# Patient Record
Sex: FEMALE | Race: BLACK OR AFRICAN AMERICAN | NOT HISPANIC OR LATINO | Employment: OTHER | URBAN - METROPOLITAN AREA
[De-identification: names, ages, dates, MRNs, and addresses within clinical notes are randomized per-mention and may not be internally consistent; named-entity substitution may affect disease eponyms.]

---

## 2018-02-10 ENCOUNTER — HOSPITAL ENCOUNTER (OUTPATIENT)
Facility: HOSPITAL | Age: 78
Setting detail: OBSERVATION
Discharge: HOME | End: 2018-02-12
Attending: EMERGENCY MEDICINE | Admitting: FAMILY MEDICINE
Payer: MEDICARE

## 2018-02-10 DIAGNOSIS — R56.9 SEIZURE (HCC): Primary | ICD-10-CM

## 2018-02-10 LAB
ALBUMIN SERPL BCP-MCNC: 3.5 G/DL (ref 3.5–5)
ALP SERPL-CCNC: 180 U/L (ref 46–116)
ALT SERPL W P-5'-P-CCNC: 39 U/L (ref 12–78)
ANION GAP SERPL CALCULATED.3IONS-SCNC: 10 MMOL/L (ref 4–13)
AST SERPL W P-5'-P-CCNC: 30 U/L (ref 5–45)
BASOPHILS # BLD AUTO: 0.01 THOUSANDS/ΜL (ref 0–0.1)
BASOPHILS NFR BLD AUTO: 0 % (ref 0–1)
BILIRUB SERPL-MCNC: 0.2 MG/DL (ref 0.2–1)
BUN SERPL-MCNC: 19 MG/DL (ref 5–25)
CALCIUM SERPL-MCNC: 8.7 MG/DL (ref 8.3–10.1)
CHLORIDE SERPL-SCNC: 103 MMOL/L (ref 100–108)
CO2 SERPL-SCNC: 29 MMOL/L (ref 21–32)
CREAT SERPL-MCNC: 0.86 MG/DL (ref 0.6–1.3)
EOSINOPHIL # BLD AUTO: 0.03 THOUSAND/ΜL (ref 0–0.61)
EOSINOPHIL NFR BLD AUTO: 0 % (ref 0–6)
ERYTHROCYTE [DISTWIDTH] IN BLOOD BY AUTOMATED COUNT: 14.7 % (ref 11.6–15.1)
GFR SERPL CREATININE-BSD FRML MDRD: 75 ML/MIN/1.73SQ M
GLUCOSE SERPL-MCNC: 143 MG/DL (ref 65–140)
HCT VFR BLD AUTO: 37 % (ref 34.8–46.1)
HGB BLD-MCNC: 11.5 G/DL (ref 11.5–15.4)
LYMPHOCYTES # BLD AUTO: 0.93 THOUSANDS/ΜL (ref 0.6–4.47)
LYMPHOCYTES NFR BLD AUTO: 13 % (ref 14–44)
MCH RBC QN AUTO: 28.8 PG (ref 26.8–34.3)
MCHC RBC AUTO-ENTMCNC: 31.1 G/DL (ref 31.4–37.4)
MCV RBC AUTO: 93 FL (ref 82–98)
MONOCYTES # BLD AUTO: 0.64 THOUSAND/ΜL (ref 0.17–1.22)
MONOCYTES NFR BLD AUTO: 9 % (ref 4–12)
NEUTROPHILS # BLD AUTO: 5.59 THOUSANDS/ΜL (ref 1.85–7.62)
NEUTS SEG NFR BLD AUTO: 77 % (ref 43–75)
NRBC BLD AUTO-RTO: 0 /100 WBCS
PLATELET # BLD AUTO: 142 THOUSANDS/UL (ref 149–390)
PMV BLD AUTO: 10.8 FL (ref 8.9–12.7)
POTASSIUM SERPL-SCNC: 3.6 MMOL/L (ref 3.5–5.3)
PROT SERPL-MCNC: 7.3 G/DL (ref 6.4–8.2)
RBC # BLD AUTO: 3.99 MILLION/UL (ref 3.81–5.12)
SODIUM SERPL-SCNC: 142 MMOL/L (ref 136–145)
TROPONIN I SERPL-MCNC: <0.02 NG/ML
WBC # BLD AUTO: 7.22 THOUSAND/UL (ref 4.31–10.16)

## 2018-02-10 PROCEDURE — 36415 COLL VENOUS BLD VENIPUNCTURE: CPT | Performed by: PHYSICIAN ASSISTANT

## 2018-02-10 PROCEDURE — 96374 THER/PROPH/DIAG INJ IV PUSH: CPT

## 2018-02-10 PROCEDURE — 80177 DRUG SCRN QUAN LEVETIRACETAM: CPT | Performed by: PHYSICIAN ASSISTANT

## 2018-02-10 PROCEDURE — 84484 ASSAY OF TROPONIN QUANT: CPT | Performed by: PHYSICIAN ASSISTANT

## 2018-02-10 PROCEDURE — 85025 COMPLETE CBC W/AUTO DIFF WBC: CPT | Performed by: PHYSICIAN ASSISTANT

## 2018-02-10 PROCEDURE — 80053 COMPREHEN METABOLIC PANEL: CPT | Performed by: PHYSICIAN ASSISTANT

## 2018-02-10 PROCEDURE — 93005 ELECTROCARDIOGRAM TRACING: CPT

## 2018-02-10 PROCEDURE — 80184 ASSAY OF PHENOBARBITAL: CPT | Performed by: PHYSICIAN ASSISTANT

## 2018-02-10 RX ORDER — LORAZEPAM 2 MG/ML
2 INJECTION INTRAMUSCULAR ONCE
Status: COMPLETED | OUTPATIENT
Start: 2018-02-10 | End: 2018-02-10

## 2018-02-10 RX ORDER — LEVETIRACETAM 500 MG/1
500 TABLET ORAL EVERY 12 HOURS SCHEDULED
COMMUNITY

## 2018-02-10 RX ORDER — PHENOBARBITAL 64.8 MG/1
64.8 TABLET ORAL 2 TIMES DAILY
COMMUNITY
End: 2022-05-11

## 2018-02-10 RX ADMIN — LORAZEPAM 2 MG: 2 INJECTION INTRAMUSCULAR; INTRAVENOUS at 23:18

## 2018-02-11 ENCOUNTER — APPOINTMENT (OUTPATIENT)
Dept: RADIOLOGY | Facility: HOSPITAL | Age: 78
End: 2018-02-11
Payer: MEDICARE

## 2018-02-11 ENCOUNTER — APPOINTMENT (OUTPATIENT)
Dept: CT IMAGING | Facility: HOSPITAL | Age: 78
End: 2018-02-11
Payer: MEDICARE

## 2018-02-11 PROBLEM — R05.9 COUGH: Status: ACTIVE | Noted: 2018-02-11

## 2018-02-11 PROBLEM — D69.6 THROMBOCYTOPENIA (HCC): Status: ACTIVE | Noted: 2018-02-11

## 2018-02-11 PROBLEM — J06.9 ACUTE UPPER RESPIRATORY INFECTION: Status: ACTIVE | Noted: 2018-02-11

## 2018-02-11 PROBLEM — R06.02 SHORTNESS OF BREATH: Status: ACTIVE | Noted: 2018-02-11

## 2018-02-11 PROBLEM — R56.9 SEIZURE (HCC): Status: ACTIVE | Noted: 2018-02-11

## 2018-02-11 PROBLEM — R23.9 ALTERATION IN SKIN INTEGRITY: Status: ACTIVE | Noted: 2018-02-11

## 2018-02-11 LAB
ANION GAP SERPL CALCULATED.3IONS-SCNC: 8 MMOL/L (ref 4–13)
ATRIAL RATE: 104 BPM
ATRIAL RATE: 88 BPM
BILIRUB UR QL STRIP: NEGATIVE
BUN SERPL-MCNC: 14 MG/DL (ref 5–25)
CALCIUM SERPL-MCNC: 8.7 MG/DL (ref 8.3–10.1)
CHLORIDE SERPL-SCNC: 102 MMOL/L (ref 100–108)
CLARITY UR: CLEAR
CO2 SERPL-SCNC: 24 MMOL/L (ref 21–32)
COLOR UR: YELLOW
CREAT SERPL-MCNC: 0.68 MG/DL (ref 0.6–1.3)
ERYTHROCYTE [DISTWIDTH] IN BLOOD BY AUTOMATED COUNT: 14.7 % (ref 11.6–15.1)
GFR SERPL CREATININE-BSD FRML MDRD: 98 ML/MIN/1.73SQ M
GLUCOSE P FAST SERPL-MCNC: 130 MG/DL (ref 65–99)
GLUCOSE SERPL-MCNC: 130 MG/DL (ref 65–140)
GLUCOSE UR STRIP-MCNC: NEGATIVE MG/DL
HCT VFR BLD AUTO: 39.5 % (ref 34.8–46.1)
HGB BLD-MCNC: 12 G/DL (ref 11.5–15.4)
HGB UR QL STRIP.AUTO: NEGATIVE
KETONES UR STRIP-MCNC: NEGATIVE MG/DL
LEUKOCYTE ESTERASE UR QL STRIP: NEGATIVE
MAGNESIUM SERPL-MCNC: 2.1 MG/DL (ref 1.6–2.6)
MCH RBC QN AUTO: 29.4 PG (ref 26.8–34.3)
MCHC RBC AUTO-ENTMCNC: 30.4 G/DL (ref 31.4–37.4)
MCV RBC AUTO: 97 FL (ref 82–98)
NITRITE UR QL STRIP: NEGATIVE
P AXIS: 27 DEGREES
P AXIS: 48 DEGREES
PH UR STRIP.AUTO: 7.5 [PH] (ref 4.5–8)
PHENOBARB SERPL-MCNC: 14.2 UG/ML (ref 15–40)
PLATELET # BLD AUTO: 135 THOUSANDS/UL (ref 149–390)
PMV BLD AUTO: 10.1 FL (ref 8.9–12.7)
POTASSIUM SERPL-SCNC: 4.5 MMOL/L (ref 3.5–5.3)
PR INTERVAL: 168 MS
PR INTERVAL: 192 MS
PROT UR STRIP-MCNC: NEGATIVE MG/DL
QRS AXIS: -3 DEGREES
QRS AXIS: 21 DEGREES
QRSD INTERVAL: 68 MS
QRSD INTERVAL: 78 MS
QT INTERVAL: 344 MS
QT INTERVAL: 368 MS
QTC INTERVAL: 445 MS
QTC INTERVAL: 452 MS
RBC # BLD AUTO: 4.08 MILLION/UL (ref 3.81–5.12)
SODIUM SERPL-SCNC: 134 MMOL/L (ref 136–145)
SP GR UR STRIP.AUTO: 1.01 (ref 1–1.03)
T WAVE AXIS: 58 DEGREES
T WAVE AXIS: 9 DEGREES
UROBILINOGEN UR QL STRIP.AUTO: 0.2 E.U./DL
VENTRICULAR RATE: 104 BPM
VENTRICULAR RATE: 88 BPM
WBC # BLD AUTO: 5.62 THOUSAND/UL (ref 4.31–10.16)

## 2018-02-11 PROCEDURE — 83735 ASSAY OF MAGNESIUM: CPT | Performed by: NURSE PRACTITIONER

## 2018-02-11 PROCEDURE — 99219 PR INITIAL OBSERVATION CARE/DAY 50 MINUTES: CPT | Performed by: NURSE PRACTITIONER

## 2018-02-11 PROCEDURE — 93005 ELECTROCARDIOGRAM TRACING: CPT

## 2018-02-11 PROCEDURE — 94640 AIRWAY INHALATION TREATMENT: CPT

## 2018-02-11 PROCEDURE — 94760 N-INVAS EAR/PLS OXIMETRY 1: CPT

## 2018-02-11 PROCEDURE — 70450 CT HEAD/BRAIN W/O DYE: CPT

## 2018-02-11 PROCEDURE — 99285 EMERGENCY DEPT VISIT HI MDM: CPT

## 2018-02-11 PROCEDURE — 93010 ELECTROCARDIOGRAM REPORT: CPT | Performed by: INTERNAL MEDICINE

## 2018-02-11 PROCEDURE — 85027 COMPLETE CBC AUTOMATED: CPT | Performed by: NURSE PRACTITIONER

## 2018-02-11 PROCEDURE — 80048 BASIC METABOLIC PNL TOTAL CA: CPT | Performed by: NURSE PRACTITIONER

## 2018-02-11 PROCEDURE — 81003 URINALYSIS AUTO W/O SCOPE: CPT | Performed by: PHYSICIAN ASSISTANT

## 2018-02-11 PROCEDURE — 71045 X-RAY EXAM CHEST 1 VIEW: CPT

## 2018-02-11 PROCEDURE — 99222 1ST HOSP IP/OBS MODERATE 55: CPT | Performed by: PSYCHIATRY & NEUROLOGY

## 2018-02-11 PROCEDURE — 94664 DEMO&/EVAL PT USE INHALER: CPT

## 2018-02-11 RX ORDER — METHYLPREDNISOLONE SODIUM SUCCINATE 40 MG/ML
20 INJECTION, POWDER, LYOPHILIZED, FOR SOLUTION INTRAMUSCULAR; INTRAVENOUS 3 TIMES DAILY
Status: DISCONTINUED | OUTPATIENT
Start: 2018-02-11 | End: 2018-02-12 | Stop reason: HOSPADM

## 2018-02-11 RX ORDER — ACETAMINOPHEN 325 MG/1
650 TABLET ORAL EVERY 6 HOURS PRN
Status: DISCONTINUED | OUTPATIENT
Start: 2018-02-11 | End: 2018-02-12 | Stop reason: HOSPADM

## 2018-02-11 RX ORDER — ATORVASTATIN CALCIUM 80 MG/1
80 TABLET, FILM COATED ORAL DAILY
COMMUNITY

## 2018-02-11 RX ORDER — OMEPRAZOLE 20 MG/1
20 CAPSULE, DELAYED RELEASE ORAL DAILY
COMMUNITY

## 2018-02-11 RX ORDER — HEPARIN SODIUM 5000 [USP'U]/ML
5000 INJECTION, SOLUTION INTRAVENOUS; SUBCUTANEOUS EVERY 8 HOURS SCHEDULED
Status: DISCONTINUED | OUTPATIENT
Start: 2018-02-11 | End: 2018-02-11

## 2018-02-11 RX ORDER — GUAIFENESIN 600 MG
600 TABLET, EXTENDED RELEASE 12 HR ORAL 2 TIMES DAILY
Status: DISCONTINUED | OUTPATIENT
Start: 2018-02-11 | End: 2018-02-12 | Stop reason: HOSPADM

## 2018-02-11 RX ORDER — FOLIC ACID 1 MG/1
TABLET ORAL DAILY
COMMUNITY

## 2018-02-11 RX ORDER — MELATONIN
2000 DAILY
COMMUNITY

## 2018-02-11 RX ORDER — FOLIC ACID 1 MG/1
1 TABLET ORAL DAILY
Status: DISCONTINUED | OUTPATIENT
Start: 2018-02-11 | End: 2018-02-12 | Stop reason: HOSPADM

## 2018-02-11 RX ORDER — PHENOBARBITAL 32.4 MG/1
64.8 TABLET ORAL 2 TIMES DAILY
Status: DISCONTINUED | OUTPATIENT
Start: 2018-02-11 | End: 2018-02-12 | Stop reason: HOSPADM

## 2018-02-11 RX ORDER — ALBUTEROL SULFATE 2.5 MG/3ML
2.5 SOLUTION RESPIRATORY (INHALATION) EVERY 6 HOURS PRN
Status: DISCONTINUED | OUTPATIENT
Start: 2018-02-11 | End: 2018-02-12

## 2018-02-11 RX ORDER — MAGNESIUM HYDROXIDE/ALUMINUM HYDROXICE/SIMETHICONE 120; 1200; 1200 MG/30ML; MG/30ML; MG/30ML
30 SUSPENSION ORAL EVERY 4 HOURS PRN
Status: DISCONTINUED | OUTPATIENT
Start: 2018-02-11 | End: 2018-02-12 | Stop reason: HOSPADM

## 2018-02-11 RX ORDER — PANTOPRAZOLE SODIUM 40 MG/1
40 TABLET, DELAYED RELEASE ORAL
Status: DISCONTINUED | OUTPATIENT
Start: 2018-02-11 | End: 2018-02-12 | Stop reason: HOSPADM

## 2018-02-11 RX ORDER — METHYLPREDNISOLONE SODIUM SUCCINATE 125 MG/2ML
125 INJECTION, POWDER, LYOPHILIZED, FOR SOLUTION INTRAMUSCULAR; INTRAVENOUS ONCE
Status: COMPLETED | OUTPATIENT
Start: 2018-02-11 | End: 2018-02-11

## 2018-02-11 RX ORDER — LEVETIRACETAM 500 MG/1
500 TABLET ORAL EVERY 12 HOURS SCHEDULED
Status: DISCONTINUED | OUTPATIENT
Start: 2018-02-11 | End: 2018-02-12 | Stop reason: HOSPADM

## 2018-02-11 RX ORDER — LEVETIRACETAM 500 MG/1
500 TABLET ORAL EVERY 12 HOURS SCHEDULED
Status: DISCONTINUED | OUTPATIENT
Start: 2018-02-11 | End: 2018-02-11 | Stop reason: SDUPTHER

## 2018-02-11 RX ADMIN — PANTOPRAZOLE SODIUM 40 MG: 40 TABLET, DELAYED RELEASE ORAL at 06:45

## 2018-02-11 RX ADMIN — Medication 1000 MG: at 01:10

## 2018-02-11 RX ADMIN — METOPROLOL TARTRATE 25 MG: 25 TABLET ORAL at 21:22

## 2018-02-11 RX ADMIN — METOPROLOL TARTRATE 25 MG: 25 TABLET ORAL at 02:54

## 2018-02-11 RX ADMIN — LEVETIRACETAM 500 MG: 500 TABLET ORAL at 08:27

## 2018-02-11 RX ADMIN — METHYLPREDNISOLONE SODIUM SUCCINATE 20 MG: 40 INJECTION, POWDER, FOR SOLUTION INTRAMUSCULAR; INTRAVENOUS at 08:27

## 2018-02-11 RX ADMIN — METHYLPREDNISOLONE SODIUM SUCCINATE 20 MG: 40 INJECTION, POWDER, FOR SOLUTION INTRAMUSCULAR; INTRAVENOUS at 17:12

## 2018-02-11 RX ADMIN — GUAIFENESIN 600 MG: 600 TABLET, EXTENDED RELEASE ORAL at 17:12

## 2018-02-11 RX ADMIN — PHENOBARBITAL 64.8 MG: 32.4 TABLET ORAL at 10:12

## 2018-02-11 RX ADMIN — METHYLPREDNISOLONE SODIUM SUCCINATE 125 MG: 125 INJECTION, POWDER, FOR SOLUTION INTRAMUSCULAR; INTRAVENOUS at 02:54

## 2018-02-11 RX ADMIN — METOPROLOL TARTRATE 25 MG: 25 TABLET ORAL at 08:27

## 2018-02-11 RX ADMIN — ALBUTEROL SULFATE 2.5 MG: 2.5 SOLUTION RESPIRATORY (INHALATION) at 02:25

## 2018-02-11 RX ADMIN — GUAIFENESIN 600 MG: 600 TABLET, EXTENDED RELEASE ORAL at 08:27

## 2018-02-11 RX ADMIN — METHYLPREDNISOLONE SODIUM SUCCINATE 20 MG: 40 INJECTION, POWDER, FOR SOLUTION INTRAMUSCULAR; INTRAVENOUS at 21:22

## 2018-02-11 RX ADMIN — FOLIC ACID 1 MG: 1 TABLET ORAL at 08:27

## 2018-02-11 RX ADMIN — LEVETIRACETAM 500 MG: 500 TABLET ORAL at 21:22

## 2018-02-11 RX ADMIN — PHENOBARBITAL 64.8 MG: 32.4 TABLET ORAL at 17:12

## 2018-02-11 NOTE — ASSESSMENT & PLAN NOTE
· History of bilateral lower extremity lymphedema  · Noted thick dry scaly skin  Ichthyoderma  No signs of infectious process  · Will consult wound Care for further management

## 2018-02-11 NOTE — ASSESSMENT & PLAN NOTE
· With Dry Cough, audible wheezing and shortness of breath  Onset 1 week ago per daughter  · Chest Xray, antitussive, respiratory protocol with p r n  neb treatments, IV Solu-Medrol  · Afebrile, no leukocytosis  Will hold off on antibiotics at this time

## 2018-02-11 NOTE — ED PROVIDER NOTES
History  Chief Complaint   Patient presents with    Seizure - Prior Hx Of     Pt brought in by EMS for seizures, family forgot to give seizure medication for the past 2 days      Max Padilla is a 68 y o  female w PMH seizure d/o who presents for evaluation of seizure  Very ltd hx provided by pt and EMS  She is not accompanied to visit by family  Apparently lives w daughter  EMS was called and when arrived she was having a seizure, at time she was rolled into ED she had a seizure  Was post ictal following and not able to recall events before hand  Apparently she has not had meds for 2 days  There is no family contact number provided to us  Prior to Admission Medications   Prescriptions Last Dose Informant Patient Reported? Taking? PHENobarbital 64 8 mg tablet   Yes Yes   Sig: Take 64 8 mg by mouth 2 (two) times a day   folic acid (FOLVITE) 1 mg tablet   Yes Yes   Sig: Take by mouth daily   levETIRAcetam (KEPPRA) 500 mg tablet   Yes Yes   Sig: Take 500 mg by mouth every 12 (twelve) hours   metoprolol tartrate (LOPRESSOR) 25 mg tablet   Yes Yes   Sig: Take 25 mg by mouth every 12 (twelve) hours   omeprazole (PriLOSEC) 20 mg delayed release capsule   Yes Yes   Sig: Take 20 mg by mouth daily      Facility-Administered Medications: None       Past Medical History:   Diagnosis Date    Seizures ([facility] [address] )        History reviewed  No pertinent surgical history  History reviewed  No pertinent family history  I have reviewed and agree with the history as documented  Social History   Substance Use Topics    Smoking status: Never Smoker    Smokeless tobacco: Never Used    Alcohol use No        Review of Systems   Constitutional: Negative for chills, diaphoresis and fever  HENT: Negative for congestion and sore throat  Eyes: Negative for visual disturbance  Respiratory: Negative for cough, chest tightness, shortness of breath and wheezing      Cardiovascular: Negative for chest pain and leg swelling  Gastrointestinal: Negative for abdominal pain, constipation, diarrhea, nausea and vomiting  Genitourinary: Negative for difficulty urinating, dysuria, frequency, hematuria, urgency, vaginal bleeding, vaginal discharge and vaginal pain  Musculoskeletal: Negative for arthralgias and myalgias  Neurological: Positive for seizures  Negative for dizziness, weakness, light-headedness, numbness and headaches  Psychiatric/Behavioral: The patient is not nervous/anxious  Physical Exam  ED Triage Vitals   Temperature Pulse Respirations Blood Pressure SpO2   02/10/18 2259 02/10/18 2259 02/10/18 2259 02/10/18 2300 02/10/18 2259   98 2 °F (36 8 °C) 103 16 (!) 177/82 97 %      Temp Source Heart Rate Source Patient Position - Orthostatic VS BP Location FiO2 (%)   02/10/18 2259 02/10/18 2259 02/10/18 2259 02/10/18 2259 --   Oral Monitor Lying Right arm       Pain Score       02/10/18 2259       No Pain           Orthostatic Vital Signs  Vitals:    02/10/18 2300 02/11/18 0000 02/11/18 0124 02/11/18 0229   BP: (!) 177/82 127/77 144/86    Pulse: 99 84 82 82   Patient Position - Orthostatic VS: Sitting Lying Lying        Physical Exam   Constitutional: She is oriented to person, place, and time  She appears well-developed and well-nourished  No distress  HENT:   Head: Normocephalic and atraumatic  Eyes: Pupils are equal, round, and reactive to light  Neck: Neck supple  No tracheal deviation present  Cardiovascular: Normal rate, regular rhythm and intact distal pulses  Exam reveals no gallop and no friction rub  No murmur heard  Pulmonary/Chest: Effort normal and breath sounds normal  No respiratory distress  She has no wheezes  She has no rales  Abdominal: Soft  Bowel sounds are normal  She exhibits no distension and no mass  There is no tenderness  There is no guarding  Musculoskeletal: She exhibits no edema or deformity  Neurological: She is alert and oriented to person, place, and time  gcs 14 - eyes closed but awaken to voice, she follows commands and appropriately answers questions but is in post ictal state initially, unable to remain awake for thorough neuro exam   Skin: Skin is warm and dry  She is not diaphoretic  Psychiatric: She has a normal mood and affect  Her behavior is normal    Nursing note and vitals reviewed        ED Medications  Medications   albuterol inhalation solution 2 5 mg (2 5 mg Nebulization Given 0/23/05 7926)   folic acid (FOLVITE) tablet 1 mg (1 mg Oral Given 2/11/18 0827)   metoprolol tartrate (LOPRESSOR) tablet 25 mg (25 mg Oral Given 2/11/18 0827)   pantoprazole (PROTONIX) EC tablet 40 mg (40 mg Oral Given 2/11/18 0645)   PHENobarbital tablet 64 8 mg (64 8 mg Oral Given 2/11/18 1012)   acetaminophen (TYLENOL) tablet 650 mg (not administered)   levETIRAcetam (KEPPRA) tablet 500 mg (500 mg Oral Given 2/11/18 0827)   pneumococcal 13-valent conjugate vaccine (PREVNAR-13) IM injection 0 5 mL (not administered)   influenza inactivated quadrivalent vaccine (FLULAVAL) IM injection 0 5 mL (not administered)   guaiFENesin (MUCINEX) 12 hr tablet 600 mg (600 mg Oral Given 2/11/18 0827)   methylPREDNISolone sodium succinate (Solu-MEDROL) injection 20 mg (20 mg Intravenous Given 2/11/18 0827)   LORazepam (ATIVAN) 2 mg/mL injection 2 mg (2 mg Intravenous Given 2/10/18 2318)   levETIRAcetam (KEPPRA) 1,000 mg in sodium chloride 0 9 % 100 mL IVPB (0 mg Intravenous Stopped 2/11/18 0234)   methylPREDNISolone sodium succinate (Solu-MEDROL) injection 125 mg (125 mg Intravenous Given 2/11/18 0254)       Diagnostic Studies  Results Reviewed     Procedure Component Value Units Date/Time    UA w Reflex to Microscopic w Reflex to Culture [[id number]]  (Normal) Collected:  02/11/18 0944    Lab Status:  Final result Specimen:  Urine from Urine, Other Updated:  02/11/18 0954     Color, UA Yellow     Clarity, UA Clear     Specific Gravity, UA 1 015     pH, UA 7 5     Leukocytes, UA Negative Nitrite, UA Negative     Protein, UA Negative mg/dl      Glucose, UA Negative mg/dl      Ketones, UA Negative mg/dl      Urobilinogen, UA 0 2 E U /dl      Bilirubin, UA Negative     Blood, UA Negative    Troponin I [[id number]]  (Normal) Collected:  02/10/18 2328    Lab Status:  Final result Specimen:  Blood from Arm, Right Updated:  02/10/18 2359     Troponin I <0 02 ng/mL     Narrative:         [employer] Chemistry analyzer 99% cutoff is > 0 04 ng/mL in network labs    o cTnI 99% cutoff is useful only when applied to patients in the clinical setting of myocardial ischemia  o cTnI 99% cutoff should be interpreted in the context of clinical history, ECG findings and possibly cardiac imaging to establish correct diagnosis  o cTnI 99% cutoff may be suggestive but clearly not indicative of a coronary event without the clinical setting of myocardial ischemia  Phenobarbital level [[id number]] Collected:  02/10/18 2328    Lab Status:   In process Specimen:  Blood from Hand, Right Updated:  02/10/18 2356    CBC and differential [[id number]]  (Abnormal) Collected:  02/10/18 2328    Lab Status:  Final result Specimen:  Blood from Hand, Right Updated:  02/10/18 2352     WBC 7 22 Thousand/uL      RBC 3 99 Million/uL      Hemoglobin 11 5 g/dL      Hematocrit 37 0 %      MCV 93 fL      MCH 28 8 pg      MCHC 31 1 (L) g/dL      RDW 14 7 %      MPV 10 8 fL      Platelets 592 (L) Thousands/uL      nRBC 0 /100 WBCs      Neutrophils Relative 77 (H) %      Lymphocytes Relative 13 (L) %      Monocytes Relative 9 %      Eosinophils Relative 0 %      Basophils Relative 0 %      Neutrophils Absolute 5 59 Thousands/µL      Lymphocytes Absolute 0 93 Thousands/µL      Monocytes Absolute 0 64 Thousand/µL      Eosinophils Absolute 0 03 Thousand/µL      Basophils Absolute 0 01 Thousands/µL     Comprehensive metabolic panel [[id number]]  (Abnormal) Collected:  02/10/18 2328    Lab Status:  Final result Specimen:  Blood from Hand, Right Updated:  02/10/18 2351     Sodium 142 mmol/L      Potassium 3 6 mmol/L      Chloride 103 mmol/L      CO2 29 mmol/L      Anion Gap 10 mmol/L      BUN 19 mg/dL      Creatinine 0 86 mg/dL      Glucose 143 (H) mg/dL      Calcium 8 7 mg/dL      AST 30 U/L      ALT 39 U/L      Alkaline Phosphatase 180 (H) U/L      Total Protein 7 3 g/dL      Albumin 3 5 g/dL      Total Bilirubin 0 20 mg/dL      eGFR 75 ml/min/1 73sq m     Narrative:         National Kidney Disease Education Program recommendations are as follows:  GFR calculation is accurate only with a steady state creatinine  Chronic Kidney disease less than 60 ml/min/1 73 sq  meters  Kidney failure less than 15 ml/min/1 73 sq  meters  Levetiracetam level [[id number]] Collected:  02/10/18 2328    Lab Status: In process Specimen:  Blood from Arm, Right Updated:  02/10/18 2332                 XR chest portable   Final Result by Tiffanie Mayfield MD (02/11 3423)      1  Mild atelectasis at the left lung base  Otherwise no focal consolidation  2   Catheter overlying the right thorax which may represent a ventriculoperitoneal shunt which appears kinked in several locations  Workstation performed: [id number]                    Procedures  Procedures       Phone Contacts  ED Phone Contact    ED Course  ED Course                                MDM  Number of Diagnoses or Management Options  Seizure [facility]):   Diagnosis management comments: DDX includes but not ltd to:   Breakthrough seizure that seems to have occurred 2/2 missing meds  We will obtain levels, resume home meds and load keppra    Plan is to obtain:  EKG/trop to check for ischemic changes   CBC to check for anemia, leukocytosis, hydration status  Chemistry panel to check for lyte abnormalities, organ function     Based on results:  Admit for neuro eval, monitoring given unable to send pt home to safe environment  No family members to contact and no family members here   I cannot confirm that she is in her usual state of health / returned to baseline  Will need neuro checks and discussion w family in am      Portions of the record may have been created with voice recognition software   Occasional wrong word or "sound a like" substitutions may have occurred due to the inherent limitations of voice recognition software   Read the chart carefully and recognize, using context, where substitutions have occurred  CritCare Time    Disposition  Final diagnoses:   Seizure ([address] )     Time reflects when diagnosis was documented in both MDM as applicable and the Disposition within this note     Time User Action Codes Description Comment    2/11/2018 12:27 AM Marisela Able Add [R56 9] Seizure [facility])       ED Disposition     ED Disposition Condition Comment    Admit  Case was discussed with tierra morris and the patient's admission status was agreed to be Admission Status: observation status to the service of Dr Sana Malagon   Follow-up Information    None       Current Discharge Medication List      CONTINUE these medications which have NOT CHANGED    Details   folic acid (FOLVITE) 1 mg tablet Take by mouth daily      levETIRAcetam (KEPPRA) 500 mg tablet Take 500 mg by mouth every 12 (twelve) hours      metoprolol tartrate (LOPRESSOR) 25 mg tablet Take 25 mg by mouth every 12 (twelve) hours      omeprazole (PriLOSEC) 20 mg delayed release capsule Take 20 mg by mouth daily      PHENobarbital 64 8 mg tablet Take 64 8 mg by mouth 2 (two) times a day           No discharge procedures on file      ED Provider  Electronically Signed by           Julio Pizarro PA-C  02/11/18 8901 JULY Ndiaye PA-C  02/11/18 3241

## 2018-02-11 NOTE — PLAN OF CARE
NEUROSENSORY - ADULT     Absence of seizures Progressing     Remains free of injury related to seizures activity Progressing        Potential for Falls     Patient will remain free of falls Progressing        Prexisting or High Potential for Compromised Skin Integrity     Skin integrity is maintained or improved Progressing        RESPIRATORY - ADULT     Achieves optimal ventilation and oxygenation Progressing

## 2018-02-11 NOTE — H&P
H&P- Rebecca Johnston 1940, 68 y o  female MRN: [id number]    Unit/Bed#: -01 Encounter: [id number]    Primary Care Provider: No primary care provider on file  Date and time admitted to hospital: 2/10/2018 10:52 PM    * Seizure [facility])   Assessment & Plan    · Pre-Hospital   Family report forgetting to administer meds yesterday  · Monitor Keppra Level  · Seizure protocol  · Neurology Consult  · Continue home meds of Keppra and phenop  Alteration in skin integrity   Assessment & Plan    · History of bilateral lower extremity lymphedema  · Noted thick dry scaly skin  Ichthyoderma  No signs of infectious process  · Will consult wound Care for further management  Acute upper respiratory infection   Assessment & Plan    · With persistent, Dry Cough, audible wheezing and shortness of breath  Onset 1 week ago per daughter  · Chest Xray, antitussive, respiratory protocol with p r n  neb treatments, IV Solu-Medrol  · Afebrile, no leukocytosis  Will hold off on antibiotics at this time  Thrombocytopenia (HCC)   Assessment & Plan    · No acute sign of bleed  Will monitor ongoing  VTE Prophylaxis: Pharmacologic VTE Prophylaxis contraindicated due to Thrombocytopenia  / reason for no mechanical VTE prophylaxis Ace wrap   Code Status:  Full code  POLST: There is no POLST form on file for this patient (pre-hospital)  Discussion with family:  Daughters    Anticipated Length of Stay:  Patient will be admitted on an Observation basis with an anticipated length of stay of  less than 2 midnights  Justification for Hospital Stay:  Seizure    Total Time for Visit, including Counseling / Coordination of Care: 45 minutes  Greater than 50% of this total time spent on direct patient counseling and coordination of care  Chief Complaint:   Seizure    History of Present Illness:    Rebecca Johnston is a 68 y o  female history of seizure who presents via EMS status post seizures at home  Daughter at bedside report she forgot to administer seizure medications yesterday  On my exam, Patient also noted with persistent dry cough, shortness of breath and diffuse wheezing  Daughter at bedside report patient has been coughing for the past week  Denies fever chills nausea vomiting or diarrhea  Review of Systems:    Review of Systems   Constitutional: Negative for chills and fever  HENT: Positive for congestion  Respiratory: Positive for cough, shortness of breath and wheezing  Skin:        Bilateral lower extremity lymphedema, tough, dry scaly skin  Neurological: Positive for seizures  Negative for dizziness, syncope, weakness and light-headedness  All other systems reviewed and are negative  Past Medical and Surgical History:     Past Medical History:   Diagnosis Date    Seizures ([facility] [address] )        History reviewed  No pertinent surgical history  Meds/Allergies:    Prior to Admission medications    Medication Sig Start Date End Date Taking? Authorizing Provider   folic acid (FOLVITE) 1 mg tablet Take by mouth daily   Yes Historical Provider, MD   levETIRAcetam (KEPPRA) 500 mg tablet Take 500 mg by mouth every 12 (twelve) hours   Yes Historical Provider, MD   metoprolol tartrate (LOPRESSOR) 25 mg tablet Take 25 mg by mouth every 12 (twelve) hours   Yes Historical Provider, MD   omeprazole (PriLOSEC) 20 mg delayed release capsule Take 20 mg by mouth daily   Yes Historical Provider, MD   PHENobarbital 64 8 mg tablet Take 64 8 mg by mouth 2 (two) times a day   Yes Historical Provider, MD     I have reviewed home medications with patient family member  Allergies:    Allergies   Allergen Reactions    Penicillins        Social History:     Marital Status: Unknown   Occupation:  Retired  Patient Pre-hospital Living Situation:  Home  Patient Pre-hospital Level of Mobility:  Independent  Patient Pre-hospital Diet Restrictions:  None  Substance Use History:   History   Alcohol Use No History   Smoking Status    Never Smoker   Smokeless Tobacco    Never Used     History   Drug Use No       Family History:    non-contributory    Physical Exam:     Vitals:   Blood Pressure: 144/86 (02/11/18 0124)  Pulse: 82 (02/11/18 0229)  Temperature: 98 2 °F (36 8 °C) (02/11/18 0124)  Temp Source: Oral (02/11/18 0124)  Respirations: 20 (02/11/18 0229)  Height: 5' 4" (162 6 cm) (02/10/18 2300)  Weight - Scale: 91 9 kg (202 lb 9 6 oz) (02/10/18 2300)  SpO2: 99 % (02/11/18 0229)    Physical Exam   Constitutional: She is oriented to person, place, and time  She appears well-developed and well-nourished  HENT:   Head: Normocephalic and atraumatic  Anita patient   Neck: Normal range of motion  Neck supple  Cardiovascular: Normal rate, regular rhythm and normal heart sounds  Pulmonary/Chest: Tachypnea noted  She has wheezes (Diffuse)  Abdominal: Soft  Bowel sounds are normal  She exhibits no distension  Genitourinary:   Genitourinary Comments: Incontinent   Musculoskeletal: Normal range of motion  She exhibits no edema or tenderness  Neurological: She is alert and oriented to person, place, and time  Skin: Skin is warm and dry  Tough, Dry scaly bilateral lower extremity   Psychiatric: She has a normal mood and affect  Her behavior is normal        Additional Data:     Lab Results: I have personally reviewed pertinent reports          Results from last 7 days  Lab Units 02/10/18  2328   WBC Thousand/uL 7 22   HEMOGLOBIN g/dL 11 5   HEMATOCRIT % 37 0   PLATELETS Thousands/uL 142*   NEUTROS PCT % 77*   LYMPHS PCT % 13*   MONOS PCT % 9   EOS PCT % 0       Results from last 7 days  Lab Units 02/10/18  2328   SODIUM mmol/L 142   POTASSIUM mmol/L 3 6   CHLORIDE mmol/L 103   CO2 mmol/L 29   BUN mg/dL 19   CREATININE mg/dL 0 86   CALCIUM mg/dL 8 7   TOTAL PROTEIN g/dL 7 3   BILIRUBIN TOTAL mg/dL 0 20   ALK PHOS U/L 180*   ALT U/L 39   AST U/L 30   GLUCOSE RANDOM mg/dL 143*           Imaging: Pending    XR chest portable    (Results Pending)       EKG, Pathology, and Other Studies Reviewed on Admission:   · EKG:  Normal sinus    Allscripts / Epic Records Reviewed: Yes     ** Please Note: This note has been constructed using a voice recognition system   **

## 2018-02-11 NOTE — CONSULTS
Consultation - Neurology   Grady Solis 68 y o  female MRN: [id number]  Unit/Bed#: -01 Encounter: [id number]      Physician Requesting Consult: Kristina Ortiz MD  Reason for Consult:  Seizure  Hx and PE limited by:     HPI: Grady Solis is a (handedness not determinable)  68 y o  female with history of seizure who did not get his seizure medication at home as per the nursing staff and had a seizure and was brought to the hospital, she has also been coughing, no other complaints, patient at baseline is confused according to the nursing staff, family is not available to confirm that  Review of Systems:  See history of present illness for pertinent neurological symptoms  All other systems are negative  Historical Information   Past Medical History:   Diagnosis Date    Seizures ([facility] [address] )      History reviewed  No pertinent surgical history  Social History   History   Smoking Status    Never Smoker   Smokeless Tobacco    Never Used     History   Alcohol Use No     History   Drug Use No       Family History:   History reviewed  No pertinent family history      Allergies   Allergen Reactions    Penicillins        Meds:  All current active meds have been reviewed    Scheduled Meds:  Current Facility-Administered Medications:  acetaminophen 650 mg Oral Q6H PRN AUREA Blackwell   albuterol 2 5 mg Nebulization Q6H PRN AUREA Blackwell   folic acid 1 mg Oral Daily AUREA Blackwell   guaiFENesin 600 mg Oral BID AUREA Blackwell   influenza vaccine 0 5 mL Intramuscular Prior to discharge AUREA Blackwell   levETIRAcetam 500 mg Oral Q12H [facility] AUREA Blackwell   methylPREDNISolone sodium succinate 20 mg Intravenous TID AUREA Blackwell   metoprolol tartrate 25 mg Oral Q12H [facility] AUREA Blackwell   pantoprazole 40 mg Oral Early Morning AUREA Blackwell   PHENobarbital 64 8 mg Oral BID AUREA Blackwell   pneumococcal 13-valent conjugate vaccine 0 5 mL Intramuscular Prior to discharge AUREA Blackwell     PRN Meds:   acetaminophen    albuterol    influenza vaccine    pneumococcal 13-valent conjugate vaccine    Physical Exam:   Objective   Vitals:   Vitals:    02/11/18 0229   BP:    Pulse: 82   Resp: 20   Temp:    SpO2: 99%   ,Body mass index is 34 78 kg/m²  Patient was examined in bed  General appearance: Cooperative in no acute distress  Head & neck head is atraumatic and normocephalic  Neck is supple with full range of motion  Cardiovascular: Carotid arteries-no carotid bruits  Neurologic:   Patient is alert awake oriented, to simple commands, unable to tell me the month year or the president, speech is fluent  No evidence of any aphasia or dysarthria  Cranial nerve examination reveals visual fields are full to threat, pupils equal and reactive, extraocular movements intact, sensation in the V1 V2 V3 distribution is symmetric, no obvious facial asymmetry noted, tongue is midline and gag is adequate    Motor examination reveals normal tone and bulk, no evidence of any drift to the outstretched extremities, strength is 5/5 preserved bilaterally in both upper and lower extremities, deep tendon reflexes are intact, toes are downgoing to withdrawal  Sensory examination to pinprick light touch proprioception and vibration is preserved bilaterally, limited exam   Coordination no evidence of any finger-to-nose dysmetria, no evidence of any dysdiadochokinesia,  Gait could not be tested    Lab Results:Lab Results:   CBC:   Results from last 7 days  Lab Units 02/11/18  0814 02/10/18  2328   WBC Thousand/uL 5 62 7 22   RBC Million/uL 4 08 3 99   HEMOGLOBIN g/dL 12 0 11 5   HEMATOCRIT % 39 5 37 0   MCV fL 97 93   PLATELETS Thousands/uL 135* 142*   , BMP/CMP:   Results from last 7 days  Lab Units 02/11/18  0814 02/10/18  2328   SODIUM mmol/L 134* 142   POTASSIUM mmol/L 4 5 3 6   CHLORIDE mmol/L 102 103   CO2 mmol/L 24 29   ANION GAP mmol/L 8 10   BUN mg/dL 14 19   CREATININE mg/dL 0 68 0 86   GLUCOSE RANDOM mg/dL 130 143*   CALCIUM mg/dL 8 7 8 7   AST U/L  --  30   ALT U/L  --  39   ALK PHOS U/L  --  180*   TOTAL PROTEIN g/dL  --  7 3   BILIRUBIN TOTAL mg/dL  --  0 20   EGFR ml/min/1 73sq m 98 75     I have personally reviewed pertinent reports  EEG, Echo, Pathology, and Other Studies: I have personally reviewed pertinent reports  Family, was not present at the bedside for history and examination  Assessment:  1  Seizure disorder with change in mental status questionable patient's baseline    Plan:  Would recommend a CT scan of the brain an EEG, continue with phenobarb and Keppra, adjust the medication according to the level, will need to check with family as to what patient's baseline is, she could be also having some cognitive impairment at baseline, she needs constant supervision and cannot be left alone, other management as per primary team     Counseling / Coordination of Care  Total time spent today 40 minutes  Greater than 50% of total time was spent with the patient and / or family counseling and / or coordination of care   A description of the counseling / coordination of care:     Aline Solis MD  2/11/2018,12:52 PM    "This note has been constructed using a voice recognition system "

## 2018-02-11 NOTE — ASSESSMENT & PLAN NOTE
· Pre-Hospital   Family report forgetting to administer meds yesterday  · Monitor Keppra Level  · Seizure protocol  · Neurology Consult  · Continue home meds of Keppra and phenop

## 2018-02-11 NOTE — PROGRESS NOTES
Jana Johnson patient's chair ringing while I was in another patient's room  Quickly went to respond to the chair alarm and found the patient sliding off the chair  She slowly came down to the floor  Asked the patient if she has any pain and asked her to do active range of motion in her extremities  The patient stated that she feels fine and that she was just trying to reach for her coffee  Madan Phi helped me get the patient up and back into bed  All fall precautions were and still in place  Chair/bed alarm on, yellow non-skid socks are on, yellow bracelet on patient's wrist, and fall risk sign visible  Notified Dr Melly Hancock  No new orders at this time  Will continue to monitor the patient  Tried educating the patient on fall precaution but the patient is forgetful and confused at times

## 2018-02-12 ENCOUNTER — APPOINTMENT (OUTPATIENT)
Dept: NEUROLOGY | Facility: HOSPITAL | Age: 78
End: 2018-02-12
Attending: PSYCHIATRY & NEUROLOGY
Payer: MEDICARE

## 2018-02-12 VITALS
SYSTOLIC BLOOD PRESSURE: 155 MMHG | HEART RATE: 73 BPM | OXYGEN SATURATION: 99 % | RESPIRATION RATE: 16 BRPM | BODY MASS INDEX: 34.59 KG/M2 | HEIGHT: 64 IN | DIASTOLIC BLOOD PRESSURE: 87 MMHG | WEIGHT: 202.6 LBS | TEMPERATURE: 98.2 F

## 2018-02-12 LAB — TSH SERPL DL<=0.05 MIU/L-ACNC: 0.46 UIU/ML (ref 0.36–3.74)

## 2018-02-12 PROCEDURE — 84443 ASSAY THYROID STIM HORMONE: CPT | Performed by: FAMILY MEDICINE

## 2018-02-12 PROCEDURE — 95816 EEG AWAKE AND DROWSY: CPT

## 2018-02-12 PROCEDURE — G0008 ADMIN INFLUENZA VIRUS VAC: HCPCS | Performed by: NURSE PRACTITIONER

## 2018-02-12 PROCEDURE — 95816 EEG AWAKE AND DROWSY: CPT | Performed by: PSYCHIATRY & NEUROLOGY

## 2018-02-12 PROCEDURE — 90686 IIV4 VACC NO PRSV 0.5 ML IM: CPT | Performed by: NURSE PRACTITIONER

## 2018-02-12 PROCEDURE — 90670 PCV13 VACCINE IM: CPT | Performed by: NURSE PRACTITIONER

## 2018-02-12 PROCEDURE — G0009 ADMIN PNEUMOCOCCAL VACCINE: HCPCS | Performed by: NURSE PRACTITIONER

## 2018-02-12 RX ORDER — ALBUTEROL SULFATE 2.5 MG/3ML
2.5 SOLUTION RESPIRATORY (INHALATION) EVERY 4 HOURS PRN
Status: DISCONTINUED | OUTPATIENT
Start: 2018-02-12 | End: 2018-02-12 | Stop reason: HOSPADM

## 2018-02-12 RX ADMIN — METOPROLOL TARTRATE 25 MG: 25 TABLET ORAL at 08:18

## 2018-02-12 RX ADMIN — METHYLPREDNISOLONE SODIUM SUCCINATE 20 MG: 40 INJECTION, POWDER, FOR SOLUTION INTRAMUSCULAR; INTRAVENOUS at 08:18

## 2018-02-12 RX ADMIN — PNEUMOCOCCAL 13-VALENT CONJUGATE VACCINE 0.5 ML: 2.2; 2.2; 2.2; 2.2; 2.2; 4.4; 2.2; 2.2; 2.2; 2.2; 2.2; 2.2; 2.2 INJECTION, SUSPENSION INTRAMUSCULAR at 18:21

## 2018-02-12 RX ADMIN — INFLUENZA VIRUS VACCINE 0.5 ML: 15; 15; 15; 15 SUSPENSION INTRAMUSCULAR at 18:23

## 2018-02-12 RX ADMIN — PANTOPRAZOLE SODIUM 40 MG: 40 TABLET, DELAYED RELEASE ORAL at 08:18

## 2018-02-12 RX ADMIN — PHENOBARBITAL 64.8 MG: 32.4 TABLET ORAL at 08:18

## 2018-02-12 RX ADMIN — PHENOBARBITAL 64.8 MG: 32.4 TABLET ORAL at 17:39

## 2018-02-12 RX ADMIN — GUAIFENESIN 600 MG: 600 TABLET, EXTENDED RELEASE ORAL at 08:18

## 2018-02-12 RX ADMIN — LEVETIRACETAM 500 MG: 500 TABLET ORAL at 08:18

## 2018-02-12 RX ADMIN — FOLIC ACID 1 MG: 1 TABLET ORAL at 08:18

## 2018-02-12 RX ADMIN — GUAIFENESIN 600 MG: 600 TABLET, EXTENDED RELEASE ORAL at 17:39

## 2018-02-12 RX ADMIN — METHYLPREDNISOLONE SODIUM SUCCINATE 20 MG: 40 INJECTION, POWDER, FOR SOLUTION INTRAMUSCULAR; INTRAVENOUS at 15:14

## 2018-02-12 NOTE — PLAN OF CARE
Problem: DISCHARGE PLANNING - CARE MANAGEMENT  Goal: Discharge to post-acute care or home with appropriate resources  INTERVENTIONS:  - Conduct assessment to determine patient/family and health care team treatment goals, and need for post-acute services based on payer coverage, community resources, and patient preferences, and barriers to discharge  - Address psychosocial, clinical, and financial barriers to discharge as identified in assessment in conjunction with the patient/family and health care team  - Arrange appropriate level of post-acute services according to patients   needs and preference and payer coverage in collaboration with the physician and health care team  - Communicate with and update the patient/family, physician, and health care team regarding progress on the discharge plan  - Arrange appropriate transportation to post-acute venues  Outcome: Progressing  Spoke with patient in her room and she stated that she lives with her family in a house with no steps  She is independent with ADL's and ambulation  She has a wa lker which she uses, but alos has a W/C and a shower chair  She has used [facility] in the past  She purchases her meds at [facility] in [facility] and can afford all of her meds  There is no history of mental illness nor substance abuse  She does not drive but her daughter does and drives her wherever she needs to go and will transfer home at NH

## 2018-02-12 NOTE — PHYSICIAN ADVISOR
Current patient class: Observation  The patient is currently on Hospital Day: 3      The patient was admitted to the hospital  on N/A at N/A for the following diagnosis:  Seizure [facility]) [R56 [id number]]     After review of the relevant documentation, labs, vital signs and test results, the patient is most appropriate for OBSERVATION STATUS  The patient was admitted to the hospital without an expected length of stay of at least 2 midnights  Given that the patient is subject to the 2 midnight benchmark as a CMS patient, they are appropriate for observation status at this time  Should the patient remain hospitalized for a second midnight the status should be reevaluated for medical necessity  Rationale is as follows: The patient is a 68 yrs   Female who presented to the ED at 2/10/2018 10:52 PM with a chief complaint of Seizure - Prior Hx Of (Pt brought in by EMS for seizures, family forgot to give seizure medication for the past 2 days )     Patient admitted to the hospital with a report of having a seizure at home and also in the ED  She does have a known seizure disorder history and it is reported that she may not have had her medication for two days  Labs for her seizure medications were ordered and the phenobarbital level was low and Keppra level is as yet unknown  The patient also  reported a cough for about one week  A CXR showed no acute pulmonary pathology but there was a question as to whether there was a ventriculoperitoneal catheter in the right hemithorax which appeared to be kinked and a head CT was ordered  This is a patient with a known seizure disorder whose one medication level was low but an OBSERVATION status would be considered appropriate for this patient to ensure no other etiology is responsible for her current seizures      The patients vitals on arrival were ED Triage Vitals   Temperature Pulse Respirations Blood Pressure SpO2   02/10/18 2259 02/10/18 2259 02/10/18 2259 02/10/18 2300 02/10/18 2259   98 2 °F (36 8 °C) 103 16 (!) 177/82 97 %      Temp Source Heart Rate Source Patient Position - Orthostatic VS BP Location FiO2 (%)   02/10/18 2259 02/10/18 2259 02/10/18 2259 02/10/18 2259 --   Oral Monitor Lying Right arm       Pain Score       02/10/18 2259       No Pain           Past Medical History:   Diagnosis Date    Seizures ([address] )      History reviewed  No pertinent surgical history          Consults have been placed to:   IP CONSULT TO NEUROLOGY    Vitals:    02/11/18 0229 02/11/18 1452 02/11/18 2300 02/12/18 0700   BP:  156/94 148/87 165/89   BP Location:  Right arm Right arm Right arm   Pulse: 82 77 79 88   Resp: 20 20 16 18   Temp:  98 3 °F (36 8 °C) 98 7 °F (37 1 °C) 98 7 °F (37 1 °C)   TempSrc:  Oral Oral Oral   SpO2: 99% 100% 97% 100%   Weight:       Height:           Most recent labs:    Recent Labs      02/10/18   2328  02/11/18   0814   WBC  7 22  5 62   HGB  11 5  12 0   HCT  37 0  39 5   PLT  142*  135*   K  3 6  4 5   NA  142  134*   CALCIUM  8 7  8 7   BUN  19  14   CREATININE  0 86  0 68   TROPONINI  <0 02   --    AST  30   --    ALT  39   --    ALKPHOS  180*   --    BILITOT  0 20   --        Scheduled Meds:  Current Facility-Administered Medications:  acetaminophen 650 mg Oral Q6H PRN AUREA Blackwell   albuterol 2 5 mg Nebulization Q6H PRN AUREA Blackwell   aluminum-magnesium hydroxide-simethicone 30 mL Oral Q4H PRN AUREA Blackwell   folic acid 1 mg Oral Daily AUREA Blackwell   guaiFENesin 600 mg Oral BID AUREA Blackwell   influenza vaccine 0 5 mL Intramuscular Prior to discharge AUREA Blackwell   levETIRAcetam 500 mg Oral Q12H [address] AUREA Blackwell   methylPREDNISolone sodium succinate 20 mg Intravenous TID AUREA Blackwell   metoprolol tartrate 25 mg Oral Q12H [address] AUREA Blackwell   pantoprazole 40 mg Oral Early Morning AUREA Blackwell   PHENobarbital 64 8 mg Oral BID AUREA Blackwell   pneumococcal 13-valent conjugate vaccine 0 5 mL Intramuscular Prior to discharge AUREA Blackwell     Continuous Infusions:   PRN Meds:   acetaminophen    albuterol    aluminum-magnesium hydroxide-simethicone    influenza vaccine    pneumococcal 13-valent conjugate vaccine    Surgical procedures (if appropriate):

## 2018-02-12 NOTE — CASE MANAGEMENT
Initial Clinical Review     Admission: Date/Time/Statement:    2/11/18 AT [address] [place] [address]    ED work up began 2/10  2328           Orders Placed This Encounter   Procedures    Place in Observation (expected length of stay for this patient is less than two midnights)       Standing Status:   Standing       Number of Occurrences:   1       Order Specific Question:   Admitting Physician       Answer:   Peace Woodall [[id number]]       Order Specific Question:   Level of Care       Answer:   Med Surg [16]      ED: Date/Time/Mode of Arrival:             ED Arrival Information      Expected Arrival Acuity Means of Arrival Escorted By Service Admission Type     - 2/10/2018 22:51 [facility] General Medicine Urgent     Arrival Complaint     -          Chief Complaint:        Chief Complaint   Patient presents with    Seizure - Prior Hx Of       Pt brought in by EMS for seizures, family forgot to give seizure medication for the past 2 days          History of Present Illness:   Olivia Suárez is a 68 y  o  female history of seizure who presents via EMS status post seizures at home  Smith Patron at bedside report she forgot to administer seizure medications yesterday  Kandy Ates my exam, Patient also noted with persistent dry cough, shortness of breath and diffuse wheezing   Daughter at bedside report patient has been coughing for the past week   Denies fever chills nausea vomiting or diarrhea      Review of Systems:   Constitutional: Negative for chills and fever  Respiratory: Positive for cough, shortness of breath and wheezing     Skin:        Bilateral lower extremity lymphedema, tough, dry scaly skin    Neurological: Positive for seizures  Negative for dizziness, syncope, weakness and light-headedness  All other systems reviewed and are negative      Physical Exam   Constitutional: She is oriented to person, place, and time  She appears well-developed and well-nourished     Cardiovascular: Normal rate, regular rhythm and normal heart sounds     Pulmonary/Chest: Tachypnea noted  She has wheezes (Diffuse)  Abdominal: Soft  Bowel sounds are normal  She exhibits no distension  Genitourinary: Incontinent   Musculoskeletal: Normal range of motion  She exhibits no edema or tenderness  Neurological: She is alert and oriented to person, place, and time  Skin: Skin is warm and dry  Tough, Dry scaly bilateral lower extremity         ED Vital Signs:            ED Triage Vitals   Temperature Pulse Respirations Blood Pressure SpO2   02/10/18 2259 02/10/18 2259 02/10/18 2259 02/10/18 2300 02/10/18 2259   98 2 °F (36 8 °C) 103 16 (!) 177/82 97 %       Temp Source Heart Rate Source Patient Position - Orthostatic VS BP Location FiO2 (%)   02/10/18 2259 02/10/18 2259 02/10/18 2259 02/10/18 2259 --   Oral Monitor Lying Right arm         Pain Score           02/10/18 2259           No Pain                Wt Readings from Last 1 Encounters:   02/10/18 91 9 kg (202 lb 9 6 oz)        02/10 0701  02/11 0700 02/11 0701  02/11 1248   Most Recent     Temperature (°F) 98 2-98  5     98 2 (36 8)     Pulse      82     Respirations 16-20     20     Blood Pressure 127//82     144/86     SpO2 (%) 92-99     99           LABS/Diagnostic Test Results:   Results from last 7 days  Lab Units 02/10/18  2328   WBC Thousand/uL 7 22   HEMOGLOBIN g/dL 11 5   HEMATOCRIT % 37 0   PLATELETS Thousands/uL 142*   NEUTROS PCT % 77*   LYMPHS PCT % 13*   MONOS PCT % 9   EOS PCT % 0         Results from last 7 days  Lab Units 02/10/18  2328   SODIUM mmol/L 142   POTASSIUM mmol/L 3 6   CHLORIDE mmol/L 103   CO2 mmol/L 29   BUN mg/dL 19   CREATININE mg/dL 0 86   CALCIUM mg/dL 8 7   TOTAL PROTEIN g/dL 7 3   BILIRUBIN TOTAL mg/dL 0 20   ALK PHOS U/L 180*   ALT U/L 39   AST U/L 30   GLUCOSE RANDOM mg/dL 143*         *PHENOBARB + KEPPRA LEVELS PENDING     EKG (per H+P):  NSR        CHEST X RAY:  1   Mild atelectasis at the left lung base   Otherwise no focal consolidation  2   Catheter overlying the right thorax which may represent a ventriculoperitoneal shunt which appears kinked in several locations         ED Treatment:              Medication Administration from 02/10/2018 2251 to 02/11/2018 0124        Date/Time Order Dose Route Action Action by Comments       02/10/2018 3359 LORazepam (ATIVAN) 2 mg/mL injection 2 mg 2 mg Intravenous Given Yossi Maria RN         02/11/2018 0110 levETIRAcetam (KEPPRA) 1,000 mg in sodium chloride 0 9 % 100 mL IVPB 1,000 mg Intravenous New Bag Yossi Maria RN               Past Medical/Surgical History:        Past Medical History:   Diagnosis Date    Seizures ([facility] [address] )           Admitting Diagnosis: Seizure ([facility] [address] ) [R56 9]     Age/Sex: 68 y o  female     Assessment/Plan:       Seizure ([facility] [address] )   Assessment & Plan     · Pre-Hospital   Family report forgetting to administer meds yesterday  · Monitor Keppra Level  · Seizure protocol  · Neurology Consult  · Continue home meds of Keppra and phenop         Alteration in skin integrity   Assessment & Plan     · History of bilateral lower extremity lymphedema  · Noted thick dry scaly skin  Ichthyoderma   No signs of infectious process  · Will consult wound Care for further management        Acute upper respiratory infection   Assessment & Plan     · With persistent, Dry Cough, audible wheezing and shortness of breath  Onset 1 week ago per daughter  · Chest Xray, antitussive, respiratory protocol with p r n  neb treatments, IV Solu-Medrol    · Afebrile, no leukocytosis   Will hold off on antibiotics at this time        Thrombocytopenia (HCC)   Assessment & Plan     · No acute sign of bleed   Will monitor ongoing           VTE Prophylaxis: Pharmacologic VTE Prophylaxis contraindicated due to Thrombocytopenia  / reason for no mechanical VTE prophylaxis Ace wrap      Code Status:  Full code  POLST: There is no POLST form on file for this patient (pre-hospital)  Discussion with family:  Daughters     Anticipated Length of Stay: Nancy Avalos will be admitted on an Observation basis with an anticipated length of stay of  less than 2 midnights      Justification for Hospital Stay:  Seizure           Admission Orders:  2/11/18 AT 0029 OBSERVATION  VS Q4HRS    SEIZURE PRECAUTIONS     AMBULATE Q SHIFT  SCD     Diet [place]/CHO Controlled; Consistent Carbohydrate Diet Level 2 (5 carb servings/75 grams CHO/meal)       IV ACCESS     Scheduled Meds:   Current Facility-Administered Medications:  acetaminophen 650 mg Oral Q6H PRN AUREA Blackwell   albuterol 2 5 mg Nebulization Q6H PRN AUREA Blackwell   folic acid 1 mg Oral Daily AUREA Blackwell   guaiFENesin 600 mg Oral BID AUREA Blackwell   influenza vaccine 0 5 mL Intramuscular Prior to discharge AUREA Blackwell   levETIRAcetam 500 mg Oral Q12H [address] AUREA Blackwell   methylPREDNISolone sodium succinate 20 mg Intravenous TID AUREA Blackwell   metoprolol tartrate 25 mg Oral Q12H [address] AUREA Blackwell   pantoprazole 40 mg Oral Early Morning AUREA Blackwell   PHENobarbital 64 8 mg Oral BID AUREA Blackwell   pneumococcal 13-valent conjugate vaccine 0 5 mL Intramuscular Prior to discharge AUREA Blackwell      PRN Meds:    acetaminophen    NEB Tx with albuterol 2 5 mg q6hrs prn given x 1    influenza vaccine    pneumococcal 13-valent conjugate vaccine

## 2018-02-12 NOTE — SOCIAL WORK
Spoke with patient in her room and she stated that she lives with her family in a house with no steps  She is independent with ADL's and ambulation  She has a wa lker which she uses, but chato has a W/C and a shower chair  She has used [place] [facility] in the past  She purchases her meds at [facility] in [facility] and can afford all of her meds  There is no history of mental illness nor substance abuse  She does not drive but her daughter does and drives her wherever she needs to go and will transfer home at MD  CM reviewed discharge planning process including the following: identifying help at home, patient preference for discharge planning needs, pharmacy preference, and availability of treatment team to discuss questions or concerns patient and/or family may have regarding understanding medications and recognizing signs and symptoms once discharged  CM also encouraged patient to follow up with all recommended appointments after discharge  Patient advised of importance for patient and family to participate in managing patients medical well being

## 2018-02-12 NOTE — DISCHARGE INSTRUCTIONS
Nonepileptic Seizures   WHAT YOU NEED TO KNOW:   A nonepileptic seizure (MICAELA) is a short period of changes in how you move, think, or feel  It is sometimes called a nonepileptic event or episode  A MICAELA looks like an epileptic seizure, but there are no electrical changes in the brain  Epilepsy medicine will not stop or prevent a MICAELA  A MICAELA is a serious condition  Early diagnosis and treatment are needed to prevent more problems  DISCHARGE INSTRUCTIONS:   Call 911 for any of the following:   · You have chest pain, tightness, or pressure that may spread to your shoulders, arms, jaw, neck, or back  · You are having breathing problems and your lips, fingernails, or face turn blue  · You had a seizure that continued for more than 5 minutes  Seek care immediately if:   · You feel like fainting or are lightheaded or too dizzy to stand up  · You were injured during or after a seizure  · You think about hurting or killing yourself or someone else  Contact your healthcare provider if:   · You are depressed and feel you cannot cope with your illness  · You are confused or cannot think clearly  · You have new symptoms that you did not have at your last healthcare provider visit  · You have questions or concerns about your condition or care  Medicines: You may need any of the following:  · Medicines  may be given to treat a physical cause, such as blood sugar or blood pressure problems  Medicines may instead be given for severe mental stress if that is the cause of your MICAELA  You may need antianxiety medicines to help keep you calm and relaxed  Antidepressants help decrease depression  · Take your medicine as directed  Contact your healthcare provider if you think your medicine is not helping or if you have side effects  Tell him or her if you are allergic to any medicine  Keep a list of the medicines, vitamins, and herbs you take  Include the amounts, and when and why you take them   Bring the list or the pill bottles to follow-up visits  Carry your medicine list with you in case of an emergency  What you can do to manage MICAELA:   · Ask what safety precautions you should take  Talk with your healthcare provider about driving  You may not be able to drive until you are seizure-free for a period of time  You will need to check the law where you live  Also talk to your healthcare provider about swimming and bathing  You may drown or develop life-threatening heart or lung damage if you have a seizure in water  · Tell your friends, family members, and coworkers that you have had a seizure  Give them written instructions to follow if you have another seizure  Your healthcare provider can help you create a list that is specific to your signs and symptoms  · Keep a seizure diary  This can help you find your triggers and avoid them  Write down the dates of your seizures, where you were, and what you were doing  Include how you felt before and after  Possible triggers include illness, lack of sleep, hormonal changes, alcohol, drugs, lights, or stress  What you can do to prevent a MICAELA:  You may not be able to prevent every seizure  The following can help you manage triggers that may make a seizure start:  · Set a regular sleep schedule  Try to go to sleep and wake up at the same times each day  Sleep problems can trigger a MICAELA  Talk to your healthcare provider if you have trouble sleeping  · Exercise as often as possible  Exercise can relieve stress and help you sleep better  You may feel better with 30 minutes of exercise most days of the week  Your healthcare provider can help you create a safe exercise plan  · Limit or do not drink alcohol  Alcohol can trigger a MICAELA  Ask your healthcare provider how much alcohol is safe for you to drink  A drink of alcohol is 12 ounces of beer, 1½ ounces of liquor, or 5 ounces of wine  · Do not use illegal drugs  Drugs can trigger a MICAELA   Talk to your healthcare provider if you use illegal drugs and need help to quit  · Manage stress  Breathe deeply and slowly when you feel stressed or anxious  Relax each part of your body one at a time  Try activities that you find relaxing, such as yoga or a short walk  Music can help you relax  You may also want to join a support group so you can talk with others who have MICAELA  Talk to someone you trust about your feelings  · Do not smoke  Nicotine and other chemicals in cigarettes and cigars can make stress and anxiety worse  Ask your healthcare provider for information if you currently smoke and need help to quit  E-cigarettes or smokeless tobacco still contain nicotine  Talk to your healthcare provider before you use these products  For more information:   · [employer]  [address] [employer] , [address]  Phone: 4- 902 - [phone]  Web Address: Sh[employer]   · [employer]  [address]   Phone: 6- [phone] - [phone]  Phone: 4- 130 - [phone]  Web Address: http://www  aafp org  Follow up with your healthcare provider as directed: Your healthcare provider may refer you to a therapist or psychologist  Write down your questions so you remember to ask them during your visits  © 2017 [facility] Information is for End User's use only and may not be sold, redistributed or otherwise used for commercial purposes  All illustrations and images included in CareNotes® are the copyrighted property of A D A M [employer]  or [employer]  The above information is an  only  It is not intended as medical advice for individual conditions or treatments  Talk to your doctor, nurse or pharmacist before following any medical regimen to see if it is safe and effective for you  Recurrent Seizures in Adults   WHAT YOU NEED TO KNOW:   A seizure is an episode of abnormal brain activity   A seizure can cause jerky muscle movements, loss of consciousness, or confusion  Recurrent means you have a seizure more than once  The cause of your seizures may not be known  Recurrent seizures may occur if you do not take antiseizure medicine as directed  Some common triggers are alcohol, drugs, lack of sleep, fever, or a virus  High or low blood sugar levels can also trigger a seizure  DISCHARGE INSTRUCTIONS:   Call 911 or have someone else call for any of the following:   · Your seizure lasts longer than 5 minutes  · You have a second seizure within 24 hours of your first     · You have trouble breathing after a seizure  · You cannot be woken after your seizure  · You have more than 1 seizure before you are fully awake or aware  · You have diabetes or are pregnant and have a seizure  · You have a seizure in water  Seek care immediately if:   · You are injured during a seizure  Contact your healthcare provider if:   · You have a fever  · You are planning to get pregnant or are currently pregnant  · You have questions or concerns about your condition or care  Medicines:   · Antiepileptic  medicine may be given to control or prevent seizures  Do not  stop taking this medicine without the direction of a healthcare provider  · Take your medicine as directed  Contact your healthcare provider if you think your medicine is not helping or if you have side effects  Tell him of her if you are allergic to any medicine  Keep a list of the medicines, vitamins, and herbs you take  Include the amounts, and when and why you take them  Bring the list or the pill bottles to follow-up visits  Carry your medicine list with you in case of an emergency  Prevent another seizure:   · Take your antiseizure medicine every day at the same time  This will also help reduce side effects  Do not skip any doses  Do not stop taking this medicine unless directed by a healthcare provider  · Manage stress    Stress can trigger a seizure  Exercise can help you reduce stress  Talk to your healthcare provider about exercise that is safe for you  Other ways to manage stress include yoga, meditation, and biofeedback  Illness can be a form of stress  Eat a variety of healthy foods and drink plenty of liquids during an illness  · Set a regular sleep schedule  A lack of sleep can trigger a seizure  Try to go to sleep and wake up at the same times every day  Keep your bedroom quiet and dark  Talk to your healthcare provider if you are having trouble sleeping  · Manage other medical conditions  Manage other health conditions that may increase your risk for a seizure  Keep your blood sugar levels and blood pressure under control  · Limit or do not drink alcohol as directed  Alcohol can trigger a seizure, especially if you drink a large amount at one time  A drink of alcohol is 12 ounces of beer, 1½ ounces of liquor, or 5 ounces of wine  Talk to your healthcare provider about a safe amount of alcohol for you  Your provider may recommend that you do not drink any alcohol  Tell him or her if you need help to quit drinking  Manage recurrent seizures:   · Ask what safety precautions you should take  Talk with your healthcare provider about driving  You may not be able to drive until you are seizure-free for a period of time  You will need to check the law where you live  Also talk to your healthcare provider about swimming and bathing  You may drown or develop life-threatening heart or lung damage if you have a seizure in water  · Tell your friends, family members, and coworkers that you had a seizure  Give them the following instructions to use if you have another seizure:     ¨ Do not panic  ¨ Gently guide me to the floor or a soft surface  ¨ Do not hold me down or put anything in my mouth  ¨ Place me on my side to help prevent me from swallowing saliva or vomit  ¨ Protect me from injury   Remove sharp or hard objects from the area surrounding me, or cushion my head  ¨ Loosen the clothing around my head and neck  ¨ Time how long my seizure lasts  Call 911 if my seizure lasts longer than 5 minutes or if I have a second seizure  ¨ Stay with me until my seizure ends  Let me rest until I am fully awake  ¨ Perform CPR if I stop breathing or you cannot feel my pulse  ¨ Do not give me anything to eat or drink until I am fully awake  Follow up with your healthcare provider or neurologist as directed: You may need more tests to find the cause of your seizure  You may also need tests to check the level of antiseizure medicine in your blood  Your neurologist may need to change or adjust your medicine  Write down your questions so you remember to ask them during your visits  © 2017 [address] [employer] Information is for End User's use only and may not be sold, redistributed or otherwise used for commercial purposes  All illustrations and images included in CareNotes® are the copyrighted property of A D A M [employer]  or [employer]ad Jones  The above information is an  only  It is not intended as medical advice for individual conditions or treatments  Talk to your doctor, nurse or pharmacist before following any medical regimen to see if it is safe and effective for you

## 2018-02-14 LAB — LEVETIRACETAM SERPL-MCNC: 20.8 UG/ML (ref 10–40)

## 2018-12-30 ENCOUNTER — APPOINTMENT (EMERGENCY)
Dept: RADIOLOGY | Facility: HOSPITAL | Age: 78
DRG: 872 | End: 2018-12-30
Payer: MEDICARE

## 2018-12-30 ENCOUNTER — APPOINTMENT (EMERGENCY)
Dept: CT IMAGING | Facility: HOSPITAL | Age: 78
DRG: 872 | End: 2018-12-30
Payer: MEDICARE

## 2018-12-30 ENCOUNTER — HOSPITAL ENCOUNTER (INPATIENT)
Facility: HOSPITAL | Age: 78
LOS: 3 days | Discharge: NURSING, REHAB OR LONG-TERM CARE | DRG: 872 | End: 2019-01-02
Attending: EMERGENCY MEDICINE | Admitting: INTERNAL MEDICINE
Payer: MEDICARE

## 2018-12-30 DIAGNOSIS — M79.604 RIGHT LEG PAIN: ICD-10-CM

## 2018-12-30 DIAGNOSIS — I50.9 CHF (CONGESTIVE HEART FAILURE) (HCC): ICD-10-CM

## 2018-12-30 DIAGNOSIS — R56.9 SEIZURE (HCC): ICD-10-CM

## 2018-12-30 DIAGNOSIS — L03.115 CELLULITIS OF RIGHT LOWER EXTREMITY: Primary | ICD-10-CM

## 2018-12-30 DIAGNOSIS — A41.9 SEPSIS (HCC): ICD-10-CM

## 2018-12-30 PROBLEM — L03.119 LOWER EXTREMITY CELLULITIS: Status: ACTIVE | Noted: 2018-12-30

## 2018-12-30 LAB
ALBUMIN SERPL BCP-MCNC: 3 G/DL (ref 3.5–5)
ALP SERPL-CCNC: 119 U/L (ref 46–116)
ALT SERPL W P-5'-P-CCNC: 29 U/L (ref 12–78)
ANION GAP SERPL CALCULATED.3IONS-SCNC: 7 MMOL/L (ref 4–13)
APTT PPP: 29 SECONDS (ref 26–38)
AST SERPL W P-5'-P-CCNC: 23 U/L (ref 5–45)
ATRIAL RATE: 106 BPM
BASOPHILS # BLD AUTO: 0.02 THOUSANDS/ΜL (ref 0–0.1)
BASOPHILS NFR BLD AUTO: 0 % (ref 0–1)
BILIRUB DIRECT SERPL-MCNC: 0.18 MG/DL (ref 0–0.2)
BILIRUB SERPL-MCNC: 0.5 MG/DL (ref 0.2–1)
BILIRUB UR QL STRIP: NEGATIVE
BUN SERPL-MCNC: 12 MG/DL (ref 5–25)
CALCIUM SERPL-MCNC: 8.7 MG/DL (ref 8.3–10.1)
CHLORIDE SERPL-SCNC: 103 MMOL/L (ref 100–108)
CLARITY UR: CLEAR
CO2 SERPL-SCNC: 27 MMOL/L (ref 21–32)
COLOR UR: YELLOW
CREAT SERPL-MCNC: 0.92 MG/DL (ref 0.6–1.3)
EOSINOPHIL # BLD AUTO: 0.01 THOUSAND/ΜL (ref 0–0.61)
EOSINOPHIL NFR BLD AUTO: 0 % (ref 0–6)
ERYTHROCYTE [DISTWIDTH] IN BLOOD BY AUTOMATED COUNT: 14.6 % (ref 11.6–15.1)
GFR SERPL CREATININE-BSD FRML MDRD: 69 ML/MIN/1.73SQ M
GLUCOSE SERPL-MCNC: 103 MG/DL (ref 65–140)
GLUCOSE UR STRIP-MCNC: NEGATIVE MG/DL
HCT VFR BLD AUTO: 36.2 % (ref 34.8–46.1)
HGB BLD-MCNC: 11.4 G/DL (ref 11.5–15.4)
HGB UR QL STRIP.AUTO: NEGATIVE
IMM GRANULOCYTES # BLD AUTO: 0.08 THOUSAND/UL (ref 0–0.2)
IMM GRANULOCYTES NFR BLD AUTO: 1 % (ref 0–2)
INR PPP: 1.19 (ref 0.86–1.17)
KETONES UR STRIP-MCNC: NEGATIVE MG/DL
LACTATE SERPL-SCNC: 0.7 MMOL/L (ref 0.5–2)
LEUKOCYTE ESTERASE UR QL STRIP: NEGATIVE
LYMPHOCYTES # BLD AUTO: 1.1 THOUSANDS/ΜL (ref 0.6–4.47)
LYMPHOCYTES NFR BLD AUTO: 8 % (ref 14–44)
MAGNESIUM SERPL-MCNC: 1.7 MG/DL (ref 1.6–2.6)
MCH RBC QN AUTO: 30.2 PG (ref 26.8–34.3)
MCHC RBC AUTO-ENTMCNC: 31.5 G/DL (ref 31.4–37.4)
MCV RBC AUTO: 96 FL (ref 82–98)
MONOCYTES # BLD AUTO: 0.53 THOUSAND/ΜL (ref 0.17–1.22)
MONOCYTES NFR BLD AUTO: 4 % (ref 4–12)
NEUTROPHILS # BLD AUTO: 11.88 THOUSANDS/ΜL (ref 1.85–7.62)
NEUTS SEG NFR BLD AUTO: 87 % (ref 43–75)
NITRITE UR QL STRIP: NEGATIVE
NRBC BLD AUTO-RTO: 0 /100 WBCS
NT-PROBNP SERPL-MCNC: 2352 PG/ML
P AXIS: 52 DEGREES
PH UR STRIP.AUTO: 7.5 [PH] (ref 4.5–8)
PLATELET # BLD AUTO: 103 THOUSANDS/UL (ref 149–390)
PLATELET # BLD AUTO: 108 THOUSANDS/UL (ref 149–390)
PMV BLD AUTO: 11.1 FL (ref 8.9–12.7)
PMV BLD AUTO: 11.5 FL (ref 8.9–12.7)
POTASSIUM SERPL-SCNC: 4.1 MMOL/L (ref 3.5–5.3)
PR INTERVAL: 144 MS
PROT SERPL-MCNC: 6.9 G/DL (ref 6.4–8.2)
PROT UR STRIP-MCNC: NEGATIVE MG/DL
PROTHROMBIN TIME: 15 SECONDS (ref 11.8–14.2)
QRS AXIS: 8 DEGREES
QRSD INTERVAL: 72 MS
QT INTERVAL: 326 MS
QTC INTERVAL: 433 MS
RBC # BLD AUTO: 3.77 MILLION/UL (ref 3.81–5.12)
SODIUM SERPL-SCNC: 137 MMOL/L (ref 136–145)
SP GR UR STRIP.AUTO: 1.01 (ref 1–1.03)
T WAVE AXIS: 28 DEGREES
TROPONIN I SERPL-MCNC: 0.04 NG/ML
UROBILINOGEN UR QL STRIP.AUTO: 0.2 E.U./DL
VENTRICULAR RATE: 106 BPM
WBC # BLD AUTO: 13.62 THOUSAND/UL (ref 4.31–10.16)

## 2018-12-30 PROCEDURE — 93005 ELECTROCARDIOGRAM TRACING: CPT

## 2018-12-30 PROCEDURE — 85049 AUTOMATED PLATELET COUNT: CPT | Performed by: INTERNAL MEDICINE

## 2018-12-30 PROCEDURE — 99285 EMERGENCY DEPT VISIT HI MDM: CPT

## 2018-12-30 PROCEDURE — 87631 RESP VIRUS 3-5 TARGETS: CPT | Performed by: EMERGENCY MEDICINE

## 2018-12-30 PROCEDURE — 99223 1ST HOSP IP/OBS HIGH 75: CPT | Performed by: INTERNAL MEDICINE

## 2018-12-30 PROCEDURE — 84484 ASSAY OF TROPONIN QUANT: CPT | Performed by: EMERGENCY MEDICINE

## 2018-12-30 PROCEDURE — 1124F ACP DISCUSS-NO DSCNMKR DOCD: CPT | Performed by: INTERNAL MEDICINE

## 2018-12-30 PROCEDURE — 96375 TX/PRO/DX INJ NEW DRUG ADDON: CPT

## 2018-12-30 PROCEDURE — 85025 COMPLETE CBC W/AUTO DIFF WBC: CPT | Performed by: EMERGENCY MEDICINE

## 2018-12-30 PROCEDURE — 96367 TX/PROPH/DG ADDL SEQ IV INF: CPT

## 2018-12-30 PROCEDURE — 87040 BLOOD CULTURE FOR BACTERIA: CPT | Performed by: EMERGENCY MEDICINE

## 2018-12-30 PROCEDURE — 80048 BASIC METABOLIC PNL TOTAL CA: CPT | Performed by: EMERGENCY MEDICINE

## 2018-12-30 PROCEDURE — 83735 ASSAY OF MAGNESIUM: CPT | Performed by: EMERGENCY MEDICINE

## 2018-12-30 PROCEDURE — 71046 X-RAY EXAM CHEST 2 VIEWS: CPT

## 2018-12-30 PROCEDURE — 85610 PROTHROMBIN TIME: CPT | Performed by: EMERGENCY MEDICINE

## 2018-12-30 PROCEDURE — 87040 BLOOD CULTURE FOR BACTERIA: CPT | Performed by: INTERNAL MEDICINE

## 2018-12-30 PROCEDURE — 73701 CT LOWER EXTREMITY W/DYE: CPT

## 2018-12-30 PROCEDURE — 36415 COLL VENOUS BLD VENIPUNCTURE: CPT | Performed by: EMERGENCY MEDICINE

## 2018-12-30 PROCEDURE — 85730 THROMBOPLASTIN TIME PARTIAL: CPT | Performed by: EMERGENCY MEDICINE

## 2018-12-30 PROCEDURE — 96365 THER/PROPH/DIAG IV INF INIT: CPT

## 2018-12-30 PROCEDURE — 83880 ASSAY OF NATRIURETIC PEPTIDE: CPT | Performed by: EMERGENCY MEDICINE

## 2018-12-30 PROCEDURE — 83605 ASSAY OF LACTIC ACID: CPT | Performed by: EMERGENCY MEDICINE

## 2018-12-30 PROCEDURE — 80076 HEPATIC FUNCTION PANEL: CPT | Performed by: EMERGENCY MEDICINE

## 2018-12-30 PROCEDURE — 81003 URINALYSIS AUTO W/O SCOPE: CPT | Performed by: EMERGENCY MEDICINE

## 2018-12-30 PROCEDURE — 93010 ELECTROCARDIOGRAM REPORT: CPT | Performed by: INTERNAL MEDICINE

## 2018-12-30 PROCEDURE — 87086 URINE CULTURE/COLONY COUNT: CPT | Performed by: EMERGENCY MEDICINE

## 2018-12-30 RX ORDER — PANTOPRAZOLE SODIUM 40 MG/1
40 TABLET, DELAYED RELEASE ORAL
Status: DISCONTINUED | OUTPATIENT
Start: 2018-12-31 | End: 2019-01-02 | Stop reason: HOSPADM

## 2018-12-30 RX ORDER — LANOLIN ALCOHOL/MO/W.PET/CERES
400 CREAM (GRAM) TOPICAL DAILY
Status: DISCONTINUED | OUTPATIENT
Start: 2018-12-31 | End: 2019-01-02 | Stop reason: HOSPADM

## 2018-12-30 RX ORDER — MELATONIN
2000 DAILY
Status: DISCONTINUED | OUTPATIENT
Start: 2018-12-31 | End: 2019-01-02 | Stop reason: HOSPADM

## 2018-12-30 RX ORDER — LEVETIRACETAM 500 MG/1
500 TABLET ORAL EVERY 12 HOURS SCHEDULED
Status: DISCONTINUED | OUTPATIENT
Start: 2018-12-30 | End: 2019-01-02 | Stop reason: HOSPADM

## 2018-12-30 RX ORDER — PHENOBARBITAL 32.4 MG/1
64.8 TABLET ORAL 2 TIMES DAILY
Status: DISCONTINUED | OUTPATIENT
Start: 2018-12-30 | End: 2019-01-02 | Stop reason: HOSPADM

## 2018-12-30 RX ORDER — ACETAMINOPHEN 325 MG/1
650 TABLET ORAL ONCE
Status: COMPLETED | OUTPATIENT
Start: 2018-12-30 | End: 2018-12-30

## 2018-12-30 RX ORDER — ATORVASTATIN CALCIUM 40 MG/1
80 TABLET, FILM COATED ORAL DAILY
Status: DISCONTINUED | OUTPATIENT
Start: 2018-12-31 | End: 2019-01-02 | Stop reason: HOSPADM

## 2018-12-30 RX ORDER — HEPARIN SODIUM 5000 [USP'U]/ML
5000 INJECTION, SOLUTION INTRAVENOUS; SUBCUTANEOUS EVERY 8 HOURS SCHEDULED
Status: DISCONTINUED | OUTPATIENT
Start: 2018-12-30 | End: 2019-01-02 | Stop reason: HOSPADM

## 2018-12-30 RX ADMIN — IOHEXOL 100 ML: 350 INJECTION, SOLUTION INTRAVENOUS at 15:42

## 2018-12-30 RX ADMIN — SODIUM CHLORIDE 500 ML: 0.9 INJECTION, SOLUTION INTRAVENOUS at 14:29

## 2018-12-30 RX ADMIN — ACETAMINOPHEN 650 MG: 325 TABLET, FILM COATED ORAL at 14:28

## 2018-12-30 RX ADMIN — PHENOBARBITAL 64.8 MG: 32.4 TABLET ORAL at 22:10

## 2018-12-30 RX ADMIN — VANCOMYCIN HYDROCHLORIDE 1750 MG: 1 INJECTION, POWDER, LYOPHILIZED, FOR SOLUTION INTRAVENOUS at 16:12

## 2018-12-30 RX ADMIN — CEFEPIME HYDROCHLORIDE 2000 MG: 2 INJECTION, POWDER, FOR SOLUTION INTRAVENOUS at 14:30

## 2018-12-30 RX ADMIN — MORPHINE SULFATE 2 MG: 2 INJECTION, SOLUTION INTRAMUSCULAR; INTRAVENOUS at 14:29

## 2018-12-30 RX ADMIN — LEVETIRACETAM 500 MG: 500 TABLET, FILM COATED ORAL at 22:10

## 2018-12-30 RX ADMIN — HEPARIN SODIUM 5000 UNITS: 5000 INJECTION, SOLUTION INTRAVENOUS; SUBCUTANEOUS at 22:10

## 2018-12-30 NOTE — ED PROVIDER NOTES
History  Chief Complaint   Patient presents with    Leg Pain     Pt c/o R leg pain that radiates to the left leg  Pt has hx of seizures and claims to have had a seizure yesterday which exacerbated the pain  Pt was able to ambulate with walker lastnight but was unable to get out of bed this AM      Patient is a 42-year-old female with past medical history of epilepsy, GERD, hypertension, hyperlipidemia, presents to the emergency department for right lower extremity pain, swelling and redness  Patient is overall a poor historian likely due to age in mild dementia  She reports that she has chronic pain in her legs however over the past several days it has worsened and according to her daughter, she has been unable to walk or get out of bed due to the pain as well as weakness in her leg since yesterday  She reports having a fever at home this morning but no associated shaking chills  She reports she has had cough as well but denies any dyspnea or chest pain  She denies any headache, dizziness or near syncope, visual disturbance, URI symptoms, neck pain or stiffness, back pain, chest pain, palpitations, dyspnea, wheezing, abdominal pain, nausea, vomiting, diarrhea, constipation, blood per rectum or melena, dysuria, frequency, hematuria, extremity paresthesia or other focal neurologic deficits  No prior history of congestive heart failure or cellulitis  She does report having had a seizure yesterday but is unsure how long it lasted and is unsure if she injured her leg in the process  History provided by:  Patient   used: No    Leg Pain   Associated symptoms: fever    Associated symptoms: no back pain and no neck pain        Prior to Admission Medications   Prescriptions Last Dose Informant Patient Reported? Taking?    PHENobarbital 64 8 mg tablet   Yes No   Sig: Take 64 8 mg by mouth 2 (two) times a day   atorvastatin (LIPITOR) 80 mg tablet   Yes No   Sig: Take 80 mg by mouth daily cholecalciferol (VITAMIN D3) 1,000 units tablet   Yes No   Sig: Take 2,000 Units by mouth daily   folic acid (FOLVITE) 1 mg tablet   Yes No   Sig: Take by mouth daily   levETIRAcetam (KEPPRA) 500 mg tablet   Yes No   Sig: Take 500 mg by mouth every 12 (twelve) hours   metoprolol tartrate (LOPRESSOR) 25 mg tablet   Yes No   Sig: Take 25 mg by mouth every 12 (twelve) hours   omeprazole (PriLOSEC) 20 mg delayed release capsule   Yes No   Sig: Take 20 mg by mouth daily      Facility-Administered Medications: None       Past Medical History:   Diagnosis Date    Seizures ([facility] [address] )        Past Surgical History:   Procedure Laterality Date    LEG SURGERY  05/2016    Pt family cannot recall the specific surgery or which leg it was preformed on  History reviewed  No pertinent family history  I have reviewed and agree with the history as documented  Social History   Substance Use Topics    Smoking status: Never Smoker    Smokeless tobacco: Never Used    Alcohol use No        Review of Systems   Constitutional: Positive for fever  Negative for chills  HENT: Negative for congestion, ear pain, rhinorrhea and sore throat  Eyes: Negative for pain and visual disturbance  Respiratory: Positive for cough  Negative for chest tightness, shortness of breath and wheezing  Cardiovascular: Positive for leg swelling  Negative for chest pain and palpitations  Gastrointestinal: Negative for abdominal distention, abdominal pain, blood in stool, constipation, diarrhea, nausea and vomiting  Genitourinary: Negative for dysuria, flank pain, frequency and hematuria  Musculoskeletal: Negative for back pain, neck pain and neck stiffness  + Right leg pain/swelling/redness   Skin: Negative for color change, pallor and rash         + Right leg redness   Allergic/Immunologic: Negative for immunocompromised state  Neurological: Positive for weakness   Negative for dizziness, syncope, facial asymmetry, speech difficulty, light-headedness, numbness and headaches         + Right leg weakness/heaviness  Hematological: Negative for adenopathy  Does not bruise/bleed easily  Psychiatric/Behavioral: Negative for confusion and decreased concentration  All other systems reviewed and are negative  Physical Exam  Physical Exam   Constitutional: She appears well-developed and well-nourished  No distress  HENT:   Head: Normocephalic and atraumatic  Mouth/Throat: Oropharynx is clear and moist  No oropharyngeal exudate  Eyes: Pupils are equal, round, and reactive to light  Conjunctivae and EOM are normal    Neck: Normal range of motion  Neck supple  No JVD present  Cardiovascular: Regular rhythm and intact distal pulses  Exam reveals no gallop and no friction rub  Murmur heard  Tachycardic  Pulmonary/Chest: Effort normal and breath sounds normal  No respiratory distress  She has no wheezes  She has no rales  She exhibits no tenderness  Abdominal: Soft  Bowel sounds are normal  She exhibits no distension  There is no tenderness  There is no rebound and no guarding  Musculoskeletal: She exhibits edema and tenderness  She exhibits no deformity  Pitting edema in bilateral lower extremities, worse on the right side  There is significant erythema, skin induration, soft tissue swelling and tenderness over the right thigh and lower leg  There are chronic venous stasis skin changes on bilateral lower legs  Right lower leg weeping  Decreased range of motion of right leg secondary to pain  Lymphadenopathy:     She has no cervical adenopathy  Neurological: She is alert  No cranial nerve deficit  Oriented to person and place but not to time  3/5 strength right lower extremity compared to 4/5 strength left lower extremity  5/5 strength bilateral upper extremities  No gross sensory deficits  Skin: Skin is warm and dry  No rash noted  She is not diaphoretic  There is erythema  No pallor     Psychiatric: She has a normal mood and affect  Her behavior is normal    Nursing note and vitals reviewed        Vital Signs  ED Triage Vitals [12/30/18 1212]   Temperature Pulse Respirations Blood Pressure SpO2   (!) 102 5 °F (39 2 °C) (!) 108 16 135/74 95 %      Temp Source Heart Rate Source Patient Position - Orthostatic VS BP Location FiO2 (%)   Oral Monitor Sitting Right arm --      Pain Score       Worst Possible Pain         Vitals:    12/30/18 1212 12/30/18 1430 12/30/18 1433 12/30/18 1615   BP: 135/74  116/56 100/50   BP Location: Right arm  Right arm Right arm   Pulse: (!) 108 (!) 106 104 95   Resp: 16  20 17   Temp: (!) 102 5 °F (39 2 °C)      TempSrc: Oral      SpO2: 95% 97% 97% 97%   Weight: 93 4 kg (205 lb 14 6 oz)      Height: 4' 11" (1 499 m)        Visual Acuity      ED Medications  Medications   vancomycin (VANCOCIN) 1,750 mg in sodium chloride 0 9 % 500 mL IVPB (1,750 mg Intravenous New Bag 12/30/18 1612)   sodium chloride 0 9 % bolus 500 mL (0 mL Intravenous Stopped 12/30/18 1544)   morphine injection 2 mg (2 mg Intravenous Given 12/30/18 1429)   cefepime (MAXIPIME) 2,000 mg in dextrose 5 % 50 mL IVPB (0 mg Intravenous Stopped 12/30/18 1525)   acetaminophen (TYLENOL) tablet 650 mg (650 mg Oral Given 12/30/18 1428)   iohexol (OMNIPAQUE) 350 MG/ML injection (MULTI-DOSE) 100 mL (100 mL Intravenous Given 12/30/18 1542)       Diagnostic Studies  Results Reviewed     Procedure Component Value Units Date/Time    UA w Reflex to Microscopic [[id number]] Collected:  12/30/18 1611    Lab Status:  Final result Specimen:  Urine from Urine, Clean Catch Updated:  12/30/18 1618     Color, UA Yellow     Clarity, UA Clear     Specific Gravity, UA 1 015     pH, UA 7 5     Leukocytes, UA Negative     Nitrite, UA Negative     Protein, UA Negative mg/dl      Glucose, UA Negative mg/dl      Ketones, UA Negative mg/dl      Urobilinogen, UA 0 2 E U /dl      Bilirubin, UA Negative     Blood, UA Negative    Urine culture [[id number]] Collected: 12/30/18 1611    Lab Status: In process Specimen:  Urine from Urine, Clean Catch Updated:  12/30/18 1614    Protime-INR [[id number]]  (Abnormal) Collected:  12/30/18 1411    Lab Status:  Final result Specimen:  Blood from Arm, Right Updated:  12/30/18 1456     Protime 15 0 (H) seconds      INR 1 19 (H)    APTT [[id number]]  (Normal) Collected:  12/30/18 1411    Lab Status:  Final result Specimen:  Blood from Arm, Right Updated:  12/30/18 1456     PTT 29 seconds     Hepatic function panel [[id number]]  (Abnormal) Collected:  12/30/18 1411    Lab Status:  Final result Specimen:  Blood from Arm, Right Updated:  12/30/18 1448     Total Bilirubin 0 50 mg/dL      Bilirubin, Direct 0 18 mg/dL      Alkaline Phosphatase 119 (H) U/L      AST 23 U/L      ALT 29 U/L      Total Protein 6 9 g/dL      Albumin 3 0 (L) g/dL     Magnesium [[id number]]  (Normal) Collected:  12/30/18 1411    Lab Status:  Final result Specimen:  Blood from Arm, Right Updated:  12/30/18 1448     Magnesium 1 7 mg/dL     B-type natriuretic peptide [[id number]]  (Abnormal) Collected:  12/30/18 1411    Lab Status:  Final result Specimen:  Blood from Arm, Right Updated:  12/30/18 1448     NT-proBNP 2,352 (H) pg/mL     Lactic acid, plasma [[id number]]  (Normal) Collected:  12/30/18 1414    Lab Status:  Final result Specimen:  Blood from Arm, Left Updated:  12/30/18 1445     LACTIC ACID 0 7 mmol/L     Narrative:         Result may be elevated if tourniquet was used during collection      Troponin I [[id number]]  (Normal) Collected:  12/30/18 1411    Lab Status:  Final result Specimen:  Blood from Arm, Right Updated:  12/30/18 1445     Troponin I 0 04 ng/mL     Basic metabolic panel [[id number]] Collected:  12/30/18 1411    Lab Status:  Final result Specimen:  Blood from Arm, Right Updated:  12/30/18 1442     Sodium 137 mmol/L      Potassium 4 1 mmol/L      Chloride 103 mmol/L      CO2 27 mmol/L      ANION GAP 7 mmol/L      BUN 12 mg/dL      Creatinine 0 92 mg/dL      Glucose 103 mg/dL      Calcium 8 7 mg/dL      eGFR 69 ml/min/1 73sq m     Narrative:         National Kidney Disease Education Program recommendations are as follows:  GFR calculation is accurate only with a steady state creatinine  Chronic Kidney disease less than 60 ml/min/1 73 sq  meters  Kidney failure less than 15 ml/min/1 73 sq  meters  Influenza A/B and RSV by PCR [[id number]] Collected:  12/30/18 1422    Lab Status: In process Specimen:  Nasopharyngeal from Nasopharyngeal Swab Updated:  12/30/18 1425    CBC and differential [[id number]]  (Abnormal) Collected:  12/30/18 1411    Lab Status:  Final result Specimen:  Blood from Arm, Right Updated:  12/30/18 1422     WBC 13 62 (H) Thousand/uL      RBC 3 77 (L) Million/uL      Hemoglobin 11 4 (L) g/dL      Hematocrit 36 2 %      MCV 96 fL      MCH 30 2 pg      MCHC 31 5 g/dL      RDW 14 6 %      MPV 11 1 fL      Platelets 081 (L) Thousands/uL      nRBC 0 /100 WBCs      Neutrophils Relative 87 (H) %      Immat GRANS % 1 %      Lymphocytes Relative 8 (L) %      Monocytes Relative 4 %      Eosinophils Relative 0 %      Basophils Relative 0 %      Neutrophils Absolute 11 88 (H) Thousands/µL      Immature Grans Absolute 0 08 Thousand/uL      Lymphocytes Absolute 1 10 Thousands/µL      Monocytes Absolute 0 53 Thousand/µL      Eosinophils Absolute 0 01 Thousand/µL      Basophils Absolute 0 02 Thousands/µL     Blood culture #2 [[id number]] Collected:  12/30/18 1411    Lab Status: In process Specimen:  Blood from Arm, Right Updated:  12/30/18 1419    Blood culture #1 [[id number]] Collected:  12/30/18 1414    Lab Status: In process Specimen:  Blood from Arm, Left Updated:  12/30/18 1419                 CT tibia fibula right w contrast   Final Result by Marcello Pereira MD (12/30 1631)      1  No CT findings to suggest necrotizing fasciitis, noting that early findings are nonspecific     2   Subcutaneous edema throughout the lower extremity, nonspecific      Workstation performed: [id number]         CT femur right w contrast   Final Result by Janet Ocampo MD (12/30 1631)      1  No CT findings to suggest necrotizing fasciitis, noting that early findings are nonspecific  2   Subcutaneous edema throughout the lower extremity, nonspecific      Workstation performed: [id number]         XR chest 2 views   ED Interpretation by Christiano Lantigua DO (12/30 1601)   Interstitial vascular congestion but otherwise no other acute abnormality  Procedures  ECG 12 Lead Documentation  Date/Time: 12/30/2018 2:00 PM  Performed by: Laura Wakefield by: Halina Nurse     ECG reviewed by me, the ED Provider: yes    Patient location:  ED  Previous ECG:     Previous ECG:  Compared to current    Comparison ECG info:  2-    Similarity:  No change  Rate:     ECG rate:  106    ECG rate assessment: tachycardic    Rhythm:     Rhythm: sinus tachycardia    Ectopy:     Ectopy: none    QRS:     QRS axis:  Normal    QRS intervals:  Normal  Conduction:     Conduction: normal    ST segments:     ST segments:  Normal  T waves:     T waves: normal    Other findings:     Other findings: LVH             Phone Contacts  ED Phone Contact    ED Course  ED Course as of Dec 30 1722   Sun Dec 30, 2018   1448 LACTIC ACID: 0 7   1448 NT-proBNP: (!) 2,352   1448 WBC: (!) 13 62   1617 Patient reassessed and updated patient and family about workup thus far  Her pain overall is better after the 2 mg of morphine  1618 Leukocytes, UA: Negative   1618 Nitrite, UA: Negative                         Initial Sepsis Screening     Row Name 12/30/18 1449                Is the patient's history suggestive of a new or worsening infection? (!)  Yes (Proceed)  -MA        Suspected source of infection soft tissue  -MA        Are two or more of the following signs & symptoms of infection both present and new to the patient?  (!)  Yes (Proceed)  -MA        Indicate SIRS criteria Leukocytosis (WBC > 99792 IJL);Tachycardia > 90 bpm;Hyperthemia > 38 3C (100 9F)  -MA        If the answer is yes to both questions, suspicion of sepsis is present          If severe sepsis is present AND tissue hypoperfusion perists in the hour after fluid resuscitation or lactate > 4, the patient meets criteria for SEPTIC SHOCK          Are any of the following organ dysfunction criteria present within 6 hours of suspected infection and SIRS criteria that are NOT considered to be chronic conditions? No  -MA        Organ dysfunction          Date of presentation of severe sepsis          Time of presentation of severe sepsis          Tissue hypoperfusion persists in the hour after crystalloid fluid administration, evidenced, by either:          Was hypotension present within one hour of the conclusion of crystalloid fluid administration?         Date of presentation of septic shock          Time of presentation of septic shock            User Key  (r) = Recorded By, (t) = Taken By, (c) = Cosigned By    Initials Name Provider Type    ROLAND Ordoñez DO Physician                  MDM  Number of Diagnoses or Management Options  Diagnosis management comments: 80-year-old female presents with right leg pain/swelling/redness  She has edema, weeping and clinical signs of cellulitis  DDX also includes DVT but given associated fever, less likely  Will do full cardiac/septic work up, obtain CT scan of right leg to r/o necrotizing fasciitis  Will start broad-spectrum antibiotics and 500 cc of IV fluids  Will admit         Amount and/or Complexity of Data Reviewed  Clinical lab tests: ordered and reviewed  Tests in the radiology section of CPT®: ordered and reviewed  Tests in the medicine section of CPT®: ordered and reviewed  Obtain history from someone other than the patient: yes  Independent visualization of images, tracings, or specimens: yes      CritCare Time    Disposition  Final diagnoses:   Cellulitis of right lower extremity Right leg pain   CHF (congestive heart failure) ([facility] [address] )   [facility]eps[facility]     Time reflects when diagnosis was documented in both MDM as applicable and the Disposition within this note     Time User Action Codes Description Comment    12/30/2018  5:19 PM Everlean Bumps E Add [L03 115] Cellulitis of right lower extremity     12/30/2018  5:19 PM Everlean Bumps E Add [G32 201] Right leg pain     12/30/2018  5:20 PM Everlean Bumps E Add [I50 9] CHF (congestive heart failure) ([facility] [address] )     12/30/2018  5:20 PM Everlean Bumps E Add [A41 9] Sepsis [facility])       ED Disposition     ED Disposition Condition Comment    Admit  Case was discussed with WOOD and the patient's admission status was agreed to be Admission Status: inpatient status to the service of Dr Chung Rosenthal   Follow-up Information    None         Patient's Medications   Discharge Prescriptions    No medications on file     No discharge procedures on file      ED Provider  Electronically Signed by           Joi Britton DO  12/30/18 5550

## 2018-12-30 NOTE — SEPSIS NOTE
Sepsis Note   Eileen Ramires 66 y o  female MRN: [id number]  Unit/Bed#: ED 24 Encounter: [id number]            Initial Sepsis Screening     [address] Name 12/30/18 9946                Is the patient's history suggestive of a new or worsening infection? (!)  Yes (Proceed)  -MA        Suspected source of infection soft tissue  -MA        Are two or more of the following signs & symptoms of infection both present and new to the patient? (!)  Yes (Proceed)  -MA        Indicate SIRS criteria Leukocytosis (WBC > 71773 IJL); Tachycardia > 90 bpm;Hyperthemia > 38 3C (100 9F)  -MA        If the answer is yes to both questions, suspicion of sepsis is present          If severe sepsis is present AND tissue hypoperfusion perists in the hour after fluid resuscitation or lactate > 4, the patient meets criteria for SEPTIC SHOCK          Are any of the following organ dysfunction criteria present within 6 hours of suspected infection and SIRS criteria that are NOT considered to be chronic conditions? No  -MA        Organ dysfunction          Date of presentation of severe sepsis          Time of presentation of severe sepsis          Tissue hypoperfusion persists in the hour after crystalloid fluid administration, evidenced, by either:          Was hypotension present within one hour of the conclusion of crystalloid fluid administration?           Date of presentation of septic shock          Time of presentation of septic shock            User Key  (r) = Recorded By, (t) = Taken By, (c) = Cosigned By    Initials Name Provider Type    [address], DO Physician

## 2018-12-31 ENCOUNTER — APPOINTMENT (INPATIENT)
Dept: ULTRASOUND IMAGING | Facility: HOSPITAL | Age: 78
DRG: 872 | End: 2018-12-31
Payer: MEDICARE

## 2018-12-31 LAB
ALBUMIN SERPL BCP-MCNC: 2.4 G/DL (ref 3.5–5)
ALP SERPL-CCNC: 95 U/L (ref 46–116)
ALT SERPL W P-5'-P-CCNC: 25 U/L (ref 12–78)
ANION GAP SERPL CALCULATED.3IONS-SCNC: 9 MMOL/L (ref 4–13)
AST SERPL W P-5'-P-CCNC: 27 U/L (ref 5–45)
BILIRUB SERPL-MCNC: 0.4 MG/DL (ref 0.2–1)
BUN SERPL-MCNC: 12 MG/DL (ref 5–25)
CALCIUM SERPL-MCNC: 8.1 MG/DL (ref 8.3–10.1)
CHLORIDE SERPL-SCNC: 105 MMOL/L (ref 100–108)
CO2 SERPL-SCNC: 24 MMOL/L (ref 21–32)
CREAT SERPL-MCNC: 0.85 MG/DL (ref 0.6–1.3)
ERYTHROCYTE [DISTWIDTH] IN BLOOD BY AUTOMATED COUNT: 14.8 % (ref 11.6–15.1)
FLUAV AG SPEC QL: NORMAL
FLUBV AG SPEC QL: NORMAL
GFR SERPL CREATININE-BSD FRML MDRD: 76 ML/MIN/1.73SQ M
GLUCOSE SERPL-MCNC: 113 MG/DL (ref 65–140)
HCT VFR BLD AUTO: 31.7 % (ref 34.8–46.1)
HGB BLD-MCNC: 9.9 G/DL (ref 11.5–15.4)
MAGNESIUM SERPL-MCNC: 1.7 MG/DL (ref 1.6–2.6)
MCH RBC QN AUTO: 29.7 PG (ref 26.8–34.3)
MCHC RBC AUTO-ENTMCNC: 31.2 G/DL (ref 31.4–37.4)
MCV RBC AUTO: 95 FL (ref 82–98)
PLATELET # BLD AUTO: 108 THOUSANDS/UL (ref 149–390)
PMV BLD AUTO: 10.8 FL (ref 8.9–12.7)
POTASSIUM SERPL-SCNC: 3.8 MMOL/L (ref 3.5–5.3)
PROT SERPL-MCNC: 6 G/DL (ref 6.4–8.2)
RBC # BLD AUTO: 3.33 MILLION/UL (ref 3.81–5.12)
RSV B RNA SPEC QL NAA+PROBE: NORMAL
SODIUM SERPL-SCNC: 138 MMOL/L (ref 136–145)
WBC # BLD AUTO: 11.36 THOUSAND/UL (ref 4.31–10.16)

## 2018-12-31 PROCEDURE — 85027 COMPLETE CBC AUTOMATED: CPT | Performed by: INTERNAL MEDICINE

## 2018-12-31 PROCEDURE — 99223 1ST HOSP IP/OBS HIGH 75: CPT | Performed by: INTERNAL MEDICINE

## 2018-12-31 PROCEDURE — 94760 N-INVAS EAR/PLS OXIMETRY 1: CPT

## 2018-12-31 PROCEDURE — 93971 EXTREMITY STUDY: CPT

## 2018-12-31 PROCEDURE — 93971 EXTREMITY STUDY: CPT | Performed by: SURGERY

## 2018-12-31 PROCEDURE — 99232 SBSQ HOSP IP/OBS MODERATE 35: CPT | Performed by: FAMILY MEDICINE

## 2018-12-31 PROCEDURE — 80053 COMPREHEN METABOLIC PANEL: CPT | Performed by: INTERNAL MEDICINE

## 2018-12-31 PROCEDURE — 94640 AIRWAY INHALATION TREATMENT: CPT

## 2018-12-31 PROCEDURE — 94664 DEMO&/EVAL PT USE INHALER: CPT

## 2018-12-31 PROCEDURE — 83735 ASSAY OF MAGNESIUM: CPT | Performed by: INTERNAL MEDICINE

## 2018-12-31 RX ORDER — ACETAMINOPHEN 325 MG/1
650 TABLET ORAL EVERY 6 HOURS PRN
Status: DISCONTINUED | OUTPATIENT
Start: 2018-12-31 | End: 2019-01-02 | Stop reason: HOSPADM

## 2018-12-31 RX ORDER — IPRATROPIUM BROMIDE AND ALBUTEROL SULFATE 2.5; .5 MG/3ML; MG/3ML
3 SOLUTION RESPIRATORY (INHALATION)
Status: DISCONTINUED | OUTPATIENT
Start: 2018-12-31 | End: 2019-01-01

## 2018-12-31 RX ORDER — CEFAZOLIN SODIUM 1 G/50ML
1000 SOLUTION INTRAVENOUS EVERY 8 HOURS
Status: DISCONTINUED | OUTPATIENT
Start: 2018-12-31 | End: 2019-01-02

## 2018-12-31 RX ADMIN — ACETAMINOPHEN 650 MG: 325 TABLET, FILM COATED ORAL at 03:37

## 2018-12-31 RX ADMIN — CEFAZOLIN SODIUM 1000 MG: 1 SOLUTION INTRAVENOUS at 20:42

## 2018-12-31 RX ADMIN — METOPROLOL TARTRATE 25 MG: 25 TABLET, FILM COATED ORAL at 08:45

## 2018-12-31 RX ADMIN — LEVETIRACETAM 500 MG: 500 TABLET, FILM COATED ORAL at 20:40

## 2018-12-31 RX ADMIN — IPRATROPIUM BROMIDE AND ALBUTEROL SULFATE 3 ML: 2.5; .5 SOLUTION RESPIRATORY (INHALATION) at 13:10

## 2018-12-31 RX ADMIN — HEPARIN SODIUM 5000 UNITS: 5000 INJECTION, SOLUTION INTRAVENOUS; SUBCUTANEOUS at 05:56

## 2018-12-31 RX ADMIN — PHENOBARBITAL 64.8 MG: 32.4 TABLET ORAL at 08:50

## 2018-12-31 RX ADMIN — Medication 400 MCG: at 08:45

## 2018-12-31 RX ADMIN — PANTOPRAZOLE SODIUM 40 MG: 40 TABLET, DELAYED RELEASE ORAL at 05:56

## 2018-12-31 RX ADMIN — IPRATROPIUM BROMIDE AND ALBUTEROL SULFATE 3 ML: 2.5; .5 SOLUTION RESPIRATORY (INHALATION) at 20:12

## 2018-12-31 RX ADMIN — ACETAMINOPHEN 650 MG: 325 TABLET, FILM COATED ORAL at 20:40

## 2018-12-31 RX ADMIN — CEFAZOLIN SODIUM 1000 MG: 1 SOLUTION INTRAVENOUS at 12:16

## 2018-12-31 RX ADMIN — VITAMIN D, TAB 1000IU (100/BT) 2000 UNITS: 25 TAB at 08:45

## 2018-12-31 RX ADMIN — HEPARIN SODIUM 5000 UNITS: 5000 INJECTION, SOLUTION INTRAVENOUS; SUBCUTANEOUS at 14:53

## 2018-12-31 RX ADMIN — VANCOMYCIN HYDROCHLORIDE 1500 MG: 1 INJECTION, POWDER, LYOPHILIZED, FOR SOLUTION INTRAVENOUS at 02:02

## 2018-12-31 RX ADMIN — PHENOBARBITAL 64.8 MG: 32.4 TABLET ORAL at 19:09

## 2018-12-31 RX ADMIN — ATORVASTATIN CALCIUM 80 MG: 40 TABLET, FILM COATED ORAL at 08:45

## 2018-12-31 RX ADMIN — METOPROLOL TARTRATE 25 MG: 25 TABLET, FILM COATED ORAL at 20:40

## 2018-12-31 RX ADMIN — LEVETIRACETAM 500 MG: 500 TABLET, FILM COATED ORAL at 08:45

## 2018-12-31 RX ADMIN — HEPARIN SODIUM 5000 UNITS: 5000 INJECTION, SOLUTION INTRAVENOUS; SUBCUTANEOUS at 21:40

## 2018-12-31 NOTE — ASSESSMENT & PLAN NOTE
2/2 below  Will treat with ABx and IVF  Continue cefazolin as per ID recommendation  Lactic acid is within normal limits

## 2018-12-31 NOTE — ASSESSMENT & PLAN NOTE
Likely acute bronchitis  Continue current antibiotic  Continue respiratory protocol  Scheduled nebulizer  Incentive spirometry  Chest x-ray negative for any acute pathology, a patient deteriorate will going to consider CT chest

## 2018-12-31 NOTE — H&P
H&P- Adeline Elliott 1940, 66 y o  female MRN: [id number]    Unit/Bed#: -01 Encounter: [id number]    Primary Care Provider: Lilibeth Francis DO   Date and time admitted to hospital: 12/30/2018 11:56 AM        Lower extremity cellulitis   Assessment & Plan    Will treat with vanomycin for now   Will get venous dopplers  Follow up on blood cultures  Will consult ID  * Sepsis ([address] )   Assessment & Plan    2/2 below  Will treat with ABx and IVF  Lactic acid is within normal limits  VTE Prophylaxis: Heparin  / sequential compression device   Code Status: Full Code  POLST: There is no POLST form on file for this patient (pre-hospital)  Discussion with family: Discussed with patient and with daughter at bedside  Anticipated Length of Stay:  Patient will be admitted on an Inpatient basis with an anticipated length of stay of  More than 2 midnights  Justification for Hospital Stay: cellulitis and sepsis  Total Time for Visit, including Counseling / Coordination of Care: 45 minutes  Greater than 50% of this total time spent on direct patient counseling and coordination of care  Chief Complaint:     Leg swelling  History of Present Illness:    Adeline Elliott is a 66 y o  female who presents with worsening bilateral edema as well as the erythema  The patient also has findings consistent with sepsis including temperatures and tachycardia  The patient's daughter gives most of the history as the patient is a poor historian  As per the patient's daughter she has "always had swollen legs", but her daughter only yesterday noticed that they were turning red and starting to weep  In the emergency department the patient is found to have a temperature of 102 5 tachycardia of 108 as well as white cell count 13  Her lactic acid is normal and her creatinine is 0 9  Apart from lower extremity edema and pain the patient has no other complaints at this time      She has been given 1 dose of cefepime and vancomycin in the emergency department and also had CT scan of both lower extremities  Review of Systems:    Review of Systems   Unable to perform ROS: Dementia       Past Medical and Surgical History:     Past Medical History:   Diagnosis Date    Seizures [facility])        Past Surgical History:   Procedure Laterality Date    LEG SURGERY  05/2016    Pt family cannot recall the specific surgery or which leg it was preformed on  Meds/Allergies:    Prior to Admission medications    Medication Sig Start Date End Date Taking? Authorizing Provider   atorvastatin (LIPITOR) 80 mg tablet Take 80 mg by mouth daily   Yes Historical Provider, MD   levETIRAcetam (KEPPRA) 500 mg tablet Take 500 mg by mouth every 12 (twelve) hours   Yes Historical Provider, MD   metoprolol tartrate (LOPRESSOR) 25 mg tablet Take 25 mg by mouth every 12 (twelve) hours   Yes Historical Provider, MD   omeprazole (PriLOSEC) 20 mg delayed release capsule Take 20 mg by mouth daily   Yes Historical Provider, MD   PHENobarbital 64 8 mg tablet Take 64 8 mg by mouth 2 (two) times a day   Yes Historical Provider, MD   cholecalciferol (VITAMIN D3) 1,000 units tablet Take 2,000 Units by mouth daily    Historical Provider, MD   folic acid (FOLVITE) 1 mg tablet Take by mouth daily    Historical Provider, MD     I have reviewed home medications with patient personally  Allergies: Allergies   Allergen Reactions    Penicillins        Social History:     Marital Status: Single   Occupation: retired  Patient Pre-hospital Living Situation: lives with daughter  Patient Pre-hospital Level of Mobility: walker  Patient Pre-hospital Diet Restrictions: none  Substance Use History:   History   Alcohol Use No     History   Smoking Status    Never Smoker   Smokeless Tobacco    Never Used     History   Drug Use No       Family History:    History reviewed  No pertinent family history      Physical Exam:     Vitals: Blood Pressure: 133/61 (12/30/18 1818)  Pulse: 90 (12/30/18 1818)  Temperature: 98 8 °F (37 1 °C) (12/30/18 1818)  Temp Source: Oral (12/30/18 1818)  Respirations: 16 (12/30/18 1818)  Height: 4' 11" (149 9 cm) (12/30/18 1212)  Weight - Scale: 93 4 kg (205 lb 14 6 oz) (12/30/18 1212)  SpO2: 97 % (12/30/18 1818)    Physical Exam   Constitutional: No distress  Obese  HENT:   Head: Normocephalic and atraumatic  Mouth/Throat: No oropharyngeal exudate  Eyes: Right eye exhibits no discharge  Left eye exhibits no discharge  No scleral icterus  Neck: No JVD present  No tracheal deviation present  No thyromegaly present  Cardiovascular: Exam reveals no gallop  Murmur heard  Pulmonary/Chest: Effort normal  No respiratory distress  She has no wheezes  She has no rales  She exhibits no tenderness  Abdominal: She exhibits no distension and no mass  There is no tenderness  There is no rebound and no guarding  Musculoskeletal: She exhibits edema and tenderness  She exhibits no deformity  Lymphadenopathy:     She has no cervical adenopathy  Neurological: She is alert  No cranial nerve deficit  Coordination normal    Skin: Rash noted  She is not diaphoretic  There is erythema  No pallor  Psychiatric: She has a normal mood and affect  Additional Data:     Lab Results: I have personally reviewed pertinent reports          Results from last 7 days  Lab Units 12/30/18  1411   WBC Thousand/uL 13 62*   HEMOGLOBIN g/dL 11 4*   HEMATOCRIT % 36 2   PLATELETS Thousands/uL 108*   NEUTROS PCT % 87*   LYMPHS PCT % 8*   MONOS PCT % 4   EOS PCT % 0       Results from last 7 days  Lab Units 12/30/18  1411   SODIUM mmol/L 137   POTASSIUM mmol/L 4 1   CHLORIDE mmol/L 103   CO2 mmol/L 27   BUN mg/dL 12   CREATININE mg/dL 0 92   ANION GAP mmol/L 7   CALCIUM mg/dL 8 7   ALBUMIN g/dL 3 0*   TOTAL BILIRUBIN mg/dL 0 50   ALK PHOS U/L 119*   ALT U/L 29   AST U/L 23   GLUCOSE RANDOM mg/dL 103       Results from last 7 days  Lab Units 12/30/18  1411   INR  1 19*               Results from last 7 days  Lab Units 12/30/18  1414   LACTIC ACID mmol/L 0 7       Imaging: I have personally reviewed pertinent reports  CT tibia fibula right w contrast   Final Result by Arminda Damon MD (12/30 1631)      1  No CT findings to suggest necrotizing fasciitis, noting that early findings are nonspecific  2   Subcutaneous edema throughout the lower extremity, nonspecific      Workstation performed: [id number]         CT femur right w contrast   Final Result by Arminda Damon MD (12/30 1631)      1  No CT findings to suggest necrotizing fasciitis, noting that early findings are nonspecific  2   Subcutaneous edema throughout the lower extremity, nonspecific      Workstation performed: [id number]         XR chest 2 views   ED Interpretation by Ani Gu DO (12/30 1601)   Interstitial vascular congestion but otherwise no other acute abnormality  EKG, Pathology, and Other Studies Reviewed on Admission:   · EKG: LVH, no acute ishcemia  Allscripts / Epic Records Reviewed: Yes     ** Please Note: This note has been constructed using a voice recognition system   **

## 2018-12-31 NOTE — MALNUTRITION/BMI
This medical record reflects one or more clinical indicators suggestive of  morbid obesity  BMI Findings:  BMI Classifications: Morbid Obesity 40-44 9     Body mass index is 41 59 kg/m²  See Nutrition note dated 12/31/2018 for additional details  Completed nutrition assessment is viewable in the nutrition documentation

## 2018-12-31 NOTE — PLAN OF CARE
PAIN - ADULT     Verbalizes/displays adequate comfort level or baseline comfort level Progressing        Potential for Falls     Patient will remain free of falls Progressing        Prexisting or High Potential for Compromised Skin Integrity     Skin integrity is maintained or improved Progressing        SAFETY ADULT     Maintain or return to baseline ADL function Progressing     Maintain or return mobility status to optimal level Progressing     Patient will remain free of falls Progressing        SKIN/TISSUE INTEGRITY - ADULT     Skin integrity remains intact Progressing     Incision(s), wounds(s) or drain site(s) healing without S/S of infection Progressing

## 2018-12-31 NOTE — ASSESSMENT & PLAN NOTE
Continue vanomycin for now   Follow-up venous dopplers  Follow up on blood cultures  ID continue IV antibiotic

## 2018-12-31 NOTE — ASSESSMENT & PLAN NOTE
Will treat with vanomycin for now   Will get venous dopplers  Follow up on blood cultures  Will consult ID

## 2018-12-31 NOTE — UTILIZATION REVIEW
Initial Clinical Review    Admission: Date/Time/Statement: 12/30/18 @ 1721     Orders Placed This Encounter   Procedures    Inpatient Admission (expected length of stay for this patient is greater than two midnights)     Standing Status:   Standing     Number of Occurrences:   1     Order Specific Question:   Admitting Physician     Answer:   Massiel Mejia     Order Specific Question:   Level of Care     Answer:   Med Surg [16]     Order Specific Question:   Bed request comments     Answer:   tele     Order Specific Question:   Estimated length of stay     Answer:   More than 2 Midnights     Order Specific Question:   Certification     Answer:   I certify that inpatient services are medically necessary for this patient for a duration of greater than two midnights  See H&P and MD Progress Notes for additional information about the patient's course of treatment  ED: Date/Time/Mode of Arrival:   ED Arrival Information     Expected Arrival Acuity Means of Arrival Escorted By Service Admission Type    - 12/30/2018 11:55 Urgent Ambulance [facility]) General Medicine Urgent    Arrival Complaint    leg pain          Chief Complaint:   Chief Complaint   Patient presents with    Leg Pain     Pt c/o R leg pain that radiates to the left leg  Pt has hx of seizures and claims to have had a seizure yesterday which exacerbated the pain   Pt was able to ambulate with walker lastnight but was unable to get out of bed this AM        History of Illness: Esther Ireland is a 66 y o  female who presents with worsening bilateral edema as well as the erythema      The patient also has findings consistent with sepsis including temperatures and tachycardia      The patient's daughter gives most of the history as the patient is a poor historian      As per the patient's daughter she has "always had swollen legs", but her daughter only yesterday noticed that they were turning red and starting to weep      In the emergency department the patient is found to have a temperature of 102 5 tachycardia of 108 as well as white cell count 13      She has been given 1 dose of cefepime and vancomycin in the emergency department and also had CT scan of both lower extremities        ED Vital Signs:   ED Triage Vitals [12/30/18 1212]   Temperature Pulse Respirations Blood Pressure SpO2   (!) 102 5 °F (39 2 °C) (!) 108 16 135/74 95 %      Temp Source Heart Rate Source Patient Position - Orthostatic VS BP Location FiO2 (%)   Oral Monitor Sitting Right arm --      Pain Score       Worst Possible Pain        Wt Readings from Last 1 Encounters:   12/31/18 93 4 kg (205 lb 14 6 oz)       Vital Signs (abnormal):   Date/Time  Temp  Pulse  Resp  BP  SpO2  O2 Device  Patient Position - Orthostatic VS   12/31/18 1100   100 7 °F (38 2 °C)  93  18  148/82  95 %  None (Room air)  Lying   12/31/18 0300   102 8 °F (39 3 °C)   116  22  123/65  95 %  None (Room air)  Lying   Temp: SLIM notified at 12/31/18 0300   12/31/18 0017   101 9 °F (38 8 °C)   112  22  133/70  98 %  None (Room air)  Lying   12/30/18 2002  99 2 °F (37 3 °C)  102  18  97/56  97 %  None (Room air)  Lying   12/30/18 1433  --  104  20  116/56  97 %  None (Room air)  Lying         Abnormal Labs/Diagnostic Test Results:   WBC 13 62 - H/H 11 4/36 2 -  Platelets 855 - BNP 6,366   CT right tib/fib and femur  1  No CT findings to suggest necrotizing fasciitis, noting that early findings are nonspecific     2   Subcutaneous edema throughout the lower extremity, nonspecific     CXR  Interstitial vascular congestion but otherwise no other acute abnormality    · EKG: LVH, no acute ishcemia       ED Treatment:   Medication Administration from 12/30/2018 1154 to 12/30/2018 1918       Date/Time Order Dose Route Action Comments     12/30/2018 1544 sodium chloride 0 9 % bolus 500 mL 0 mL Intravenous Stopped      12/30/2018 1429 sodium chloride 0 9 % bolus 500 mL 500 mL Intravenous New Bag      12/30/2018 1429 morphine injection 2 mg 2 mg Intravenous Given      12/30/2018 1525 cefepime (MAXIPIME) 2,000 mg in dextrose 5 % 50 mL IVPB 0 mg Intravenous Stopped      12/30/2018 1430 cefepime (MAXIPIME) 2,000 mg in dextrose 5 % 50 mL IVPB 2,000 mg Intravenous New Bag      12/30/2018 1812 vancomycin (VANCOCIN) 1,750 mg in sodium chloride 0 9 % 500 mL IVPB 0 mg/kg Intravenous Stopped      12/30/2018 1612 vancomycin (VANCOCIN) 1,750 mg in sodium chloride 0 9 % 500 mL IVPB 1,750 mg Intravenous New Bag      12/30/2018 1428 acetaminophen (TYLENOL) tablet 650 mg 650 mg Oral Given      12/30/2018 1542 iohexol (OMNIPAQUE) 350 MG/ML injection (MULTI-DOSE) 100 mL 100 mL Intravenous Given           Past Medical/Surgical History: Active Ambulatory Problems     Diagnosis Date Noted    Cough 02/11/2018    Shortness of breath 02/11/2018    [facility] ([facility] [address] ) 02/11/2018    Thrombocytopenia ([facility] [address] ) 02/11/2018    Acute upper respiratory infection 02/11/2018    Alteration in skin integrity 02/11/2018     Resolved Ambulatory Problems     Diagnosis Date Noted    No Resolved Ambulatory Problems     Past Medical History:   Diagnosis Date    Seizures ([facility] )        Admitting Diagnosis: CHF (congestive heart failure) ([facility]) [I50 9]  Leg pain [M79 606]  Right leg pain [M79 604]  Cellulitis of right lower extremity [L03 115]  Sepsis ([facility] [address] ) [A41 9]    Age/Sex: 66 y o  female    Assessment/Plan:          Lower extremity cellulitis   Assessment & Plan     Will treat with vanomycin for now   Will get venous dopplers  Follow up on blood cultures  Will consult ID           * Sepsis ([facility] [address] )   Assessment & Plan     2/2 below  Will treat with ABx and IVF  Lactic acid is within normal limits              VTE Prophylaxis: Heparin  / sequential compression device   Code Status: Full Code     POLST: There is no POLST form on file for this patient (pre-hospital)  Discussion with family: Discussed with patient and with daughter at bedside       Anticipated Length of Stay:  Patient will be admitted on an Inpatient basis with an anticipated length of stay of  More than 2 midnights  Justification for Hospital Stay: cellulitis and sepsis           Admission Orders:  Scheduled Meds:   Current Facility-Administered Medications:  acetaminophen 650 mg Oral Q6H PRN Miguel Angel Montiel MD    atorvastatin 80 mg Oral Daily Jayden Jason MD    cefazolin 1,000 mg Intravenous Q8H Emanuel Roberts MD Last Rate: 1,000 mg (12/31/18 1216)   cholecalciferol 2,000 Units Oral Daily Jayden Jason MD    folic acid 491 mcg Oral Daily Jayden Jason MD    heparin (porcine) 5,000 Units Subcutaneous [facility] Jayden Jason MD    ipratropium-albuterol 3 mL Nebulization Q6H Kathy Brambila MD    levETIRAcetam 500 mg Oral Q12H [facility] Jayden Jason MD    metoprolol tartrate 25 mg Oral Q12H [facility] Jayden Jason MD    pantoprazole 40 mg Oral Early Morning Jayden Jason MD    PHENobarbital 64 8 mg Oral BID Jayden Jason MD      Continuous Infusions:    PRN Meds:   acetaminophen          Consult infectious disease  Telemetry  Regular diet  SCD's  Repeat CBC 12/31 WBC 11 36, H/H 9 9/31 7 Platelet 354  Repeat BMP 12/31 Ca 8 1  - albumin 2 4  - T protein 6 0      ====================================================================  Infectious disease note  1  Sepsis, POA, presented with fever and leukocytosis  Source of sepsis is most likely right leg cellulitis  Despite sepsis, patient remains systemically and hemodynamically stable, without hypotension  She had no lactic acidosis on admission  Patient is clinically improved on IV antibiotics  Temperature is down  WBC decreasing  Admission blood cultures are negative so far  Antibiotic plan as in below  Monitor temperature/WBC  Monitor hemodynamics  Follow-up on pending blood cultures      2  Right leg cellulitis  Etiology is most likely underlying venous stasis and poorly controlled leg edema    Cellulitis has the appearance of beta-hemolytic streptococcal infection, without purulence  CT without deep abscess or involvement of deeper structures  Patient is clinically improved overnight  Deescalate antibiotic regimen to IV cefazolin  Serial leg exams  Monitor temperature/WBC      3  Venous stasis/lymphedema with poorly controlled leg edema  For now, would keep legs elevated to control edema  At home, patient will also need to keep her legs elevated  She may benefit from compression stocking  Keep legs elevated to control edema      4  Dementia  Mental status appears to base baseline

## 2018-12-31 NOTE — CONSULTS
Consultation - Infectious Disease   Olivia Woods 66 y o  female MRN: [id number]  Unit/Bed#: -01 Encounter: [id number]      IMPRESSION & RECOMMENDATIONS:   Impression/Recommendations: This is a 66 y o  female, with underlying venous stasis/lymphedema, admitted yesterday with sepsis and right leg cellulitis  Patient is clinically improved on IV antibiotics  1  Sepsis, POA, presented with fever and leukocytosis  Source of sepsis is most likely right leg cellulitis  Despite sepsis, patient remains systemically and hemodynamically stable, without hypotension  She had no lactic acidosis on admission  Patient is clinically improved on IV antibiotics  Temperature is down  WBC decreasing  Admission blood cultures are negative so far  Antibiotic plan as in below  Monitor temperature/WBC  Monitor hemodynamics  Follow-up on pending blood cultures  2  Right leg cellulitis  Etiology is most likely underlying venous stasis and poorly controlled leg edema  Cellulitis has the appearance of beta-hemolytic streptococcal infection, without purulence  CT without deep abscess or involvement of deeper structures  Patient is clinically improved overnight  Deescalate antibiotic regimen to IV cefazolin  Serial leg exams  Monitor temperature/WBC  3  Venous stasis/lymphedema with poorly controlled leg edema  For now, would keep legs elevated to control edema  At home, patient will also need to keep her legs elevated  She may benefit from compression stocking  Keep legs elevated to control edema  4  Dementia  Mental status appears to base baseline  Hospitalization records reviewed in detail  Discussed with patient in detail regarding the above plan  Thank you for this consultation  We will follow along with you  HISTORY OF PRESENT ILLNESS:  Reason for Consult:  Right leg cellulitis      HPI: Tamar Childs is a 66 y o  female, history of venous stasis/lymphedema, brought to the ER yesterday by her daughter with 1 day history of right leg swelling, redness and pain  On presentation, patient had fever and leukocytosis  She had obvious right leg cellulitis  She was admitted  She was started on vancomycin/cefepime  We are asked to evaluate the patient  Patient has underlying dementia  History of typical and unreliable  However, patient is able to answer question regarding his symptoms quite appropriately  She complains of pain both in right thigh and lower leg, worse in the lower leg posteriorly  She states that the pain is a little better today  No fever or chills  Patient states that she had infection of her leg previously but does not recall when  Per old record review, patient has not had previous admissions in the [facility] system  REVIEW OF SYSTEMS:  A complete 12 point system-based review was done  Except for what is noted in HPI above, ROS of systems is otherwise negative  PAST MEDICAL HISTORY:  Past Medical History:   Diagnosis Date    Seizures [facility])      Past Surgical History:   Procedure Laterality Date    LEG SURGERY  2016    Pt family cannot recall the specific surgery or which leg it was preformed on  Problem list reviewed  FAMILY HISTORY:  Non-contributory    SOCIAL HISTORY:  History   Alcohol Use No     History   Drug Use No     History   Smoking Status    Never Smoker   Smokeless Tobacco    Never Used       ALLERGIES:  Allergies   Allergen Reactions    Penicillins        MEDICATIONS:  All current active medications have been reviewed  Patient is currently on vancomycin/cefepime      PHYSICAL EXAM:  Vitals:  Temp:  [98 8 °F (37 1 °C)-102 8 °F (39 3 °C)] 99 5 °F (37 5 °C)  HR:  [] 105  Resp:  [16-22] 18  BP: ()/(50-74) 118/63  SpO2:  [95 %-98 %] 96 %  Temp (24hrs), Av 8 °F (38 2 °C), Min:98 8 °F (37 1 °C), Max:102 8 °F (39 3 °C)  Current: Temperature: 99 5 °F (37 5 °C)     Physical Exam:  General:  Obese, comfortable, nontoxic, in no acute distress  Awake, alert and oriented x 3  Eyes:  Conjunctive clear with no hemorrhages or effusions  Oropharynx:  No ulcers, no lesions, pharynx benign, no tonsillitis  Neck:  Supple, no lymphadenopathy, no mass, nontender  Lungs:  Expansion symmetric, no rales, no wheezing, no accessory muscle use  Cardiac:  Tachycardic with regular rhythm, normal S1, normal S2, no murmurs  Abdomen:  Soft, nondistended, non-tender, no HSM  Extremities:  2 to 3+ leg edema, worse on right  Right leg with erythema/warmth involving the whole lower leg and half upper leg  No purulence  Moderate tenderness, especially in calf  Knee without effusion and with full ROM  Skin:  No rashes, no ulcers  Neurological:  Moves all four extremities spontaneously, sensation grossly intact    LABS, IMAGING, & OTHER STUDIES:  Lab Results:  I have personally reviewed pertinent labs  Results from last 7 days  Lab Units 12/31/18  0553 12/30/18  1411   POTASSIUM mmol/L 3 8 4 1   CHLORIDE mmol/L 105 103   CO2 mmol/L 24 27   BUN mg/dL 12 12   CREATININE mg/dL 0 85 0 92   EGFR ml/min/1 73sq m 76 69   CALCIUM mg/dL 8 1* 8 7   AST U/L 27 23   ALT U/L 25 29   ALK PHOS U/L 95 119*       Results from last 7 days  Lab Units 12/31/18  0553 12/30/18  2310 12/30/18  1411   WBC Thousand/uL 11 36*  --  13 62*   HEMOGLOBIN g/dL 9 9*  --  11 4*   PLATELETS Thousands/uL 108* 103* 108*           Imaging Studies:   I have personally reviewed pertinent imaging study reports and images in PACS  Right lower leg CT reviewed personally  There is soft tissue edema consistent with cellulitis  No fasciitis  No abscess  Right thigh CT reviewed personally  Soft tissue edema consistent with cellulitis  No fasciitis  No abscess  CXR reviewed personally  No infiltrates or consolidations  EKG, Pathology, and Other Studies:   I have personally reviewed pertinent reports

## 2018-12-31 NOTE — PROGRESS NOTES
Progress Note - Grady Shells 1940, 66 y o  female MRN: [id number]    Unit/Bed#: -01 Encounter: [id number]    Primary Care Provider: Paolo Alexis DO   Date and time admitted to hospital: 2018 11:56 AM        * Sepsis [facility])   Assessment & Plan    2/ below  Will treat with ABx and IVF  Continue cefazolin as per ID recommendation  Lactic acid is within normal limits  Lower extremity cellulitis   Assessment & Plan    Continue vanomycin for now   Follow-up venous dopplers  Follow up on blood cultures  ID continue IV antibiotic  Acute upper respiratory infection   Assessment & Plan    Likely acute bronchitis  Continue current antibiotic  Continue respiratory protocol  Scheduled nebulizer  Incentive spirometry  Chest x-ray negative for any acute pathology, a patient deteriorate will going to consider CT chest       VTE Pharmacologic Prophylaxis:   Pharmacologic: Heparin  Mechanical VTE Prophylaxis in Place: Yes    Patient Centered Rounds: I have performed bedside rounds with nursing staff today  Discussions with Specialists or Other Care Team Provider:     Education and Discussions with Family / Patient:  Patient    Time Spent for Care: 20 minutes  More than 50% of total time spent on counseling and coordination of care as described above      Current Length of Stay: 1 day(s)    Current Patient Status: Inpatient   Certification Statement: The patient will continue to require additional inpatient hospital stay due to Acute above condition    Discharge Plan:  Depends on clinical course    Code Status: Level 1 - Full Code      Subjective:   Complaint short of breath, denies chest pain, fever, nausea    Objective:     Vitals:   Temp (24hrs), Av 5 °F (38 1 °C), Min:98 8 °F (37 1 °C), Max:102 8 °F (39 3 °C)    Temp:  [98 8 °F (37 1 °C)-102 8 °F (39 3 °C)] 100 7 °F (38 2 °C)  HR:  [] 93  Resp:  [16-22] 18  BP: ()/(50-82) 148/82  SpO2:  [95 %-98 %] 95 %  Body mass index is 41 59 kg/m²  Input and Output Summary (last 24 hours): Intake/Output Summary (Last 24 hours) at 12/31/18 1305  Last data filed at 12/31/18 0102   Gross per 24 hour   Intake              120 ml   Output                0 ml   Net              120 ml       Physical Exam:     Physical Exam   Constitutional: She is oriented to person, place, and time  No distress  Cardiovascular: Regular rhythm, normal heart sounds and intact distal pulses  Exam reveals no gallop  No murmur heard  Pulmonary/Chest: She has wheezes  Abdominal: She exhibits no distension and no mass  There is no tenderness  There is no guarding  obese   Musculoskeletal: She exhibits no edema or tenderness  Neurological: She is alert and oriented to person, place, and time  She has normal reflexes  No cranial nerve deficit  Coordination abnormal    Skin: Skin is warm  She is not diaphoretic  There is erythema  Right lower extremity         Additional Data:     Labs:      Results from last 7 days  Lab Units 12/31/18  0553  12/30/18  1411   WBC Thousand/uL 11 36*  --  13 62*   HEMOGLOBIN g/dL 9 9*  --  11 4*   HEMATOCRIT % 31 7*  --  36 2   PLATELETS Thousands/uL 108*  < > 108*   NEUTROS PCT %  --   --  87*   LYMPHS PCT %  --   --  8*   MONOS PCT %  --   --  4   EOS PCT %  --   --  0   < > = values in this interval not displayed  Results from last 7 days  Lab Units 12/31/18  0553   SODIUM mmol/L 138   POTASSIUM mmol/L 3 8   CHLORIDE mmol/L 105   CO2 mmol/L 24   BUN mg/dL 12   CREATININE mg/dL 0 85   ANION GAP mmol/L 9   CALCIUM mg/dL 8 1*   ALBUMIN g/dL 2 4*   TOTAL BILIRUBIN mg/dL 0 40   ALK PHOS U/L 95   ALT U/L 25   AST U/L 27   GLUCOSE RANDOM mg/dL 113       Results from last 7 days  Lab Units 12/30/18  1411   INR  1 19*               Results from last 7 days  Lab Units 12/30/18  1414   LACTIC ACID mmol/L 0 7           * I Have Reviewed All Lab Data Listed Above  * Additional Pertinent Lab Tests Reviewed:  All Labs Within Last 24 Hours Reviewed    Imaging:    Imaging Reports Reviewed Today Include:   Imaging Personally Reviewed by Myself Includes:      Recent Cultures (last 7 days):           Last 24 Hours Medication List:     Current Facility-Administered Medications:  acetaminophen 650 mg Oral Q6H PRN Miguel Angel Montiel MD    atorvastatin 80 mg Oral Daily Shantell Al MD    cefazolin 1,000 mg Intravenous Q8H Simon Brian MD Last Rate: 1,000 mg (12/31/18 1216)   cholecalciferol 2,000 Units Oral Daily Shantell Al MD    folic acid 971 mcg Oral Daily Shantell Al MD    heparin (porcine) 5,000 Units Subcutaneous [facility] Shantell Al MD    ipratropium-albuterol 3 mL Nebulization Q6H Kanika Alexandra MD    levETIRAcetam 500 mg Oral Q12H [facility] Shantell Al MD    metoprolol tartrate 25 mg Oral Q12H [facility] Shantell Al MD    pantoprazole 40 mg Oral Early Morning Shantell Al MD    PHENobarbital 64 8 mg Oral BID Shantell Al MD         Today, Patient Was Seen By: Kanika Alexandra MD    ** Please Note: Dictation voice to text software may have been used in the creation of this document   **

## 2019-01-01 LAB — BACTERIA UR CULT: NORMAL

## 2019-01-01 PROCEDURE — 94760 N-INVAS EAR/PLS OXIMETRY 1: CPT

## 2019-01-01 PROCEDURE — 99232 SBSQ HOSP IP/OBS MODERATE 35: CPT | Performed by: INTERNAL MEDICINE

## 2019-01-01 PROCEDURE — 94640 AIRWAY INHALATION TREATMENT: CPT

## 2019-01-01 PROCEDURE — 99232 SBSQ HOSP IP/OBS MODERATE 35: CPT | Performed by: FAMILY MEDICINE

## 2019-01-01 RX ORDER — IPRATROPIUM BROMIDE AND ALBUTEROL SULFATE 2.5; .5 MG/3ML; MG/3ML
3 SOLUTION RESPIRATORY (INHALATION)
Status: DISCONTINUED | OUTPATIENT
Start: 2019-01-01 | End: 2019-01-02 | Stop reason: HOSPADM

## 2019-01-01 RX ADMIN — HEPARIN SODIUM 5000 UNITS: 5000 INJECTION, SOLUTION INTRAVENOUS; SUBCUTANEOUS at 22:36

## 2019-01-01 RX ADMIN — CEFAZOLIN SODIUM 1000 MG: 1 SOLUTION INTRAVENOUS at 12:18

## 2019-01-01 RX ADMIN — ATORVASTATIN CALCIUM 80 MG: 40 TABLET, FILM COATED ORAL at 09:01

## 2019-01-01 RX ADMIN — METOPROLOL TARTRATE 25 MG: 25 TABLET, FILM COATED ORAL at 22:36

## 2019-01-01 RX ADMIN — Medication 400 MCG: at 08:59

## 2019-01-01 RX ADMIN — PHENOBARBITAL 64.8 MG: 32.4 TABLET ORAL at 08:59

## 2019-01-01 RX ADMIN — HEPARIN SODIUM 5000 UNITS: 5000 INJECTION, SOLUTION INTRAVENOUS; SUBCUTANEOUS at 05:33

## 2019-01-01 RX ADMIN — CEFAZOLIN SODIUM 1000 MG: 1 SOLUTION INTRAVENOUS at 01:57

## 2019-01-01 RX ADMIN — PANTOPRAZOLE SODIUM 40 MG: 40 TABLET, DELAYED RELEASE ORAL at 05:33

## 2019-01-01 RX ADMIN — VITAMIN D, TAB 1000IU (100/BT) 2000 UNITS: 25 TAB at 09:00

## 2019-01-01 RX ADMIN — IPRATROPIUM BROMIDE AND ALBUTEROL SULFATE 3 ML: 2.5; .5 SOLUTION RESPIRATORY (INHALATION) at 07:36

## 2019-01-01 RX ADMIN — CEFAZOLIN SODIUM 1000 MG: 1 SOLUTION INTRAVENOUS at 18:58

## 2019-01-01 RX ADMIN — LEVETIRACETAM 500 MG: 500 TABLET, FILM COATED ORAL at 22:36

## 2019-01-01 RX ADMIN — PHENOBARBITAL 64.8 MG: 32.4 TABLET ORAL at 18:58

## 2019-01-01 RX ADMIN — LEVETIRACETAM 500 MG: 500 TABLET, FILM COATED ORAL at 09:01

## 2019-01-01 RX ADMIN — METOPROLOL TARTRATE 25 MG: 25 TABLET, FILM COATED ORAL at 09:00

## 2019-01-01 RX ADMIN — IPRATROPIUM BROMIDE AND ALBUTEROL SULFATE 3 ML: 2.5; .5 SOLUTION RESPIRATORY (INHALATION) at 20:14

## 2019-01-01 RX ADMIN — HEPARIN SODIUM 5000 UNITS: 5000 INJECTION, SOLUTION INTRAVENOUS; SUBCUTANEOUS at 14:17

## 2019-01-01 RX ADMIN — IPRATROPIUM BROMIDE AND ALBUTEROL SULFATE 3 ML: 2.5; .5 SOLUTION RESPIRATORY (INHALATION) at 12:56

## 2019-01-01 NOTE — PROGRESS NOTES
Progress Note - Rachel Prieto 1940, 66 y o  female MRN: [id number]    Unit/Bed#: -01 Encounter: [id number]    Primary Care Provider: Eber Ma DO   Date and time admitted to hospital: 2018 11:56 AM        * Sepsis [facility])   Assessment & Plan    Improved  Continue cefazolin as per ID recommendation  Lactic acid is within normal limits  Lower extremity cellulitis   Assessment & Plan    Continue vanomycin for now   Follow-up venous dopplers  Negative  blood cultures  ID continue IV antibiotic  Acute upper respiratory infection   Assessment & Plan    Likely acute bronchitis  Continue current antibiotic  Continue respiratory protocol  Scheduled nebulizer  Incentive spirometry  Chest x-ray negative for any acute pathology, a patient deteriorate will going to consider CT chest       VTE Pharmacologic Prophylaxis:   Pharmacologic: Heparin  Mechanical VTE Prophylaxis in Place: Yes    Patient Centered Rounds: I have performed bedside rounds with nursing staff today  Discussions with Specialists or Other Care Team Provider:     Education and Discussions with Family / Patient: patient     Time Spent for Care: 20 minutes  More than 50% of total time spent on counseling and coordination of care as described above  Current Length of Stay: 2 day(s)    Current Patient Status: Inpatient   Certification Statement: The patient will continue to require additional inpatient hospital stay due to Acute above conditions    Discharge Plan:  Depends on clinical course    Code Status: Level 1 - Full Code      Subjective:   Patient states he is doing better today  Denied worsening condition  Objective:     Vitals:   Temp (24hrs), Av 5 °F (37 5 °C), Min:99 °F (37 2 °C), Max:100 3 °F (37 9 °C)    Temp:  [99 °F (37 2 °C)-100 3 °F (37 9 °C)] 99 °F (37 2 °C)  HR:  [] 78  Resp:  [16-18] 16  BP: (110-164)/(64-72) 132/68  SpO2:  [94 %-98 %] 94 %  Body mass index is 41 59 kg/m²       Input and Output Summary (last 24 hours): Intake/Output Summary (Last 24 hours) at 01/01/19 1424  Last data filed at 01/01/19 0244   Gross per 24 hour   Intake              260 ml   Output             1500 ml   Net            -1240 ml       Physical Exam:     Physical Exam   Constitutional: She is oriented to person, place, and time  No distress  Cardiovascular: Regular rhythm, normal heart sounds and intact distal pulses  Exam reveals no gallop  No murmur heard  Pulmonary/Chest: She has no wheezes  She has no rales  She exhibits no tenderness  Abdominal: She exhibits no distension and no mass  There is no tenderness  There is no guarding  obese   Musculoskeletal: She exhibits no edema or tenderness  Neurological: She is alert and oriented to person, place, and time  She has normal reflexes  No cranial nerve deficit  Coordination abnormal    Skin: Skin is warm  She is not diaphoretic  There is erythema  Right lower extremity       Additional Data:     Labs:      Results from last 7 days  Lab Units 12/31/18  0553  12/30/18  1411   WBC Thousand/uL 11 36*  --  13 62*   HEMOGLOBIN g/dL 9 9*  --  11 4*   HEMATOCRIT % 31 7*  --  36 2   PLATELETS Thousands/uL 108*  < > 108*   NEUTROS PCT %  --   --  87*   LYMPHS PCT %  --   --  8*   MONOS PCT %  --   --  4   EOS PCT %  --   --  0   < > = values in this interval not displayed  Results from last 7 days  Lab Units 12/31/18  0553   SODIUM mmol/L 138   POTASSIUM mmol/L 3 8   CHLORIDE mmol/L 105   CO2 mmol/L 24   BUN mg/dL 12   CREATININE mg/dL 0 85   ANION GAP mmol/L 9   CALCIUM mg/dL 8 1*   ALBUMIN g/dL 2 4*   TOTAL BILIRUBIN mg/dL 0 40   ALK PHOS U/L 95   ALT U/L 25   AST U/L 27   GLUCOSE RANDOM mg/dL 113       Results from last 7 days  Lab Units 12/30/18  1411   INR  1 19*               Results from last 7 days  Lab Units 12/30/18  1414   LACTIC ACID mmol/L 0 7           * I Have Reviewed All Lab Data Listed Above    * Additional Pertinent Lab Tests Reviewed: All Labs Within Last 24 Hours Reviewed    Imaging:    Imaging Reports Reviewed Today Include:   Imaging Personally Reviewed by Myself Includes:      Recent Cultures (last 7 days):       Results from last 7 days  Lab Units 12/30/18  2310 12/30/18  1611 12/30/18  1422 12/30/18  1414 12/30/18  1411   BLOOD CULTURE  No Growth at 24 hrs  No Growth at 24 hrs   --   --  No Growth at 24 hrs  No Growth at 24 hrs  URINE CULTURE   --  <10,000 cfu/ml   --   --   --    INFLUENZA B PCR   --   --  None Detected  --   --    RSV PCR   --   --  None Detected  --   --        Last 24 Hours Medication List:     Current Facility-Administered Medications:  acetaminophen 650 mg Oral Q6H PRN Miguel Angel Montiel MD    atorvastatin 80 mg Oral Daily Andria Nunez MD    cefazolin 1,000 mg Intravenous Q8H Don Howard MD Last Rate: 1,000 mg (01/01/19 1218)   cholecalciferol 2,000 Units Oral Daily Andria Nunez MD    folic acid 227 mcg Oral Daily Andria Nunez MD    heparin (porcine) 5,000 Units Subcutaneous [facility] Andria Nunez MD    ipratropium-albuterol 3 mL Nebulization TID Andria Nunez MD    levETIRAcetam 500 mg Oral Q12H [facility] Andria Nunez MD    metoprolol tartrate 25 mg Oral Q12H [facility] Andria Nunez MD    pantoprazole 40 mg Oral Early Morning Andria Nunez MD    PHENobarbital 64 8 mg Oral BID Andria Nunez MD         Today, Patient Was Seen By: Yvonne Lynn MD    ** Please Note: Dictation voice to text software may have been used in the creation of this document   **

## 2019-01-01 NOTE — PROGRESS NOTES
Progress Note - Infectious Disease   Olivia Botello 66 y o  female MRN: [id number]  Unit/Bed#: -01 Encounter: [id number]      Impression/Recommendations:  1  Sepsis, POA, presented with fever and leukocytosis  Source of sepsis is most likely right leg cellulitis  Despite sepsis, patient remains systemically and hemodynamically stable, without hypotension  She had no lactic acidosis on admission  Patient is clinically improved on IV antibiotics  Temperature is down  WBC decreasing  Admission blood cultures remain negative  Antibiotic plan as in below  Monitor temperature/WBC  Monitor hemodynamics  Follow-up on pending blood cultures      2  Right leg cellulitis  Etiology is most likely underlying venous stasis and poorly controlled leg edema  Cellulitis has the appearance of beta-hemolytic streptococcal infection, without purulence  CT without deep abscess or involvement of deeper structures  Patient is clinically improving and continues to improve with antibiotic deescalation  Continue IV cefazolin  Serial leg exams  Monitor temperature/WBC  Anticipate transition to p o  Keflex in 24-48 hours, if patient continues to improve clinically      3  Venous stasis/lymphedema with poorly controlled leg edema  Per discussion with daughter, patient frequently sits with legs dangling down at home  For now, would keep legs elevated to control edema  At home, patient will also need to keep her legs elevated  She may benefit from compression stocking  Keep legs elevated to control edema      4  Dementia  Mental status appears to base baseline      Discussed with patient and daughter in detail regarding the above plan  Antibiotics:  Cefazolin  Antibiotic # 3    Subjective:  Patient's right leg pain is improved  Temperature trending down  No further chills  She is tolerating antibiotic well  No nausea, vomiting or diarrhea      Objective:  Vitals:  Temp:  [99 2 °F (37 3 °C)-100 7 °F (38 2 °C)] 99 2 °F (37 3 °C)  HR:  [] 127  Resp:  [16-18] 16  BP: (110-164)/(64-82) 164/70  SpO2:  [95 %-99 %] 98 %  Temp (24hrs), Av 9 °F (37 7 °C), Min:99 2 °F (37 3 °C), Max:100 7 °F (38 2 °C)  Current: Temperature: 99 2 °F (37 3 °C)    Physical Exam:     General: Awake, alert, cooperative, no distress  Neck:  Supple  No mass  No lymphadenopathy  Lungs: Expansion symmetric, no rales, no wheezing, respirations unlabored  Heart:  Regular rate and rhythm, S1 and S2 normal, no murmur  Abdomen: Soft, nondistended, non-tender, bowel sounds active all four quadrants,        no masses, no organomegaly  Extremities: Decreased leg edema  Decrease erythema/warmth  No ulcer  Much improved tenderness  Skin:  No rash  Neuro: Moves all extremities  Invasive Devices     Peripheral Intravenous Line            Peripheral IV 18 Left Wrist 1 day    Peripheral IV 18 Right Wrist 1 day                Labs studies:   I have personally reviewed pertinent labs  Results from last 7 days  Lab Units 18  0553 18  1411   POTASSIUM mmol/L 3 8 4 1   CHLORIDE mmol/L 105 103   CO2 mmol/L 24 27   BUN mg/dL 12 12   CREATININE mg/dL 0 85 0 92   EGFR ml/min/1 73sq m 76 69   CALCIUM mg/dL 8 1* 8 7   AST U/L 27 23   ALT U/L 25 29   ALK PHOS U/L 95 119*       Results from last 7 days  Lab Units 18  0553 18  2310 18  1411   WBC Thousand/uL 11 36*  --  13 62*   HEMOGLOBIN g/dL 9 9*  --  11 4*   PLATELETS Thousands/uL 108* 103* 108*       Results from last 7 days  Lab Units 18  2310 18  1611 18  1422 18  1414 18  1411   BLOOD CULTURE  No Growth at 24 hrs  No Growth at 24 hrs   --   --  No Growth at 24 hrs  No Growth at 24 hrs     URINE CULTURE   --  <10,000 cfu/ml   --   --   --    INFLUENZA B PCR   --   --  None Detected  --   --    RSV PCR   --   --  None Detected  --   --        Imaging Studies:   I have personally reviewed pertinent imaging study reports and images in PACS  EKG, Pathology, and Other Studies:   I have personally reviewed pertinent reports

## 2019-01-01 NOTE — ASSESSMENT & PLAN NOTE
Continue vanomycin for now   Follow-up venous dopplers  Negative  blood cultures  ID continue IV antibiotic

## 2019-01-01 NOTE — PLAN OF CARE
Nutrition/Hydration-ADULT     Nutrient/Hydration intake appropriate for improving, restoring or maintaining nutritional needs Progressing        PAIN - ADULT     Verbalizes/displays adequate comfort level or baseline comfort level Progressing        Potential for Falls     Patient will remain free of falls Progressing        Prexisting or High Potential for Compromised Skin Integrity     Skin integrity is maintained or improved Progressing        SAFETY ADULT     Maintain or return to baseline ADL function Progressing     Maintain or return mobility status to optimal level Progressing     Patient will remain free of falls Progressing        SKIN/TISSUE INTEGRITY - ADULT     Skin integrity remains intact Progressing     Incision(s), wounds(s) or drain site(s) healing without S/S of infection Progressing

## 2019-01-02 VITALS
HEART RATE: 96 BPM | BODY MASS INDEX: 41.51 KG/M2 | SYSTOLIC BLOOD PRESSURE: 127 MMHG | OXYGEN SATURATION: 96 % | RESPIRATION RATE: 20 BRPM | TEMPERATURE: 99.4 F | DIASTOLIC BLOOD PRESSURE: 78 MMHG | WEIGHT: 205.91 LBS | HEIGHT: 59 IN

## 2019-01-02 PROCEDURE — 97163 PT EVAL HIGH COMPLEX 45 MIN: CPT

## 2019-01-02 PROCEDURE — G8979 MOBILITY GOAL STATUS: HCPCS

## 2019-01-02 PROCEDURE — 94640 AIRWAY INHALATION TREATMENT: CPT

## 2019-01-02 PROCEDURE — 94760 N-INVAS EAR/PLS OXIMETRY 1: CPT

## 2019-01-02 PROCEDURE — G8978 MOBILITY CURRENT STATUS: HCPCS

## 2019-01-02 PROCEDURE — 99232 SBSQ HOSP IP/OBS MODERATE 35: CPT | Performed by: FAMILY MEDICINE

## 2019-01-02 PROCEDURE — 99238 HOSP IP/OBS DSCHRG MGMT 30/<: CPT | Performed by: FAMILY MEDICINE

## 2019-01-02 PROCEDURE — 99232 SBSQ HOSP IP/OBS MODERATE 35: CPT | Performed by: INTERNAL MEDICINE

## 2019-01-02 RX ORDER — PHENOBARBITAL 64.8 MG/1
64.8 TABLET ORAL 2 TIMES DAILY
Qty: 5 TABLET | Refills: 0 | Status: SHIPPED | OUTPATIENT
Start: 2019-01-03 | End: 2019-01-13

## 2019-01-02 RX ORDER — CEPHALEXIN 500 MG/1
500 CAPSULE ORAL EVERY 6 HOURS SCHEDULED
Status: DISCONTINUED | OUTPATIENT
Start: 2019-01-02 | End: 2019-01-02 | Stop reason: HOSPADM

## 2019-01-02 RX ORDER — PHENOBARBITAL 64.8 MG/1
64.8 TABLET ORAL 2 TIMES DAILY
Qty: 5 TABLET | Refills: 0 | Status: SHIPPED | OUTPATIENT
Start: 2019-01-03 | End: 2019-01-02

## 2019-01-02 RX ORDER — CEPHALEXIN 500 MG/1
500 CAPSULE ORAL EVERY 6 HOURS SCHEDULED
Qty: 24 CAPSULE | Refills: 0 | Status: SHIPPED | OUTPATIENT
Start: 2019-01-02 | End: 2019-01-08

## 2019-01-02 RX ADMIN — CEFAZOLIN SODIUM 1000 MG: 1 SOLUTION INTRAVENOUS at 02:31

## 2019-01-02 RX ADMIN — CEPHALEXIN 500 MG: 500 CAPSULE ORAL at 13:21

## 2019-01-02 RX ADMIN — Medication 400 MCG: at 09:06

## 2019-01-02 RX ADMIN — ATORVASTATIN CALCIUM 80 MG: 40 TABLET, FILM COATED ORAL at 09:05

## 2019-01-02 RX ADMIN — LEVETIRACETAM 500 MG: 500 TABLET, FILM COATED ORAL at 09:05

## 2019-01-02 RX ADMIN — METOPROLOL TARTRATE 25 MG: 25 TABLET, FILM COATED ORAL at 09:05

## 2019-01-02 RX ADMIN — IPRATROPIUM BROMIDE AND ALBUTEROL SULFATE 3 ML: 2.5; .5 SOLUTION RESPIRATORY (INHALATION) at 14:16

## 2019-01-02 RX ADMIN — CEPHALEXIN 500 MG: 500 CAPSULE ORAL at 18:32

## 2019-01-02 RX ADMIN — PANTOPRAZOLE SODIUM 40 MG: 40 TABLET, DELAYED RELEASE ORAL at 06:29

## 2019-01-02 RX ADMIN — VITAMIN D, TAB 1000IU (100/BT) 2000 UNITS: 25 TAB at 09:05

## 2019-01-02 RX ADMIN — PHENOBARBITAL 64.8 MG: 32.4 TABLET ORAL at 18:32

## 2019-01-02 RX ADMIN — IPRATROPIUM BROMIDE AND ALBUTEROL SULFATE 3 ML: 2.5; .5 SOLUTION RESPIRATORY (INHALATION) at 08:33

## 2019-01-02 RX ADMIN — HEPARIN SODIUM 5000 UNITS: 5000 INJECTION, SOLUTION INTRAVENOUS; SUBCUTANEOUS at 06:28

## 2019-01-02 RX ADMIN — PHENOBARBITAL 64.8 MG: 32.4 TABLET ORAL at 09:08

## 2019-01-02 NOTE — PROGRESS NOTES
Progress Note - Esther Ireland 1940, 66 y o  female MRN: [id number]    Unit/Bed#: -01 Encounter: [id number]    Primary Care Provider: Karissa Candelaria DO   Date and time admitted to hospital: 2018 11:56 AM        * Sepsis [facility])   Assessment & Plan    Improved  Discontinue cefazolin as per ID recommendation  Lactic acid is within normal limits  Start on Keflex  PT OT recommend short-term rehab pending placement  Lower extremity cellulitis   Assessment & Plan    Continue vanomycin for now   Follow-up venous dopplers  Negative  blood cultures  ID discontinue IV antibiotic  Start on Keflex       Acute upper respiratory infection   Assessment & Plan    Likely acute bronchitis  Stay complaining of cough  Continue current antibiotic  Continue respiratory protocol  Scheduled nebulizer  Incentive spirometry  Chest x-ray negative for any acute pathology, a patient deteriorate will going to consider CT chest         VTE Pharmacologic Prophylaxis:   Pharmacologic: Heparin  Mechanical VTE Prophylaxis in Place: No    Patient Centered Rounds: I have performed bedside rounds with nursing staff today  Discussions with Specialists or Other Care Team Provider:      Education and Discussions with Family / Patient:  Patient    Time Spent for Care: 20 minutes  More than 50% of total time spent on counseling and coordination of care as described above  Current Length of Stay: 3 day(s)    Current Patient Status: Inpatient   Certification Statement: The patient will continue to require additional inpatient hospital stay due to Acute above conditions    Discharge Plan:  Pending placement    Code Status: Level 1 - Full Code      Subjective:   Patient states he is doing better  Still complaining of some cough  She denied worsening of lower extremity edema      Objective:     Vitals:   Temp (24hrs), Av 3 °F (37 4 °C), Min:99 °F (37 2 °C), Max:100 °F (37 8 °C)    Temp:  [99 °F (37 2 °C)-100 °F (37 8 °C)] 99 °F (37 2 °C)  HR:  [] 98  Resp:  [18] 18  BP: (106-132)/(55-66) 130/62  SpO2:  [93 %-96 %] 93 %  Body mass index is 41 59 kg/m²  Input and Output Summary (last 24 hours):     No intake or output data in the 24 hours ending 01/02/19 1109    Physical Exam:     Physical Exam   Constitutional: She is oriented to person, place, and time  No distress  Cardiovascular: Regular rhythm, normal heart sounds and intact distal pulses  Exam reveals no gallop  No murmur heard  Pulmonary/Chest: She has no wheezes  She has no rales  She exhibits no tenderness  Abdominal: She exhibits no distension and no mass  There is no tenderness  There is no guarding  obese   Musculoskeletal: She exhibits no edema or tenderness  Neurological: She is alert and oriented to person, place, and time  She has normal reflexes  No cranial nerve deficit  Coordination abnormal    Skin: Skin is warm  She is not diaphoretic  There is erythema  Right lower extremity         Additional Data:     Labs:      Results from last 7 days  Lab Units 12/31/18  0553  12/30/18  1411   WBC Thousand/uL 11 36*  --  13 62*   HEMOGLOBIN g/dL 9 9*  --  11 4*   HEMATOCRIT % 31 7*  --  36 2   PLATELETS Thousands/uL 108*  < > 108*   NEUTROS PCT %  --   --  87*   LYMPHS PCT %  --   --  8*   MONOS PCT %  --   --  4   EOS PCT %  --   --  0   < > = values in this interval not displayed      Results from last 7 days  Lab Units 12/31/18  0553   SODIUM mmol/L 138   POTASSIUM mmol/L 3 8   CHLORIDE mmol/L 105   CO2 mmol/L 24   BUN mg/dL 12   CREATININE mg/dL 0 85   ANION GAP mmol/L 9   CALCIUM mg/dL 8 1*   ALBUMIN g/dL 2 4*   TOTAL BILIRUBIN mg/dL 0 40   ALK PHOS U/L 95   ALT U/L 25   AST U/L 27   GLUCOSE RANDOM mg/dL 113       Results from last 7 days  Lab Units 12/30/18  1411   INR  1 19*               Results from last 7 days  Lab Units 12/30/18  1414   LACTIC ACID mmol/L 0 7           * I Have Reviewed All Lab Data Listed Above   * Additional Pertinent Lab Tests Reviewed: All Labs Within Last 24 Hours Reviewed    Imaging:    Imaging Reports Reviewed Today Include:   Imaging Personally Reviewed by Myself Includes:      Recent Cultures (last 7 days):       Results from last 7 days  Lab Units 12/30/18  2310 12/30/18  1611 12/30/18  1422 12/30/18  1414 12/30/18  1411   BLOOD CULTURE  No Growth at 48 hrs  No Growth at 48 hrs   --   --  No Growth at 48 hrs  No Growth at 48 hrs  URINE CULTURE   --  <10,000 cfu/ml   --   --   --    INFLUENZA B PCR   --   --  None Detected  --   --    RSV PCR   --   --  None Detected  --   --        Last 24 Hours Medication List:     Current Facility-Administered Medications:  acetaminophen 650 mg Oral Q6H PRN Miguel Angel Montiel MD   atorvastatin 80 mg Oral Daily Will MD Luis   cephalexin 500 mg Oral Q6H [facility] Jayjay Panda MD   cholecalciferol 2,000 Units Oral Daily Will MD Luis   folic acid 440 mcg Oral Daily Will MD Luis   heparin (porcine) 5,000 Units Subcutaneous [facility] Will MD Luis   ipratropium-albuterol 3 mL Nebulization TID Will MD Luis   levETIRAcetam 500 mg Oral Q12H [facility] Will MD Luis   metoprolol tartrate 25 mg Oral Q12H Efren Victoria MD   pantoprazole 40 mg Oral Early Morning Will MD Luis   PHENobarbital 64 8 mg Oral BID Will MD Luis        Today, Patient Was Seen By: Leeann Helms MD    ** Please Note: Dictation voice to text software may have been used in the creation of this document   **

## 2019-01-02 NOTE — PLAN OF CARE
Problem: Potential for Falls  Goal: Patient will remain free of falls  INTERVENTIONS:  - Assess patient frequently for physical needs  -  Identify cognitive and physical deficits and behaviors that affect risk of falls    -  [place] fall precautions as indicated by assessment   - Educate patient/family on patient safety including physical limitations  - Instruct patient to call for assistance with activity based on assessment  - Modify environment to reduce risk of injury  - Consider OT/PT consult to assist with strengthening/mobility    Outcome: Progressing      Problem: Prexisting or High Potential for Compromised Skin Integrity  Goal: Skin integrity is maintained or improved  INTERVENTIONS:  - Identify patients at risk for skin breakdown  - Assess and monitor skin integrity  - Assess and monitor nutrition and hydration status  - Monitor labs (i e  albumin)  - Assess for incontinence   - Turn and reposition patient  - Assist with mobility/ambulation  - Relieve pressure over bony prominences  - Avoid friction and shearing  - Provide appropriate hygiene as needed including keeping skin clean and dry  - Evaluate need for skin moisturizer/barrier cream  - Collaborate with interdisciplinary team (i e  Nutrition, Rehabilitation, etc )   - Patient/family teaching   Outcome: Progressing      Problem: PAIN - ADULT  Goal: Verbalizes/displays adequate comfort level or baseline comfort level  Interventions:  - Encourage patient to monitor pain and request assistance  - Assess pain using appropriate pain scale  - Administer analgesics based on type and severity of pain and evaluate response  - Implement non-pharmacological measures as appropriate and evaluate response  - Consider cultural and social influences on pain and pain management  - Notify physician/advanced practitioner if interventions unsuccessful or patient reports new pain   Outcome: Progressing      Problem: SAFETY ADULT  Goal: Maintain or return to baseline ADL function  INTERVENTIONS:  -  Assess patient's ability to carry out ADLs; assess patient's baseline for ADL function and identify physical deficits which impact ability to perform ADLs (bathing, care of mouth/teeth, toileting, grooming, dressing, etc )  - Assess/evaluate cause of self-care deficits   - Assess range of motion  - Assess patient's mobility; develop plan if impaired  - Assess patient's need for assistive devices and provide as appropriate  - Encourage maximum independence but intervene and supervise when necessary  ¯ Involve family in performance of ADLs  ¯ Assess for home care needs following discharge   ¯ Request OT consult to assist with ADL evaluation and planning for discharge  ¯ Provide patient education as appropriate   Outcome: Progressing    Goal: Maintain or return mobility status to optimal level  INTERVENTIONS:  - Assess patient's baseline mobility status (ambulation, transfers, stairs, etc )    - Identify cognitive and physical deficits and behaviors that affect mobility  - Identify mobility aids required to assist with transfers and/or ambulation (gait belt, sit-to-stand, lift, walker, cane, etc )  - [place] fall precautions as indicated by assessment  - Record patient progress and toleration of activity level on Mobility SBAR; progress patient to next Phase/Stage  - Instruct patient to call for assistance with activity based on assessment  - Request Rehabilitation consult to assist with strengthening/weightbearing, etc    Outcome: Progressing    Goal: Patient will remain free of falls  INTERVENTIONS:  - Assess patient frequently for physical needs  -  Identify cognitive and physical deficits and behaviors that affect risk of falls    -  [place] fall precautions as indicated by assessment   - Educate patient/family on patient safety including physical limitations  - Instruct patient to call for assistance with activity based on assessment  - Modify environment to reduce risk of injury  - Consider OT/PT consult to assist with strengthening/mobility    Outcome: Progressing      Problem: SKIN/TISSUE INTEGRITY - ADULT  Goal: Skin integrity remains intact  INTERVENTIONS  - Identify patients at risk for skin breakdown  - Assess and monitor skin integrity  - Assess and monitor nutrition and hydration status  - Monitor labs (i e  albumin)  - Assess for incontinence   - Turn and reposition patient  - Assist with mobility/ambulation  - Relieve pressure over bony prominences  - Avoid friction and shearing  - Provide appropriate hygiene as needed including keeping skin clean and dry  - Evaluate need for skin moisturizer/barrier cream  - Collaborate with interdisciplinary team (i e  Nutrition, Rehabilitation, etc )   - Patient/family teaching   Outcome: Progressing    Goal: Incision(s), wounds(s) or drain site(s) healing without S/S of infection  INTERVENTIONS  - Assess and document risk factors for skin impairment   - Assess and document dressing, incision, wound bed, drain sites and surrounding tissue  - Initiate Nutrition services consult and/or wound management as needed   Outcome: Progressing      Problem: Nutrition/Hydration-ADULT  Goal: Nutrient/Hydration intake appropriate for improving, restoring or maintaining nutritional needs  Monitor and assess patient's nutrition/hydration status for malnutrition (ex- brittle hair, bruises, dry skin, pale skin and conjunctiva, muscle wasting, smooth red tongue, and disorientation)  Collaborate with interdisciplinary team and initiate plan and interventions as ordered  Monitor patient's weight and dietary intake as ordered or per policy  Utilize nutrition screening tool and intervene per policy  Determine patient's food preferences and provide high-protein, high-caloric foods as appropriate       INTERVENTIONS:  - Monitor oral intake, urinary output, labs, and treatment plans  - Assess nutrition and hydration status and recommend course of action  - Evaluate amount of meals eaten  - Assist patient with eating if necessary   - Allow adequate time for meals  - Recommend/ encourage appropriate diets, oral nutritional supplements, and vitamin/mineral supplements  - Order, calculate, and assess calorie counts as needed  - Recommend, monitor, and adjust tube feedings and TPN/PPN based on assessed needs  - Assess need for intravenous fluids  - Provide specific nutrition/hydration education as appropriate  - Include patient/family/caregiver in decisions related to nutrition   Outcome: Progressing

## 2019-01-02 NOTE — ASSESSMENT & PLAN NOTE
Continue vanomycin for now   Follow-up venous dopplers  Negative  blood cultures  ID discontinue IV antibiotic     Start on Keflex

## 2019-01-02 NOTE — PLAN OF CARE
Problem: DISCHARGE PLANNING - CARE MANAGEMENT  Goal: Discharge to post-acute care or home with appropriate resources  INTERVENTIONS:  - Conduct assessment to determine patient/family and health care team treatment goals, and need for post-acute services based on payer coverage, community resources, and patient preferences, and barriers to discharge  - Address psychosocial, clinical, and financial barriers to discharge as identified in assessment in conjunction with the patient/family and health care team  - Arrange appropriate level of post-acute services according to patient's   needs and preference and payer coverage in collaboration with the physician and health care team  - Communicate with and update the patient/family, physician, and health care team regarding progress on the discharge plan  - Arrange appropriate transportation to post-acute venues  Outcome: Progressing  LOS 3  LACE 72  No 30 day readmit  Patient's daughter-Jerri phoned back  Citlali PATIÑO informed patient lives with her in a one story house  There is a ramp to enter the house from outside  Patient ambulates with a walker  Citlali PATIÑO helps patient with showers  Patient can dress herself  Patient has been in [facility] in the past and Citlali PATIÑO would like patient to go back after discharge  Referral sent to [facility]  Patient has hhc hx with [place] [facility]  Patient fills her prescriptions with [employer], [place]  Citlali PATIÑO denies mental health dx hx and substance abuse hx  Patient does  Have a plate in head over 40 years now  Patient's daughter-Jerri is patient's health representative  Patient is retired  Citlali PATIÑO drives patient to doctor's appointments  Patient is cleared for discharge per WOOD PATIÑO would like patient to be transported to [facility]  CM will follow patient's needs  nurse and SLIM informed

## 2019-01-02 NOTE — ASSESSMENT & PLAN NOTE
Likely acute bronchitis    Stay complaining of cough  Continue current antibiotic  Continue respiratory protocol  Scheduled nebulizer  Incentive spirometry  Chest x-ray negative for any acute pathology, a patient deteriorate will going to consider CT chest

## 2019-01-02 NOTE — PROGRESS NOTES
Progress Note - Infectious Disease   Olivia Fernandez 66 y o  female MRN: [id number]  Unit/Bed#: -01 Encounter: [id number]      Impression/Plan:  1  Sepsis, POA, presented with fever and leukocytosis   Source of sepsis is most likely right leg cellulitis   Despite sepsis, patient remains systemically and hemodynamically stable, without hypotension   She had no lactic acidosis on admission   Patient is clinically improved on IV antibiotics   Temperature is down   WBC decreasing   Admission blood cultures remain negative  Antibiotic plan as in below  Monitor temperature/WBC  Monitor hemodynamics  Follow-up on pending blood cultures      2  Right leg cellulitis   Etiology is most likely underlying venous stasis and poorly controlled leg edema   Cellulitis has the appearance of beta-hemolytic streptococcal infection, without purulence   CT without deep abscess or involvement of deeper structures   Patient is clinically improving and continues to improve with antibiotic deescalation  Will transition to oral Keflex 500 mg q 6 hours  Plan for antibiotics through 1/8/19  Serial leg exams  Monitor temperature/WBC      3  Venous stasis/lymphedema with poorly controlled leg edema  Per prior discussion with daughter, patient frequently sits with legs dangling down at home    For now, would keep legs elevated to control edema   At home, patient will also need to keep her legs elevated   She may benefit from compression stocking  Keep legs elevated to control edema      4  Dementia   Mental status appears to base baseline      ID consult service will sign off at this time  Please call if any additional questions or concerns  Antibiotics:  Ancef 3  Systemic antibiotics day 4    24 hour events:  No acute events noted overnight on chart review  Patient remains afebrile  No CBC today  Blood cultures remain without growth  No new images  Patient's other vitals stable    Subjective:  Patient appears pleasant today  She denies any nausea, vomiting, chest pain or shortness of breath  She denies any rash or intolerance of antibiotics  She reports that her legs continue to have pain as they always do  She can't tell if the redness is any better  She offers no other complaints on exam     Objective:  Vitals:  Temp:  [99 °F (37 2 °C)-100 °F (37 8 °C)] 99 °F (37 2 °C)  HR:  [] 98  Resp:  [18] 18  BP: (106-132)/(55-68) 130/62  SpO2:  [93 %-97 %] 96 %  Temp (24hrs), Av 3 °F (37 4 °C), Min:99 °F (37 2 °C), Max:100 °F (37 8 °C)  Current: Temperature: 99 °F (37 2 °C)    Physical Exam:   General Appearance:  Alert, interactive, nontoxic, no acute distress  Throat: Oropharynx moist without lesions  Lungs:   Clear to auscultation anteriorly bilaterally; no wheezes, rhonchi or rales; respirations unlabored   Heart:  RRR; no murmur, rub or gallop   Abdomen:   Soft, non-tender, non-distended, positive bowel sounds  Extremities: No clubbing, cyanosis; patient noted to have nonpitting edema in the lower extremities bilaterally and the skin appears to have changes consistent with chronic swelling and venous stasis  No pain or tenderness or decreased mobility in the knees or ankles  Skin: No new rashes or lesions  No new draining wounds noted  Patient's right leg has faint erythema present and remains tender to palpation along the shin         Labs, Imaging, & Other studies:   All pertinent labs and imaging studies were personally reviewed    Results from last 7 days  Lab Units 18  0553 18  2310 18  1411   WBC Thousand/uL 11 36*  --  13 62*   HEMOGLOBIN g/dL 9 9*  --  11 4*   PLATELETS Thousands/uL 108* 103* 108*       Results from last 7 days  Lab Units 18  0553 18  1411   POTASSIUM mmol/L 3 8 4 1   CHLORIDE mmol/L 105 103   CO2 mmol/L 24 27   BUN mg/dL 12 12   CREATININE mg/dL 0 85 0 92   EGFR ml/min/1 73sq m 76 69   CALCIUM mg/dL 8 1* 8 7   AST U/L 27 23   ALT U/L 25 29   ALK PHOS U/L 95 119*       Results from last 7 days  Lab Units 12/30/18  2310 12/30/18  1611 12/30/18  1422 12/30/18  1414 12/30/18  1411   BLOOD CULTURE  No Growth at 48 hrs  No Growth at 48 hrs   --   --  No Growth at 48 hrs  No Growth at 48 hrs     URINE CULTURE   --  <10,000 cfu/ml   --   --   --    INFLUENZA B PCR   --   --  None Detected  --   --    RSV PCR   --   --  None Detected  --   --

## 2019-01-02 NOTE — ASSESSMENT & PLAN NOTE
Improved  Discontinue cefazolin as per ID recommendation  Lactic acid is within normal limits  Start on Keflex  PT OT recommend short-term rehab pending placement

## 2019-01-02 NOTE — SOCIAL WORK
LOS 3  LACE 72  No 30 day readmit  Patient's daughterTamika phoned back  [facility] informed patient lives with her in a one story house  There is a ramp to enter the house from outside  Patient ambulates with a walker  [facility] helps patient with showers  Patient can dress herself  Patient has been in [facility] in the past and [facility] would like patient to go back after discharge  Referral sent to [facility]  Patient has hhc hx with [place]  Patient fills her prescriptions with [place], [place]  [facility] denies mental health dx hx and substance abuse hx  Patient does  Have a plate in head over 40 years now  Patient's daughter-Jerri is patient's health representative  Patient is retired  [facility] drives patient to doctor's appointments  Patient is cleared for discharge per SLIM  [facility] would like patient to be transported to [facility]  CM will follow patient's needs  nurse and SLIM informed  CM reviewed discharge planning process including the following: identifying help at home, patient preference for discharge planning needs, pharmacy preference, and availability of treatment team to discuss questions or concerns patient and/or family may have regarding understanding medications and recognizing signs and symptoms once discharged  CM also encouraged patient to follow up with all recommended appointments after discharge  Patient advised of importance for patient and family to participate in managing patients medical well being  CM name and role reviewed  Discharge Checklist reviewed and CM will continue to monitor for progress toward discharge goals in nursing and provider rounds  Khurram Pretty

## 2019-01-02 NOTE — PLAN OF CARE
Problem: PHYSICAL THERAPY ADULT  Goal: Performs mobility at highest level of function for planned discharge setting  See evaluation for individualized goals  Treatment/Interventions: Functional transfer training, LE strengthening/ROM, Therapeutic exercise, Endurance training, Patient/family training, Bed mobility, Gait training, Spoke to nursing, Spoke to MD, Spoke to case management          See flowsheet documentation for full assessment, interventions and recommendations  Prognosis: Good  Problem List: Decreased strength, Decreased range of motion, Decreased endurance, Impaired balance, Decreased mobility, Decreased cognition, Decreased skin integrity, Pain  Assessment: pt is a 79y/o f who presents to [facility] c sepsis 2* R LE cellulitis  PMH significant for dementia, seizure, thrombocytopenia, + LE cellulitis  at baseline, pt fairly mod (I) c functional mobility c RW  resides c dtr in 2nd floor apt c elevator access  dtr (A) pt c ADLs prn  currently pt requires min (A)x2 to complete OOB mobility tasks 2* deficits in strength, ROM, balance, gait quality, pain, cognition, skin integrity, + activity tolerance noted in PT exam above  Barthel Index 30/100  increased time required to complete all transitions c min verbal cues for safe technique  ambulated 3' to chair c RW limited by pain/fatigue  min verbal cues required for sequencing + RW management  would benefit from skilled PT to maximize functional mobility + improve quality of life  upon d/c, recommend STR  PT eval of high complexity 2* unstable med status c pt requiring ongoing medical management 2* sepsis  pt requires min (A)x2 to complete OOB mobility tasks 2* significant deficits noted above in PT eval  pt also cont to report pain R LE c movement + touch  pt c multiple co-morbidities impacting PT including dementia, seizures, + thrombocytopenia     Barriers to Discharge: None     Recommendation: Short-term skilled PT     PT - OK to Discharge: Yes    See flowsheet documentation for full assessment

## 2019-01-02 NOTE — SOCIAL WORK
CM left message for daughter to call back to discuss discharge plan  Patient is unable to provided adequate information  Nurse and SLIM notified

## 2019-01-02 NOTE — PHYSICAL THERAPY NOTE
PT Evaluation (24min)  (10:10-10:34)    Past Medical History:   Diagnosis Date    Seizures ([facility] [address] )       01/02/19 1011   Note Type   Note type Eval only   Pain Assessment   Pain Assessment FLACC   Pain Rating: FLACC (Rest) - Face 0   Pain Rating: FLACC (Rest) - Legs 0   Pain Rating: FLACC (Rest) - Activity 0   Pain Rating: FLACC (Rest) - Cry 0   Pain Rating: FLACC (Rest) - Consolability 0   Score: FLACC (Rest) 0   Pain Rating: FLACC (Activity) - Face 1   Pain Rating: FLACC (Activity) - Legs 1   Pain Rating: FLACC (Activity) - Activity 1   Pain Rating: FLACC (Activity) - Cry 1   Pain Rating: FLACC (Activity) - Consolability 1   Score: FLACC (Activity) 5   Home Living   Type of Home Apartment  (2nd floor)   Home Layout Elevator; One level   Bathroom Toilet Raised   Bathroom Equipment Shower chair;Grab bars in shower;Grab bars around toilet; Toilet raiser   [address] [facility]; Wheelchair-manual;Cane;Grab bars   Prior Function   Level of [place] Needs assistance with ADLs and functional mobility  (ambulates c RW)   Lives With Daughter   Receives Help From Family   ADL Assistance Needs assistance  (dtr (A) in/out of shower)   IADLs Needs assistance   Falls in the last 6 months 1 to 4   Comments (-) drives   Restrictions/Precautions   Other Precautions Cognitive; Bed Alarm; Fall Risk   General   Additional Pertinent History pt presents to [facility] c sepsis 2* R LE cellulitis  PT consulted for mobility + d/c planning  cleared for OOB mobility by MD Brenda Herrera)  Family/Caregiver Present No   Cognition   Orientation Level Oriented to person;Oriented to place   RUE Assessment   RUE Assessment X  (shldr limited)   LUE Assessment   LUE Assessment X  (shldr limited)   RLE Assessment   RLE Assessment WFL  (4-/5)   LLE Assessment   LLE Assessment WFL  (4-/5)   Coordination   Sensation WFL   Bed Mobility   Supine to Sit 3  Moderate assistance   Additional items Assist x 1;HOB elevated; Increased time required;Verbal cues;LE management   Transfers   Sit to Stand 4  Minimal assistance   Additional items Assist x 2;Verbal cues; Increased time required   Stand to Sit 4  Minimal assistance   Additional items Assist x 2;Verbal cues; Increased time required;Armrests   Ambulation/Elevation   Gait pattern Antalgic;Decreased foot clearance; Wide VITO; Short stride   Gait Assistance 4  Minimal assist   Additional items Verbal cues; Assist x 2   Assistive Device Rolling walker   Distance 3' to chair; limited by pain/fatigue   Balance   Static Sitting Fair +   Dynamic Sitting Fair   Static Standing Poor +   Dynamic Standing Poor   Ambulatory Poor   Activity Tolerance   Activity Tolerance Patient limited by fatigue;Patient limited by pain   Nurse [address]   Assessment   Prognosis Good   Problem List Decreased strength;Decreased range of motion;Decreased endurance; Impaired balance;Decreased mobility; Decreased cognition;Decreased skin integrity;Pain   Assessment pt is a 79y/o f who presents to [facility] c sepsis 2* R LE cellulitis  PMH significant for dementia, seizure, thrombocytopenia, + LE cellulitis  at baseline, pt fairly mod (I) c functional mobility c RW  resides c dtr in 2nd floor apt c elevator access  dtr (A) pt c ADLs prn  currently pt requires min (A)x2 to complete OOB mobility tasks 2* deficits in strength, ROM, balance, gait quality, pain, cognition, skin integrity, + activity tolerance noted in PT exam above  Barthel Index 30/100  increased time required to complete all transitions c min verbal cues for safe technique  ambulated 3' to chair c RW limited by pain/fatigue  min verbal cues required for sequencing + RW management  would benefit from skilled PT to maximize functional mobility + improve quality of life  upon d/c, recommend STR  PT eval of high complexity 2* unstable med status c pt requiring ongoing medical management 2* sepsis   pt requires min (A)x2 to complete OOB mobility tasks 2* significant deficits noted above in PT eval  pt also cont to report pain R LE c movement + touch  pt c multiple co-morbidities impacting PT including dementia, seizures, + thrombocytopenia  Barriers to Discharge None   Goals   Patient Goals "I want to get up and walk more"  [facility] Expiration Date 01/12/19   Short Term Goal #1 1  increase strength 1/2 grade to improve overall functional mobility, 2  perform bed mobility c (S) to decrease caregiver burden, 3  perform transfers c (S) to safely perform ADLs, 4  ambulate 150' c (S) + RW to safely navigate home environment   Plan   Treatment/Interventions Functional transfer training;LE strengthening/ROM; Therapeutic exercise; Endurance training;Patient/family training;Bed mobility;Gait training;Spoke to nursing;Spoke to MD;Spoke to case management   PT Frequency Other (Comment)  (3-5x/wk)   Recommendation   Recommendation Short-term skilled PT   PT - OK to Discharge Yes  (to STR when stable )   Barthel Index   Feeding 5   Bathing 0   Grooming Score 0   Dressing Score 5   Bladder Score 0   Bowels Score 10   Toilet Use Score 5   Transfers (Bed/Chair) Score 5   Mobility (Level Surface) Score 0   Stairs Score 0   Barthel Index Score 30     Donald Lucero, PT, DPT

## 2019-01-03 NOTE — DISCHARGE SUMMARY
Discharge Summary - [place] [address] [facility]    Patient Information: Jarrett Grande 66 y o  female MRN: [id number]  Unit/Bed#: -01 Encounter: [id number]    Discharging Physician / Practitioner: Urmila Farooq MD  PCP: Davy Oakley DO  Admission Date: 12/30/2018  Discharge Date: 01/03/19    Disposition:     Other: Short-term rehab         Discharge Diagnoses:     Principal Problem:    Sepsis ([address] )  Active Problems:    Acute upper respiratory infection    Lower extremity cellulitis  Resolved Problems:    * No resolved hospital problems  *      Consultations During Hospital Stay:  · Infectious Disease    Procedures Performed:     ·     Significant Findings / Test Results:     ·     Incidental Findings:   ·     Test Results Pending at Discharge (will require follow up):   ·      Outpatient Tests Requested:  ·     Complications:     Hospital Course:     Jarrett Grande is a 66 y o  female patient who originally presented to the hospital on 12/30/2018 due to sepsis secondary to right lower extremity cellulitis, patient was placed on IV antibiotic, closely evaluated by Infectious Disease  De-escalated to by mouth antibiotic with will improvement of symptoms  Patient was having some upper respiratory symptoms reason why she was on respiratory protocol  Evaluated by Physical therapy which recommend patient should go to inpatient rehab  Condition at Discharge: stable     Discharge Day Visit / Exam:     * Please refer to separate progress note for these details *    Discussion with Family:       Discharge instructions/Information to patient and family:   See after visit summary for information provided to patient and family  Provisions for Follow-Up Care:  See after visit summary for information related to follow-up care and any pertinent home health orders  Planned Readmission:     Discharge Statement:  I spent 30 minutes discharging the patient   This time was spent on the day of discharge  I had direct contact with the patient on the day of discharge  Greater than 50% of the total time was spent examining patient, answering all patient questions, arranging and discussing plan of care with patient as well as directly providing post-discharge instructions  Additional time then spent on discharge activities  Discharge Medications:  See after visit summary for reconciled discharge medications provided to patient and family  ** Please Note: This note has been constructed using a voice recognition system   **

## 2019-01-05 LAB
BACTERIA BLD CULT: NORMAL

## 2019-12-18 ENCOUNTER — APPOINTMENT (EMERGENCY)
Dept: CT IMAGING | Facility: HOSPITAL | Age: 79
End: 2019-12-18
Payer: MEDICARE

## 2019-12-18 ENCOUNTER — HOSPITAL ENCOUNTER (EMERGENCY)
Facility: HOSPITAL | Age: 79
Discharge: HOME | End: 2019-12-18
Admitting: EMERGENCY MEDICINE
Payer: MEDICARE

## 2019-12-18 VITALS
SYSTOLIC BLOOD PRESSURE: 166 MMHG | TEMPERATURE: 98.5 F | DIASTOLIC BLOOD PRESSURE: 83 MMHG | HEIGHT: 59 IN | RESPIRATION RATE: 19 BRPM | OXYGEN SATURATION: 100 % | WEIGHT: 196.21 LBS | BODY MASS INDEX: 39.56 KG/M2 | HEART RATE: 65 BPM

## 2019-12-18 DIAGNOSIS — G40.909 SEIZURE DISORDER (HCC): Primary | ICD-10-CM

## 2019-12-18 LAB
ANION GAP SERPL CALCULATED.3IONS-SCNC: 8 MMOL/L (ref 4–13)
ATRIAL RATE: 70 BPM
BASOPHILS # BLD AUTO: 0.03 THOUSANDS/ΜL (ref 0–0.1)
BASOPHILS NFR BLD AUTO: 1 % (ref 0–1)
BUN SERPL-MCNC: 16 MG/DL (ref 5–25)
CALCIUM SERPL-MCNC: 8.8 MG/DL (ref 8.3–10.1)
CHLORIDE SERPL-SCNC: 103 MMOL/L (ref 100–108)
CO2 SERPL-SCNC: 29 MMOL/L (ref 21–32)
CREAT SERPL-MCNC: 0.83 MG/DL (ref 0.6–1.3)
EOSINOPHIL # BLD AUTO: 0.08 THOUSAND/ΜL (ref 0–0.61)
EOSINOPHIL NFR BLD AUTO: 2 % (ref 0–6)
ERYTHROCYTE [DISTWIDTH] IN BLOOD BY AUTOMATED COUNT: 14.4 % (ref 11.6–15.1)
GFR SERPL CREATININE-BSD FRML MDRD: 78 ML/MIN/1.73SQ M
GLUCOSE SERPL-MCNC: 80 MG/DL (ref 65–140)
HCT VFR BLD AUTO: 39.8 % (ref 34.8–46.1)
HGB BLD-MCNC: 12.1 G/DL (ref 11.5–15.4)
IMM GRANULOCYTES # BLD AUTO: 0.02 THOUSAND/UL (ref 0–0.2)
IMM GRANULOCYTES NFR BLD AUTO: 0 % (ref 0–2)
LYMPHOCYTES # BLD AUTO: 0.88 THOUSANDS/ΜL (ref 0.6–4.47)
LYMPHOCYTES NFR BLD AUTO: 19 % (ref 14–44)
MCH RBC QN AUTO: 29.6 PG (ref 26.8–34.3)
MCHC RBC AUTO-ENTMCNC: 30.4 G/DL (ref 31.4–37.4)
MCV RBC AUTO: 97 FL (ref 82–98)
MONOCYTES # BLD AUTO: 0.56 THOUSAND/ΜL (ref 0.17–1.22)
MONOCYTES NFR BLD AUTO: 12 % (ref 4–12)
NEUTROPHILS # BLD AUTO: 3.1 THOUSANDS/ΜL (ref 1.85–7.62)
NEUTS SEG NFR BLD AUTO: 66 % (ref 43–75)
NRBC BLD AUTO-RTO: 0 /100 WBCS
P AXIS: 52 DEGREES
PLATELET # BLD AUTO: 125 THOUSANDS/UL (ref 149–390)
PMV BLD AUTO: 11.5 FL (ref 8.9–12.7)
POTASSIUM SERPL-SCNC: 4.2 MMOL/L (ref 3.5–5.3)
PR INTERVAL: 182 MS
QRS AXIS: 1 DEGREES
QRSD INTERVAL: 84 MS
QT INTERVAL: 396 MS
QTC INTERVAL: 427 MS
RBC # BLD AUTO: 4.09 MILLION/UL (ref 3.81–5.12)
SODIUM SERPL-SCNC: 140 MMOL/L (ref 136–145)
T WAVE AXIS: 17 DEGREES
VENTRICULAR RATE: 70 BPM
WBC # BLD AUTO: 4.67 THOUSAND/UL (ref 4.31–10.16)

## 2019-12-18 PROCEDURE — 96365 THER/PROPH/DIAG IV INF INIT: CPT

## 2019-12-18 PROCEDURE — 93010 ELECTROCARDIOGRAM REPORT: CPT | Performed by: INTERNAL MEDICINE

## 2019-12-18 PROCEDURE — 72125 CT NECK SPINE W/O DYE: CPT

## 2019-12-18 PROCEDURE — 96361 HYDRATE IV INFUSION ADD-ON: CPT

## 2019-12-18 PROCEDURE — 93005 ELECTROCARDIOGRAM TRACING: CPT

## 2019-12-18 PROCEDURE — 36415 COLL VENOUS BLD VENIPUNCTURE: CPT | Performed by: PHYSICIAN ASSISTANT

## 2019-12-18 PROCEDURE — 70450 CT HEAD/BRAIN W/O DYE: CPT

## 2019-12-18 PROCEDURE — 99284 EMERGENCY DEPT VISIT MOD MDM: CPT | Performed by: PHYSICIAN ASSISTANT

## 2019-12-18 PROCEDURE — 80048 BASIC METABOLIC PNL TOTAL CA: CPT | Performed by: PHYSICIAN ASSISTANT

## 2019-12-18 PROCEDURE — 99285 EMERGENCY DEPT VISIT HI MDM: CPT

## 2019-12-18 PROCEDURE — 85025 COMPLETE CBC W/AUTO DIFF WBC: CPT | Performed by: PHYSICIAN ASSISTANT

## 2019-12-18 RX ORDER — MIDAZOLAM HYDROCHLORIDE 1 MG/ML
2 INJECTION INTRAMUSCULAR; INTRAVENOUS ONCE
Status: COMPLETED | OUTPATIENT
Start: 2019-12-18 | End: 2019-12-18

## 2019-12-18 RX ORDER — LEVETIRACETAM 500 MG/1
500 TABLET ORAL EVERY 12 HOURS SCHEDULED
Qty: 60 TABLET | Refills: 0 | Status: SHIPPED | OUTPATIENT
Start: 2019-12-18 | End: 2022-05-11

## 2019-12-18 RX ORDER — LEVETIRACETAM 500 MG/1
500 TABLET ORAL ONCE
Status: COMPLETED | OUTPATIENT
Start: 2019-12-18 | End: 2019-12-18

## 2019-12-18 RX ADMIN — LEVETIRACETAM 500 MG: 500 TABLET, FILM COATED ORAL at 11:09

## 2019-12-18 RX ADMIN — SODIUM CHLORIDE 1000 ML: 0.9 INJECTION, SOLUTION INTRAVENOUS at 08:51

## 2019-12-18 RX ADMIN — LEVETIRACETAM 1000 MG: 100 INJECTION, SOLUTION INTRAVENOUS at 09:48

## 2019-12-18 NOTE — ED NOTES
Discharged via provider  Pt ambulatory out of department, using steady gait, with walker, with daughter       Jayy Thomas, TERESA  12/18/19 8266

## 2019-12-18 NOTE — ED PROVIDER NOTES
History  Chief Complaint   Patient presents with    Seizure - Prior Hx Of     pt brought via EMS for evaluation of seizures, intermittently for 1 hour prior to arrival  EMS reports witnessed seizure, given 4mg Versed IN  Per family, pt hasn't taken Keppra x1 week     Yashleonor Chapin is a 78 y o  female w PMH CHF, cirrhosis, seizures who presents for evaluation of seizure  Patient with seizure for about an hour prior to arrival   She comes in with the daughter who takes care of her  The daughter lives with her  She reports she takes Keppra and phenobarbital   She reports she accidentally got her prescriptions next up and picked up vitamin-D instead of Keppra and has been giving her vitamin D thinking it was Keppra for the past few weeks  She finally figured this out this morning when she started to have seizures  She reports she had 3 grand mal tonic-clonic seizures  She reports with the 1st seizure she was standing and then did fall to the floor, unsure if she had her head it was it was not completely witnessed  Mother was in the same room but when she turned around her mother was on the ground  She had 10 seizures total, 3 grand-mal seizures and 7 smaller seizures where she just jerked her head and her neck and she had her eyes open and was able to talk and respond during these episodes  EMS gave 4mg versed and since she has had no seizures  Prior to Admission Medications   Prescriptions Last Dose Informant Patient Reported? Taking?    PHENobarbital 64 8 mg tablet   Yes No   Sig: Take 64 8 mg by mouth 2 (two) times a day   PHENobarbital 64 8 mg tablet   No No   Sig: Take 1 tablet (64 8 mg total) by mouth 2 (two) times a day for 10 days   atorvastatin (LIPITOR) 80 mg tablet   Yes No   Sig: Take 80 mg by mouth daily   cholecalciferol (VITAMIN D3) 1,000 units tablet   Yes No   Sig: Take 2,000 Units by mouth daily   folic acid (FOLVITE) 1 mg tablet   Yes No   Sig: Take by mouth daily   levETIRAcetam (KEPPRA) 500 mg tablet   Yes No   Sig: Take 500 mg by mouth every 12 (twelve) hours   metoprolol tartrate (LOPRESSOR) 25 mg tablet   Yes No   Sig: Take 25 mg by mouth every 12 (twelve) hours   omeprazole (PriLOSEC) 20 mg delayed release capsule   Yes No   Sig: Take 20 mg by mouth daily      Facility-Administered Medications: None       Past Medical History:   Diagnosis Date    CHF (congestive heart failure) (HCC)     Liver cirrhosis (HCC)     Seizures ([address] )        Past Surgical History:   Procedure Laterality Date    HYSTERECTOMY      LEG SURGERY  05/2016    Pt family cannot recall the specific surgery or which leg it was preformed on  History reviewed  No pertinent family history  I have reviewed and agree with the history as documented  Social History     Tobacco Use    Smoking status: Never Smoker    Smokeless tobacco: Never Used   Substance Use Topics    Alcohol use: No    Drug use: No        Review of Systems   Constitutional: Negative for chills, diaphoresis and fever  HENT: Negative for congestion and sore throat  Eyes: Negative for visual disturbance  Respiratory: Negative for cough, chest tightness, shortness of breath and wheezing  Cardiovascular: Negative for chest pain and leg swelling  Gastrointestinal: Negative for abdominal pain, constipation, diarrhea, nausea and vomiting  Genitourinary: Negative for difficulty urinating, dysuria, frequency, hematuria, urgency, vaginal bleeding, vaginal discharge and vaginal pain  Musculoskeletal: Negative for arthralgias and myalgias  Neurological: Positive for seizures  Negative for dizziness, weakness, light-headedness, numbness and headaches  Psychiatric/Behavioral: The patient is not nervous/anxious  Physical Exam  Physical Exam   Constitutional: She is oriented to person, place, and time  She appears well-developed and well-nourished  No distress  HENT:   Head: Normocephalic and atraumatic     Eyes: Pupils are equal, round, and reactive to light  Neck: Normal range of motion  Neck supple  No tracheal deviation present  Cardiovascular: Normal rate, regular rhythm, normal heart sounds and intact distal pulses  Exam reveals no gallop and no friction rub  No murmur heard  Pulmonary/Chest: Effort normal and breath sounds normal  No respiratory distress  She has no wheezes  She has no rales  Abdominal: Soft  Bowel sounds are normal  She exhibits no distension and no mass  There is no tenderness  There is no guarding  Musculoskeletal: She exhibits no edema or deformity  Neurological: She is alert and oriented to person, place, and time  GCS 15 non focal  She does have some baseline weakness but strength is symmetric  Follows commands, alert and responsive    Skin: Skin is warm and dry  She is not diaphoretic  Psychiatric: She has a normal mood and affect  Her behavior is normal    Nursing note and vitals reviewed        Vital Signs  ED Triage Vitals [12/18/19 0742]   Temperature Pulse Respirations Blood Pressure SpO2   98 5 °F (36 9 °C) 82 20 (!) 194/86 94 %      Temp Source Heart Rate Source Patient Position - Orthostatic VS BP Location FiO2 (%)   Oral Monitor Lying Left arm --      Pain Score       No Pain           Vitals:    12/18/19 0830 12/18/19 0930 12/18/19 1030 12/18/19 1130   BP: 156/72 160/77 138/71 166/83   Pulse: 74 77 65 65   Patient Position - Orthostatic VS: Lying Lying Lying Lying         Visual Acuity  Visual Acuity      Most Recent Value   L Pupil Size (mm)  3   R Pupil Size (mm)  3          ED Medications  Medications   midazolam (FOR EMS ONLY) (VERSED) 2 mg/2 mL injection 4 mg (0 mg Does not apply Given to EMS 12/18/19 0816)   sodium chloride 0 9 % bolus 1,000 mL (0 mL Intravenous Stopped 12/18/19 1213)   levETIRAcetam (KEPPRA) 1,000 mg in sodium chloride 0 9 % 100 mL IVPB (0 mg Intravenous Stopped 12/18/19 1017)   levETIRAcetam (KEPPRA) tablet 500 mg (500 mg Oral Given 12/18/19 1109) Diagnostic Studies  Results Reviewed     Procedure Component Value Units Date/Time    Basic metabolic panel [[id number]] Collected:  12/18/19 0849    Lab Status:  Final result Specimen:  Blood from Hand, Right Updated:  12/18/19 0911     Sodium 140 mmol/L      Potassium 4 2 mmol/L      Chloride 103 mmol/L      CO2 29 mmol/L      ANION GAP 8 mmol/L      BUN 16 mg/dL      Creatinine 0 83 mg/dL      Glucose 80 mg/dL      Calcium 8 8 mg/dL      eGFR 78 ml/min/1 73sq m     Narrative:       [place] guidelines for Chronic Kidney Disease (CKD):     Stage 1 with normal or high GFR (GFR > 90 mL/min/1 73 square meters)    Stage 2 Mild CKD (GFR = 60-89 mL/min/1 73 square meters)    Stage 3A Moderate CKD (GFR = 45-59 mL/min/1 73 square meters)    Stage 3B Moderate CKD (GFR = 30-44 mL/min/1 73 square meters)    Stage 4 Severe CKD (GFR = 15-29 mL/min/1 73 square meters)    Stage 5 End Stage CKD (GFR <15 mL/min/1 73 square meters)  Note: GFR calculation is accurate only with a steady state creatinine    CBC and differential [[id number]]  (Abnormal) Collected:  12/18/19 0849    Lab Status:  Final result Specimen:  Blood from Hand, Right Updated:  12/18/19 0907     WBC 4 67 Thousand/uL      RBC 4 09 Million/uL      Hemoglobin 12 1 g/dL      Hematocrit 39 8 %      MCV 97 fL      MCH 29 6 pg      MCHC 30 4 g/dL      RDW 14 4 %      MPV 11 5 fL      Platelets 862 Thousands/uL      nRBC 0 /100 WBCs      Neutrophils Relative 66 %      Immat GRANS % 0 %      Lymphocytes Relative 19 %      Monocytes Relative 12 %      Eosinophils Relative 2 %      Basophils Relative 1 %      Neutrophils Absolute 3 10 Thousands/µL      Immature Grans Absolute 0 02 Thousand/uL      Lymphocytes Absolute 0 88 Thousands/µL      Monocytes Absolute 0 56 Thousand/µL      Eosinophils Absolute 0 08 Thousand/µL      Basophils Absolute 0 03 Thousands/µL                  CT head without contrast   Final Result by Luan Su MD (12/18 6544)      No new intracranial hemorrhage      No extra-axial collection      Ventricular shunt catheter through the right frontal approach which crosses the midline and then projects into the basal ganglia and extending up to the level of the suprasellar cistern, unchanged in appearance      Chronic hypodensities left frontal region, right occipital region remains unchanged                  Workstation performed: [id number]         CT spine cervical without contrast   Final Result by Raul Moran MD (12/18 1106)      No acute fracture or evidence for traumatic malalignment  Workstation performed: [id number]                    Procedures  Procedures         ED Course  ED Course as of Dec 21 2138   Wed Dec 18, 2019   0900 Discussed case w neurologist Dr Robin Frey  Plan to observe patient until noon following load of 1g IV keppra and 500mg PO  If not additional seizures, remains at baseline, ok to go home  If recurrent seizure or change in mental status can rediscuss plan w neuro  MDM  Number of Diagnoses or Management Options  Seizure disorder [facility]):   Diagnosis management comments: DDX includes but not ltd to:   Patient with hx of seizures w mediation mix up likely causing breakthrough seizures now  Will CT head to ensure no trauma based on her hx of fall  Called neurology and will load w keppra and resume home dose  Daughter called pharmacy while here to ensure meds are available  Will check her electrolytes to ensure no metabolic derangement to explain her seizures  Return parameters discussed  Pt requires f/u as an outpt  Pt expresses understanding w above treatment plan  All questions answered prior to d/c      Portions of the record may have been created with voice recognition software   Occasional wrong word or "sound a like" substitutions may have occurred due to the inherent limitations of voice recognition software   Read the chart carefully and recognize, using context, where substitutions have occurred  Disposition  Final diagnoses:   Seizure disorder [facility])     Time reflects when diagnosis was documented in both MDM as applicable and the Disposition within this note     Time User Action Codes Description Comment    12/18/2019 12:11 PM Kira Reach Add [G40 919] Breakthrough seizure ([facility] [address] )     12/18/2019 12:11 PM Kira Reach Remove [G40 919] Breakthrough seizure ([facility] [address] )     12/18/2019 12:11 PM Kira Reach Add [G40 909] Seizure disorder [facility])       ED Disposition     ED Disposition Condition Date/Time Comment    Discharge Stable Wed Dec 18, 2019 12:11 PM [address] discharge to home/self care  Follow-up Information     Follow up With Specialties Details Why Contact Info Additional Information    [facility] Emergency Department Emergency Medicine  If symptoms worsen [address] [facility] [address] [place], [place], [address]        follow up w neurology as an outpatient           Discharge Medication List as of 12/18/2019 12:12 PM      START taking these medications    Details   !! levETIRAcetam (KEPPRA) 500 mg tablet Take 1 tablet (500 mg total) by mouth every 12 (twelve) hours, Starting Wed 12/18/2019, Print       !! - Potential duplicate medications found  Please discuss with provider        CONTINUE these medications which have NOT CHANGED    Details   atorvastatin (LIPITOR) 80 mg tablet Take 80 mg by mouth daily, Historical Med      cholecalciferol (VITAMIN D3) 1,000 units tablet Take 2,000 Units by mouth daily, Historical Med      folic acid (FOLVITE) 1 mg tablet Take by mouth daily, Historical Med      !! levETIRAcetam (KEPPRA) 500 mg tablet Take 500 mg by mouth every 12 (twelve) hours, Historical Med      metoprolol tartrate (LOPRESSOR) 25 mg tablet Take 25 mg by mouth every 12 (twelve) hours, Historical Med      omeprazole (PriLOSEC) 20 mg delayed release capsule Take 20 mg by mouth daily, Historical Med      PHENobarbital 64 8 mg tablet Take 64 8 mg by mouth 2 (two) times a day, Historical Med       !! - Potential duplicate medications found  Please discuss with provider  No discharge procedures on file      ED Provider  Electronically Signed by           Milady Wadsworth PA-C  12/21/19 6107

## 2020-12-13 ENCOUNTER — APPOINTMENT (EMERGENCY)
Dept: CT IMAGING | Facility: HOSPITAL | Age: 80
DRG: 871 | End: 2020-12-13
Payer: MEDICARE

## 2020-12-13 ENCOUNTER — APPOINTMENT (EMERGENCY)
Dept: RADIOLOGY | Facility: HOSPITAL | Age: 80
DRG: 871 | End: 2020-12-13
Payer: MEDICARE

## 2020-12-13 ENCOUNTER — HOSPITAL ENCOUNTER (INPATIENT)
Facility: HOSPITAL | Age: 80
LOS: 11 days | Discharge: HOME HEALTH | DRG: 871 | End: 2020-12-24
Attending: EMERGENCY MEDICINE | Admitting: INTERNAL MEDICINE
Payer: MEDICARE

## 2020-12-13 DIAGNOSIS — U07.1 PNEUMONIA DUE TO COVID-19 VIRUS: Primary | ICD-10-CM

## 2020-12-13 DIAGNOSIS — J12.82 PNEUMONIA DUE TO COVID-19 VIRUS: Primary | ICD-10-CM

## 2020-12-13 PROBLEM — I50.32 CHRONIC DIASTOLIC (CONGESTIVE) HEART FAILURE (HCC): Status: ACTIVE | Noted: 2020-12-13

## 2020-12-13 PROBLEM — I10 HTN, GOAL BELOW 140/90: Status: ACTIVE | Noted: 2020-12-13

## 2020-12-13 PROBLEM — G40.019 LOCALIZATION-RELATED EPILEPSY, INTRACTABLE (HCC): Status: ACTIVE | Noted: 2019-02-15

## 2020-12-13 PROBLEM — I35.0 NONRHEUMATIC AORTIC VALVE STENOSIS: Status: ACTIVE | Noted: 2020-12-13

## 2020-12-13 PROBLEM — E78.5 HYPERLIPIDEMIA: Status: ACTIVE | Noted: 2020-12-13

## 2020-12-13 PROBLEM — G40.909 SEIZURE DISORDER (HCC): Status: ACTIVE | Noted: 2018-02-11

## 2020-12-13 PROBLEM — K21.9 GERD (GASTROESOPHAGEAL REFLUX DISEASE): Status: ACTIVE | Noted: 2020-12-13

## 2020-12-13 LAB
ABO GROUP BLD: NORMAL
ALBUMIN SERPL BCP-MCNC: 3.3 G/DL (ref 3.5–5)
ALP SERPL-CCNC: 102 U/L (ref 46–116)
ALT SERPL W P-5'-P-CCNC: 63 U/L (ref 12–78)
ANION GAP SERPL CALCULATED.3IONS-SCNC: 7 MMOL/L (ref 4–13)
APTT PPP: 37 SECONDS (ref 23–37)
AST SERPL W P-5'-P-CCNC: 84 U/L (ref 5–45)
BACTERIA UR QL AUTO: ABNORMAL /HPF
BASE EX.OXY STD BLDV CALC-SCNC: 67.4 % (ref 60–80)
BASE EXCESS BLDV CALC-SCNC: 2 MMOL/L
BASOPHILS # BLD AUTO: 0.01 THOUSANDS/ΜL (ref 0–0.1)
BASOPHILS NFR BLD AUTO: 0 % (ref 0–1)
BILIRUB SERPL-MCNC: 0.5 MG/DL (ref 0.2–1)
BILIRUB UR QL STRIP: ABNORMAL
BUN SERPL-MCNC: 8 MG/DL (ref 5–25)
CALCIUM ALBUM COR SERPL-MCNC: 9.3 MG/DL (ref 8.3–10.1)
CALCIUM SERPL-MCNC: 8.7 MG/DL (ref 8.3–10.1)
CHLORIDE SERPL-SCNC: 96 MMOL/L (ref 100–108)
CLARITY UR: ABNORMAL
CO2 SERPL-SCNC: 30 MMOL/L (ref 21–32)
COLOR UR: ABNORMAL
CREAT SERPL-MCNC: 1.2 MG/DL (ref 0.6–1.3)
CRP SERPL QL: 108.9 MG/L
D DIMER PPP FEU-MCNC: 2.05 UG/ML FEU
EOSINOPHIL # BLD AUTO: 0.01 THOUSAND/ΜL (ref 0–0.61)
EOSINOPHIL NFR BLD AUTO: 0 % (ref 0–6)
ERYTHROCYTE [DISTWIDTH] IN BLOOD BY AUTOMATED COUNT: 14.4 % (ref 11.6–15.1)
FLUAV RNA RESP QL NAA+PROBE: NEGATIVE
FLUBV RNA RESP QL NAA+PROBE: NEGATIVE
GFR SERPL CREATININE-BSD FRML MDRD: 49 ML/MIN/1.73SQ M
GLUCOSE SERPL-MCNC: 110 MG/DL (ref 65–140)
GLUCOSE UR STRIP-MCNC: NEGATIVE MG/DL
HCO3 BLDV-SCNC: 28.1 MMOL/L (ref 24–30)
HCT VFR BLD AUTO: 41.1 % (ref 34.8–46.1)
HGB BLD-MCNC: 13 G/DL (ref 11.5–15.4)
HGB UR QL STRIP.AUTO: ABNORMAL
HYALINE CASTS #/AREA URNS LPF: ABNORMAL /LPF
IMM GRANULOCYTES # BLD AUTO: 0.04 THOUSAND/UL (ref 0–0.2)
IMM GRANULOCYTES NFR BLD AUTO: 1 % (ref 0–2)
INR PPP: 1.07 (ref 0.84–1.19)
KETONES UR STRIP-MCNC: ABNORMAL MG/DL
LACTATE SERPL-SCNC: 1.4 MMOL/L (ref 0.5–2)
LEUKOCYTE ESTERASE UR QL STRIP: NEGATIVE
LYMPHOCYTES # BLD AUTO: 0.85 THOUSANDS/ΜL (ref 0.6–4.47)
LYMPHOCYTES NFR BLD AUTO: 13 % (ref 14–44)
MCH RBC QN AUTO: 29.1 PG (ref 26.8–34.3)
MCHC RBC AUTO-ENTMCNC: 31.6 G/DL (ref 31.4–37.4)
MCV RBC AUTO: 92 FL (ref 82–98)
MONOCYTES # BLD AUTO: 0.74 THOUSAND/ΜL (ref 0.17–1.22)
MONOCYTES NFR BLD AUTO: 12 % (ref 4–12)
MUCOUS THREADS UR QL AUTO: ABNORMAL
NEUTROPHILS # BLD AUTO: 4.7 THOUSANDS/ΜL (ref 1.85–7.62)
NEUTS SEG NFR BLD AUTO: 74 % (ref 43–75)
NITRITE UR QL STRIP: NEGATIVE
NON-SQ EPI CELLS URNS QL MICRO: ABNORMAL /HPF
NRBC BLD AUTO-RTO: 0 /100 WBCS
NT-PROBNP SERPL-MCNC: 493 PG/ML
O2 CT BLDV-SCNC: 13.2 ML/DL
PCO2 BLDV: 49.3 MM HG (ref 42–50)
PH BLDV: 7.37 [PH] (ref 7.3–7.4)
PH UR STRIP.AUTO: 6.5 [PH]
PLATELET # BLD AUTO: 111 THOUSANDS/UL (ref 149–390)
PLATELET # BLD AUTO: 125 THOUSANDS/UL (ref 149–390)
PMV BLD AUTO: 10.9 FL (ref 8.9–12.7)
PMV BLD AUTO: 11.5 FL (ref 8.9–12.7)
PO2 BLDV: 36.1 MM HG (ref 35–45)
POTASSIUM SERPL-SCNC: 4.1 MMOL/L (ref 3.5–5.3)
PROT SERPL-MCNC: 7.6 G/DL (ref 6.4–8.2)
PROT UR STRIP-MCNC: ABNORMAL MG/DL
PROTHROMBIN TIME: 14.2 SECONDS (ref 11.6–14.5)
RBC # BLD AUTO: 4.46 MILLION/UL (ref 3.81–5.12)
RBC #/AREA URNS AUTO: ABNORMAL /HPF
RH BLD: NEGATIVE
RSV RNA RESP QL NAA+PROBE: NEGATIVE
SARS-COV-2 RNA RESP QL NAA+PROBE: POSITIVE
SODIUM SERPL-SCNC: 133 MMOL/L (ref 136–145)
SP GR UR STRIP.AUTO: 1.02 (ref 1–1.03)
TROPONIN I SERPL-MCNC: <0.02 NG/ML
UROBILINOGEN UR QL STRIP.AUTO: 1 E.U./DL
WBC # BLD AUTO: 6.35 THOUSAND/UL (ref 4.31–10.16)
WBC #/AREA URNS AUTO: ABNORMAL /HPF

## 2020-12-13 PROCEDURE — 86140 C-REACTIVE PROTEIN: CPT | Performed by: PHYSICIAN ASSISTANT

## 2020-12-13 PROCEDURE — 85049 AUTOMATED PLATELET COUNT: CPT | Performed by: PHYSICIAN ASSISTANT

## 2020-12-13 PROCEDURE — 85610 PROTHROMBIN TIME: CPT | Performed by: NURSE PRACTITIONER

## 2020-12-13 PROCEDURE — 84484 ASSAY OF TROPONIN QUANT: CPT | Performed by: NURSE PRACTITIONER

## 2020-12-13 PROCEDURE — 85025 COMPLETE CBC W/AUTO DIFF WBC: CPT | Performed by: NURSE PRACTITIONER

## 2020-12-13 PROCEDURE — 0241U HB NFCT DS VIR RESP RNA 4 TRGT: CPT | Performed by: NURSE PRACTITIONER

## 2020-12-13 PROCEDURE — G1004 CDSM NDSC: HCPCS

## 2020-12-13 PROCEDURE — 94640 AIRWAY INHALATION TREATMENT: CPT

## 2020-12-13 PROCEDURE — 85730 THROMBOPLASTIN TIME PARTIAL: CPT | Performed by: NURSE PRACTITIONER

## 2020-12-13 PROCEDURE — 99285 EMERGENCY DEPT VISIT HI MDM: CPT | Performed by: NURSE PRACTITIONER

## 2020-12-13 PROCEDURE — 86901 BLOOD TYPING SEROLOGIC RH(D): CPT | Performed by: PHYSICIAN ASSISTANT

## 2020-12-13 PROCEDURE — 80053 COMPREHEN METABOLIC PANEL: CPT | Performed by: NURSE PRACTITIONER

## 2020-12-13 PROCEDURE — 99285 EMERGENCY DEPT VISIT HI MDM: CPT

## 2020-12-13 PROCEDURE — 81001 URINALYSIS AUTO W/SCOPE: CPT | Performed by: NURSE PRACTITIONER

## 2020-12-13 PROCEDURE — 96365 THER/PROPH/DIAG IV INF INIT: CPT

## 2020-12-13 PROCEDURE — 83605 ASSAY OF LACTIC ACID: CPT | Performed by: NURSE PRACTITIONER

## 2020-12-13 PROCEDURE — 1124F ACP DISCUSS-NO DSCNMKR DOCD: CPT | Performed by: NURSE PRACTITIONER

## 2020-12-13 PROCEDURE — 86900 BLOOD TYPING SEROLOGIC ABO: CPT | Performed by: PHYSICIAN ASSISTANT

## 2020-12-13 PROCEDURE — 36415 COLL VENOUS BLD VENIPUNCTURE: CPT | Performed by: NURSE PRACTITIONER

## 2020-12-13 PROCEDURE — 71045 X-RAY EXAM CHEST 1 VIEW: CPT

## 2020-12-13 PROCEDURE — 82728 ASSAY OF FERRITIN: CPT | Performed by: PHYSICIAN ASSISTANT

## 2020-12-13 PROCEDURE — 84145 PROCALCITONIN (PCT): CPT | Performed by: NURSE PRACTITIONER

## 2020-12-13 PROCEDURE — 99223 1ST HOSP IP/OBS HIGH 75: CPT | Performed by: INTERNAL MEDICINE

## 2020-12-13 PROCEDURE — 87040 BLOOD CULTURE FOR BACTERIA: CPT | Performed by: NURSE PRACTITIONER

## 2020-12-13 PROCEDURE — 82805 BLOOD GASES W/O2 SATURATION: CPT | Performed by: NURSE PRACTITIONER

## 2020-12-13 PROCEDURE — 94664 DEMO&/EVAL PT USE INHALER: CPT

## 2020-12-13 PROCEDURE — 96366 THER/PROPH/DIAG IV INF ADDON: CPT

## 2020-12-13 PROCEDURE — 96361 HYDRATE IV INFUSION ADD-ON: CPT

## 2020-12-13 PROCEDURE — 83880 ASSAY OF NATRIURETIC PEPTIDE: CPT | Performed by: NURSE PRACTITIONER

## 2020-12-13 PROCEDURE — 94760 N-INVAS EAR/PLS OXIMETRY 1: CPT

## 2020-12-13 PROCEDURE — 85379 FIBRIN DEGRADATION QUANT: CPT | Performed by: PHYSICIAN ASSISTANT

## 2020-12-13 PROCEDURE — 71275 CT ANGIOGRAPHY CHEST: CPT

## 2020-12-13 RX ORDER — FAMOTIDINE 20 MG/1
20 TABLET, FILM COATED ORAL DAILY
Status: DISCONTINUED | OUTPATIENT
Start: 2020-12-14 | End: 2020-12-24 | Stop reason: HOSPADM

## 2020-12-13 RX ORDER — DOXYCYCLINE HYCLATE 100 MG/1
100 CAPSULE ORAL EVERY 12 HOURS
Status: DISCONTINUED | OUTPATIENT
Start: 2020-12-13 | End: 2020-12-21

## 2020-12-13 RX ORDER — FOLIC ACID 1 MG/1
1 TABLET ORAL DAILY
Status: DISCONTINUED | OUTPATIENT
Start: 2020-12-14 | End: 2020-12-24 | Stop reason: HOSPADM

## 2020-12-13 RX ORDER — PHENOBARBITAL 32.4 MG/1
64.8 TABLET ORAL 2 TIMES DAILY
Status: DISCONTINUED | OUTPATIENT
Start: 2020-12-13 | End: 2020-12-24 | Stop reason: HOSPADM

## 2020-12-13 RX ORDER — LIDOCAINE 50 MG/G
1 PATCH TOPICAL DAILY
Status: DISCONTINUED | OUTPATIENT
Start: 2020-12-13 | End: 2020-12-24 | Stop reason: HOSPADM

## 2020-12-13 RX ORDER — ACETAMINOPHEN 325 MG/1
650 TABLET ORAL EVERY 6 HOURS PRN
Status: DISCONTINUED | OUTPATIENT
Start: 2020-12-13 | End: 2020-12-24 | Stop reason: HOSPADM

## 2020-12-13 RX ORDER — DEXAMETHASONE SODIUM PHOSPHATE 4 MG/ML
6 INJECTION, SOLUTION INTRA-ARTICULAR; INTRALESIONAL; INTRAMUSCULAR; INTRAVENOUS; SOFT TISSUE EVERY 24 HOURS
Status: DISCONTINUED | OUTPATIENT
Start: 2020-12-13 | End: 2020-12-23

## 2020-12-13 RX ORDER — ALBUTEROL SULFATE 90 UG/1
2 AEROSOL, METERED RESPIRATORY (INHALATION) EVERY 4 HOURS PRN
Status: DISCONTINUED | OUTPATIENT
Start: 2020-12-13 | End: 2020-12-24 | Stop reason: HOSPADM

## 2020-12-13 RX ORDER — ASCORBIC ACID 500 MG
1000 TABLET ORAL EVERY 12 HOURS SCHEDULED
Status: COMPLETED | OUTPATIENT
Start: 2020-12-13 | End: 2020-12-20

## 2020-12-13 RX ORDER — ONDANSETRON 2 MG/ML
4 INJECTION INTRAMUSCULAR; INTRAVENOUS EVERY 6 HOURS PRN
Status: DISCONTINUED | OUTPATIENT
Start: 2020-12-13 | End: 2020-12-24 | Stop reason: HOSPADM

## 2020-12-13 RX ORDER — GUAIFENESIN 100 MG/5ML
200 SOLUTION ORAL EVERY 4 HOURS PRN
Status: DISCONTINUED | OUTPATIENT
Start: 2020-12-13 | End: 2020-12-24 | Stop reason: HOSPADM

## 2020-12-13 RX ORDER — MULTIVITAMIN/IRON/FOLIC ACID 18MG-0.4MG
1 TABLET ORAL DAILY
Status: DISCONTINUED | OUTPATIENT
Start: 2020-12-21 | End: 2020-12-24 | Stop reason: HOSPADM

## 2020-12-13 RX ORDER — SODIUM CHLORIDE FOR INHALATION 0.9 %
3 VIAL, NEBULIZER (ML) INHALATION ONCE
Status: COMPLETED | OUTPATIENT
Start: 2020-12-13 | End: 2020-12-13

## 2020-12-13 RX ORDER — IPRATROPIUM BROMIDE AND ALBUTEROL SULFATE 2.5; .5 MG/3ML; MG/3ML
3 SOLUTION RESPIRATORY (INHALATION) ONCE
Status: COMPLETED | OUTPATIENT
Start: 2020-12-13 | End: 2020-12-13

## 2020-12-13 RX ORDER — ZINC SULFATE 50(220)MG
220 CAPSULE ORAL DAILY
Status: COMPLETED | OUTPATIENT
Start: 2020-12-14 | End: 2020-12-20

## 2020-12-13 RX ORDER — LEVETIRACETAM 500 MG/1
500 TABLET ORAL EVERY 12 HOURS SCHEDULED
Status: DISCONTINUED | OUTPATIENT
Start: 2020-12-13 | End: 2020-12-24 | Stop reason: HOSPADM

## 2020-12-13 RX ORDER — ATORVASTATIN CALCIUM 40 MG/1
80 TABLET, FILM COATED ORAL DAILY
Status: DISCONTINUED | OUTPATIENT
Start: 2020-12-14 | End: 2020-12-24 | Stop reason: HOSPADM

## 2020-12-13 RX ORDER — MELATONIN
2000 DAILY
Status: DISCONTINUED | OUTPATIENT
Start: 2020-12-14 | End: 2020-12-24 | Stop reason: HOSPADM

## 2020-12-13 RX ORDER — DOCUSATE SODIUM 100 MG/1
100 CAPSULE, LIQUID FILLED ORAL 2 TIMES DAILY
Status: DISCONTINUED | OUTPATIENT
Start: 2020-12-13 | End: 2020-12-24 | Stop reason: HOSPADM

## 2020-12-13 RX ADMIN — LEVETIRACETAM 500 MG: 500 TABLET, FILM COATED ORAL at 21:50

## 2020-12-13 RX ADMIN — OXYCODONE HYDROCHLORIDE AND ACETAMINOPHEN 1000 MG: 500 TABLET ORAL at 21:48

## 2020-12-13 RX ADMIN — DEXAMETHASONE SODIUM PHOSPHATE 6 MG: 4 INJECTION, SOLUTION INTRAMUSCULAR; INTRAVENOUS at 21:48

## 2020-12-13 RX ADMIN — GUAIFENESIN 200 MG: 100 SOLUTION ORAL at 21:48

## 2020-12-13 RX ADMIN — DOCUSATE SODIUM 100 MG: 100 CAPSULE ORAL at 21:48

## 2020-12-13 RX ADMIN — IPRATROPIUM BROMIDE AND ALBUTEROL SULFATE 3 ML: 2.5; .5 SOLUTION RESPIRATORY (INHALATION) at 18:15

## 2020-12-13 RX ADMIN — METOPROLOL TARTRATE 25 MG: 25 TABLET, FILM COATED ORAL at 21:48

## 2020-12-13 RX ADMIN — CEFTRIAXONE SODIUM 1000 MG: 10 INJECTION, POWDER, FOR SOLUTION INTRAVENOUS at 18:13

## 2020-12-13 RX ADMIN — DOXYCYCLINE 100 MG: 100 CAPSULE ORAL at 21:48

## 2020-12-13 RX ADMIN — ISODIUM CHLORIDE 3 ML: 0.03 SOLUTION RESPIRATORY (INHALATION) at 18:15

## 2020-12-13 RX ADMIN — IOHEXOL 100 ML: 350 INJECTION, SOLUTION INTRAVENOUS at 18:44

## 2020-12-13 RX ADMIN — SODIUM CHLORIDE 500 ML: 0.9 INJECTION, SOLUTION INTRAVENOUS at 17:40

## 2020-12-13 RX ADMIN — REMDESIVIR 200 MG: 100 INJECTION, POWDER, LYOPHILIZED, FOR SOLUTION INTRAVENOUS at 22:13

## 2020-12-13 RX ADMIN — ACETAMINOPHEN 650 MG: 325 TABLET, FILM COATED ORAL at 21:48

## 2020-12-13 RX ADMIN — LIDOCAINE 5% 1 PATCH: 700 PATCH TOPICAL at 21:50

## 2020-12-13 RX ADMIN — PHENOBARBITAL 64.8 MG: 32.4 TABLET ORAL at 22:05

## 2020-12-14 LAB
ALBUMIN SERPL BCP-MCNC: 2.7 G/DL (ref 3.5–5)
ALP SERPL-CCNC: 86 U/L (ref 46–116)
ALT SERPL W P-5'-P-CCNC: 56 U/L (ref 12–78)
ANION GAP SERPL CALCULATED.3IONS-SCNC: 7 MMOL/L (ref 4–13)
AST SERPL W P-5'-P-CCNC: 78 U/L (ref 5–45)
BASOPHILS # BLD AUTO: 0 THOUSANDS/ΜL (ref 0–0.1)
BASOPHILS NFR BLD AUTO: 0 % (ref 0–1)
BILIRUB SERPL-MCNC: 0.5 MG/DL (ref 0.2–1)
BUN SERPL-MCNC: 8 MG/DL (ref 5–25)
CALCIUM ALBUM COR SERPL-MCNC: 9.4 MG/DL (ref 8.3–10.1)
CALCIUM SERPL-MCNC: 8.4 MG/DL (ref 8.3–10.1)
CHLORIDE SERPL-SCNC: 101 MMOL/L (ref 100–108)
CO2 SERPL-SCNC: 29 MMOL/L (ref 21–32)
CREAT SERPL-MCNC: 0.7 MG/DL (ref 0.6–1.3)
EOSINOPHIL # BLD AUTO: 0 THOUSAND/ΜL (ref 0–0.61)
EOSINOPHIL NFR BLD AUTO: 0 % (ref 0–6)
ERYTHROCYTE [DISTWIDTH] IN BLOOD BY AUTOMATED COUNT: 14.4 % (ref 11.6–15.1)
FERRITIN SERPL-MCNC: 340 NG/ML (ref 8–388)
GFR SERPL CREATININE-BSD FRML MDRD: 95 ML/MIN/1.73SQ M
GLUCOSE SERPL-MCNC: 117 MG/DL (ref 65–140)
HCT VFR BLD AUTO: 39.9 % (ref 34.8–46.1)
HGB BLD-MCNC: 12.6 G/DL (ref 11.5–15.4)
IMM GRANULOCYTES # BLD AUTO: 0.03 THOUSAND/UL (ref 0–0.2)
IMM GRANULOCYTES NFR BLD AUTO: 1 % (ref 0–2)
LYMPHOCYTES # BLD AUTO: 0.75 THOUSANDS/ΜL (ref 0.6–4.47)
LYMPHOCYTES NFR BLD AUTO: 17 % (ref 14–44)
MCH RBC QN AUTO: 29.1 PG (ref 26.8–34.3)
MCHC RBC AUTO-ENTMCNC: 31.6 G/DL (ref 31.4–37.4)
MCV RBC AUTO: 92 FL (ref 82–98)
MONOCYTES # BLD AUTO: 0.5 THOUSAND/ΜL (ref 0.17–1.22)
MONOCYTES NFR BLD AUTO: 11 % (ref 4–12)
NEUTROPHILS # BLD AUTO: 3.19 THOUSANDS/ΜL (ref 1.85–7.62)
NEUTS SEG NFR BLD AUTO: 71 % (ref 43–75)
NRBC BLD AUTO-RTO: 0 /100 WBCS
PLATELET # BLD AUTO: 116 THOUSANDS/UL (ref 149–390)
PMV BLD AUTO: 11.7 FL (ref 8.9–12.7)
POTASSIUM SERPL-SCNC: 4.7 MMOL/L (ref 3.5–5.3)
PROCALCITONIN SERPL-MCNC: <0.05 NG/ML
PROT SERPL-MCNC: 7 G/DL (ref 6.4–8.2)
RBC # BLD AUTO: 4.33 MILLION/UL (ref 3.81–5.12)
SODIUM SERPL-SCNC: 137 MMOL/L (ref 136–145)
WBC # BLD AUTO: 4.47 THOUSAND/UL (ref 4.31–10.16)

## 2020-12-14 PROCEDURE — 85025 COMPLETE CBC W/AUTO DIFF WBC: CPT | Performed by: PHYSICIAN ASSISTANT

## 2020-12-14 PROCEDURE — XW033E5 INTRODUCTION OF REMDESIVIR ANTI-INFECTIVE INTO PERIPHERAL VEIN, PERCUTANEOUS APPROACH, NEW TECHNOLOGY GROUP 5: ICD-10-PCS | Performed by: INTERNAL MEDICINE

## 2020-12-14 PROCEDURE — 80053 COMPREHEN METABOLIC PANEL: CPT | Performed by: PHYSICIAN ASSISTANT

## 2020-12-14 PROCEDURE — 99232 SBSQ HOSP IP/OBS MODERATE 35: CPT | Performed by: INTERNAL MEDICINE

## 2020-12-14 RX ADMIN — METOPROLOL TARTRATE 25 MG: 25 TABLET, FILM COATED ORAL at 09:36

## 2020-12-14 RX ADMIN — LIDOCAINE 5% 1 PATCH: 700 PATCH TOPICAL at 10:24

## 2020-12-14 RX ADMIN — PHENOBARBITAL 64.8 MG: 32.4 TABLET ORAL at 10:22

## 2020-12-14 RX ADMIN — ATORVASTATIN CALCIUM 80 MG: 40 TABLET, FILM COATED ORAL at 09:35

## 2020-12-14 RX ADMIN — ZINC SULFATE 220 MG (50 MG) CAPSULE 220 MG: CAPSULE at 09:36

## 2020-12-14 RX ADMIN — DOCUSATE SODIUM 100 MG: 100 CAPSULE ORAL at 17:53

## 2020-12-14 RX ADMIN — METOPROLOL TARTRATE 25 MG: 25 TABLET, FILM COATED ORAL at 21:17

## 2020-12-14 RX ADMIN — LEVETIRACETAM 500 MG: 500 TABLET, FILM COATED ORAL at 09:36

## 2020-12-14 RX ADMIN — DEXAMETHASONE SODIUM PHOSPHATE 6 MG: 4 INJECTION, SOLUTION INTRAMUSCULAR; INTRAVENOUS at 21:17

## 2020-12-14 RX ADMIN — ACETAMINOPHEN 650 MG: 325 TABLET, FILM COATED ORAL at 21:17

## 2020-12-14 RX ADMIN — LEVETIRACETAM 500 MG: 500 TABLET, FILM COATED ORAL at 21:17

## 2020-12-14 RX ADMIN — Medication 2000 UNITS: at 09:36

## 2020-12-14 RX ADMIN — OXYCODONE HYDROCHLORIDE AND ACETAMINOPHEN 1000 MG: 500 TABLET ORAL at 09:36

## 2020-12-14 RX ADMIN — CEFTRIAXONE SODIUM 1000 MG: 10 INJECTION, POWDER, FOR SOLUTION INTRAVENOUS at 17:56

## 2020-12-14 RX ADMIN — DOCUSATE SODIUM 100 MG: 100 CAPSULE ORAL at 09:36

## 2020-12-14 RX ADMIN — OXYCODONE HYDROCHLORIDE AND ACETAMINOPHEN 1000 MG: 500 TABLET ORAL at 21:17

## 2020-12-14 RX ADMIN — FAMOTIDINE 20 MG: 20 TABLET, FILM COATED ORAL at 10:22

## 2020-12-14 RX ADMIN — DOXYCYCLINE 100 MG: 100 CAPSULE ORAL at 21:17

## 2020-12-14 RX ADMIN — DOXYCYCLINE 100 MG: 100 CAPSULE ORAL at 09:35

## 2020-12-14 RX ADMIN — REMDESIVIR 100 MG: 100 INJECTION, POWDER, LYOPHILIZED, FOR SOLUTION INTRAVENOUS at 21:30

## 2020-12-14 RX ADMIN — FOLIC ACID 1 MG: 1 TABLET ORAL at 09:36

## 2020-12-14 RX ADMIN — ENOXAPARIN SODIUM 40 MG: 40 INJECTION SUBCUTANEOUS at 10:24

## 2020-12-14 RX ADMIN — PHENOBARBITAL 64.8 MG: 32.4 TABLET ORAL at 17:53

## 2020-12-15 PROBLEM — A41.9 SEPSIS (HCC): Status: RESOLVED | Noted: 2018-12-30 | Resolved: 2020-12-15

## 2020-12-15 LAB
ANION GAP SERPL CALCULATED.3IONS-SCNC: 7 MMOL/L (ref 4–13)
BASOPHILS # BLD AUTO: 0 THOUSANDS/ΜL (ref 0–0.1)
BASOPHILS NFR BLD AUTO: 0 % (ref 0–1)
BUN SERPL-MCNC: 12 MG/DL (ref 5–25)
CALCIUM SERPL-MCNC: 8.5 MG/DL (ref 8.3–10.1)
CHLORIDE SERPL-SCNC: 102 MMOL/L (ref 100–108)
CO2 SERPL-SCNC: 27 MMOL/L (ref 21–32)
CREAT SERPL-MCNC: 0.91 MG/DL (ref 0.6–1.3)
EOSINOPHIL # BLD AUTO: 0 THOUSAND/ΜL (ref 0–0.61)
EOSINOPHIL NFR BLD AUTO: 0 % (ref 0–6)
ERYTHROCYTE [DISTWIDTH] IN BLOOD BY AUTOMATED COUNT: 14.4 % (ref 11.6–15.1)
GFR SERPL CREATININE-BSD FRML MDRD: 69 ML/MIN/1.73SQ M
GLUCOSE SERPL-MCNC: 121 MG/DL (ref 65–140)
HCT VFR BLD AUTO: 40.7 % (ref 34.8–46.1)
HGB BLD-MCNC: 12.4 G/DL (ref 11.5–15.4)
IMM GRANULOCYTES # BLD AUTO: 0.04 THOUSAND/UL (ref 0–0.2)
IMM GRANULOCYTES NFR BLD AUTO: 1 % (ref 0–2)
LYMPHOCYTES # BLD AUTO: 0.96 THOUSANDS/ΜL (ref 0.6–4.47)
LYMPHOCYTES NFR BLD AUTO: 15 % (ref 14–44)
MCH RBC QN AUTO: 28.6 PG (ref 26.8–34.3)
MCHC RBC AUTO-ENTMCNC: 30.5 G/DL (ref 31.4–37.4)
MCV RBC AUTO: 94 FL (ref 82–98)
MONOCYTES # BLD AUTO: 0.57 THOUSAND/ΜL (ref 0.17–1.22)
MONOCYTES NFR BLD AUTO: 9 % (ref 4–12)
NEUTROPHILS # BLD AUTO: 4.73 THOUSANDS/ΜL (ref 1.85–7.62)
NEUTS SEG NFR BLD AUTO: 75 % (ref 43–75)
NRBC BLD AUTO-RTO: 0 /100 WBCS
PLATELET # BLD AUTO: 136 THOUSANDS/UL (ref 149–390)
PMV BLD AUTO: 11.1 FL (ref 8.9–12.7)
POTASSIUM SERPL-SCNC: 4.1 MMOL/L (ref 3.5–5.3)
PROCALCITONIN SERPL-MCNC: <0.05 NG/ML
RBC # BLD AUTO: 4.34 MILLION/UL (ref 3.81–5.12)
SODIUM SERPL-SCNC: 136 MMOL/L (ref 136–145)
WBC # BLD AUTO: 6.3 THOUSAND/UL (ref 4.31–10.16)

## 2020-12-15 PROCEDURE — 80048 BASIC METABOLIC PNL TOTAL CA: CPT | Performed by: INTERNAL MEDICINE

## 2020-12-15 PROCEDURE — 94664 DEMO&/EVAL PT USE INHALER: CPT

## 2020-12-15 PROCEDURE — 99233 SBSQ HOSP IP/OBS HIGH 50: CPT | Performed by: INTERNAL MEDICINE

## 2020-12-15 PROCEDURE — 94760 N-INVAS EAR/PLS OXIMETRY 1: CPT

## 2020-12-15 PROCEDURE — 84145 PROCALCITONIN (PCT): CPT | Performed by: INTERNAL MEDICINE

## 2020-12-15 PROCEDURE — 85025 COMPLETE CBC W/AUTO DIFF WBC: CPT | Performed by: INTERNAL MEDICINE

## 2020-12-15 PROCEDURE — 97163 PT EVAL HIGH COMPLEX 45 MIN: CPT

## 2020-12-15 RX ADMIN — ENOXAPARIN SODIUM 40 MG: 40 INJECTION SUBCUTANEOUS at 10:04

## 2020-12-15 RX ADMIN — LEVETIRACETAM 500 MG: 500 TABLET, FILM COATED ORAL at 09:59

## 2020-12-15 RX ADMIN — REMDESIVIR 100 MG: 100 INJECTION, POWDER, LYOPHILIZED, FOR SOLUTION INTRAVENOUS at 22:25

## 2020-12-15 RX ADMIN — LEVETIRACETAM 500 MG: 500 TABLET, FILM COATED ORAL at 22:20

## 2020-12-15 RX ADMIN — DEXAMETHASONE SODIUM PHOSPHATE 6 MG: 4 INJECTION, SOLUTION INTRAMUSCULAR; INTRAVENOUS at 22:25

## 2020-12-15 RX ADMIN — DOXYCYCLINE 100 MG: 100 CAPSULE ORAL at 10:04

## 2020-12-15 RX ADMIN — PHENOBARBITAL 64.8 MG: 32.4 TABLET ORAL at 17:42

## 2020-12-15 RX ADMIN — Medication 2000 UNITS: at 09:59

## 2020-12-15 RX ADMIN — ZINC SULFATE 220 MG (50 MG) CAPSULE 220 MG: CAPSULE at 09:59

## 2020-12-15 RX ADMIN — DOXYCYCLINE 100 MG: 100 CAPSULE ORAL at 22:20

## 2020-12-15 RX ADMIN — FOLIC ACID 1 MG: 1 TABLET ORAL at 09:59

## 2020-12-15 RX ADMIN — OXYCODONE HYDROCHLORIDE AND ACETAMINOPHEN 1000 MG: 500 TABLET ORAL at 22:20

## 2020-12-15 RX ADMIN — ATORVASTATIN CALCIUM 80 MG: 40 TABLET, FILM COATED ORAL at 09:59

## 2020-12-15 RX ADMIN — ACETAMINOPHEN 650 MG: 325 TABLET, FILM COATED ORAL at 22:26

## 2020-12-15 RX ADMIN — METOPROLOL TARTRATE 25 MG: 25 TABLET, FILM COATED ORAL at 22:23

## 2020-12-15 RX ADMIN — FAMOTIDINE 20 MG: 20 TABLET, FILM COATED ORAL at 09:59

## 2020-12-15 RX ADMIN — CEFTRIAXONE SODIUM 1000 MG: 10 INJECTION, POWDER, FOR SOLUTION INTRAVENOUS at 17:44

## 2020-12-15 RX ADMIN — OXYCODONE HYDROCHLORIDE AND ACETAMINOPHEN 1000 MG: 500 TABLET ORAL at 09:59

## 2020-12-15 RX ADMIN — PHENOBARBITAL 64.8 MG: 32.4 TABLET ORAL at 09:59

## 2020-12-15 RX ADMIN — METOPROLOL TARTRATE 25 MG: 25 TABLET, FILM COATED ORAL at 09:59

## 2020-12-16 LAB — PROCALCITONIN SERPL-MCNC: <0.05 NG/ML

## 2020-12-16 PROCEDURE — 84145 PROCALCITONIN (PCT): CPT | Performed by: INTERNAL MEDICINE

## 2020-12-16 PROCEDURE — 99232 SBSQ HOSP IP/OBS MODERATE 35: CPT | Performed by: INTERNAL MEDICINE

## 2020-12-16 RX ADMIN — CEFTRIAXONE SODIUM 1000 MG: 10 INJECTION, POWDER, FOR SOLUTION INTRAVENOUS at 18:04

## 2020-12-16 RX ADMIN — LEVETIRACETAM 500 MG: 500 TABLET, FILM COATED ORAL at 22:33

## 2020-12-16 RX ADMIN — DOXYCYCLINE 100 MG: 100 CAPSULE ORAL at 22:34

## 2020-12-16 RX ADMIN — REMDESIVIR 100 MG: 100 INJECTION, POWDER, LYOPHILIZED, FOR SOLUTION INTRAVENOUS at 22:43

## 2020-12-16 RX ADMIN — Medication 2000 UNITS: at 10:11

## 2020-12-16 RX ADMIN — OXYCODONE HYDROCHLORIDE AND ACETAMINOPHEN 1000 MG: 500 TABLET ORAL at 10:11

## 2020-12-16 RX ADMIN — PHENOBARBITAL 64.8 MG: 32.4 TABLET ORAL at 18:05

## 2020-12-16 RX ADMIN — METOPROLOL TARTRATE 25 MG: 25 TABLET, FILM COATED ORAL at 22:32

## 2020-12-16 RX ADMIN — FAMOTIDINE 20 MG: 20 TABLET, FILM COATED ORAL at 10:10

## 2020-12-16 RX ADMIN — ENOXAPARIN SODIUM 40 MG: 40 INJECTION SUBCUTANEOUS at 10:20

## 2020-12-16 RX ADMIN — ATORVASTATIN CALCIUM 80 MG: 40 TABLET, FILM COATED ORAL at 10:10

## 2020-12-16 RX ADMIN — OXYCODONE HYDROCHLORIDE AND ACETAMINOPHEN 1000 MG: 500 TABLET ORAL at 22:30

## 2020-12-16 RX ADMIN — DOXYCYCLINE 100 MG: 100 CAPSULE ORAL at 10:10

## 2020-12-16 RX ADMIN — LEVETIRACETAM 500 MG: 500 TABLET, FILM COATED ORAL at 10:10

## 2020-12-16 RX ADMIN — LIDOCAINE 5% 1 PATCH: 700 PATCH TOPICAL at 10:14

## 2020-12-16 RX ADMIN — ZINC SULFATE 220 MG (50 MG) CAPSULE 220 MG: CAPSULE at 10:10

## 2020-12-16 RX ADMIN — PHENOBARBITAL 64.8 MG: 32.4 TABLET ORAL at 10:09

## 2020-12-16 RX ADMIN — DEXAMETHASONE SODIUM PHOSPHATE 6 MG: 4 INJECTION, SOLUTION INTRAMUSCULAR; INTRAVENOUS at 22:35

## 2020-12-16 RX ADMIN — METOPROLOL TARTRATE 25 MG: 25 TABLET, FILM COATED ORAL at 10:11

## 2020-12-16 RX ADMIN — FOLIC ACID 1 MG: 1 TABLET ORAL at 10:09

## 2020-12-17 LAB
ALBUMIN SERPL BCP-MCNC: 2.4 G/DL (ref 3.5–5)
ALP SERPL-CCNC: 81 U/L (ref 46–116)
ALT SERPL W P-5'-P-CCNC: 49 U/L (ref 12–78)
ANION GAP SERPL CALCULATED.3IONS-SCNC: 9 MMOL/L (ref 4–13)
AST SERPL W P-5'-P-CCNC: 58 U/L (ref 5–45)
BILIRUB SERPL-MCNC: 0.3 MG/DL (ref 0.2–1)
BUN SERPL-MCNC: 11 MG/DL (ref 5–25)
CALCIUM ALBUM COR SERPL-MCNC: 9.5 MG/DL (ref 8.3–10.1)
CALCIUM SERPL-MCNC: 8.2 MG/DL (ref 8.3–10.1)
CHLORIDE SERPL-SCNC: 99 MMOL/L (ref 100–108)
CO2 SERPL-SCNC: 26 MMOL/L (ref 21–32)
CREAT SERPL-MCNC: 0.68 MG/DL (ref 0.6–1.3)
ERYTHROCYTE [DISTWIDTH] IN BLOOD BY AUTOMATED COUNT: 14.4 % (ref 11.6–15.1)
GFR SERPL CREATININE-BSD FRML MDRD: 96 ML/MIN/1.73SQ M
GLUCOSE SERPL-MCNC: 151 MG/DL (ref 65–140)
HCT VFR BLD AUTO: 38.3 % (ref 34.8–46.1)
HGB BLD-MCNC: 12 G/DL (ref 11.5–15.4)
MCH RBC QN AUTO: 29.3 PG (ref 26.8–34.3)
MCHC RBC AUTO-ENTMCNC: 31.3 G/DL (ref 31.4–37.4)
MCV RBC AUTO: 93 FL (ref 82–98)
PLATELET # BLD AUTO: 180 THOUSANDS/UL (ref 149–390)
PMV BLD AUTO: 10.4 FL (ref 8.9–12.7)
POTASSIUM SERPL-SCNC: 4.7 MMOL/L (ref 3.5–5.3)
PROT SERPL-MCNC: 6.7 G/DL (ref 6.4–8.2)
RBC # BLD AUTO: 4.1 MILLION/UL (ref 3.81–5.12)
SODIUM SERPL-SCNC: 134 MMOL/L (ref 136–145)
WBC # BLD AUTO: 7.81 THOUSAND/UL (ref 4.31–10.16)

## 2020-12-17 PROCEDURE — 99232 SBSQ HOSP IP/OBS MODERATE 35: CPT | Performed by: INTERNAL MEDICINE

## 2020-12-17 PROCEDURE — 85027 COMPLETE CBC AUTOMATED: CPT | Performed by: INTERNAL MEDICINE

## 2020-12-17 PROCEDURE — 80053 COMPREHEN METABOLIC PANEL: CPT | Performed by: INTERNAL MEDICINE

## 2020-12-17 RX ADMIN — DEXAMETHASONE SODIUM PHOSPHATE 6 MG: 4 INJECTION, SOLUTION INTRAMUSCULAR; INTRAVENOUS at 20:50

## 2020-12-17 RX ADMIN — CEFTRIAXONE SODIUM 1000 MG: 10 INJECTION, POWDER, FOR SOLUTION INTRAVENOUS at 17:34

## 2020-12-17 RX ADMIN — DOXYCYCLINE 100 MG: 100 CAPSULE ORAL at 10:11

## 2020-12-17 RX ADMIN — PHENOBARBITAL 64.8 MG: 32.4 TABLET ORAL at 10:11

## 2020-12-17 RX ADMIN — PHENOBARBITAL 64.8 MG: 32.4 TABLET ORAL at 17:34

## 2020-12-17 RX ADMIN — DOXYCYCLINE 100 MG: 100 CAPSULE ORAL at 20:50

## 2020-12-17 RX ADMIN — OXYCODONE HYDROCHLORIDE AND ACETAMINOPHEN 1000 MG: 500 TABLET ORAL at 10:17

## 2020-12-17 RX ADMIN — LEVETIRACETAM 500 MG: 500 TABLET, FILM COATED ORAL at 10:11

## 2020-12-17 RX ADMIN — ENOXAPARIN SODIUM 40 MG: 40 INJECTION SUBCUTANEOUS at 10:23

## 2020-12-17 RX ADMIN — Medication 2000 UNITS: at 10:12

## 2020-12-17 RX ADMIN — FOLIC ACID 1 MG: 1 TABLET ORAL at 10:20

## 2020-12-17 RX ADMIN — OXYCODONE HYDROCHLORIDE AND ACETAMINOPHEN 1000 MG: 500 TABLET ORAL at 20:50

## 2020-12-17 RX ADMIN — METOPROLOL TARTRATE 25 MG: 25 TABLET, FILM COATED ORAL at 20:50

## 2020-12-17 RX ADMIN — FAMOTIDINE 20 MG: 20 TABLET, FILM COATED ORAL at 10:12

## 2020-12-17 RX ADMIN — ZINC SULFATE 220 MG (50 MG) CAPSULE 220 MG: CAPSULE at 10:11

## 2020-12-17 RX ADMIN — LEVETIRACETAM 500 MG: 500 TABLET, FILM COATED ORAL at 20:50

## 2020-12-17 RX ADMIN — ACETAMINOPHEN 650 MG: 325 TABLET, FILM COATED ORAL at 20:50

## 2020-12-17 RX ADMIN — METOPROLOL TARTRATE 25 MG: 25 TABLET, FILM COATED ORAL at 10:12

## 2020-12-17 RX ADMIN — ATORVASTATIN CALCIUM 80 MG: 40 TABLET, FILM COATED ORAL at 10:11

## 2020-12-18 LAB
ALBUMIN SERPL BCP-MCNC: 2.4 G/DL (ref 3.5–5)
ALP SERPL-CCNC: 80 U/L (ref 46–116)
ALT SERPL W P-5'-P-CCNC: 44 U/L (ref 12–78)
ANION GAP SERPL CALCULATED.3IONS-SCNC: 5 MMOL/L (ref 4–13)
AST SERPL W P-5'-P-CCNC: 35 U/L (ref 5–45)
BACTERIA BLD CULT: NORMAL
BILIRUB SERPL-MCNC: 0.3 MG/DL (ref 0.2–1)
BUN SERPL-MCNC: 10 MG/DL (ref 5–25)
CALCIUM ALBUM COR SERPL-MCNC: 9.9 MG/DL (ref 8.3–10.1)
CALCIUM SERPL-MCNC: 8.6 MG/DL (ref 8.3–10.1)
CHLORIDE SERPL-SCNC: 104 MMOL/L (ref 100–108)
CO2 SERPL-SCNC: 30 MMOL/L (ref 21–32)
CREAT SERPL-MCNC: 0.77 MG/DL (ref 0.6–1.3)
ERYTHROCYTE [DISTWIDTH] IN BLOOD BY AUTOMATED COUNT: 14.3 % (ref 11.6–15.1)
GFR SERPL CREATININE-BSD FRML MDRD: 84 ML/MIN/1.73SQ M
GLUCOSE SERPL-MCNC: 143 MG/DL (ref 65–140)
HCT VFR BLD AUTO: 38.3 % (ref 34.8–46.1)
HGB BLD-MCNC: 11.9 G/DL (ref 11.5–15.4)
MCH RBC QN AUTO: 28.7 PG (ref 26.8–34.3)
MCHC RBC AUTO-ENTMCNC: 31.1 G/DL (ref 31.4–37.4)
MCV RBC AUTO: 92 FL (ref 82–98)
PLATELET # BLD AUTO: 209 THOUSANDS/UL (ref 149–390)
PMV BLD AUTO: 10.3 FL (ref 8.9–12.7)
POTASSIUM SERPL-SCNC: 4.1 MMOL/L (ref 3.5–5.3)
PROT SERPL-MCNC: 6.3 G/DL (ref 6.4–8.2)
RBC # BLD AUTO: 4.15 MILLION/UL (ref 3.81–5.12)
SODIUM SERPL-SCNC: 139 MMOL/L (ref 136–145)
WBC # BLD AUTO: 8.48 THOUSAND/UL (ref 4.31–10.16)

## 2020-12-18 PROCEDURE — 85027 COMPLETE CBC AUTOMATED: CPT | Performed by: INTERNAL MEDICINE

## 2020-12-18 PROCEDURE — 99232 SBSQ HOSP IP/OBS MODERATE 35: CPT | Performed by: INTERNAL MEDICINE

## 2020-12-18 PROCEDURE — 80053 COMPREHEN METABOLIC PANEL: CPT | Performed by: INTERNAL MEDICINE

## 2020-12-18 RX ADMIN — PHENOBARBITAL 64.8 MG: 32.4 TABLET ORAL at 17:24

## 2020-12-18 RX ADMIN — OXYCODONE HYDROCHLORIDE AND ACETAMINOPHEN 1000 MG: 500 TABLET ORAL at 10:12

## 2020-12-18 RX ADMIN — ENOXAPARIN SODIUM 40 MG: 40 INJECTION SUBCUTANEOUS at 10:10

## 2020-12-18 RX ADMIN — ZINC SULFATE 220 MG (50 MG) CAPSULE 220 MG: CAPSULE at 10:10

## 2020-12-18 RX ADMIN — FAMOTIDINE 20 MG: 20 TABLET, FILM COATED ORAL at 10:11

## 2020-12-18 RX ADMIN — Medication 2000 UNITS: at 10:11

## 2020-12-18 RX ADMIN — OXYCODONE HYDROCHLORIDE AND ACETAMINOPHEN 1000 MG: 500 TABLET ORAL at 21:24

## 2020-12-18 RX ADMIN — LEVETIRACETAM 500 MG: 500 TABLET, FILM COATED ORAL at 21:25

## 2020-12-18 RX ADMIN — METOPROLOL TARTRATE 25 MG: 25 TABLET, FILM COATED ORAL at 21:25

## 2020-12-18 RX ADMIN — DOXYCYCLINE 100 MG: 100 CAPSULE ORAL at 10:10

## 2020-12-18 RX ADMIN — DEXAMETHASONE SODIUM PHOSPHATE 6 MG: 4 INJECTION, SOLUTION INTRAMUSCULAR; INTRAVENOUS at 23:07

## 2020-12-18 RX ADMIN — METOPROLOL TARTRATE 25 MG: 25 TABLET, FILM COATED ORAL at 10:11

## 2020-12-18 RX ADMIN — PHENOBARBITAL 64.8 MG: 32.4 TABLET ORAL at 10:11

## 2020-12-18 RX ADMIN — DOXYCYCLINE 100 MG: 100 CAPSULE ORAL at 21:24

## 2020-12-18 RX ADMIN — ALBUTEROL SULFATE 2 PUFF: 90 AEROSOL, METERED RESPIRATORY (INHALATION) at 21:29

## 2020-12-18 RX ADMIN — CEFTRIAXONE SODIUM 1000 MG: 10 INJECTION, POWDER, FOR SOLUTION INTRAVENOUS at 23:07

## 2020-12-18 RX ADMIN — FOLIC ACID 1 MG: 1 TABLET ORAL at 10:11

## 2020-12-18 RX ADMIN — ATORVASTATIN CALCIUM 80 MG: 40 TABLET, FILM COATED ORAL at 10:11

## 2020-12-18 RX ADMIN — LEVETIRACETAM 500 MG: 500 TABLET, FILM COATED ORAL at 10:12

## 2020-12-18 RX ADMIN — ACETAMINOPHEN 650 MG: 325 TABLET, FILM COATED ORAL at 21:25

## 2020-12-19 LAB
ALBUMIN SERPL BCP-MCNC: 2.5 G/DL (ref 3.5–5)
ALP SERPL-CCNC: 83 U/L (ref 46–116)
ALT SERPL W P-5'-P-CCNC: 37 U/L (ref 12–78)
ANION GAP SERPL CALCULATED.3IONS-SCNC: 7 MMOL/L (ref 4–13)
AST SERPL W P-5'-P-CCNC: 30 U/L (ref 5–45)
BILIRUB SERPL-MCNC: 0.3 MG/DL (ref 0.2–1)
BUN SERPL-MCNC: 10 MG/DL (ref 5–25)
CALCIUM ALBUM COR SERPL-MCNC: 9.8 MG/DL (ref 8.3–10.1)
CALCIUM SERPL-MCNC: 8.6 MG/DL (ref 8.3–10.1)
CHLORIDE SERPL-SCNC: 100 MMOL/L (ref 100–108)
CO2 SERPL-SCNC: 29 MMOL/L (ref 21–32)
CREAT SERPL-MCNC: 0.77 MG/DL (ref 0.6–1.3)
ERYTHROCYTE [DISTWIDTH] IN BLOOD BY AUTOMATED COUNT: 14.5 % (ref 11.6–15.1)
GFR SERPL CREATININE-BSD FRML MDRD: 84 ML/MIN/1.73SQ M
GLUCOSE SERPL-MCNC: 137 MG/DL (ref 65–140)
HCT VFR BLD AUTO: 39.6 % (ref 34.8–46.1)
HGB BLD-MCNC: 12.5 G/DL (ref 11.5–15.4)
MCH RBC QN AUTO: 29.1 PG (ref 26.8–34.3)
MCHC RBC AUTO-ENTMCNC: 31.6 G/DL (ref 31.4–37.4)
MCV RBC AUTO: 92 FL (ref 82–98)
PLATELET # BLD AUTO: 231 THOUSANDS/UL (ref 149–390)
PMV BLD AUTO: 10.2 FL (ref 8.9–12.7)
POTASSIUM SERPL-SCNC: 4.1 MMOL/L (ref 3.5–5.3)
PROT SERPL-MCNC: 6.8 G/DL (ref 6.4–8.2)
RBC # BLD AUTO: 4.3 MILLION/UL (ref 3.81–5.12)
SODIUM SERPL-SCNC: 136 MMOL/L (ref 136–145)
WBC # BLD AUTO: 8.73 THOUSAND/UL (ref 4.31–10.16)

## 2020-12-19 PROCEDURE — 99232 SBSQ HOSP IP/OBS MODERATE 35: CPT | Performed by: INTERNAL MEDICINE

## 2020-12-19 PROCEDURE — 85027 COMPLETE CBC AUTOMATED: CPT | Performed by: INTERNAL MEDICINE

## 2020-12-19 PROCEDURE — 80053 COMPREHEN METABOLIC PANEL: CPT | Performed by: INTERNAL MEDICINE

## 2020-12-19 RX ADMIN — Medication 2000 UNITS: at 08:07

## 2020-12-19 RX ADMIN — FOLIC ACID 1 MG: 1 TABLET ORAL at 08:08

## 2020-12-19 RX ADMIN — ENOXAPARIN SODIUM 40 MG: 40 INJECTION SUBCUTANEOUS at 08:08

## 2020-12-19 RX ADMIN — FAMOTIDINE 20 MG: 20 TABLET, FILM COATED ORAL at 08:07

## 2020-12-19 RX ADMIN — METOPROLOL TARTRATE 25 MG: 25 TABLET, FILM COATED ORAL at 20:29

## 2020-12-19 RX ADMIN — OXYCODONE HYDROCHLORIDE AND ACETAMINOPHEN 1000 MG: 500 TABLET ORAL at 08:08

## 2020-12-19 RX ADMIN — PHENOBARBITAL 64.8 MG: 32.4 TABLET ORAL at 17:01

## 2020-12-19 RX ADMIN — GUAIFENESIN 200 MG: 100 SOLUTION ORAL at 20:29

## 2020-12-19 RX ADMIN — METOPROLOL TARTRATE 25 MG: 25 TABLET, FILM COATED ORAL at 08:08

## 2020-12-19 RX ADMIN — CEFTRIAXONE SODIUM 1000 MG: 10 INJECTION, POWDER, FOR SOLUTION INTRAVENOUS at 20:28

## 2020-12-19 RX ADMIN — LEVETIRACETAM 500 MG: 500 TABLET, FILM COATED ORAL at 20:30

## 2020-12-19 RX ADMIN — OXYCODONE HYDROCHLORIDE AND ACETAMINOPHEN 1000 MG: 500 TABLET ORAL at 20:29

## 2020-12-19 RX ADMIN — LEVETIRACETAM 500 MG: 500 TABLET, FILM COATED ORAL at 08:08

## 2020-12-19 RX ADMIN — DOCUSATE SODIUM 100 MG: 100 CAPSULE ORAL at 17:01

## 2020-12-19 RX ADMIN — DOXYCYCLINE 100 MG: 100 CAPSULE ORAL at 20:35

## 2020-12-19 RX ADMIN — ATORVASTATIN CALCIUM 80 MG: 40 TABLET, FILM COATED ORAL at 08:07

## 2020-12-19 RX ADMIN — DEXAMETHASONE SODIUM PHOSPHATE 6 MG: 4 INJECTION, SOLUTION INTRAMUSCULAR; INTRAVENOUS at 20:30

## 2020-12-19 RX ADMIN — ZINC SULFATE 220 MG (50 MG) CAPSULE 220 MG: CAPSULE at 08:09

## 2020-12-19 RX ADMIN — PHENOBARBITAL 64.8 MG: 32.4 TABLET ORAL at 08:08

## 2020-12-19 RX ADMIN — LIDOCAINE 5% 1 PATCH: 700 PATCH TOPICAL at 08:04

## 2020-12-19 RX ADMIN — DOXYCYCLINE 100 MG: 100 CAPSULE ORAL at 08:55

## 2020-12-19 RX ADMIN — DOCUSATE SODIUM 100 MG: 100 CAPSULE ORAL at 08:08

## 2020-12-20 LAB — AMMONIA PLAS-SCNC: 19 UMOL/L (ref 11–35)

## 2020-12-20 PROCEDURE — 80177 DRUG SCRN QUAN LEVETIRACETAM: CPT | Performed by: INTERNAL MEDICINE

## 2020-12-20 PROCEDURE — 82140 ASSAY OF AMMONIA: CPT | Performed by: INTERNAL MEDICINE

## 2020-12-20 PROCEDURE — 99233 SBSQ HOSP IP/OBS HIGH 50: CPT | Performed by: INTERNAL MEDICINE

## 2020-12-20 RX ADMIN — DOCUSATE SODIUM 100 MG: 100 CAPSULE ORAL at 17:16

## 2020-12-20 RX ADMIN — LIDOCAINE 5% 1 PATCH: 700 PATCH TOPICAL at 08:34

## 2020-12-20 RX ADMIN — DEXAMETHASONE SODIUM PHOSPHATE 6 MG: 4 INJECTION, SOLUTION INTRAMUSCULAR; INTRAVENOUS at 21:29

## 2020-12-20 RX ADMIN — ENOXAPARIN SODIUM 40 MG: 40 INJECTION SUBCUTANEOUS at 08:34

## 2020-12-20 RX ADMIN — LEVETIRACETAM 500 MG: 500 TABLET, FILM COATED ORAL at 08:37

## 2020-12-20 RX ADMIN — METOPROLOL TARTRATE 25 MG: 25 TABLET, FILM COATED ORAL at 21:28

## 2020-12-20 RX ADMIN — DOXYCYCLINE 100 MG: 100 CAPSULE ORAL at 21:29

## 2020-12-20 RX ADMIN — ATORVASTATIN CALCIUM 80 MG: 40 TABLET, FILM COATED ORAL at 08:37

## 2020-12-20 RX ADMIN — DOCUSATE SODIUM 100 MG: 100 CAPSULE ORAL at 08:37

## 2020-12-20 RX ADMIN — METOPROLOL TARTRATE 25 MG: 25 TABLET, FILM COATED ORAL at 08:37

## 2020-12-20 RX ADMIN — FOLIC ACID 1 MG: 1 TABLET ORAL at 08:38

## 2020-12-20 RX ADMIN — Medication 2000 UNITS: at 08:37

## 2020-12-20 RX ADMIN — FAMOTIDINE 20 MG: 20 TABLET, FILM COATED ORAL at 08:36

## 2020-12-20 RX ADMIN — PHENOBARBITAL 64.8 MG: 32.4 TABLET ORAL at 08:36

## 2020-12-20 RX ADMIN — DOXYCYCLINE 100 MG: 100 CAPSULE ORAL at 08:37

## 2020-12-20 RX ADMIN — PHENOBARBITAL 64.8 MG: 32.4 TABLET ORAL at 17:16

## 2020-12-20 RX ADMIN — LEVETIRACETAM 500 MG: 500 TABLET, FILM COATED ORAL at 21:28

## 2020-12-20 RX ADMIN — ZINC SULFATE 220 MG (50 MG) CAPSULE 220 MG: CAPSULE at 08:37

## 2020-12-20 RX ADMIN — OXYCODONE HYDROCHLORIDE AND ACETAMINOPHEN 1000 MG: 500 TABLET ORAL at 08:37

## 2020-12-21 LAB
ALBUMIN SERPL BCP-MCNC: 2.5 G/DL (ref 3.5–5)
ALP SERPL-CCNC: 84 U/L (ref 46–116)
ALT SERPL W P-5'-P-CCNC: 37 U/L (ref 12–78)
ANION GAP SERPL CALCULATED.3IONS-SCNC: 7 MMOL/L (ref 4–13)
AST SERPL W P-5'-P-CCNC: 31 U/L (ref 5–45)
BILIRUB SERPL-MCNC: 0.4 MG/DL (ref 0.2–1)
BUN SERPL-MCNC: 13 MG/DL (ref 5–25)
CALCIUM ALBUM COR SERPL-MCNC: 10.2 MG/DL (ref 8.3–10.1)
CALCIUM SERPL-MCNC: 9 MG/DL (ref 8.3–10.1)
CHLORIDE SERPL-SCNC: 101 MMOL/L (ref 100–108)
CO2 SERPL-SCNC: 30 MMOL/L (ref 21–32)
CREAT SERPL-MCNC: 0.67 MG/DL (ref 0.6–1.3)
ERYTHROCYTE [DISTWIDTH] IN BLOOD BY AUTOMATED COUNT: 14.4 % (ref 11.6–15.1)
GFR SERPL CREATININE-BSD FRML MDRD: 96 ML/MIN/1.73SQ M
GLUCOSE SERPL-MCNC: 119 MG/DL (ref 65–140)
HCT VFR BLD AUTO: 41.1 % (ref 34.8–46.1)
HGB BLD-MCNC: 12.7 G/DL (ref 11.5–15.4)
MCH RBC QN AUTO: 28.7 PG (ref 26.8–34.3)
MCHC RBC AUTO-ENTMCNC: 30.9 G/DL (ref 31.4–37.4)
MCV RBC AUTO: 93 FL (ref 82–98)
PLATELET # BLD AUTO: 266 THOUSANDS/UL (ref 149–390)
PMV BLD AUTO: 10.2 FL (ref 8.9–12.7)
POTASSIUM SERPL-SCNC: 4.6 MMOL/L (ref 3.5–5.3)
PROT SERPL-MCNC: 7.1 G/DL (ref 6.4–8.2)
RBC # BLD AUTO: 4.43 MILLION/UL (ref 3.81–5.12)
SODIUM SERPL-SCNC: 138 MMOL/L (ref 136–145)
WBC # BLD AUTO: 8.12 THOUSAND/UL (ref 4.31–10.16)

## 2020-12-21 PROCEDURE — 80053 COMPREHEN METABOLIC PANEL: CPT | Performed by: INTERNAL MEDICINE

## 2020-12-21 PROCEDURE — 85027 COMPLETE CBC AUTOMATED: CPT | Performed by: INTERNAL MEDICINE

## 2020-12-21 PROCEDURE — 99232 SBSQ HOSP IP/OBS MODERATE 35: CPT | Performed by: INTERNAL MEDICINE

## 2020-12-21 RX ADMIN — Medication 2000 UNITS: at 10:03

## 2020-12-21 RX ADMIN — ENOXAPARIN SODIUM 40 MG: 40 INJECTION SUBCUTANEOUS at 10:02

## 2020-12-21 RX ADMIN — FAMOTIDINE 20 MG: 20 TABLET, FILM COATED ORAL at 10:03

## 2020-12-21 RX ADMIN — LEVETIRACETAM 500 MG: 500 TABLET, FILM COATED ORAL at 10:02

## 2020-12-21 RX ADMIN — DOCUSATE SODIUM 100 MG: 100 CAPSULE ORAL at 17:47

## 2020-12-21 RX ADMIN — METOPROLOL TARTRATE 25 MG: 25 TABLET, FILM COATED ORAL at 10:02

## 2020-12-21 RX ADMIN — ATORVASTATIN CALCIUM 80 MG: 40 TABLET, FILM COATED ORAL at 10:02

## 2020-12-21 RX ADMIN — FOLIC ACID 1 MG: 1 TABLET ORAL at 10:02

## 2020-12-21 RX ADMIN — DOCUSATE SODIUM 100 MG: 100 CAPSULE ORAL at 10:03

## 2020-12-21 RX ADMIN — LEVETIRACETAM 500 MG: 500 TABLET, FILM COATED ORAL at 20:39

## 2020-12-21 RX ADMIN — Medication 1 TABLET: at 10:03

## 2020-12-21 RX ADMIN — PHENOBARBITAL 64.8 MG: 32.4 TABLET ORAL at 17:47

## 2020-12-21 RX ADMIN — METOPROLOL TARTRATE 25 MG: 25 TABLET, FILM COATED ORAL at 20:39

## 2020-12-21 RX ADMIN — PHENOBARBITAL 64.8 MG: 32.4 TABLET ORAL at 10:02

## 2020-12-22 PROCEDURE — 99232 SBSQ HOSP IP/OBS MODERATE 35: CPT | Performed by: NURSE PRACTITIONER

## 2020-12-22 RX ADMIN — ATORVASTATIN CALCIUM 80 MG: 40 TABLET, FILM COATED ORAL at 09:18

## 2020-12-22 RX ADMIN — FAMOTIDINE 20 MG: 20 TABLET, FILM COATED ORAL at 09:18

## 2020-12-22 RX ADMIN — DOCUSATE SODIUM 100 MG: 100 CAPSULE ORAL at 17:47

## 2020-12-22 RX ADMIN — ENOXAPARIN SODIUM 40 MG: 40 INJECTION SUBCUTANEOUS at 09:18

## 2020-12-22 RX ADMIN — PHENOBARBITAL 64.8 MG: 32.4 TABLET ORAL at 09:18

## 2020-12-22 RX ADMIN — LEVETIRACETAM 500 MG: 500 TABLET, FILM COATED ORAL at 09:18

## 2020-12-22 RX ADMIN — METOPROLOL TARTRATE 25 MG: 25 TABLET, FILM COATED ORAL at 21:49

## 2020-12-22 RX ADMIN — Medication 2000 UNITS: at 09:18

## 2020-12-22 RX ADMIN — FOLIC ACID 1 MG: 1 TABLET ORAL at 09:18

## 2020-12-22 RX ADMIN — METOPROLOL TARTRATE 25 MG: 25 TABLET, FILM COATED ORAL at 09:18

## 2020-12-22 RX ADMIN — Medication 1 TABLET: at 09:18

## 2020-12-22 RX ADMIN — LEVETIRACETAM 500 MG: 500 TABLET, FILM COATED ORAL at 21:49

## 2020-12-22 RX ADMIN — PHENOBARBITAL 64.8 MG: 32.4 TABLET ORAL at 17:47

## 2020-12-23 LAB
ANION GAP SERPL CALCULATED.3IONS-SCNC: 5 MMOL/L (ref 4–13)
BUN SERPL-MCNC: 11 MG/DL (ref 5–25)
CALCIUM SERPL-MCNC: 9.1 MG/DL (ref 8.3–10.1)
CHLORIDE SERPL-SCNC: 101 MMOL/L (ref 100–108)
CO2 SERPL-SCNC: 30 MMOL/L (ref 21–32)
CREAT SERPL-MCNC: 0.67 MG/DL (ref 0.6–1.3)
D DIMER PPP FEU-MCNC: 0.69 UG/ML FEU
ERYTHROCYTE [DISTWIDTH] IN BLOOD BY AUTOMATED COUNT: 14.6 % (ref 11.6–15.1)
GFR SERPL CREATININE-BSD FRML MDRD: 96 ML/MIN/1.73SQ M
GLUCOSE SERPL-MCNC: 88 MG/DL (ref 65–140)
HCT VFR BLD AUTO: 40.1 % (ref 34.8–46.1)
HGB BLD-MCNC: 12.4 G/DL (ref 11.5–15.4)
LEVETIRACETAM SERPL-MCNC: 20.4 UG/ML (ref 10–40)
MCH RBC QN AUTO: 29.1 PG (ref 26.8–34.3)
MCHC RBC AUTO-ENTMCNC: 30.9 G/DL (ref 31.4–37.4)
MCV RBC AUTO: 94 FL (ref 82–98)
PLATELET # BLD AUTO: 217 THOUSANDS/UL (ref 149–390)
PMV BLD AUTO: 9.6 FL (ref 8.9–12.7)
POTASSIUM SERPL-SCNC: 4.3 MMOL/L (ref 3.5–5.3)
PROCALCITONIN SERPL-MCNC: <0.05 NG/ML
RBC # BLD AUTO: 4.26 MILLION/UL (ref 3.81–5.12)
SODIUM SERPL-SCNC: 136 MMOL/L (ref 136–145)
WBC # BLD AUTO: 7.51 THOUSAND/UL (ref 4.31–10.16)

## 2020-12-23 PROCEDURE — 84145 PROCALCITONIN (PCT): CPT | Performed by: NURSE PRACTITIONER

## 2020-12-23 PROCEDURE — 85027 COMPLETE CBC AUTOMATED: CPT | Performed by: NURSE PRACTITIONER

## 2020-12-23 PROCEDURE — 94761 N-INVAS EAR/PLS OXIMETRY MLT: CPT

## 2020-12-23 PROCEDURE — 85379 FIBRIN DEGRADATION QUANT: CPT | Performed by: PHYSICIAN ASSISTANT

## 2020-12-23 PROCEDURE — 80048 BASIC METABOLIC PNL TOTAL CA: CPT | Performed by: NURSE PRACTITIONER

## 2020-12-23 PROCEDURE — 99232 SBSQ HOSP IP/OBS MODERATE 35: CPT | Performed by: PHYSICIAN ASSISTANT

## 2020-12-23 RX ORDER — PREDNISONE 20 MG/1
40 TABLET ORAL DAILY
Status: DISCONTINUED | OUTPATIENT
Start: 2020-12-23 | End: 2020-12-24 | Stop reason: HOSPADM

## 2020-12-23 RX ADMIN — DOCUSATE SODIUM 100 MG: 100 CAPSULE ORAL at 17:41

## 2020-12-23 RX ADMIN — FAMOTIDINE 20 MG: 20 TABLET, FILM COATED ORAL at 09:57

## 2020-12-23 RX ADMIN — LEVETIRACETAM 500 MG: 500 TABLET, FILM COATED ORAL at 09:57

## 2020-12-23 RX ADMIN — FOLIC ACID 1 MG: 1 TABLET ORAL at 09:57

## 2020-12-23 RX ADMIN — Medication 1 TABLET: at 09:57

## 2020-12-23 RX ADMIN — Medication 2000 UNITS: at 09:57

## 2020-12-23 RX ADMIN — DOCUSATE SODIUM 100 MG: 100 CAPSULE ORAL at 09:57

## 2020-12-23 RX ADMIN — ENOXAPARIN SODIUM 40 MG: 40 INJECTION SUBCUTANEOUS at 09:58

## 2020-12-23 RX ADMIN — PHENOBARBITAL 64.8 MG: 32.4 TABLET ORAL at 17:41

## 2020-12-23 RX ADMIN — PHENOBARBITAL 64.8 MG: 32.4 TABLET ORAL at 09:57

## 2020-12-23 RX ADMIN — ATORVASTATIN CALCIUM 80 MG: 40 TABLET, FILM COATED ORAL at 09:57

## 2020-12-23 RX ADMIN — LEVETIRACETAM 500 MG: 500 TABLET, FILM COATED ORAL at 22:31

## 2020-12-23 RX ADMIN — PREDNISONE 40 MG: 20 TABLET ORAL at 22:31

## 2020-12-23 RX ADMIN — METOPROLOL TARTRATE 25 MG: 25 TABLET, FILM COATED ORAL at 22:31

## 2020-12-23 RX ADMIN — METOPROLOL TARTRATE 25 MG: 25 TABLET, FILM COATED ORAL at 09:57

## 2020-12-24 VITALS
BODY MASS INDEX: 40.72 KG/M2 | HEIGHT: 59 IN | HEART RATE: 85 BPM | OXYGEN SATURATION: 95 % | DIASTOLIC BLOOD PRESSURE: 83 MMHG | WEIGHT: 202 LBS | SYSTOLIC BLOOD PRESSURE: 125 MMHG | RESPIRATION RATE: 18 BRPM | TEMPERATURE: 97.9 F

## 2020-12-24 PROCEDURE — 99239 HOSP IP/OBS DSCHRG MGMT >30: CPT | Performed by: INTERNAL MEDICINE

## 2020-12-24 RX ORDER — GUAIFENESIN 100 MG/5ML
200 SOLUTION ORAL EVERY 4 HOURS PRN
Qty: 236 ML | Refills: 0 | Status: SHIPPED | OUTPATIENT
Start: 2020-12-24 | End: 2022-05-11

## 2020-12-24 RX ORDER — PREDNISONE 20 MG/1
TABLET ORAL
Qty: 19 TABLET | Refills: 0 | Status: SHIPPED | OUTPATIENT
Start: 2020-12-25 | End: 2020-12-24

## 2020-12-24 RX ORDER — MULTIVITAMIN/IRON/FOLIC ACID 18MG-0.4MG
1 TABLET ORAL DAILY
Qty: 30 TABLET | Refills: 0 | Status: SHIPPED | OUTPATIENT
Start: 2020-12-25 | End: 2022-05-11

## 2020-12-24 RX ORDER — ALBUTEROL SULFATE 90 UG/1
2 AEROSOL, METERED RESPIRATORY (INHALATION) EVERY 4 HOURS PRN
Qty: 8 G | Refills: 0 | Status: SHIPPED | OUTPATIENT
Start: 2020-12-24

## 2020-12-24 RX ORDER — LIDOCAINE 50 MG/G
1 PATCH TOPICAL DAILY
Qty: 10 PATCH | Refills: 0 | Status: SHIPPED | OUTPATIENT
Start: 2020-12-25 | End: 2022-05-11

## 2020-12-24 RX ORDER — PREDNISONE 20 MG/1
TABLET ORAL
Qty: 7 TABLET | Refills: 0 | Status: SHIPPED | OUTPATIENT
Start: 2020-12-25 | End: 2020-12-31

## 2020-12-24 RX ADMIN — DOCUSATE SODIUM 100 MG: 100 CAPSULE ORAL at 09:14

## 2020-12-24 RX ADMIN — FAMOTIDINE 20 MG: 20 TABLET, FILM COATED ORAL at 09:14

## 2020-12-24 RX ADMIN — ATORVASTATIN CALCIUM 80 MG: 40 TABLET, FILM COATED ORAL at 09:14

## 2020-12-24 RX ADMIN — ENOXAPARIN SODIUM 40 MG: 40 INJECTION SUBCUTANEOUS at 09:13

## 2020-12-24 RX ADMIN — FOLIC ACID 1 MG: 1 TABLET ORAL at 09:14

## 2020-12-24 RX ADMIN — PREDNISONE 40 MG: 20 TABLET ORAL at 09:14

## 2020-12-24 RX ADMIN — LIDOCAINE 5% 1 PATCH: 700 PATCH TOPICAL at 09:13

## 2020-12-24 RX ADMIN — Medication 2000 UNITS: at 09:14

## 2020-12-24 RX ADMIN — Medication 1 TABLET: at 09:14

## 2020-12-24 RX ADMIN — METOPROLOL TARTRATE 25 MG: 25 TABLET, FILM COATED ORAL at 09:14

## 2020-12-24 RX ADMIN — PHENOBARBITAL 64.8 MG: 32.4 TABLET ORAL at 09:13

## 2020-12-24 RX ADMIN — LEVETIRACETAM 500 MG: 500 TABLET, FILM COATED ORAL at 09:14

## 2020-12-29 ENCOUNTER — TELEPHONE (OUTPATIENT)
Dept: PULMONOLOGY | Facility: CLINIC | Age: 80
End: 2020-12-29

## 2021-01-11 ENCOUNTER — TELEMEDICINE (OUTPATIENT)
Dept: PULMONOLOGY | Facility: CLINIC | Age: 81
End: 2021-01-11
Payer: MEDICARE

## 2021-01-11 DIAGNOSIS — J06.9 ACUTE UPPER RESPIRATORY INFECTION: ICD-10-CM

## 2021-01-11 DIAGNOSIS — J12.82 PNEUMONIA DUE TO COVID-19 VIRUS: Primary | ICD-10-CM

## 2021-01-11 DIAGNOSIS — U07.1 PNEUMONIA DUE TO COVID-19 VIRUS: Primary | ICD-10-CM

## 2021-01-11 DIAGNOSIS — J96.01 ACUTE RESPIRATORY FAILURE WITH HYPOXIA (HCC): ICD-10-CM

## 2021-01-11 DIAGNOSIS — I50.32 CHRONIC DIASTOLIC (CONGESTIVE) HEART FAILURE (HCC): ICD-10-CM

## 2021-01-11 DIAGNOSIS — G40.909 SEIZURE DISORDER (HCC): ICD-10-CM

## 2021-01-11 PROCEDURE — 99204 OFFICE O/P NEW MOD 45 MIN: CPT | Performed by: INTERNAL MEDICINE

## 2021-01-11 NOTE — PROGRESS NOTES
Virtual Regular Visit      Assessment/Plan:    Problem List Items Addressed This Visit        Respiratory    Acute upper respiratory infection    Pneumonia due to COVID-19 virus - Primary     Improved clinically, currently continues to have some minimal cough and still requiring oxygen probably with exertion  No further intervention at this time  Acute respiratory failure with hypoxia (HCC)     Based on assessment today with pulse oximetry I believe patient does not need oxygen at rest, I explained that to the daughter and to monitor the pulse ox, also to start oxygen with pulse ox less than 88%  She verbalized understanding  I told her to continue oxygen with exertion  I told her also to speak to the physical therapist tomorrow to check her pulse ox on room air with exertion  I encouraged patient to walk every day at least 3 times daily with the help of her daughter, I told the daughter to push her mother to walk few steps each time to increase her stamina and exercise tolerance  Cardiovascular and Mediastinum    Chronic diastolic (congestive) heart failure (HCC)     Wt Readings from Last 3 Encounters:   12/23/20 91 6 kg (202 lb)   12/18/19 89 kg (196 lb 3 4 oz)   12/31/18 93 4 kg (205 lb 14 6 oz)       Currently compensated, follow with PCP/ Cardiology as needed              Nervous and Auditory    Seizure disorder ([facility] [address] )     Continue Keppra                    Reason for visit is   Chief Complaint   Patient presents with    Virtual Regular Visit        Encounter provider Nesha Gonzalez MD    Provider located at [address] [place] [address]      Recent Visits  No visits were found meeting these conditions     Showing recent visits within past 7 days and meeting all other requirements     Today's Visits  Date Type Provider Dept   01/11/21 Telemedicine Nesha Gonzalez MD [facility]   Showing today's visits and meeting all other requirements     Future Appointments  No visits were found meeting these conditions  Showing future appointments within next 150 days and meeting all other requirements        The patient was identified by name and date of birth  Tiara Johnson was informed that this is a telemedicine visit and that the visit is being conducted through [facility] and patient was informed that this is not a secure, HIPAA-compliant platform  She agrees to proceed     My office door was closed  No one else was in the room  She acknowledged consent and understanding of privacy and security of the video platform  The patient has agreed to participate and understands they can discontinue the visit at any time  Patient is aware this is a billable service  Terrance Botello is a [de-identified] y o  female who has no past medical history of pulmonary disease although she was a former smoker but quit smoking more than 40 years ago, was admitted in mid December 2021 with respiratory symptoms and hypoxia and was found to have COVID-19 pneumonia, was treated and consider to have mild disease  She finished a course of corticosteroids for 10 days and also 5 days of Remdesivir  She had 11 days hospitalization and was discharged on 2 L of oxygen via nasal cannula on 12/24/2020  Since then patient has been doing better, improved shortness of breath, denies chest pain, denies fever or chills but she has some night sweats, denies body aches but continues to have significant fatigue/weakness and not doing much activity  She has mild intermittent cough at night without significant sputum production  Denies dysphagia or nausea or vomiting, denies abdominal pain, denies diarrhea but she has some soft stool  Patient mainly stays in bed, she walks with the help of her daughter and a walker for short distance in the house in the morning to go the bathroom but otherwise she stays in bed  She has a physical therapist who comes every week to help for with walking  Also she has a visiting nurse 3 times weekly  Her pulse ox is about 97% on oxygen as per last visit on Saturday  Prior to COVID-19 pneumonia patient was able to walk to the bathroom with a walker  Patient appeared comfortable in bed, she lives with her daughter who helped during this encounter  Patient has chronic diastolic CHF but currently not on diuretics  Denies legs edema and the daughter confirms  Also she has seizure disorder on Keppra  Past Medical History:   Diagnosis Date    CHF (congestive heart failure) (HCC)     Liver cirrhosis (HCC)     Seizures (HCC)        Past Surgical History:   Procedure Laterality Date    HYSTERECTOMY      LEG SURGERY  05/2016    Pt family cannot recall the specific surgery or which leg it was preformed on         Current Outpatient Medications   Medication Sig Dispense Refill    albuterol (PROVENTIL HFA,VENTOLIN HFA) 90 mcg/act inhaler Inhale 2 puffs every 4 (four) hours as needed for wheezing 8 g 0    atorvastatin (LIPITOR) 80 mg tablet Take 80 mg by mouth daily      cholecalciferol (VITAMIN D3) 1,000 units tablet Take 2,000 Units by mouth daily      folic acid (FOLVITE) 1 mg tablet Take by mouth daily      guaiFENesin (ROBITUSSIN) 100 mg/5 mL oral solution Take 10 mL (200 mg total) by mouth every 4 (four) hours as needed (cough) 236 mL 0    levETIRAcetam (KEPPRA) 500 mg tablet Take 500 mg by mouth every 12 (twelve) hours      levETIRAcetam (KEPPRA) 500 mg tablet Take 1 tablet (500 mg total) by mouth every 12 (twelve) hours 60 tablet 0    lidocaine (LIDODERM) 5 % Apply 1 patch topically daily for 10 days Remove & Discard patch within 12 hours or as directed by MD 10 patch 0    metoprolol tartrate (LOPRESSOR) 25 mg tablet Take 25 mg by mouth every 12 (twelve) hours      multivitamin-minerals (CENTRUM ADULTS) tablet Take 1 tablet by mouth daily 30 tablet 0    omeprazole (PriLOSEC) 20 mg delayed release capsule Take 20 mg by mouth daily      PHENobarbital 64 8 mg tablet Take 64 8 mg by mouth 2 (two) times a day       No current facility-administered medications for this visit  Allergies   Allergen Reactions    Penicillins Other (See Comments)     Pt unaware of reaction       Review of Systems   Constitutional: Positive for fatigue  HENT: Negative  Eyes: Negative  Respiratory: Positive for cough  Cardiovascular: Negative  Gastrointestinal: Negative  Endocrine: Negative  Genitourinary: Negative  Musculoskeletal: Negative  Skin: Negative  Allergic/Immunologic: Negative  Neurological: Positive for headaches  Hematological: Negative  Psychiatric/Behavioral: Negative  Video Exam    There were no vitals filed for this visit  I asked patient's daughter to use the pulse oximetry while we were talking and she showed me the results  On 2 L/min NC: pulse ox 95-97%, /min    2 minutes Off O2 / on RA:  Pulse ox 91%, /min    Physical Exam  Vitals signs reviewed  Constitutional:       General: She is not in acute distress  Appearance: Normal appearance  She is not ill-appearing, toxic-appearing or diaphoretic  HENT:      Head: Normocephalic  Mouth/Throat:      Mouth: Mucous membranes are moist    Eyes:      Extraocular Movements: Extraocular movements intact  Neck:      Musculoskeletal: Normal range of motion  Pulmonary:      Effort: Pulmonary effort is normal    Musculoskeletal:      Left lower leg: No edema  Skin:     General: Skin is dry  Coloration: Skin is not jaundiced or pale  Findings: No rash  Neurological:      Mental Status: She is alert and oriented to person, place, and time  Mental status is at baseline  Psychiatric:         Mood and Affect: Mood normal          Behavior: Behavior normal          Thought Content:  Thought content normal          Judgment: Judgment normal         Chest CT scan reviewed on PACs:  Decreased right lung volume, patchy bilateral ground-glass infiltrates specially in the right upper lobe  No PE  Labs reviewed:  Creatinine 0 67, bicarb 30, hemoglobin 12 4  On 12/13/2020:   9, D-dimer 2 05 --> 0 69 on 12/23/2020      I spent 30 minutes with patient today in which greater than 50% of the time was spent in counseling/coordination of care regarding Checking pulse ox on and off oxygen and also counseling the patient/daughter about further management including out of bed and ambulation  VIRTUAL VISIT DISCLAIMER    Olivia Campo acknowledges that she has consented to an online visit or consultation  She understands that the online visit is based solely on information provided by her, and that, in the absence of a face-to-face physical evaluation by the physician, the diagnosis she receives is both limited and provisional in terms of accuracy and completeness  This is not intended to replace a full medical face-to-face evaluation by the physician  Teresa Child understands and accepts these terms

## 2021-01-11 NOTE — ASSESSMENT & PLAN NOTE
Wt Readings from Last 3 Encounters:   12/23/20 91 6 kg (202 lb)   12/18/19 89 kg (196 lb 3 4 oz)   12/31/18 93 4 kg (205 lb 14 6 oz)       Currently compensated, follow with PCP/ Cardiology as needed

## 2021-01-11 NOTE — ASSESSMENT & PLAN NOTE
Based on assessment today with pulse oximetry I believe patient does not need oxygen at rest, I explained that to the daughter and to monitor the pulse ox, also to start oxygen with pulse ox less than 88%  She verbalized understanding  I told her to continue oxygen with exertion  I told her also to speak to the physical therapist tomorrow to check her pulse ox on room air with exertion  I encouraged patient to walk every day at least 3 times daily with the help of her daughter, I told the daughter to push her mother to walk few steps each time to increase her stamina and exercise tolerance

## 2021-01-11 NOTE — ASSESSMENT & PLAN NOTE
Improved clinically, currently continues to have some minimal cough and still requiring oxygen probably with exertion  No further intervention at this time

## 2021-03-14 ENCOUNTER — APPOINTMENT (EMERGENCY)
Dept: CT IMAGING | Facility: HOSPITAL | Age: 81
DRG: 603 | End: 2021-03-14
Payer: MEDICARE

## 2021-03-14 ENCOUNTER — HOSPITAL ENCOUNTER (INPATIENT)
Facility: HOSPITAL | Age: 81
LOS: 6 days | Discharge: HOME HEALTH | DRG: 603 | End: 2021-03-20
Attending: EMERGENCY MEDICINE | Admitting: INTERNAL MEDICINE
Payer: MEDICARE

## 2021-03-14 ENCOUNTER — APPOINTMENT (EMERGENCY)
Dept: RADIOLOGY | Facility: HOSPITAL | Age: 81
DRG: 603 | End: 2021-03-14
Payer: MEDICARE

## 2021-03-14 DIAGNOSIS — L03.90 CELLULITIS: Primary | ICD-10-CM

## 2021-03-14 DIAGNOSIS — R60.1 ANASARCA: ICD-10-CM

## 2021-03-14 PROBLEM — R60.0 LOWER EXTREMITY EDEMA: Status: ACTIVE | Noted: 2021-03-14

## 2021-03-14 PROBLEM — R26.2 AMBULATORY DYSFUNCTION: Status: ACTIVE | Noted: 2021-03-14

## 2021-03-14 LAB
ALBUMIN SERPL BCP-MCNC: 3 G/DL (ref 3.5–5)
ALP SERPL-CCNC: 156 U/L (ref 46–116)
ALT SERPL W P-5'-P-CCNC: 31 U/L (ref 12–78)
ANION GAP SERPL CALCULATED.3IONS-SCNC: 4 MMOL/L (ref 4–13)
APTT PPP: 31 SECONDS (ref 23–37)
AST SERPL W P-5'-P-CCNC: 24 U/L (ref 5–45)
ATRIAL RATE: 89 BPM
BASE EX.OXY STD BLDV CALC-SCNC: 47.8 % (ref 60–80)
BASE EXCESS BLDV CALC-SCNC: 4.9 MMOL/L
BASOPHILS # BLD AUTO: 0.04 THOUSANDS/ΜL (ref 0–0.1)
BASOPHILS NFR BLD AUTO: 1 % (ref 0–1)
BILIRUB SERPL-MCNC: 0.33 MG/DL (ref 0.2–1)
BUN SERPL-MCNC: 6 MG/DL (ref 5–25)
CALCIUM ALBUM COR SERPL-MCNC: 9.7 MG/DL (ref 8.3–10.1)
CALCIUM SERPL-MCNC: 8.9 MG/DL (ref 8.3–10.1)
CHLORIDE SERPL-SCNC: 104 MMOL/L (ref 100–108)
CO2 SERPL-SCNC: 35 MMOL/L (ref 21–32)
CREAT SERPL-MCNC: 0.71 MG/DL (ref 0.6–1.3)
EOSINOPHIL # BLD AUTO: 0.35 THOUSAND/ΜL (ref 0–0.61)
EOSINOPHIL NFR BLD AUTO: 5 % (ref 0–6)
ERYTHROCYTE [DISTWIDTH] IN BLOOD BY AUTOMATED COUNT: 15.2 % (ref 11.6–15.1)
GFR SERPL CREATININE-BSD FRML MDRD: 93 ML/MIN/1.73SQ M
GLUCOSE SERPL-MCNC: 91 MG/DL (ref 65–140)
HCO3 BLDV-SCNC: 31.9 MMOL/L (ref 24–30)
HCT VFR BLD AUTO: 34.5 % (ref 34.8–46.1)
HGB BLD-MCNC: 10.7 G/DL (ref 11.5–15.4)
IMM GRANULOCYTES # BLD AUTO: 0.03 THOUSAND/UL (ref 0–0.2)
IMM GRANULOCYTES NFR BLD AUTO: 0 % (ref 0–2)
INR PPP: 1.1 (ref 0.84–1.19)
LACTATE SERPL-SCNC: 1.2 MMOL/L (ref 0.5–2)
LIPASE SERPL-CCNC: 72 U/L (ref 73–393)
LYMPHOCYTES # BLD AUTO: 1.41 THOUSANDS/ΜL (ref 0.6–4.47)
LYMPHOCYTES NFR BLD AUTO: 21 % (ref 14–44)
MAGNESIUM SERPL-MCNC: 1.6 MG/DL (ref 1.6–2.6)
MCH RBC QN AUTO: 29.9 PG (ref 26.8–34.3)
MCHC RBC AUTO-ENTMCNC: 31 G/DL (ref 31.4–37.4)
MCV RBC AUTO: 96 FL (ref 82–98)
MONOCYTES # BLD AUTO: 0.89 THOUSAND/ΜL (ref 0.17–1.22)
MONOCYTES NFR BLD AUTO: 13 % (ref 4–12)
NEUTROPHILS # BLD AUTO: 4.17 THOUSANDS/ΜL (ref 1.85–7.62)
NEUTS SEG NFR BLD AUTO: 60 % (ref 43–75)
NRBC BLD AUTO-RTO: 0 /100 WBCS
NT-PROBNP SERPL-MCNC: 538 PG/ML
O2 CT BLDV-SCNC: 8.1 ML/DL
P AXIS: 45 DEGREES
PCO2 BLDV: 58.5 MM HG (ref 42–50)
PH BLDV: 7.35 [PH] (ref 7.3–7.4)
PLATELET # BLD AUTO: 177 THOUSANDS/UL (ref 149–390)
PLATELET # BLD AUTO: 197 THOUSANDS/UL (ref 149–390)
PMV BLD AUTO: 10 FL (ref 8.9–12.7)
PMV BLD AUTO: 10.2 FL (ref 8.9–12.7)
PO2 BLDV: 27.7 MM HG (ref 35–45)
POTASSIUM SERPL-SCNC: 3.7 MMOL/L (ref 3.5–5.3)
PR INTERVAL: 142 MS
PROT SERPL-MCNC: 7.1 G/DL (ref 6.4–8.2)
PROTHROMBIN TIME: 13.7 SECONDS (ref 11.6–14.5)
QRS AXIS: 29 DEGREES
QRSD INTERVAL: 78 MS
QT INTERVAL: 370 MS
QTC INTERVAL: 450 MS
RBC # BLD AUTO: 3.58 MILLION/UL (ref 3.81–5.12)
SODIUM SERPL-SCNC: 143 MMOL/L (ref 136–145)
T WAVE AXIS: 65 DEGREES
TROPONIN I SERPL-MCNC: <0.02 NG/ML
VENTRICULAR RATE: 89 BPM
WBC # BLD AUTO: 6.89 THOUSAND/UL (ref 4.31–10.16)

## 2021-03-14 PROCEDURE — 87040 BLOOD CULTURE FOR BACTERIA: CPT | Performed by: EMERGENCY MEDICINE

## 2021-03-14 PROCEDURE — 83605 ASSAY OF LACTIC ACID: CPT | Performed by: EMERGENCY MEDICINE

## 2021-03-14 PROCEDURE — 82805 BLOOD GASES W/O2 SATURATION: CPT | Performed by: EMERGENCY MEDICINE

## 2021-03-14 PROCEDURE — 99285 EMERGENCY DEPT VISIT HI MDM: CPT

## 2021-03-14 PROCEDURE — 85730 THROMBOPLASTIN TIME PARTIAL: CPT | Performed by: EMERGENCY MEDICINE

## 2021-03-14 PROCEDURE — 99285 EMERGENCY DEPT VISIT HI MDM: CPT | Performed by: EMERGENCY MEDICINE

## 2021-03-14 PROCEDURE — 93005 ELECTROCARDIOGRAM TRACING: CPT

## 2021-03-14 PROCEDURE — 85049 AUTOMATED PLATELET COUNT: CPT | Performed by: INTERNAL MEDICINE

## 2021-03-14 PROCEDURE — 80053 COMPREHEN METABOLIC PANEL: CPT | Performed by: EMERGENCY MEDICINE

## 2021-03-14 PROCEDURE — 84145 PROCALCITONIN (PCT): CPT | Performed by: EMERGENCY MEDICINE

## 2021-03-14 PROCEDURE — 83880 ASSAY OF NATRIURETIC PEPTIDE: CPT | Performed by: EMERGENCY MEDICINE

## 2021-03-14 PROCEDURE — 96360 HYDRATION IV INFUSION INIT: CPT

## 2021-03-14 PROCEDURE — 71045 X-RAY EXAM CHEST 1 VIEW: CPT

## 2021-03-14 PROCEDURE — 85610 PROTHROMBIN TIME: CPT | Performed by: EMERGENCY MEDICINE

## 2021-03-14 PROCEDURE — 85025 COMPLETE CBC W/AUTO DIFF WBC: CPT | Performed by: EMERGENCY MEDICINE

## 2021-03-14 PROCEDURE — 73700 CT LOWER EXTREMITY W/O DYE: CPT

## 2021-03-14 PROCEDURE — 1124F ACP DISCUSS-NO DSCNMKR DOCD: CPT | Performed by: EMERGENCY MEDICINE

## 2021-03-14 PROCEDURE — 83690 ASSAY OF LIPASE: CPT | Performed by: EMERGENCY MEDICINE

## 2021-03-14 PROCEDURE — 83735 ASSAY OF MAGNESIUM: CPT | Performed by: EMERGENCY MEDICINE

## 2021-03-14 PROCEDURE — 99223 1ST HOSP IP/OBS HIGH 75: CPT | Performed by: INTERNAL MEDICINE

## 2021-03-14 PROCEDURE — 84484 ASSAY OF TROPONIN QUANT: CPT | Performed by: EMERGENCY MEDICINE

## 2021-03-14 PROCEDURE — 93010 ELECTROCARDIOGRAM REPORT: CPT | Performed by: INTERNAL MEDICINE

## 2021-03-14 PROCEDURE — 73701 CT LOWER EXTREMITY W/DYE: CPT

## 2021-03-14 PROCEDURE — 36415 COLL VENOUS BLD VENIPUNCTURE: CPT | Performed by: EMERGENCY MEDICINE

## 2021-03-14 RX ORDER — PANTOPRAZOLE SODIUM 40 MG/1
40 TABLET, DELAYED RELEASE ORAL
Status: DISCONTINUED | OUTPATIENT
Start: 2021-03-15 | End: 2021-03-20 | Stop reason: HOSPADM

## 2021-03-14 RX ORDER — HEPARIN SODIUM 5000 [USP'U]/ML
5000 INJECTION, SOLUTION INTRAVENOUS; SUBCUTANEOUS EVERY 8 HOURS SCHEDULED
Status: DISCONTINUED | OUTPATIENT
Start: 2021-03-14 | End: 2021-03-20 | Stop reason: HOSPADM

## 2021-03-14 RX ORDER — LABETALOL 20 MG/4 ML (5 MG/ML) INTRAVENOUS SYRINGE
10 EVERY 4 HOURS PRN
Status: DISCONTINUED | OUTPATIENT
Start: 2021-03-14 | End: 2021-03-20 | Stop reason: HOSPADM

## 2021-03-14 RX ORDER — PHENOBARBITAL 32.4 MG/1
64.8 TABLET ORAL 2 TIMES DAILY
Status: DISCONTINUED | OUTPATIENT
Start: 2021-03-14 | End: 2021-03-20 | Stop reason: HOSPADM

## 2021-03-14 RX ORDER — LEVETIRACETAM 500 MG/1
500 TABLET ORAL EVERY 12 HOURS SCHEDULED
Status: DISCONTINUED | OUTPATIENT
Start: 2021-03-14 | End: 2021-03-20 | Stop reason: HOSPADM

## 2021-03-14 RX ORDER — ALBUTEROL SULFATE 90 UG/1
2 AEROSOL, METERED RESPIRATORY (INHALATION) EVERY 4 HOURS PRN
Status: DISCONTINUED | OUTPATIENT
Start: 2021-03-14 | End: 2021-03-20 | Stop reason: HOSPADM

## 2021-03-14 RX ORDER — ACETAMINOPHEN 325 MG/1
650 TABLET ORAL EVERY 6 HOURS PRN
Status: DISCONTINUED | OUTPATIENT
Start: 2021-03-14 | End: 2021-03-20 | Stop reason: HOSPADM

## 2021-03-14 RX ORDER — ATORVASTATIN CALCIUM 40 MG/1
80 TABLET, FILM COATED ORAL DAILY
Status: DISCONTINUED | OUTPATIENT
Start: 2021-03-15 | End: 2021-03-20 | Stop reason: HOSPADM

## 2021-03-14 RX ORDER — CEFAZOLIN SODIUM 2 G/50ML
2000 SOLUTION INTRAVENOUS EVERY 8 HOURS
Status: DISCONTINUED | OUTPATIENT
Start: 2021-03-14 | End: 2021-03-19

## 2021-03-14 RX ADMIN — PHENOBARBITAL 64.8 MG: 32.4 TABLET ORAL at 17:57

## 2021-03-14 RX ADMIN — CEFEPIME HYDROCHLORIDE 2000 MG: 2 INJECTION, POWDER, FOR SOLUTION INTRAVENOUS at 20:45

## 2021-03-14 RX ADMIN — HEPARIN SODIUM 5000 UNITS: 5000 INJECTION INTRAVENOUS; SUBCUTANEOUS at 23:06

## 2021-03-14 RX ADMIN — IOHEXOL 100 ML: 350 INJECTION, SOLUTION INTRAVENOUS at 14:36

## 2021-03-14 RX ADMIN — CEFAZOLIN SODIUM 2000 MG: 2 SOLUTION INTRAVENOUS at 23:06

## 2021-03-14 RX ADMIN — LEVETIRACETAM 500 MG: 500 TABLET, FILM COATED ORAL at 20:45

## 2021-03-14 RX ADMIN — METOPROLOL TARTRATE 25 MG: 25 TABLET, FILM COATED ORAL at 20:44

## 2021-03-14 RX ADMIN — SODIUM CHLORIDE 1000 ML: 0.9 INJECTION, SOLUTION INTRAVENOUS at 13:03

## 2021-03-14 RX ADMIN — HEPARIN SODIUM 5000 UNITS: 5000 INJECTION INTRAVENOUS; SUBCUTANEOUS at 17:57

## 2021-03-14 NOTE — ED PROVIDER NOTES
History  Chief Complaint   Patient presents with    Leg Swelling     pt presents with bilateral leg swelling for approx 1 week  pt was seen at PCP friday     Abscess     pt presents with large open area behind left calf- draining noted     [de-identified]year old female patient presents emergency department for evaluation of open wound to the posterior lower left leg  Patient also has worsening anasarca  The patient had bilateral pedal edema on top of her lymphedema but now has swelling to the mid thigh  The patient is unable to ambulate  wound is new and worsening  Currently she is lying in bed, in no apparent distress, she states she is not short of breath currently  Patient have a full septic evaluation done this I cannot tell if this is an open abscess or if there was thinning of the skin due to pressure ulcer and she has a nonhealing wound to the lower left leg  History provided by:  Patient and relative   used: No    Medical Problem  Severity:  Moderate  Onset quality:  Gradual  Timing:  Constant  Progression:  Worsening  Chronicity:  New  Associated symptoms: no congestion, no ear pain, no headaches, no loss of consciousness and no vomiting        Prior to Admission Medications   Prescriptions Last Dose Informant Patient Reported? Taking?    PHENobarbital 64 8 mg tablet   Yes No   Sig: Take 64 8 mg by mouth 2 (two) times a day   albuterol (PROVENTIL HFA,VENTOLIN HFA) 90 mcg/act inhaler   No No   Sig: Inhale 2 puffs every 4 (four) hours as needed for wheezing   atorvastatin (LIPITOR) 80 mg tablet   Yes No   Sig: Take 80 mg by mouth daily   cholecalciferol (VITAMIN D3) 1,000 units tablet   Yes No   Sig: Take 2,000 Units by mouth daily   folic acid (FOLVITE) 1 mg tablet   Yes No   Sig: Take by mouth daily   guaiFENesin (ROBITUSSIN) 100 mg/5 mL oral solution   No No   Sig: Take 10 mL (200 mg total) by mouth every 4 (four) hours as needed (cough)   levETIRAcetam (KEPPRA) 500 mg tablet   Yes No Sig: Take 500 mg by mouth every 12 (twelve) hours   levETIRAcetam (KEPPRA) 500 mg tablet   No No   Sig: Take 1 tablet (500 mg total) by mouth every 12 (twelve) hours   lidocaine (LIDODERM) 5 %   No No   Sig: Apply 1 patch topically daily for 10 days Remove & Discard patch within 12 hours or as directed by MD   metoprolol tartrate (LOPRESSOR) 25 mg tablet   Yes No   Sig: Take 25 mg by mouth every 12 (twelve) hours   multivitamin-minerals (CENTRUM ADULTS) tablet   No No   Sig: Take 1 tablet by mouth daily   omeprazole (PriLOSEC) 20 mg delayed release capsule   Yes No   Sig: Take 20 mg by mouth daily      Facility-Administered Medications: None       Past Medical History:   Diagnosis Date    CHF (congestive heart failure) (HCC)     Liver cirrhosis (HCC)     Seizures ([facility] [address] )        Past Surgical History:   Procedure Laterality Date    HYSTERECTOMY      LEG SURGERY  05/2016    Pt family cannot recall the specific surgery or which leg it was preformed on  History reviewed  No pertinent family history  I have reviewed and agree with the history as documented  E-Cigarette/Vaping     E-Cigarette/Vaping Substances     Social History     Tobacco Use    Smoking status: Never Smoker    Smokeless tobacco: Never Used   Substance Use Topics    Alcohol use: Never     Frequency: Never    Drug use: No       Review of Systems   HENT: Negative for congestion and ear pain  Gastrointestinal: Negative for vomiting  Neurological: Negative for loss of consciousness and headaches  All other systems reviewed and are negative  Physical Exam  Physical Exam  Vitals signs and nursing note reviewed  Constitutional:       Appearance: She is well-developed  HENT:      Head: Normocephalic and atraumatic  Right Ear: External ear normal       Left Ear: External ear normal    Eyes:      Conjunctiva/sclera: Conjunctivae normal    Neck:      Thyroid: No thyromegaly  Vascular: No JVD        Trachea: No tracheal deviation  Cardiovascular:      Rate and Rhythm: Normal rate  Pulmonary:      Effort: Pulmonary effort is normal       Breath sounds: Normal breath sounds  No stridor  Abdominal:      General: There is no distension  Palpations: Abdomen is soft  There is no mass  Tenderness: There is no abdominal tenderness  There is no guarding  Hernia: No hernia is present  Musculoskeletal: Normal range of motion  General: No tenderness or deformity  Right lower leg: Edema present  Left lower leg: Edema present  Legs:    Lymphadenopathy:      Cervical: No cervical adenopathy  Skin:     General: Skin is warm  Coloration: Skin is not pale  Findings: No erythema or rash  Neurological:      Mental Status: She is alert and oriented to person, place, and time     Psychiatric:         Behavior: Behavior normal          Vital Signs  ED Triage Vitals [03/14/21 1124]   Temperature Pulse Respirations Blood Pressure SpO2   98 3 °F (36 8 °C) 90 (!) 24 165/75 97 %      Temp Source Heart Rate Source Patient Position - Orthostatic VS BP Location FiO2 (%)   Oral Monitor Sitting Left arm --      Pain Score       5           Vitals:    03/16/21 1608 03/16/21 2307 03/16/21 2330 03/17/21 0745   BP: 133/88 153/97 141/100 138/98   Pulse: 76 84 78 80   Patient Position - Orthostatic VS:             Visual Acuity  Visual Acuity      Most Recent Value   L Pupil Size (mm)  2   R Pupil Size (mm)  2   L Pupil Shape  Round   R Pupil Shape  Round          ED Medications  Medications   Labetalol HCl (NORMODYNE) injection 10 mg (has no administration in time range)   ceFAZolin (ANCEF) IVPB (premix in dextrose) 2,000 mg 50 mL (2,000 mg Intravenous New Bag 3/17/21 0602)   heparin (porcine) subcutaneous injection 5,000 Units (5,000 Units Subcutaneous Given 3/17/21 0553)   acetaminophen (TYLENOL) tablet 650 mg (650 mg Oral Given 3/17/21 0010)   atorvastatin (LIPITOR) tablet 80 mg (80 mg Oral Given 3/17/21 5231)   albuterol (PROVENTIL HFA,VENTOLIN HFA) inhaler 2 puff (has no administration in time range)   levETIRAcetam (KEPPRA) tablet 500 mg (500 mg Oral Given 3/17/21 0905)   metoprolol tartrate (LOPRESSOR) tablet 25 mg (25 mg Oral Given 3/17/21 0905)   pantoprazole (PROTONIX) EC tablet 40 mg (40 mg Oral Given 3/17/21 0553)   PHENobarbital tablet 64 8 mg (64 8 mg Oral Given 3/17/21 0905)   sodium chloride 0 9 % bolus 1,000 mL (0 mL Intravenous Stopped 3/14/21 1410)   iohexol (OMNIPAQUE) 350 MG/ML injection (MULTI-DOSE) 100 mL (100 mL Intravenous Given 3/14/21 1436)   cefepime (MAXIPIME) 2,000 mg in dextrose 5 % 50 mL IVPB (2,000 mg Intravenous New Bag 3/14/21 2045)   potassium chloride (K-DUR,KLOR-CON) CR tablet 40 mEq (40 mEq Oral Given 3/15/21 1002)   influenza vaccine, high-dose (FLUZONE HIGH-DOSE) IM injection TANYA 0 7 mL (0 7 mL Intramuscular Given 3/15/21 1251)       Diagnostic Studies  Results Reviewed     Procedure Component Value Units Date/Time    Blood culture [[id number]] Collected: 03/14/21 1257    Lab Status: Preliminary result Specimen: Blood from Arm, Right Updated: 03/17/21 0202     Blood Culture No Growth at 48 hrs  Blood culture [[id number]] Collected: 03/14/21 1257    Lab Status: Preliminary result Specimen: Blood from Arm, Right Updated: 03/17/21 0202     Blood Culture No Growth at 48 hrs  Wound culture and Gram stain [[id number]]  (Abnormal) Collected: 03/15/21 0745    Lab Status: Preliminary result Specimen: Wound from Leg, Left Updated: 03/16/21 1429     Wound Culture Culture results to follow       Gram Stain Result No polys seen      1+ Gram positive cocci in pairs    Urinalysis with culture and sensitivity reflex [[id number]] Collected: 03/15/21 0745    Lab Status: Final result Specimen: Urine, Clean Catch Updated: 03/15/21 0757     Color, UA Light Yellow     Clarity, UA Clear     Specific Gravity, UA 1 010     pH, UA 7 0     Leukocytes, UA Negative     Nitrite, UA Negative     Protein, UA Negative mg/dl      Glucose, UA Negative mg/dl      Ketones, UA Negative mg/dl      Urobilinogen, UA 0 2 E U /dl      Bilirubin, UA Negative     Blood, UA Negative    Basic metabolic panel [[id number]]  (Abnormal) Collected: 03/15/21 0603    Lab Status: Final result Specimen: Blood from Hand, Right Updated: 03/15/21 0639     Sodium 141 mmol/L      Potassium 3 4 mmol/L      Chloride 104 mmol/L      CO2 31 mmol/L      ANION GAP 6 mmol/L      BUN 5 mg/dL      Creatinine 0 64 mg/dL      Glucose 89 mg/dL      Calcium 8 8 mg/dL      eGFR 97 ml/min/1 73sq m     Narrative:      National Kidney Disease Foundation guidelines for Chronic Kidney Disease (CKD):     Stage 1 with normal or high GFR (GFR > 90 mL/min/1 73 square meters)    Stage 2 Mild CKD (GFR = 60-89 mL/min/1 73 square meters)    Stage 3A Moderate CKD (GFR = 45-59 mL/min/1 73 square meters)    Stage 3B Moderate CKD (GFR = 30-44 mL/min/1 73 square meters)    Stage 4 Severe CKD (GFR = 15-29 mL/min/1 73 square meters)    Stage 5 End Stage CKD (GFR <15 mL/min/1 73 square meters)  Note: GFR calculation is accurate only with a steady state creatinine    CBC and differential [[id number]]  (Abnormal) Collected: 03/15/21 0603    Lab Status: Final result Specimen: Blood from Hand, Right Updated: 03/15/21 0631     WBC 6 64 Thousand/uL      RBC 3 48 Million/uL      Hemoglobin 10 2 g/dL      Hematocrit 33 3 %      MCV 96 fL      MCH 29 3 pg      MCHC 30 6 g/dL      RDW 15 6 %      MPV 10 3 fL      Platelets 330 Thousands/uL      nRBC 0 /100 WBCs      Neutrophils Relative 54 %      Immat GRANS % 1 %      Lymphocytes Relative 25 %      Monocytes Relative 14 %      Eosinophils Relative 5 %      Basophils Relative 1 %      Neutrophils Absolute 3 70 Thousands/µL      Immature Grans Absolute 0 03 Thousand/uL      Lymphocytes Absolute 1 63 Thousands/µL      Monocytes Absolute 0 91 Thousand/µL      Eosinophils Absolute 0 34 Thousand/µL      Basophils Absolute 0 03 Thousands/µL Procalcitonin [[id number]]  (Normal) Collected: 03/14/21 1257    Lab Status: Final result Specimen: Blood from Arm, Right Updated: 03/15/21 0205     Procalcitonin <0 05 ng/ml     Platelet count [[id number]]  (Normal) Collected: 03/14/21 1832    Lab Status: Final result Specimen: Blood from Arm, Left Updated: 03/14/21 1839     Platelets 494 Thousands/uL      MPV 10 0 fL     Troponin I [[id number]]  (Normal) Collected: 03/14/21 1257    Lab Status: Final result Specimen: Blood from Arm, Right Updated: 03/14/21 1357     Troponin I <0 02 ng/mL     Lipase [[id number]]  (Abnormal) Collected: 03/14/21 1257    Lab Status: Final result Specimen: Blood from Arm, Right Updated: 03/14/21 1342     Lipase 72 u/L     B-type natriuretic peptide [[id number]]  (Abnormal) Collected: 03/14/21 1257    Lab Status: Final result Specimen: Blood from Arm, Right Updated: 03/14/21 1342     NT-proBNP 538 pg/mL     Magnesium [[id number]]  (Normal) Collected: 03/14/21 1257    Lab Status: Final result Specimen: Blood from Arm, Right Updated: 03/14/21 1342     Magnesium 1 6 mg/dL     Lactate Blood [[id number]]  (Normal) Collected: 03/14/21 1257    Lab Status: Final result Specimen: Blood from Arm, Right Updated: 03/14/21 1338     LACTIC ACID 1 2 mmol/L     Narrative:      Result may be elevated if tourniquet was used during collection      Comprehensive metabolic panel [[id number]]  (Abnormal) Collected: 03/14/21 1257    Lab Status: Final result Specimen: Blood from Arm, Right Updated: 03/14/21 1336     Sodium 143 mmol/L      Potassium 3 7 mmol/L      Chloride 104 mmol/L      CO2 35 mmol/L      ANION GAP 4 mmol/L      BUN 6 mg/dL      Creatinine 0 71 mg/dL      Glucose 91 mg/dL      Calcium 8 9 mg/dL      Corrected Calcium 9 7 mg/dL      AST 24 U/L      ALT 31 U/L      Alkaline Phosphatase 156 U/L      Total Protein 7 1 g/dL      Albumin 3 0 g/dL      Total Bilirubin 0 33 mg/dL      eGFR 93 ml/min/1 73sq m     Narrative:      National Kidney Disease Foundation guidelines for Chronic Kidney Disease (CKD):     Stage 1 with normal or high GFR (GFR > 90 mL/min/1 73 square meters)    Stage 2 Mild CKD (GFR = 60-89 mL/min/1 73 square meters)    Stage 3A Moderate CKD (GFR = 45-59 mL/min/1 73 square meters)    Stage 3B Moderate CKD (GFR = 30-44 mL/min/1 73 square meters)    Stage 4 Severe CKD (GFR = 15-29 mL/min/1 73 square meters)    Stage 5 End Stage CKD (GFR <15 mL/min/1 73 square meters)  Note: GFR calculation is accurate only with a steady state creatinine    Protime-INR [[id number]]  (Normal) Collected: 03/14/21 1257    Lab Status: Final result Specimen: Blood from Arm, Right Updated: 03/14/21 1329     Protime 13 7 seconds      INR 1 10    APTT [[id number]]  (Normal) Collected: 03/14/21 1257    Lab Status: Final result Specimen: Blood from Arm, Right Updated: 03/14/21 1329     PTT 31 seconds     CBC and differential [[id number]]  (Abnormal) Collected: 03/14/21 1257    Lab Status: Final result Specimen: Blood from Arm, Right Updated: 03/14/21 1314     WBC 6 89 Thousand/uL      RBC 3 58 Million/uL      Hemoglobin 10 7 g/dL      Hematocrit 34 5 %      MCV 96 fL      MCH 29 9 pg      MCHC 31 0 g/dL      RDW 15 2 %      MPV 10 2 fL      Platelets 706 Thousands/uL      nRBC 0 /100 WBCs      Neutrophils Relative 60 %      Immat GRANS % 0 %      Lymphocytes Relative 21 %      Monocytes Relative 13 %      Eosinophils Relative 5 %      Basophils Relative 1 %      Neutrophils Absolute 4 17 Thousands/µL      Immature Grans Absolute 0 03 Thousand/uL      Lymphocytes Absolute 1 41 Thousands/µL      Monocytes Absolute 0 89 Thousand/µL      Eosinophils Absolute 0 35 Thousand/µL      Basophils Absolute 0 04 Thousands/µL     Blood gas, venous [[id number]]  (Abnormal) Collected: 03/14/21 1257    Lab Status: Final result Specimen: Blood from Arm, Right Updated: 03/14/21 1312     pH, Darryl 7 354     pCO2, Darryl 58 5 mm Hg      pO2, Darryl 27 7 mm Hg      HCO3, Darryl 31 9 mmol/L      Base Excess, Darryl 4 9 mmol/L      O2 Content, Darryl 8 1 ml/dL      O2 HGB, VENOUS 47 8 %                  VAS lower limb venous duplex study, complete bilateral   Final Result by Joanna Cushing, MD (03/15 1236)      CT lower extremity wo contrast left   Final Result by Andrea Howard MD (03/14 5399)   1  Left lower extremity subcutaneous edema and skin thickening consistent with nonspecific cellulitis without evidence of drainable collection in this unenhanced examination  Prominent blister (vesicle) noted along the skin surface posteriorly as    above  No deep soft tissue infection and no evidence of osseous destructive change to indicate osteomyelitis  2   Healed tibiofibular fracture status post ORIF for the tibial fracture without hardware complication  Workstation performed: [id number]      CT lower extremity w contrast right   Final Result by Dane Cano MD (03/14 1943)   1  Diffuse edema of the RIGHT lower leg, consistent with the history of lymphedema and anasarca  2   No evidence of abscess  3   No acute bony abnormality in the RIGHT tibia or fibula  4   The patient will be returning for a CT of her LEFT lower leg  Workstation performed: [id number]      XR chest 1 view portable   Final Result by Johny Serrano MD (03/14 1435)   No acute cardiopulmonary disease  Workstation performed: [id number]                 Procedures  Procedures         ED Course  ED Course as of Mar 17 1146   Reshma Sheriff Mar 14, 2021   1523 I went to explain to family the reason for the repeat CT but they were unavailable  Identification of Seniors at Risk      Most Recent Value   (ISAR) Identification of Seniors at Risk   Before the illness or injury that brought you to the Emergency, did you need someone to help you on a regular basis?   1 Filed at: 03/14/2021 1127   In the last 24 hours, have you needed more help than usual?  1 Filed at: 03/14/2021 1127   Have you been hospitalized for one or more nights during the past 6 months? 1 Filed at: 03/14/2021 1127   In general, do you see well? 1 Filed at: 03/14/2021 1127   In general, do you have serious problems with your memory? 1 Filed at: 03/14/2021 1127   Do you take more than three different medications every day? 1 Filed at: 03/14/2021 1127   ISAR Score  6 Filed at: 03/14/2021 1127                                  [facility]  Number of Diagnoses or Management Options  Anasarca: new and requires workup  Cellulitis: new and requires workup     Amount and/or Complexity of Data Reviewed  Clinical lab tests: ordered and reviewed  Tests in the radiology section of CPT®: reviewed and ordered  Decide to obtain previous medical records or to obtain history from someone other than the patient: yes  Review and summarize past medical records: yes    Patient Progress  Patient progress: stable      Disposition  Final diagnoses:   Cellulitis   Anasarca     Time reflects when diagnosis was documented in both MDM as applicable and the Disposition within this note     Time User Action Codes Description Comment    3/14/2021  4:12 PM Meme Hand Add [L03 90] Cellulitis     3/14/2021  4:12 PM Meme Hand Add [R60 1] Anasarca       ED Disposition     ED Disposition Condition Date/Time Comment    Admit Stable Sun Mar 14, 2021  4:12 PM Case was discussed with Dr Zulema Helms and the patient's admission status was agreed to be Admission Status: inpatient status to the service of Dr Zulema Helms           Follow-up Information     Follow up With Specialties Details Why Contact Info    [place]  Follow up [address] [place] [address] Phone [phone]  fax [phone]          Current Discharge Medication List      CONTINUE these medications which have NOT CHANGED    Details   albuterol (PROVENTIL HFA,VENTOLIN HFA) 90 mcg/act inhaler Inhale 2 puffs every 4 (four) hours as needed for wheezing  Qty: 8 g, Refills: 0    Comments: Substitution to a formulary equivalent within the same pharmaceutical class is authorized  Associated Diagnoses: Pneumonia due to COVID-19 virus      atorvastatin (LIPITOR) 80 mg tablet Take 80 mg by mouth daily      cholecalciferol (VITAMIN D3) 1,000 units tablet Take 2,000 Units by mouth daily      folic acid (FOLVITE) 1 mg tablet Take by mouth daily      guaiFENesin (ROBITUSSIN) 100 mg/5 mL oral solution Take 10 mL (200 mg total) by mouth every 4 (four) hours as needed (cough)  Qty: 236 mL, Refills: 0    Associated Diagnoses: Pneumonia due to COVID-19 virus      !! levETIRAcetam (KEPPRA) 500 mg tablet Take 500 mg by mouth every 12 (twelve) hours      !! levETIRAcetam (KEPPRA) 500 mg tablet Take 1 tablet (500 mg total) by mouth every 12 (twelve) hours  Qty: 60 tablet, Refills: 0    Associated Diagnoses: Seizure disorder (HCC)      lidocaine (LIDODERM) 5 % Apply 1 patch topically daily for 10 days Remove & Discard patch within 12 hours or as directed by MD  Qty: 10 patch, Refills: 0    Associated Diagnoses: Pneumonia due to COVID-19 virus      metoprolol tartrate (LOPRESSOR) 25 mg tablet Take 25 mg by mouth every 12 (twelve) hours      multivitamin-minerals (CENTRUM ADULTS) tablet Take 1 tablet by mouth daily  Qty: 30 tablet, Refills: 0    Associated Diagnoses: Pneumonia due to COVID-19 virus      omeprazole (PriLOSEC) 20 mg delayed release capsule Take 20 mg by mouth daily      PHENobarbital 64 8 mg tablet Take 64 8 mg by mouth 2 (two) times a day       !! - Potential duplicate medications found  Please discuss with provider  No discharge procedures on file      PDMP Review       Value Time User    PDMP Reviewed  Yes 12/24/2020 11:26 AM Pati Hicks MD          ED Provider  Electronically Signed by           Solis Sykes DO  03/17/21 5326

## 2021-03-14 NOTE — ED NOTES
Pt's granddaughter, Hema Rich, called for an update   RN to call her back at [phone]     Ynes Patel  03/14/21 8510

## 2021-03-14 NOTE — ASSESSMENT & PLAN NOTE
Likely in the context a lymphedema  Unlikely congestive heart failure given no shortness of breath and BNP within normal limits  Consider trialing gentle diuresis

## 2021-03-14 NOTE — ASSESSMENT & PLAN NOTE
Presented with left lower extremity erythema warmth and tenderness consistent with cellulitis also noted to have draining from an open wound  Patient currently afebrile, WBC count within normal limits  Will place on cefazolin for now  Blood cultures pending  Wound care culture  Will obtain wound cultures well

## 2021-03-14 NOTE — H&P
Latosha Livingston [id number], [de-identified] y o  female MRN: [id number]  Unit/Bed#: ED 16 Encounter: [id number]  Primary Care Provider: Vineet Scott DO   Date and time admitted to hospital: 3/14/2021 11:29 AM    * Lower extremity cellulitis  Assessment & Plan  Presented with left lower extremity erythema warmth and tenderness consistent with cellulitis also noted to have draining from an open wound  Patient currently afebrile, WBC count within normal limits  Will place on cefazolin for now  Blood cultures pending  Wound care culture  Will obtain wound cultures well    Lower extremity edema  Assessment & Plan  Likely in the context a lymphedema  Unlikely congestive heart failure given no shortness of breath and BNP within normal limits  Consider trialing gentle diuresis      Ambulatory dysfunction  Assessment & Plan  PT/OT    Hyperlipidemia  Assessment & Plan  Continue Lipitor    Essential hypertension  Assessment & Plan  Continue home Lopressor  Blood pressure elevated  Will add labetalol p r n  For now    GERD (gastroesophageal reflux disease)  Assessment & Plan  Continue home PPI    Seizure disorder [facility])  Assessment & Plan  Continue home Keppra      VTE Prophylaxis: Heparin  / sequential compression device   Code Status: full code  POLST: There is no POLST form on file for this patient (pre-hospital)  Discussion with family: pt    Anticipated Length of Stay:  Patient will be admitted on an Inpatient basis with an anticipated length of stay of  > 2 midnights  Justification for Hospital Stay:  Cellulitis    Total Time for Visit, including Counseling / Coordination of Care: 1 hour  Greater than 50% of this total time spent on direct patient counseling and coordination of care      Chief Complaint:   Lower extremity pain    History of Present Illness:    Trudi Cronin is a [de-identified] y o  female with past medical history significant diastolic heart failure, seizure disorder, lymphedema who presented to the emergency department due to pain in her left lower leg  She notes she has had an open wound in her left lower leg and noticed swelling as well  She reports a history of lymphedema  She reports pain at the area as well  Denies fevers, chills, abdominal pain, chest pain, shortness breath, cough, nausea, vomiting, diarrhea, constipation, dysuria, headaches or any other symptoms  Review of Systems:    Review of Systems   Constitutional: Negative for activity change, appetite change, chills, diaphoresis, fever and unexpected weight change  HENT: Negative for congestion, facial swelling and rhinorrhea  Eyes: Negative for photophobia and visual disturbance  Respiratory: Negative for cough, shortness of breath and wheezing  Cardiovascular: Positive for leg swelling  Negative for chest pain and palpitations  Gastrointestinal: Negative for abdominal pain, blood in stool, constipation, diarrhea, nausea and vomiting  Genitourinary: Negative for decreased urine volume, difficulty urinating, dysuria, flank pain, frequency, hematuria and urgency  Musculoskeletal: Negative for arthralgias, back pain, joint swelling and myalgias  Neurological: Negative for dizziness, syncope, facial asymmetry, light-headedness, numbness and headaches  Psychiatric/Behavioral: Negative for confusion and decreased concentration  The patient is not nervous/anxious  Past Medical and Surgical History:     Past Medical History:   Diagnosis Date    CHF [facility]congestive heart failure) ([facility] )     Liver cirrhosis ([facility] )     Seizures ([facility] )        Past Surgical History:   Procedure Laterality Date    HYSTERECTOMY      LEG SURGERY  05/2016    Pt family cannot recall the specific surgery or which leg it was preformed on  Meds/Allergies:    Prior to Admission medications    Medication Sig Start Date End Date Taking?  Authorizing Provider   albuterol (PROVENTIL HFA,VENTOLIN HFA) 90 mcg/act inhaler Inhale 2 puffs every 4 (four) hours as needed for wheezing 12/24/20   Edilberto Cruz MD   atorvastatin (LIPITOR) 80 mg tablet Take 80 mg by mouth daily    Historical Provider, MD   cholecalciferol (VITAMIN D3) 1,000 units tablet Take 2,000 Units by mouth daily    Historical Provider, MD   folic acid (FOLVITE) 1 mg tablet Take by mouth daily    Historical Provider, MD   guaiFENesin (ROBITUSSIN) 100 mg/5 mL oral solution Take 10 mL (200 mg total) by mouth every 4 (four) hours as needed (cough) 12/24/20   Edilberto Cruz MD   levETIRAcetam (KEPPRA) 500 mg tablet Take 500 mg by mouth every 12 (twelve) hours    Historical Provider, MD   levETIRAcetam (KEPPRA) 500 mg tablet Take 1 tablet (500 mg total) by mouth every 12 (twelve) hours 12/18/19   Jacob Mcdonnell PA-C   lidocaine (LIDODERM) 5 % Apply 1 patch topically daily for 10 days Remove & Discard patch within 12 hours or as directed by MD 12/25/20 1/4/21  Edilberto Cruz MD   metoprolol tartrate (LOPRESSOR) 25 mg tablet Take 25 mg by mouth every 12 (twelve) hours    Historical Provider, MD   multivitamin-minerals (CENTRUM ADULTS) tablet Take 1 tablet by mouth daily 12/25/20 1/24/21  Edilberto Cruz MD   omeprazole (PriLOSEC) 20 mg delayed release capsule Take 20 mg by mouth daily    Historical Provider, MD   PHENobarbital 64 8 mg tablet Take 64 8 mg by mouth 2 (two) times a day    Historical Provider, MD     I have reviewed home medications with patient personally  Allergies:    Allergies   Allergen Reactions    Penicillins Other (See Comments)     Pt unaware of reaction       Social History:     Marital Status: Single     Patient Pre-hospital Living Situation: home  Patient Pre-hospital Level of Mobility: indepdent  Patient Pre-hospital Diet Restrictions: none  Substance Use History:   Social History     Substance and Sexual Activity   Alcohol Use Never    Frequency: Never     Social History     Tobacco Use   Smoking Status Never Smoker   Smokeless Tobacco Never Used     Social History     Substance and Sexual Activity   Drug Use No       Family History:    History reviewed  No pertinent family history  Physical Exam:     Vitals:   Blood Pressure: (!) 180/80 (03/14/21 1330)  Pulse: 84 (03/14/21 1330)  Temperature: 98 3 °F (36 8 °C) (03/14/21 1124)  Temp Source: Oral (03/14/21 1124)  Respirations: 20 (03/14/21 1330)  Weight - Scale: 93 kg (205 lb) (03/14/21 1124)  SpO2: 95 % (03/14/21 1330)    Physical Exam  Constitutional:       General: She is not in acute distress  Appearance: She is well-developed  She is not diaphoretic  HENT:      Head: Normocephalic and atraumatic  Nose: Nose normal       Mouth/Throat:      Pharynx: No oropharyngeal exudate  Eyes:      General: No scleral icterus  Right eye: No discharge  Left eye: No discharge  Conjunctiva/sclera: Conjunctivae normal       Pupils: Pupils are equal, round, and reactive to light  Neck:      Musculoskeletal: Normal range of motion and neck supple  Thyroid: No thyromegaly  Vascular: No JVD  Cardiovascular:      Rate and Rhythm: Normal rate and regular rhythm  Heart sounds: Normal heart sounds  No murmur  No friction rub  No gallop  Pulmonary:      Effort: Pulmonary effort is normal  No respiratory distress  Breath sounds: Normal breath sounds  No wheezing or rales  Chest:      Chest wall: No tenderness  Abdominal:      General: Bowel sounds are normal  There is no distension  Palpations: Abdomen is soft  Tenderness: There is no abdominal tenderness  There is no guarding or rebound  Musculoskeletal: Normal range of motion  General: No tenderness or deformity  Right lower leg: Edema present  Left lower leg: Edema present  Skin:     General: Skin is warm and dry  Findings: Erythema and lesion present  No rash  Neurological:      Mental Status: She is alert and oriented to person, place, and time        Cranial Nerves: No cranial nerve deficit  Sensory: No sensory deficit  Motor: No abnormal muscle tone  Coordination: Coordination normal            Additional Data:     Lab Results: I have personally reviewed pertinent reports  Results from last 7 days   Lab Units 03/14/21  1257   WBC Thousand/uL 6 89   HEMOGLOBIN g/dL 10 7*   HEMATOCRIT % 34 5*   PLATELETS Thousands/uL 197   NEUTROS PCT % 60   LYMPHS PCT % 21   MONOS PCT % 13*   EOS PCT % 5     Results from last 7 days   Lab Units 03/14/21  1257   SODIUM mmol/L 143   POTASSIUM mmol/L 3 7   CHLORIDE mmol/L 104   CO2 mmol/L 35*   BUN mg/dL 6   CREATININE mg/dL 0 71   ANION GAP mmol/L 4   CALCIUM mg/dL 8 9   ALBUMIN g/dL 3 0*   TOTAL BILIRUBIN mg/dL 0 33   ALK PHOS U/L 156*   ALT U/L 31   AST U/L 24   GLUCOSE RANDOM mg/dL 91     Results from last 7 days   Lab Units 03/14/21  1257   INR  1 10             Results from last 7 days   Lab Units 03/14/21  1257   LACTIC ACID mmol/L 1 2       Imaging: I have personally reviewed pertinent reports  CT lower extremity wo contrast left   Final Result by Roxanne Kasper MD (03/14 0975)   1  Left lower extremity subcutaneous edema and skin thickening consistent with nonspecific cellulitis without evidence of drainable collection in this unenhanced examination  Prominent blister (vesicle) noted along the skin surface posteriorly as    above  No deep soft tissue infection and no evidence of osseous destructive change to indicate osteomyelitis  2   Healed tibiofibular fracture status post ORIF for the tibial fracture without hardware complication  Workstation performed: [id number]      CT lower extremity w contrast right   Final Result by Berenice Ibarra MD (03/14 1467)   1  Diffuse edema of the RIGHT lower leg, consistent with the history of lymphedema and anasarca  2   No evidence of abscess  3   No acute bony abnormality in the RIGHT tibia or fibula     4   The patient will be returning for a CT of her LEFT lower leg  Workstation performed: [id number]      XR chest 1 view portable   Final Result by Deepika Ovalle MD (03/14 5057)   No acute cardiopulmonary disease  Workstation performed: [id number]          EKG, Pathology, and Other Studies Reviewed on Admission:   · EKG: reviewed    Allscripts / Epic Records Reviewed: Yes     ** Please Note: This note has been constructed using a voice recognition system   **

## 2021-03-15 ENCOUNTER — APPOINTMENT (INPATIENT)
Dept: ULTRASOUND IMAGING | Facility: HOSPITAL | Age: 81
DRG: 603 | End: 2021-03-15
Payer: MEDICARE

## 2021-03-15 PROBLEM — E87.6 HYPOKALEMIA: Status: ACTIVE | Noted: 2021-03-15

## 2021-03-15 LAB
ANION GAP SERPL CALCULATED.3IONS-SCNC: 6 MMOL/L (ref 4–13)
BASOPHILS # BLD AUTO: 0.03 THOUSANDS/ΜL (ref 0–0.1)
BASOPHILS NFR BLD AUTO: 1 % (ref 0–1)
BILIRUB UR QL STRIP: NEGATIVE
BUN SERPL-MCNC: 5 MG/DL (ref 5–25)
CALCIUM SERPL-MCNC: 8.8 MG/DL (ref 8.3–10.1)
CHLORIDE SERPL-SCNC: 104 MMOL/L (ref 100–108)
CLARITY UR: CLEAR
CO2 SERPL-SCNC: 31 MMOL/L (ref 21–32)
COLOR UR: NORMAL
CREAT SERPL-MCNC: 0.64 MG/DL (ref 0.6–1.3)
EOSINOPHIL # BLD AUTO: 0.34 THOUSAND/ΜL (ref 0–0.61)
EOSINOPHIL NFR BLD AUTO: 5 % (ref 0–6)
ERYTHROCYTE [DISTWIDTH] IN BLOOD BY AUTOMATED COUNT: 15.6 % (ref 11.6–15.1)
GFR SERPL CREATININE-BSD FRML MDRD: 97 ML/MIN/1.73SQ M
GLUCOSE SERPL-MCNC: 89 MG/DL (ref 65–140)
GLUCOSE UR STRIP-MCNC: NEGATIVE MG/DL
HCT VFR BLD AUTO: 33.3 % (ref 34.8–46.1)
HGB BLD-MCNC: 10.2 G/DL (ref 11.5–15.4)
HGB UR QL STRIP.AUTO: NEGATIVE
IMM GRANULOCYTES # BLD AUTO: 0.03 THOUSAND/UL (ref 0–0.2)
IMM GRANULOCYTES NFR BLD AUTO: 1 % (ref 0–2)
KETONES UR STRIP-MCNC: NEGATIVE MG/DL
LEUKOCYTE ESTERASE UR QL STRIP: NEGATIVE
LYMPHOCYTES # BLD AUTO: 1.63 THOUSANDS/ΜL (ref 0.6–4.47)
LYMPHOCYTES NFR BLD AUTO: 25 % (ref 14–44)
MCH RBC QN AUTO: 29.3 PG (ref 26.8–34.3)
MCHC RBC AUTO-ENTMCNC: 30.6 G/DL (ref 31.4–37.4)
MCV RBC AUTO: 96 FL (ref 82–98)
MONOCYTES # BLD AUTO: 0.91 THOUSAND/ΜL (ref 0.17–1.22)
MONOCYTES NFR BLD AUTO: 14 % (ref 4–12)
NEUTROPHILS # BLD AUTO: 3.7 THOUSANDS/ΜL (ref 1.85–7.62)
NEUTS SEG NFR BLD AUTO: 54 % (ref 43–75)
NITRITE UR QL STRIP: NEGATIVE
NRBC BLD AUTO-RTO: 0 /100 WBCS
PH UR STRIP.AUTO: 7 [PH]
PLATELET # BLD AUTO: 181 THOUSANDS/UL (ref 149–390)
PMV BLD AUTO: 10.3 FL (ref 8.9–12.7)
POTASSIUM SERPL-SCNC: 3.4 MMOL/L (ref 3.5–5.3)
PROCALCITONIN SERPL-MCNC: <0.05 NG/ML
PROCALCITONIN SERPL-MCNC: <0.05 NG/ML
PROT UR STRIP-MCNC: NEGATIVE MG/DL
RBC # BLD AUTO: 3.48 MILLION/UL (ref 3.81–5.12)
SODIUM SERPL-SCNC: 141 MMOL/L (ref 136–145)
SP GR UR STRIP.AUTO: 1.01 (ref 1–1.03)
UROBILINOGEN UR QL STRIP.AUTO: 0.2 E.U./DL
WBC # BLD AUTO: 6.64 THOUSAND/UL (ref 4.31–10.16)

## 2021-03-15 PROCEDURE — 87070 CULTURE OTHR SPECIMN AEROBIC: CPT | Performed by: INTERNAL MEDICINE

## 2021-03-15 PROCEDURE — 93970 EXTREMITY STUDY: CPT

## 2021-03-15 PROCEDURE — 97163 PT EVAL HIGH COMPLEX 45 MIN: CPT

## 2021-03-15 PROCEDURE — 87205 SMEAR GRAM STAIN: CPT | Performed by: INTERNAL MEDICINE

## 2021-03-15 PROCEDURE — G0008 ADMIN INFLUENZA VIRUS VAC: HCPCS | Performed by: FAMILY MEDICINE

## 2021-03-15 PROCEDURE — 85025 COMPLETE CBC W/AUTO DIFF WBC: CPT | Performed by: INTERNAL MEDICINE

## 2021-03-15 PROCEDURE — 99232 SBSQ HOSP IP/OBS MODERATE 35: CPT | Performed by: PHYSICIAN ASSISTANT

## 2021-03-15 PROCEDURE — 80048 BASIC METABOLIC PNL TOTAL CA: CPT | Performed by: INTERNAL MEDICINE

## 2021-03-15 PROCEDURE — 84145 PROCALCITONIN (PCT): CPT | Performed by: EMERGENCY MEDICINE

## 2021-03-15 PROCEDURE — 93970 EXTREMITY STUDY: CPT | Performed by: SURGERY

## 2021-03-15 PROCEDURE — 90662 IIV NO PRSV INCREASED AG IM: CPT | Performed by: FAMILY MEDICINE

## 2021-03-15 PROCEDURE — 97167 OT EVAL HIGH COMPLEX 60 MIN: CPT

## 2021-03-15 PROCEDURE — 81003 URINALYSIS AUTO W/O SCOPE: CPT | Performed by: EMERGENCY MEDICINE

## 2021-03-15 RX ORDER — POTASSIUM CHLORIDE 20 MEQ/1
40 TABLET, EXTENDED RELEASE ORAL ONCE
Status: COMPLETED | OUTPATIENT
Start: 2021-03-15 | End: 2021-03-15

## 2021-03-15 RX ADMIN — LEVETIRACETAM 500 MG: 500 TABLET, FILM COATED ORAL at 22:53

## 2021-03-15 RX ADMIN — PANTOPRAZOLE SODIUM 40 MG: 40 TABLET, DELAYED RELEASE ORAL at 06:41

## 2021-03-15 RX ADMIN — METOPROLOL TARTRATE 25 MG: 25 TABLET, FILM COATED ORAL at 22:53

## 2021-03-15 RX ADMIN — PHENOBARBITAL 64.8 MG: 32.4 TABLET ORAL at 08:49

## 2021-03-15 RX ADMIN — METOPROLOL TARTRATE 25 MG: 25 TABLET, FILM COATED ORAL at 08:49

## 2021-03-15 RX ADMIN — HEPARIN SODIUM 5000 UNITS: 5000 INJECTION INTRAVENOUS; SUBCUTANEOUS at 06:41

## 2021-03-15 RX ADMIN — PHENOBARBITAL 64.8 MG: 32.4 TABLET ORAL at 17:02

## 2021-03-15 RX ADMIN — LEVETIRACETAM 500 MG: 500 TABLET, FILM COATED ORAL at 08:49

## 2021-03-15 RX ADMIN — INFLUENZA A VIRUS A/MICHIGAN/45/2015 X-275 (H1N1) ANTIGEN (FORMALDEHYDE INACTIVATED), INFLUENZA A VIRUS A/SINGAPORE/INFIMH-16-0019/2016 IVR-186 (H3N2) ANTIGEN (FORMALDEHYDE INACTIVATED), INFLUENZA B VIRUS B/PHUKET/3073/2013 ANTIGEN (FORMALDEHYDE INACTIVATED), AND INFLUENZA B VIRUS B/MARYLAND/15/2016 BX-69A ANTIGEN (FORMALDEHYDE INACTIVATED) 0.7 ML: 60; 60; 60; 60 INJECTION, SUSPENSION INTRAMUSCULAR at 12:51

## 2021-03-15 RX ADMIN — CEFAZOLIN SODIUM 2000 MG: 2 SOLUTION INTRAVENOUS at 14:36

## 2021-03-15 RX ADMIN — HEPARIN SODIUM 5000 UNITS: 5000 INJECTION INTRAVENOUS; SUBCUTANEOUS at 22:53

## 2021-03-15 RX ADMIN — CEFAZOLIN SODIUM 2000 MG: 2 SOLUTION INTRAVENOUS at 22:52

## 2021-03-15 RX ADMIN — ATORVASTATIN CALCIUM 80 MG: 40 TABLET, FILM COATED ORAL at 08:49

## 2021-03-15 RX ADMIN — CEFAZOLIN SODIUM 2000 MG: 2 SOLUTION INTRAVENOUS at 06:41

## 2021-03-15 RX ADMIN — POTASSIUM CHLORIDE 40 MEQ: 1500 TABLET, EXTENDED RELEASE ORAL at 10:02

## 2021-03-15 RX ADMIN — HEPARIN SODIUM 5000 UNITS: 5000 INJECTION INTRAVENOUS; SUBCUTANEOUS at 14:21

## 2021-03-15 NOTE — PHYSICAL THERAPY NOTE
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, intractable [facility] )    Nonrheumatic aortic valve stenosis    Hyperlipidemia    Pneumonia due to COVID-19 virus    Acute respiratory failure with hypoxia ([facility] )    Ambulatory dysfunction    Lower extremity edema    Hypokalemia     Past Medical History  Past Medical History:   Diagnosis Date    CHF (congestive heart failure) (HCC)     Liver cirrhosis (HCC)     Seizures (HCC)      Past Surgical History  Past Surgical History:   Procedure Laterality Date    HYSTERECTOMY      LEG SURGERY  05/2016    Pt family cannot recall the specific surgery or which leg it was preformed on       03/15/21 1341   PT Last Visit   PT Visit Date 03/15/21   Note Type   Note type Evaluation   Pain Assessment   Pain Assessment Tool 0-10   Pain Score Worst Possible Pain   Pain Location/Orientation Location: Generalized  (pt reports "from shoulders to knees")   Home Living   Type of [address] [place] [address] One level  (No DAVIS)   Bathroom Shower/Tub Tub/shower unit   Bathroom Toilet Standard   Bathroom Equipment Shower chair;Grab bars in shower;Commode   [address]; Wheelchair-manual   Additional Comments Pt ambulates with RW     Prior Function   Level of [place] Needs assistance with ADLs and functional mobility   Lives With Daughter;Family   Receives Help From Family   ADL Assistance Needs assistance   IADLs Needs assistance   Falls in the last 6 months 1 to 4   Vocational Retired   Restrictions/Precautions   [place] Precautions Per Order No   Braces or Orthoses Other (Comment)  (none per patient)   Other Precautions Cognitive; Chair Alarm; Bed Alarm;Multiple lines; Fall Risk;Pain   General   Family/Caregiver Present No   Cognition   Overall Cognitive Status Impaired   Arousal/Participation Responsive   Orientation Level Oriented to person;Disoriented to place; Disoriented to time;Disoriented to situation   Memory Decreased recall of precautions;Decreased recall of recent events;Decreased short term memory   Following Commands Follows one step commands with increased time or repetition   Comments Pt agreeable to PT    RUE Assessment   RUE Assessment   (defer to OT assessment)   LUE Assessment   LUE Assessment   (defer to OT assessment)   RLE Assessment   RLE Assessment X   Strength RLE   RLE Overall Strength 3/5   LLE Assessment   LLE Assessment X   Strength LLE   LLE Overall Strength 3/5   Light Touch   RLE Light Touch Grossly intact   LLE Light Touch Grossly intact   Bed Mobility   Rolling R 3  Moderate assistance   Additional items Assist x 1;Bedrails; Increased time required;Verbal cues;LE management   Rolling L 3  Moderate assistance   Additional items Assist x 1;Bedrails; Increased time required;Verbal cues;LE management   Supine to Sit 3  Moderate assistance   Additional items Assist x 2;HOB elevated; Bedrails; Increased time required;Verbal cues;LE management   Sit to Supine 3  Moderate assistance   Additional items Assist x 2;HOB elevated; Bedrails; Increased time required;Verbal cues;LE management   Transfers   Sit to Stand 3  Moderate assistance   Additional items Assist x 2; Increased time required;Verbal cues   Stand to Sit 3  Moderate assistance   Additional items Assist x 2; Increased time required;Verbal cues   Ambulation/Elevation   Gait pattern Not appropriate; Not tested   Balance   Static Sitting Fair   Dynamic Sitting Fair - Static Standing Poor +   Dynamic Standing Poor   Endurance Deficit   Endurance Deficit Yes   Activity Tolerance   Activity Tolerance Patient limited by pain; Patient limited by fatigue   Medical Staff Made Aware OT Sai Flow   Nurse Made Aware Discussed case with RN Nell De Guzman; post session pt was left supine in bed in NAD, all belongings within reach, +bed alarm   Assessment   Prognosis Good   Problem List Decreased strength; Impaired balance;Decreased endurance;Decreased mobility; Decreased cognition;Pain;Decreased skin integrity   Assessment Pt is [de-identified]year old female seen for PT evaluation s/p admit to [facility] on 3/14/2021 with Lower extremity cellulitis  PT consulted to assess pt's functional mobility and d/c needs  Order placed for PT eval and tx, with up and OOB as tolerated order  Comorbidities affecting pt's physical performance at time of assessment include seizure disorder, GERD, essential hypertension, hyperlipidemia, ambulatory dysfunction, lower extremity edema, and hypokalemia  PTA, pt was requiring assist for mobility and utilized a RW  Personal factors affecting pt at time of IE include lives in a one story house, inability to ambulate household distances, inability to navigate community distances, inability to navigate level surfaces without external assistance, unable to perform dynamic tasks in community, decreased cognition, positive fall history, inability to perform IADLs, and inability to perform ADLs  Please find objective findings from PT assessment regarding body systems outlined above with impairments and limitations including weakness, impaired balance, decreased endurance, inability to ambulate, pain, decreased activity tolerance, decreased functional mobility tolerance, fall risk, decreased skin integrity, and decreased cognition  The following objective measures performed on IE also reveal limitations: Barthel Index: 35/100 and Modified Jamila: 4 (moderate/severe disability)   Pt's clinical presentation is currently unstable/unpredictable seen in pt's presentation of need for ongoing medical management/monitoring, pt is a fall risk, and pt requires cues/assist for safety with functional mobility  Pt to benefit from continued PT tx to address deficits as defined above and maximize level of functional independent mobility and consistency  From PT/mobility standpoint, recommendation at time of d/c would be STR pending progress in order to facilitate return to PLOF  Barriers to Discharge Decreased caregiver support   Goals   Patient Goals none expressed   STG Expiration Date 03/25/21   Short Term Goal #1 In 7-10 days: Increase bilateral LE strength 1/2 grade to facilitate independent mobility, Perform all bed mobility tasks with min A of 1 to decrease caregiver burden, Perform all transfers with min A of 1 to improve independence, Increase all balance 1/2 grade to decrease risk for falls and PT to see and establish goals for gait when appropriate   Plan   Treatment/Interventions Functional transfer training;LE strengthening/ROM; Therapeutic exercise; Endurance training;Cognitive reorientation;Patient/family training;Bed mobility;Spoke to nursing;OT   PT Frequency Other (Comment)  (3-5x/wk)   Recommendation   PT Discharge Recommendation Post-Acute Rehabilitation Services   PT - OK to Discharge Yes  (when medically cleared, if to STR)   AM-PAC Basic Mobility Inpatient   Turning in Bed Without Bedrails 2   Lying on Back to Sitting on Edge of Flat Bed 1   Moving Bed to Chair 1   Standing Up From Chair 1   Walk in Room 1   Climb 3-5 Stairs 1   Basic Mobility Inpatient Raw Score 7   Turning Head Towards Sound 4   Follow Simple Instructions 3   Low Function Basic Mobility Raw Score 14   Low Function Basic Mobility Standardized Score 22 01   Modified [place] Scale   Modified [place] Scale 4   Barthel Index   Feeding 5   Bathing 0   Grooming Score 5   Dressing Score 5   Bladder Score 5   Bowels Score 5 Toilet Use Score 5   Transfers (Bed/Chair) Score 5   Mobility (Level Surface) Score 0   Stairs Score 0   Barthel Index Score 35     Austin Albarran, PT, DPT

## 2021-03-15 NOTE — ASSESSMENT & PLAN NOTE
· Pt lives at home with her daughter, uses a cane/walker at baseline   · Having increased difficulties with ambulation in setting of underlying cellulitis and edema   · PT/OT consultations pending

## 2021-03-15 NOTE — ASSESSMENT & PLAN NOTE
· Continue Keppra 500 mg BID   · No evidence of seizure like activity in the hospital thus far   · Seizure precautions

## 2021-03-15 NOTE — WOUND OSTOMY CARE
Progress Note - Wound   Olivia Desai Speak [de-identified] y o  female MRN: [id number]  Unit/Bed#: -01 Encounter: [id number]      Assessment:   Patient admitted for lower extremity cellulitis  History of CHF, HTN, and seizure disorder  Patient agreeable to assessment  Patient alert and oriented x 4, incontinent of bladder, continent of bowel, assist of 2 with walker, assist of 1 to turn for assessment, foam wedges in use for turning and repositioning, heels elevated of off the bed  SLIM and primary RN made aware of patient's wounds  1  Posterior left tibial -POA- Etiology unclear, suspect DTPI versus hematoma  Patient states she recently had a fell and hit her left leg  CT scan per SLIM shows blistering and possible hemorrhagic component  Wound is a soft palpable mass, pain on palpation, round in shape, moist, partial thickness, un-yamil of skin present on wound bed, approx  20% pink, 40% light purple, 40% deep purple/maroon, small amount of serosanguinous drainage  If pressure related, this wound has the potential to evolve to a full thickness injury, stage 3 or 4  Laura-wound is intact, dry, blanchable, and hyperpigmented  2  Right heel evolving DTPI-POA- Wound is oval in shape, approx 80% dry deep purple, non-blanchable, intact skin; and 20% beefy red tissue, moist, partial thickness wound bed  This wound has the potential to evolve to a full thickness injury, stage 3 or 4  Laura-wound is dry, intact, no redness and calloused  Left heel is dry, intact, no redness    B/L sacrum and buttock dry, intact, no redness  Educated patient on importance of frequent offloading of pressure via turning, repositioning, and weight-shifting  Verbalized understanding of plan of care  No induration, fluctuance, odor, warmth, redness, or purulence noted to the above noted wounds  New dressings applied  Patient tolerated poorly, reports pain to the wound on palpation  Primary nurse aware of plan of care   See flow sheets for more detailed assessment findings  Will follow along  Skin care plans:  1-Hydraguard to bilateral sacrum and buttock BID and PRN  2-Elevate heels to offload pressure  3-Ehob cushion in chair when out of bed  4-Moisturize skin daily with skin nourishing cream   5-Turn/reposition q2h or when medically stable for pressure re-distribution on skin  6- Apply Alleyvn foam to B/L heels  Grabiel right with a T for treatment  Peel back every shift to check skin integrity  Change every 3 days  7- Cleanse left posterior tibial with NSS, pat dry  Apply adaptic, maxorb, cover with ABD and wrap with Kerlex  Change every other day or PRN for soilage or dislodgement  Wound 03/14/21 Tibial Left;Posterior (Active)   Wound Image   03/15/21 1028   Wound Description Edema;Drainage;Fragile;Light purple;Pink;Swelling 03/15/21 1028   Pressure Injury Stage DTPI 03/15/21 1028   Laura-wound Assessment Intact;Dry;Fragile; Hyperpigmented 03/15/21 1028   Wound Length (cm) 7 cm 03/15/21 1028   Wound Width (cm) 6 5 cm 03/15/21 1028   Wound Depth (cm) 0 1 cm 03/15/21 1028   Wound Surface Area (cm^2) 45 5 cm^2 03/15/21 1028   Wound Volume (cm^3) 4 55 cm^3 03/15/21 1028   Calculated Wound Volume (cm^3) 4 55 cm^3 03/15/21 1028   Tunneling 0 cm 03/15/21 1028   Undermining 0 03/15/21 1028   Drainage Amount Scant 03/15/21 1028   Drainage Description Serosanguineous 03/15/21 1028   Non-staged Wound Description Partial thickness 03/15/21 1028   Treatments Site care;Cleansed 03/15/21 1028   Dressing Vaseline gauze;Calcium Alginate;ABD 03/15/21 1028   Wound packed? No 03/15/21 1028   Dressing Changed Changed 03/15/21 1028   Patient Tolerance Tolerated poorly 03/15/21 1028   Dressing Status Clean;Dry; Intact 03/15/21 1028       Wound 03/15/21 Pressure Injury Heel Right (Active)   Wound Image   03/15/21 1039   Wound Description Beefy red;Light purple 03/15/21 1039   Pressure Injury Stage DTPI 03/15/21 1039   Laura-wound Assessment Dry; Intact;Callus 03/15/21 1039   Wound Length (cm) 1 cm 03/15/21 1039   Wound Width (cm) 1 3 cm 03/15/21 1039   Wound Depth (cm) 0 2 cm 03/15/21 1039   Wound Surface Area (cm^2) 1 3 cm^2 03/15/21 1039   Wound Volume (cm^3) 0 26 cm^3 03/15/21 1039   Calculated Wound Volume (cm^3) 0 26 cm^3 03/15/21 1039   Tunneling 0 cm 03/15/21 1039   Undermining 0 03/15/21 1039   Drainage Amount Scant 03/15/21 1039   Drainage Description Serosanguineous 03/15/21 1039   Non-staged Wound Description Partial thickness 03/15/21 1039   Treatments Cleansed;Site care 03/15/21 1039   Dressing Foam, Silicon (eg  Allevyn, etc) 03/15/21 1039   Wound packed? No 03/15/21 1039   Dressing Changed Changed 03/15/21 1039   Patient Tolerance Tolerated well 03/15/21 1039   Dressing Status Clean;Dry; Intact 03/15/21 1039         Call or tigertext with any questions  Wound Care will continue to follow    Cb Hamilton RN BSN  Wound care

## 2021-03-15 NOTE — PLAN OF CARE
Problem: OCCUPATIONAL THERAPY ADULT  Goal: Performs self-care activities at highest level of function for planned discharge setting  See evaluation for individualized goals  Description: Treatment Interventions: ADL retraining, Functional transfer training, UE strengthening/ROM, Endurance training, Patient/family training, Equipment evaluation/education, Compensatory technique education, Continued evaluation, Energy conservation, Activityengagement          See flowsheet documentation for full assessment, interventions and recommendations  Note: Limitation: Decreased ADL status, Decreased UE ROM, Decreased UE strength, Decreased endurance, Decreased cognition, Decreased self-care trans, Decreased high-level ADLs  Prognosis: Good  Assessment: Patient is a [de-identified] y o  female seen for OT evaluation s/p admit to [address] [facility] on 3/14/2021 w/Lower extremity cellulitis  Commorbidities affecting patient's functional performance at time of assessment include: seizure disorder, GERD, essential hypertension, hyperlipidemia, ambulatory dysfunction, lower extremity edema, and hypokalemia  Orders placed for OT evaluation and treatment  Performed at least two patient identifiers during session including name and wristband  Prior to admission, Patient not a good historian, information obtained thru chart review  Patient lives with her daughter and family in a one story house, no DAVIS  patient ambulates with walker and requires assistance with ADLs/ IADLs  Personal factors affecting patient at time of initial evaluation include: limited insight into deficits, decreased initiation and engagement and difficulty performing ADLs  Upon evaluation, patient requires moderate assist for UB ADLs, maximal assist for LB ADLs   Patient is alert, oriented to name,, disoriented to time,, disoriented to place, and disoriented to situation,, presents with limited ability to focus on a task over time (attention span) and limited ability to recall information after a brief period of time (short term memory) / working memory  Occupational performance is affected by the following deficits: decreased functional use of BUEs, in hand manipulation deficit with impaired reach, grasp and coordination, limited active ROM, decreased muscle strength, degenerative arthritic joint changes, impaired gross motor coordination, impaired fine motor coordination, dynamic sit/ stand balance deficit with poor standing tolerance time for self care and functional mobility, decreased activity tolerance and increased pain  Patient to benefit from continued Occupational Therapy treatment while in the hospital to address deficits as defined above and maximize level of functional independence with ADLs and functional mobility  Occupational Performance areas to address include: grooming , bathing/ shower, dressing, toilet hygiene, transfer to all surfaces, functional mobility, health maintenance and IADLs: safety procedures  From OT standpoint, recommendation at time of d/c would be Short Term Rehab        OT Discharge Recommendation: Post-Acute Rehabilitation Services

## 2021-03-15 NOTE — PROGRESS NOTES
[facility]  Progress Note - Kath Ours 1940, [de-identified] y o  female MRN: [id number]  Unit/Bed#: -[facility] Encounter: [id number]  Primary Care Provider: Didi Musa DO   Date and time admitted to hospital: 3/14/2021 11:29 AM      DOS: 3/15/2021  * Lower extremity cellulitis  Assessment & Plan  · Pt presented with left lower extremity warmth, erythema and tenderness with open wound of the posterior left leg   · CT scan left leg with subcutaneous edema and skin thickening consistent with nonspecific cellulitis without evidence of drainable collection, prominent blister posteriorly noted   · Baseline lower extremity edema from lymphedema bilaterally   · Pt remains afebrile without leukocytosis   · Continue high dose IV Ancef for now and monitor  · Blood cultures and wound culture pending   · Wound care consult pending, appreciate recommendations  · Will likely have slow improvement in setting of significant baseline lymphedema, encourage leg elevation, compression     Lower extremity edema  Assessment & Plan  · Likely in the context of lymphedema  · , no respiratory complaints, no prior ECHO to review, low suspicion for CHF at this time   · Continue aggressive edema measures, compression and leg elevation   · Obtain venous duplex   · Continue IV abx for cellulitis as above   · Monitor closely  · Wound care consultation pending      Hypokalemia  Assessment & Plan  · K+ 3 4   · Replete with 40 mEq KDUR  · Obtain repeat BMP in the AM    Ambulatory dysfunction  Assessment & Plan  · Pt lives at home with her daughter, uses a cane/walker at baseline   · Having increased difficulties with ambulation in setting of underlying cellulitis and edema   · PT/OT consultations pending    Hyperlipidemia  Assessment & Plan  · Continue Lipitor 80 mg daily     Essential hypertension  Assessment & Plan  · Hypertensive urgency on admission likely in setting of pain, improvement noted   · Continue Lopressor 25 mg BID   · PRN Labetalol for SBP > 190 mmHg   · Monitor     GERD (gastroesophageal reflux disease)  Assessment & Plan  · Continue Protonix 40 mg daily     Seizure disorder (HCC)  Assessment & Plan  · Continue Keppra 500 mg BID   · No evidence of seizure like activity in the hospital thus far   · Seizure precautions     VTE Pharmacologic Prophylaxis:   Pharmacologic: Heparin  Mechanical VTE Prophylaxis in Place: No    Patient Centered Rounds: I have evaluated patient without nursing staff present due to Speaking to nurse outside patient's room    Discussions with Specialists or Other Care Team Provider: Discussed with RNLYNDSEY and reviewed previous notes     Education and Discussions with Family / Patient: Discussed with patient at bedside as well as patient's daughter over the phone regarding plan of care    Time Spent for Care: 30 minutes  More than 50% of total time spent on counseling and coordination of care as described above  Current Length of Stay: 1 day(s)    Current Patient Status: Inpatient   Certification Statement: The patient will continue to require additional inpatient hospital stay due to IV antibiotics for underlying cellulitis, wound care evaluation    Discharge Plan:  Not medically stable as above, likely not for another 48 hours pending improvement    Code Status: Level 1 - Full Code      Subjective:   Patient reports that she feels well today  She does report bilateral lower extremity leg pain  Mostly of the right knee  She denies any chest pain, shortness of breath, abdominal pain, nausea or vomiting  Reports that it has been difficult for her to get around secondary to her leg pain and swelling      Objective:     Vitals:   Temp (24hrs), Av 8 °F (37 1 °C), Min:98 3 °F (36 8 °C), Max:99 3 °F (37 4 °C)    Temp:  [98 3 °F (36 8 °C)-99 3 °F (37 4 °C)] 99 3 °F (37 4 °C)  HR:  [77-98] 84  Resp:  [18-24] 18  BP: (126-180)/() 149/99  SpO2:  [93 %-97 %] 95 %  Body mass index is 41 4 kg/m²  Input and Output Summary (last 24 hours): Intake/Output Summary (Last 24 hours) at 3/15/2021 0909  Last data filed at 3/15/2021 0648  Gross per 24 hour   Intake 620 ml   Output 260 ml   Net 360 ml       Physical Exam:     Physical Exam  Vitals signs reviewed  Constitutional:       General: She is not in acute distress  Appearance: She is not toxic-appearing  Comments: Patient is in no acute distress lying in her hospital bed resting comfortably  Very pleasant   HENT:      Head: Normocephalic and atraumatic  Eyes:      Conjunctiva/sclera: Conjunctivae normal       Pupils: Pupils are equal, round, and reactive to light  Cardiovascular:      Rate and Rhythm: Normal rate and regular rhythm  Pulses: Normal pulses  Pulmonary:      Effort: Pulmonary effort is normal  No respiratory distress  Breath sounds: Normal breath sounds  No wheezing  Abdominal:      General: Bowel sounds are normal  There is no distension  Palpations: Abdomen is soft  Tenderness: There is no abdominal tenderness  Musculoskeletal:      Comments: Bilateral lower extremity edema, nonpitting  Left lower extremity with erythema and warmth with posterior blister, draining clear fluid   Skin:     General: Skin is warm and dry  Findings: Erythema present  Neurological:      Mental Status: She is alert     Psychiatric:         Mood and Affect: Mood normal          Additional Data:     Labs:    Results from last 7 days   Lab Units 03/15/21  0603   WBC Thousand/uL 6 64   HEMOGLOBIN g/dL 10 2*   HEMATOCRIT % 33 3*   PLATELETS Thousands/uL 181   NEUTROS PCT % 54   LYMPHS PCT % 25   MONOS PCT % 14*   EOS PCT % 5     Results from last 7 days   Lab Units 03/15/21  0603 03/14/21  1257   POTASSIUM mmol/L 3 4* 3 7   CHLORIDE mmol/L 104 104   CO2 mmol/L 31 35*   BUN mg/dL 5 6   CREATININE mg/dL 0 64 0 71   CALCIUM mg/dL 8 8 8 9   ALK PHOS U/L  --  156*   ALT U/L  --  31   AST U/L  --  24 Results from last 7 days   Lab Units 03/14/21  1257   INR  1 10       * I Have Reviewed All Lab Data Listed Above  * Additional Pertinent Lab Tests Reviewed: All Labs Within Last 24 Hours Reviewed    Imaging:    Imaging Reports Reviewed Today Include:  CT lower extremity  Imaging Personally Reviewed by Myself Includes:  None    Recent Cultures (last 7 days):     Results from last 7 days   Lab Units 03/14/21  1257   BLOOD CULTURE  Received in Microbiology Lab  Culture in Progress  Received in Microbiology Lab  Culture in Progress  Last 24 Hours Medication List:   Current Facility-Administered Medications   Medication Dose Route Frequency Provider Last Rate    acetaminophen  650 mg Oral Q6H PRN Jens Hill MD      albuterol  2 puff Inhalation Q4H PRN Jens Hill MD      atorvastatin  80 mg Oral Daily Jens Hill MD      cefazolin  2,000 mg Intravenous Q8H Jens Hill MD 2,000 mg (03/15/21 0641)    heparin (porcine)  5,000 Units Subcutaneous Q8H [address] Jens Hill MD      Labetalol HCl  10 mg Intravenous Q4H PRN Jens Hill MD      levETIRAcetam  500 mg Oral Q12H [address] Jens Hill MD      metoprolol tartrate  25 mg Oral Q12H [address] Jens Hill MD      pantoprazole  40 mg Oral Early Morning Jens Hill MD      PHENobarbital  64 8 mg Oral BID Jens Hill MD      potassium chloride  40 mEq Oral Once Magali Macedo PA-C          Today, Patient Was Seen By: Magali Macedo PA-C    ** Please Note: Dictation voice to text software may have been used in the creation of this document   **

## 2021-03-15 NOTE — ASSESSMENT & PLAN NOTE
· Hypertensive urgency on admission likely in setting of pain, improvement noted   · Continue Lopressor 25 mg BID   · PRN Labetalol for SBP > 190 mmHg   · Monitor

## 2021-03-15 NOTE — ASSESSMENT & PLAN NOTE
· Pt presented with left lower extremity warmth, erythema and tenderness with open wound of the posterior left leg   · CT scan left leg with subcutaneous edema and skin thickening consistent with nonspecific cellulitis without evidence of drainable collection, prominent blister posteriorly noted   · Baseline lower extremity edema from lymphedema bilaterally   · Pt remains afebrile without leukocytosis   · Continue high dose IV Ancef for now and monitor  · Blood cultures and wound culture pending   · Wound care consult pending, appreciate recommendations  · Will likely have slow improvement in setting of significant baseline lymphedema, encourage leg elevation, compression

## 2021-03-15 NOTE — ASSESSMENT & PLAN NOTE
· Likely in the context of lymphedema  · , no respiratory complaints, no prior ECHO to review, low suspicion for CHF at this time   · Continue aggressive edema measures, compression and leg elevation   · Obtain venous duplex   · Continue IV abx for cellulitis as above   · Monitor closely  · Wound care consultation pending

## 2021-03-15 NOTE — OCCUPATIONAL THERAPY NOTE
Occupational Therapy Evaluation        Patient Name: Sam CHAMPIONX Date: 3/15/2021       03/15/21 1319   OT Last Visit   OT Visit Date 03/15/21   Note Type   Note type Evaluation   Restrictions/Precautions   Weight Bearing Precautions Per Order No   Braces or Orthoses Other (Comment)  (none per patient)   Other Precautions Cognitive; Chair Alarm; Bed Alarm;Multiple lines; Fall Risk;Pain   Pain Assessment   Pain Assessment Tool FLACC   Pain Score Worst Possible Pain  (report not consistent with observation)   Pain Location/Orientation Location: Generalized   Pain Rating: FLACC (Rest) - Face 0   Pain Rating: FLACC (Rest) - Legs 0   Pain Rating: FLACC (Rest) - Activity 0   Pain Rating: FLACC (Rest) - Cry 0   Pain Rating: FLACC (Rest) - Consolability 0   Score: FLACC (Rest) 0   Pain Rating: FLACC (Activity) - Face 1   Pain Rating: FLACC (Activity) - Legs 1   Pain Rating: FLACC (Activity) - Activity 1   Pain Rating: FLACC (Activity) - Cry 1   Pain Rating: FLACC (Activity) - Consolability 0   Score: FLACC (Activity) 4   Home Living   Type of Home Apartment;House   Home Layout One level  (no DAVIS)   Bathroom Shower/Tub Tub/shower unit   Bathroom Toilet Standard   Bathroom Equipment Grab bars in shower; Shower chair;Grab bars around toilet;Commode   [facility]; Wheelchair-manual   Additional Comments ambulatory with walker   Prior Function   Level of [place] Needs assistance with IADLs; Needs assistance with ADLs and functional mobility   Lives With Daughter;Family   Receives Help From Family   ADL Assistance Needs assistance  ("shower and medicines")   IADLs Needs assistance   Falls in the last 6 months 1 to 4  (patient unable to quantify)   Vocational Retired   Lifestyle   Autonomy Patient not a good historian, information obtained thru chart review   Patient lives with her daughter and family in a one story house, no DAVIS  patient ambulates with walker and requires assistance with ADLs/ IADLs  Reciprocal Relationships Supportive family   Psychosocial   Psychosocial (WDL) WDL   ADL   Eating Assistance 5  Supervision/Setup   Grooming Assistance 4  Minimal Assistance   UB Bathing Assistance 3  Moderate Assistance   LB Bathing Assistance 2  Maximal Assistance   UB Dressing Assistance 4  Minimal Assistance   LB Dressing Assistance 2  Maximal [address]  2  Maximal Assistance   Functional Assistance 2  Maximal Assistance   Bed Mobility   Rolling R 3  Moderate assistance   Additional items Assist x 1;Bedrails; Increased time required;Verbal cues;LE management   Rolling L 3  Moderate assistance   Additional items Assist x 1;Bedrails; Increased time required;Verbal cues;LE management   Supine to Sit 3  Moderate assistance   Additional items Assist x 2;HOB elevated; Bedrails; Increased time required;Verbal cues;LE management   Sit to Supine 3  Moderate assistance   Additional items Assist x 2;HOB elevated; Bedrails; Increased time required;Verbal cues;LE management   Transfers   Sit to Stand 3  Moderate assistance   Additional items Assist x 2; Increased time required;Verbal cues   Stand to Sit 3  Moderate assistance   Additional items Assist x 2; Increased time required;Verbal cues   Functional Mobility   Functional Mobility 2  Maximal assistance   Additional items Rolling walker   Balance   Static Sitting Fair   Dynamic Sitting Fair -   Static Standing Poor +   Dynamic Standing Poor   Activity Tolerance   Activity Tolerance Patient limited by pain; Patient limited by fatigue   Nurse Made Aware Discussed case with TERESA Metcalf; post session pt was left supine in bed in NAD, all belongings within reach, +bed alarm   RUE Assessment   RUE Assessment X  (limited functional use of RUE)   RUE Overall AROM   R Shoulder Flexion limited overhead movements, arthtitic joint deformities, with limited    RUE Strength   RUE Overall Strength Deficits  (3+/5)   LUE Assessment LUE Assessment X  (limited functional use of LUE)   LUE Overall AROM   L Shoulder Flexion limited overhead movements, arthtitic joint deformities, with limited    LUE Strength   LUE Overall Strength Deficits  (3+/5)   Hand Function   Gross Motor Coordination Impaired   Fine Motor Coordination Impaired   Sensation   Light Touch No apparent deficits  (BUEs)   Proprioception   Proprioception No apparent deficits  (BUEs)   Cognition   Overall Cognitive Status Impaired   Arousal/Participation Alert; Responsive; Cooperative   Attention Within functional limits   Orientation Level Oriented to person;Disoriented to place; Disoriented to time;Disoriented to situation   Memory Decreased recall of biographical information;Decreased short term memory;Decreased recall of recent events   Following Commands Follows one step commands with increased time or repetition   Assessment   Limitation Decreased ADL status; Decreased UE ROM; Decreased UE strength;Decreased endurance;Decreased cognition;Decreased self-care trans;Decreased high-level ADLs   Prognosis Good   Assessment Patient is a [de-identified] y o  female seen for OT evaluation s/p admit to [id number] [place] [address] on 3/14/2021 w/Lower extremity cellulitis  Commorbidities affecting patient's functional performance at time of assessment include: seizure disorder, GERD, essential hypertension, hyperlipidemia, ambulatory dysfunction, lower extremity edema, and hypokalemia  Orders placed for OT evaluation and treatment  Performed at least two patient identifiers during session including name and wristband  Prior to admission, Patient not a good historian, information obtained thru chart review  Patient lives with her daughter and family in a one story house, no DAVIS  patient ambulates with walker and requires assistance with ADLs/ IADLs    Personal factors affecting patient at time of initial evaluation include: limited insight into deficits, decreased initiation and engagement and difficulty performing ADLs  Upon evaluation, patient requires moderate assist for UB ADLs, maximal assist for LB ADLs  Patient is alert, oriented to name,, disoriented to time,, disoriented to place, and disoriented to situation,, presents with limited ability to focus on a task over time (attention span) and limited ability to recall information after a brief period of time (short term memory) / working memory  Occupational performance is affected by the following deficits: decreased functional use of BUEs, in hand manipulation deficit with impaired reach, grasp and coordination, limited active ROM, decreased muscle strength, degenerative arthritic joint changes, impaired gross motor coordination, impaired fine motor coordination, dynamic sit/ stand balance deficit with poor standing tolerance time for self care and functional mobility, decreased activity tolerance and increased pain  Patient to benefit from continued Occupational Therapy treatment while in the hospital to address deficits as defined above and maximize level of functional independence with ADLs and functional mobility  Occupational Performance areas to address include: grooming , bathing/ shower, dressing, toilet hygiene, transfer to all surfaces, functional mobility, health maintenance and IADLs: safety procedures  From OT standpoint, recommendation at time of d/c would be Short Term Rehab  Plan   Treatment Interventions ADL retraining;Functional transfer training;UE strengthening/ROM; Endurance training;Patient/family training;Equipment evaluation/education; Compensatory technique education;Continued evaluation; Energy conservation; Activityengagement   Goal Expiration Date 03/29/21   OT Frequency 3-5x/wk   Recommendation   OT Discharge Recommendation Post-Acute Rehabilitation Services   AM-PAC Daily Activity Inpatient   Lower Body Dressing 1   Bathing 2   Toileting 2   Upper Body Dressing 2   Grooming 3   Eating 3   Daily Activity Raw Score 13 Daily Activity Standardized Score (Calc for Raw Score >=11) 32 03   AM-PAC Applied Cognition Inpatient   Following a Speech/Presentation 3   Understanding Ordinary Conversation 3   Taking Medications 2   Remembering Where Things Are Placed or Put Away 2   Remembering List of 4-5 Errands 1   Taking Care of Complicated Tasks 1   Applied Cognition Raw Score 12   Applied Cognition Standardized Score 28 82   Barthel Index   Feeding 5   Bathing 0   Grooming Score 5   Dressing Score 5   Bladder Score 5   Bowels Score 5   Toilet Use Score 5   Transfers (Bed/Chair) Score 5   Mobility (Level Surface) Score 0   Stairs Score 0   Barthel Index Score 35   Modified [place] Scale   Modified [place]ila Scale 4       Occupational Therapy goals: In 7-14 days:     1- Patient will verbalize and demonstrate use of energy conservation/ deep breathing technique and work simplification skills during functional activity with no verbal cues  2-Patient will verbalize and demonstrate good body mechanics and joint protection techniques during  ADLs/ IADLs with no verbal cues  3- Patient will increase OOB/ sitting tolerance to 2-4 hours per day for increased participation in self care and leisure tasks with no s/s of exertion  4-Patient will increase standing tolerance time to 5  minutes with unilateral UE support to complete sink level ADLs@ mod I level   5- Patient will increase sitting tolerance at edge of bed to 20 minutes to complete UB ADLs @ set up assist level  6- Patient will transfer bed to Chair / toilet at Set up assist level with AD as indicated  7- Patient will complete UB ADLs with set up assist  8- Patient will complete LB ADLs with min assist with the use of adaptive equipment  9- Patient will complete toileting hygiene with set up assist/ supervision for thoroughness    10-Patient/ Family  will demonstrate competency with UE Home Exercise Program
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
## 2021-03-15 NOTE — DISCHARGE INSTR - OTHER ORDERS
6- Apply Alleyvn foam to B/L heels  Grabiel right with a T for treatment  Peel back every shift to check skin integrity  Change every 3 days  7- Cleanse left posterior tibial with NSS, pat dry  Apply adaptic, maxorb, cover with ABD and wrap with Kerlex  Change every other day or PRN for soilage or dislodgement

## 2021-03-15 NOTE — PLAN OF CARE
Problem: Prexisting or High Potential for Compromised Skin Integrity  Goal: Skin integrity is maintained or improved  Description: INTERVENTIONS:  - Identify patients at risk for skin breakdown  - Assess and monitor skin integrity  - Assess and monitor nutrition and hydration status  - Monitor labs   - Assess for incontinence   - Turn and reposition patient  - Assist with mobility/ambulation  - Relieve pressure over bony prominences  - Avoid friction and shearing  - Provide appropriate hygiene as needed including keeping skin clean and dry  - Evaluate need for skin moisturizer/barrier cream  - Collaborate with interdisciplinary team   - Patient/family teaching  - Consider wound care consult   Outcome: Progressing     Problem: Potential for Falls  Goal: Patient will remain free of falls  Description: INTERVENTIONS:  - Assess patient frequently for physical needs  -  Identify cognitive and physical deficits and behaviors that affect risk of falls    -  [place] fall precautions as indicated by assessment   - Educate patient/family on patient safety including physical limitations  - Instruct patient to call for assistance with activity based on assessment  - Modify environment to reduce risk of injury  - Consider OT/PT consult to assist with strengthening/mobility  Outcome: Progressing     Problem: PAIN - ADULT  Goal: Verbalizes/displays adequate comfort level or baseline comfort level  Description: Interventions:  - Encourage patient to monitor pain and request assistance  - Assess pain using appropriate pain scale  - Administer analgesics based on type and severity of pain and evaluate response  - Implement non-pharmacological measures as appropriate and evaluate response  - Consider cultural and social influences on pain and pain management  - Notify physician/advanced practitioner if interventions unsuccessful or patient reports new pain  Outcome: Progressing     Problem: INFECTION - ADULT  Goal: Absence or prevention of progression during hospitalization  Description: INTERVENTIONS:  - Assess and monitor for signs and symptoms of infection  - Monitor lab/diagnostic results  - Monitor all insertion sites, i e  indwelling lines, tubes, and drains  - Monitor endotracheal if appropriate and nasal secretions for changes in amount and color  - [place] appropriate cooling/warming therapies per order  - Administer medications as ordered  - Instruct and encourage patient and family to use good hand hygiene technique  - Identify and instruct in appropriate isolation precautions for identified infection/condition  Outcome: Progressing  Goal: Absence of fever/infection during neutropenic period  Description: INTERVENTIONS:  - Monitor WBC    Outcome: Progressing     Problem: SAFETY ADULT  Goal: Patient will remain free of falls  Description: INTERVENTIONS:  - Assess patient frequently for physical needs  -  Identify cognitive and physical deficits and behaviors that affect risk of falls    -  [place] fall precautions as indicated by assessment   - Educate patient/family on patient safety including physical limitations  - Instruct patient to call for assistance with activity based on assessment  - Modify environment to reduce risk of injury  - Consider OT/PT consult to assist with strengthening/mobility  Outcome: Progressing  Goal: Maintain or return to baseline ADL function  Description: INTERVENTIONS:  -  Assess patient's ability to carry out ADLs; assess patient's baseline for ADL function and identify physical deficits which impact ability to perform ADLs (bathing, care of mouth/teeth, toileting, grooming, dressing, etc )  - Assess/evaluate cause of self-care deficits   - Assess range of motion  - Assess patient's mobility; develop plan if impaired  - Assess patient's need for assistive devices and provide as appropriate  - Encourage maximum independence but intervene and supervise when necessary  - Involve family in performance of ADLs  - Assess for home care needs following discharge   - Consider OT consult to assist with ADL evaluation and planning for discharge  - Provide patient education as appropriate  Outcome: Progressing  Goal: Maintain or return mobility status to optimal level  Description: INTERVENTIONS:  - Assess patient's baseline mobility status (ambulation, transfers, stairs, etc )    - Identify cognitive and physical deficits and behaviors that affect mobility  - Identify mobility aids required to assist with transfers and/or ambulation (gait belt, sit-to-stand, lift, walker, cane, etc )  - [place] fall precautions as indicated by assessment  - Record patient progress and toleration of activity level on Mobility SBAR; progress patient to next Phase/Stage  - Instruct patient to call for assistance with activity based on assessment  - Consider rehabilitation consult to assist with strengthening/weightbearing, etc   Outcome: Progressing     Problem: Knowledge Deficit  Goal: Patient/family/caregiver demonstrates understanding of disease process, treatment plan, medications, and discharge instructions  Description: Complete learning assessment and assess knowledge base    Interventions:  - Provide teaching at level of understanding  - Provide teaching via preferred learning methods  Outcome: Progressing

## 2021-03-15 NOTE — PLAN OF CARE
Problem: PHYSICAL THERAPY ADULT  Goal: Performs mobility at highest level of function for planned discharge setting  See evaluation for individualized goals  Description: Treatment/Interventions: Functional transfer training, LE strengthening/ROM, Therapeutic exercise, Endurance training, Cognitive reorientation, Patient/family training, Bed mobility, Spoke to nursing, OT          See flowsheet documentation for full assessment, interventions and recommendations  Note: Prognosis: Good  Problem List: Decreased strength, Impaired balance, Decreased endurance, Decreased mobility, Decreased cognition, Pain, Decreased skin integrity  Assessment: Pt is [de-identified]year old female seen for PT evaluation s/p admit to [facility] on 3/14/2021 with Lower extremity cellulitis  PT consulted to assess pt's functional mobility and d/c needs  Order placed for PT eval and tx, with up and OOB as tolerated order  Comorbidities affecting pt's physical performance at time of assessment include seizure disorder, GERD, essential hypertension, hyperlipidemia, ambulatory dysfunction, lower extremity edema, and hypokalemia  PTA, pt was requiring assist for mobility and utilized a RW  Personal factors affecting pt at time of IE include lives in a one story house, inability to ambulate household distances, inability to navigate community distances, inability to navigate level surfaces without external assistance, unable to perform dynamic tasks in community, decreased cognition, positive fall history, inability to perform IADLs, and inability to perform ADLs  Please find objective findings from PT assessment regarding body systems outlined above with impairments and limitations including weakness, impaired balance, decreased endurance, inability to ambulate, pain, decreased activity tolerance, decreased functional mobility tolerance, fall risk, decreased skin integrity, and decreased cognition   The following objective measures performed on IE also reveal limitations: Barthel Index: 35/100 and Modified Jamila: 4 (moderate/severe disability)  Pt's clinical presentation is currently unstable/unpredictable seen in pt's presentation of need for ongoing medical management/monitoring, pt is a fall risk, and pt requires cues/assist for safety with functional mobility  Pt to benefit from continued PT tx to address deficits as defined above and maximize level of functional independent mobility and consistency  From PT/mobility standpoint, recommendation at time of d/c would be STR pending progress in order to facilitate return to PLOF  Barriers to Discharge: Decreased caregiver support     PT Discharge Recommendation: Post-Acute Rehabilitation Services     PT - OK to Discharge: Yes(when medically cleared, if to STR)    See flowsheet documentation for full assessment

## 2021-03-16 LAB
ANION GAP SERPL CALCULATED.3IONS-SCNC: 3 MMOL/L (ref 4–13)
BUN SERPL-MCNC: 6 MG/DL (ref 5–25)
CALCIUM SERPL-MCNC: 8.4 MG/DL (ref 8.3–10.1)
CHLORIDE SERPL-SCNC: 104 MMOL/L (ref 100–108)
CO2 SERPL-SCNC: 33 MMOL/L (ref 21–32)
CREAT SERPL-MCNC: 0.74 MG/DL (ref 0.6–1.3)
ERYTHROCYTE [DISTWIDTH] IN BLOOD BY AUTOMATED COUNT: 15.5 % (ref 11.6–15.1)
GFR SERPL CREATININE-BSD FRML MDRD: 88 ML/MIN/1.73SQ M
GLUCOSE SERPL-MCNC: 90 MG/DL (ref 65–140)
HCT VFR BLD AUTO: 32.3 % (ref 34.8–46.1)
HGB BLD-MCNC: 9.9 G/DL (ref 11.5–15.4)
MCH RBC QN AUTO: 29.5 PG (ref 26.8–34.3)
MCHC RBC AUTO-ENTMCNC: 30.7 G/DL (ref 31.4–37.4)
MCV RBC AUTO: 96 FL (ref 82–98)
PLATELET # BLD AUTO: 181 THOUSANDS/UL (ref 149–390)
PMV BLD AUTO: 10.7 FL (ref 8.9–12.7)
POTASSIUM SERPL-SCNC: 4.2 MMOL/L (ref 3.5–5.3)
RBC # BLD AUTO: 3.36 MILLION/UL (ref 3.81–5.12)
SODIUM SERPL-SCNC: 140 MMOL/L (ref 136–145)
WBC # BLD AUTO: 7.22 THOUSAND/UL (ref 4.31–10.16)

## 2021-03-16 PROCEDURE — 99232 SBSQ HOSP IP/OBS MODERATE 35: CPT | Performed by: NURSE PRACTITIONER

## 2021-03-16 PROCEDURE — 85027 COMPLETE CBC AUTOMATED: CPT | Performed by: PHYSICIAN ASSISTANT

## 2021-03-16 PROCEDURE — 80048 BASIC METABOLIC PNL TOTAL CA: CPT | Performed by: PHYSICIAN ASSISTANT

## 2021-03-16 PROCEDURE — 97530 THERAPEUTIC ACTIVITIES: CPT

## 2021-03-16 RX ADMIN — PHENOBARBITAL 64.8 MG: 32.4 TABLET ORAL at 08:42

## 2021-03-16 RX ADMIN — HEPARIN SODIUM 5000 UNITS: 5000 INJECTION INTRAVENOUS; SUBCUTANEOUS at 06:30

## 2021-03-16 RX ADMIN — PHENOBARBITAL 64.8 MG: 32.4 TABLET ORAL at 17:48

## 2021-03-16 RX ADMIN — LEVETIRACETAM 500 MG: 500 TABLET, FILM COATED ORAL at 08:42

## 2021-03-16 RX ADMIN — CEFAZOLIN SODIUM 2000 MG: 2 SOLUTION INTRAVENOUS at 23:05

## 2021-03-16 RX ADMIN — CEFAZOLIN SODIUM 2000 MG: 2 SOLUTION INTRAVENOUS at 06:30

## 2021-03-16 RX ADMIN — LEVETIRACETAM 500 MG: 500 TABLET, FILM COATED ORAL at 23:05

## 2021-03-16 RX ADMIN — HEPARIN SODIUM 5000 UNITS: 5000 INJECTION INTRAVENOUS; SUBCUTANEOUS at 14:35

## 2021-03-16 RX ADMIN — METOPROLOL TARTRATE 25 MG: 25 TABLET, FILM COATED ORAL at 08:42

## 2021-03-16 RX ADMIN — ACETAMINOPHEN 650 MG: 325 TABLET, FILM COATED ORAL at 17:48

## 2021-03-16 RX ADMIN — HEPARIN SODIUM 5000 UNITS: 5000 INJECTION INTRAVENOUS; SUBCUTANEOUS at 23:05

## 2021-03-16 RX ADMIN — PANTOPRAZOLE SODIUM 40 MG: 40 TABLET, DELAYED RELEASE ORAL at 06:30

## 2021-03-16 RX ADMIN — ATORVASTATIN CALCIUM 80 MG: 40 TABLET, FILM COATED ORAL at 08:42

## 2021-03-16 RX ADMIN — CEFAZOLIN SODIUM 2000 MG: 2 SOLUTION INTRAVENOUS at 14:35

## 2021-03-16 RX ADMIN — ACETAMINOPHEN 650 MG: 325 TABLET, FILM COATED ORAL at 10:34

## 2021-03-16 RX ADMIN — METOPROLOL TARTRATE 25 MG: 25 TABLET, FILM COATED ORAL at 23:08

## 2021-03-16 NOTE — PLAN OF CARE
Problem: PHYSICAL THERAPY ADULT  Goal: Performs mobility at highest level of function for planned discharge setting  See evaluation for individualized goals  Description: Treatment/Interventions: Functional transfer training, LE strengthening/ROM, Therapeutic exercise, Endurance training, Cognitive reorientation, Patient/family training, Bed mobility, Spoke to nursing, OT          See flowsheet documentation for full assessment, interventions and recommendations  Outcome: Progressing  Note: Prognosis: Good  Problem List: Decreased strength, Impaired balance, Decreased endurance, Decreased mobility, Decreased cognition, Pain, Decreased skin integrity  Assessment: Pt seen for PT treatment session this date with interventions consisting of gait training w/ emphasis on improving pt's ability to ambulate level surfaces x 3 forward steps with min A of 2 provided by therapist with RW and therapeutic activity consisting of training: sit<>stand transfers  Pt agreeable to PT treatment session upon arrival, pt found seated OOB in recliner, in no apparent distress, A&O x 2 and responsive  In comparison to previous session, pt with improvements in ability to initiate steps using RW; however, further distance ambulation deferred secondary to ongoing LLE pain  Post session: pt returned back to recliner, bed alarm engaged, all needs in reach and RN notified of session findings/recommendations  Continue to recommend STR at time of d/c in order to maximize pt's functional independence and safety w/ mobility  Pt continues to be functioning below baseline level, and remains limited 2* factors listed above  PT will continue to see pt while here in order to address the deficits listed above and provide interventions consistent w/ POC in effort to achieve STGs    Barriers to Discharge: Decreased caregiver support     PT Discharge Recommendation: Post-Acute Rehabilitation Services     PT - OK to Discharge: Yes(when medically cleared; if to STR)    See flowsheet documentation for full assessment

## 2021-03-16 NOTE — PHYSICAL THERAPY NOTE
Physical Therapy Treatment Note       03/16/21 1525   PT Last Visit   PT Visit Date 03/16/21   Note Type   Note Type Treatment   Pain Assessment   Pain Assessment Tool FLACC   Pain Location/Orientation Orientation: Left; Location: Leg;Orientation: Lower   Hospital Pain Intervention(s) Repositioned   Pain Rating: FLACC (Rest) - Face 0   Pain Rating: FLACC (Rest) - Legs 0   Pain Rating: FLACC (Rest) - Activity 0   Pain Rating: FLACC (Rest) - Cry 1   Pain Rating: FLACC (Rest) - Consolability 1   Score: FLACC (Rest) 2   Pain Rating: FLACC (Activity) - Face 1   Pain Rating: FLACC (Activity) - Legs 1   Pain Rating: FLACC (Activity) - Activity 1   Pain Rating: FLACC (Activity) - Cry 1   Pain Rating: FLACC (Activity) - Consolability 1   Score: FLACC (Activity) 5   Restrictions/Precautions   Weight Bearing Precautions Per Order No   Other Precautions Cognitive; Bed Alarm; Chair Alarm;Pain; Fall Risk;Multiple lines   General   Chart Reviewed Yes   Response to Previous Treatment Patient unable to report, no changes reported from family or staff   Family/Caregiver Present No   Cognition   Overall Cognitive Status Impaired   Arousal/Participation Alert; Responsive; Cooperative   Attention Attends with cues to redirect   Orientation Level Oriented to person;Oriented to place; Disoriented to time;Disoriented to situation  ("hospital" and "October, March")   Memory Decreased recall of precautions;Decreased recall of recent events;Decreased short term memory   Following Commands Follows one step commands with increased time or repetition   Comments patient agreeable to PT treatment session with encouragement   Subjective   Subjective "My left leg hurts"   Bed Mobility   Additional Comments patient seated out of bed in recliner chair upon arrival   Transfers   Sit to Stand 3  Moderate assistance   Additional items Assist x 2; Increased time required;Verbal cues;Armrests   Stand to Sit 3  Moderate assistance   Additional items Assist x 2;Increased time required;Verbal cues;Armrests   Additional Comments provided patient education on benefits of mobility/increased activity to meet personal goals   Ambulation/Elevation   Gait pattern Excessively slow; Short stride;Decreased foot clearance; Antalgic   Gait Assistance 4  Minimal assist   Additional items Assist x 2;Verbal cues   Assistive Device Rolling walker   Distance 3 forward steps   Stair Management Assistance Not tested   Balance   Static Sitting Fair +   Dynamic Sitting Fair   Static Standing Poor +   Dynamic Standing Poor   Endurance Deficit   Endurance Deficit Yes   Activity Tolerance   Activity Tolerance Patient limited by pain   Medical Staff Made Aware PT Faith Purdy   Nurse Made Aware RN Geovani Rider confirmed patient appropriate for therapy; made aware of session outcomes   Assessment   Prognosis Good   Problem List Decreased strength; Impaired balance;Decreased endurance;Decreased mobility; Decreased cognition;Pain;Decreased skin integrity   Assessment Pt seen for PT treatment session this date with interventions consisting of gait training w/ emphasis on improving pt's ability to ambulate level surfaces x 3 forward steps with min A of 2 provided by therapist with RW and therapeutic activity consisting of training: sit<>stand transfers  Pt agreeable to PT treatment session upon arrival, pt found seated OOB in recliner, in no apparent distress, A&O x 2 and responsive  In comparison to previous session, pt with improvements in ability to initiate steps using RW; however, further distance ambulation deferred secondary to ongoing LLE pain  Post session: pt returned back to recliner, bed alarm engaged, all needs in reach and RN notified of session findings/recommendations  Continue to recommend STR at time of d/c in order to maximize pt's functional independence and safety w/ mobility  Pt continues to be functioning below baseline level, and remains limited 2* factors listed above   PT will continue to see pt while here in order to address the deficits listed above and provide interventions consistent w/ POC in effort to achieve STGs  Barriers to Discharge Decreased caregiver support   Goals   Patient Goals "to go home and sit and clean my house"   STG Expiration Date 03/25/21   Short Term Goal #1 Increase bilateral LE strength 1/2 grade to facilitate independent mobility, Perform all bed mobility tasks with min A of 1 to decrease caregiver burden, Perform all transfers with min A of 1 to improve independence, Ambulate > 50 ft  with RW with min A of 1 w/o LOB and w/ normalized gait pattern 100% of the time and Increase all balance 1/2 grade to decrease risk for falls    PT Treatment Day 1   Plan   Treatment/Interventions Functional transfer training;LE strengthening/ROM; Therapeutic exercise; Endurance training;Patient/family training;Equipment eval/education;Gait training;Bed mobility;Spoke to nursing   Progress Slow progress, decreased activity tolerance   PT Frequency Other (Comment)  (3-5x/wk)   Recommendation   PT Discharge Recommendation Post-Acute Rehabilitation Services   PT - OK to Discharge Yes  (when medically cleared; if to STR)   Muna 8 in Bed Without Bedrails 2   Lying on Back to Sitting on Edge of Flat Bed 2   Moving Bed to Chair 2   Standing Up From Chair 2   Walk in Room 2   Climb 3-5 Stairs 1   Basic Mobility Inpatient Raw Score 11   Basic Mobility Standardized Score 30 25       Sri Muhammad PT, DPT    Time of PT treatment session: 15:01-15:25  24 minutes

## 2021-03-16 NOTE — PROGRESS NOTES
[facility]  Progress Note - Eun Edmonds 1940, [de-identified] y o  female MRN: [id number]  Unit/Bed#: -[facility] Encounter: [id number]  Primary Care Provider: Dl Willoughby DO   Date and time admitted to hospital: 3/14/2021 11:29 AM    * Lower extremity cellulitis  Assessment & Plan  · Pt presented with left lower extremity warmth, erythema and tenderness with open wound of the posterior left leg   · CT scan left leg with subcutaneous edema and skin thickening consistent with nonspecific cellulitis without evidence of drainable collection, prominent blister posteriorly noted   · Baseline lower extremity edema from lymphedema bilaterally   · Pt remains afebrile without leukocytosis   · Continue high dose IV Ancef for now and monitor  · Blood cultures are negative for 24 hours and wound culture are positive for g positive cocci  · Wound care recommendations are in place  · Will likely have slow improvement in setting of significant baseline lymphedema, encourage leg elevation, compression     Ambulatory dysfunction  Assessment & Plan  · Pt lives at home with her daughter, uses a cane/walker at baseline   · Having increased difficulties with ambulation in setting of underlying cellulitis and edema   · PT/OT recommending short-term rehab    Hypokalemia  Assessment & Plan  · Resolved with supplementation    Lower extremity edema  Assessment & Plan  · Likely in the context of lymphedema  · , no respiratory complaints, no prior ECHO to review, low suspicion for CHF at this time   · Continue aggressive edema measures, compression and leg elevation   · Venous duplex negative for DVT  · Continue IV abx for cellulitis as above   · Monitor closely  · Wound care recommendations ordered      Hyperlipidemia  Assessment & Plan  · Continue Lipitor 80 mg daily     Essential hypertension  Assessment & Plan  · Hypertensive urgency on admission likely in setting of pain, improvement noted   · Continue Lopressor 25 mg BID   · PRN Labetalol for SBP > 190 mmHg   · Monitor     GERD (gastroesophageal reflux disease)  Assessment & Plan  · Continue Protonix 40 mg daily     Seizure disorder (HCC)  Assessment & Plan  · Continue Keppra 500 mg BID   · No evidence of seizure like activity in the hospital thus far   · Seizure precautions          VTE Pharmacologic Prophylaxis:   Pharmacologic: Heparin  Mechanical VTE Prophylaxis in Place: Yes    Patient Centered Rounds: I have performed bedside rounds with nursing staff today  Discussions with Specialists or Other Care Team Provider:  Reviewed previous provider's notes discussed with case management and primary RN    Education and Discussions with Family / Patient:  Discussed plan of care with patient and family denies any additional questions or concerns at this time    Time Spent for Care: 30 minutes  More than 50% of total time spent on counseling and coordination of care as described above  Current Length of Stay: 2 day(s)    Current Patient Status: Inpatient   Certification Statement: The patient will continue to require additional inpatient hospital stay due to Continued wound care, continued IV antibiotics    Discharge Plan / Estimated Discharge Date:  48 hours pending improvement of edema and cellulitis      Code Status: Level 1 - Full Code      Subjective:   Denies any chest pain chest tightness shortness of breath or difficulty breathing  She reports that he not swelling that she has right now is pretty close of her typical baseline swelling  She is out of bed in the chair she knows to keep her legs elevated as much as she can    She is tolerating her meals she denies any significant amount of pain reports that her left lower extremity is a little more tender than the right does not appear to be in any acute distress and is agreeable to staying in the hospital for an additional 1-2 days for IV antibiotics    Objective:     Vitals:   Temp (24hrs), Av 5 °F (36 9 °C), Min:97 6 °F (36 4 °C), Max:99 3 °F (37 4 °C)    Temp:  [97 6 °F (36 4 °C)-99 3 °F (37 4 °C)] 97 6 °F (36 4 °C)  HR:  [83-97] 83  Resp:  [17] 17  BP: (130-133)/(80-85) 130/85  SpO2:  [95 %-96 %] 95 %  Body mass index is 41 4 kg/m²  Input and Output Summary (last 24 hours): Intake/Output Summary (Last 24 hours) at 3/16/2021 1004  Last data filed at 3/16/2021 0401  Gross per 24 hour   Intake 60 ml   Output 750 ml   Net -690 ml     Physical Exam:     Physical Exam  Vitals signs and nursing note reviewed  Constitutional:       General: She is not in acute distress  Appearance: She is obese  She is ill-appearing  Cardiovascular:      Rate and Rhythm: Normal rate  Pulses: Normal pulses  Heart sounds: Normal heart sounds  Pulmonary:      Effort: Pulmonary effort is normal       Breath sounds: Normal breath sounds  Abdominal:      Palpations: Abdomen is soft  Comments: Rotund   Musculoskeletal: Normal range of motion  General: Tenderness present  Right lower leg: Edema present  Left lower leg: Edema present  Skin:     General: Skin is warm  Findings: Erythema present  Neurological:      General: No focal deficit present  Mental Status: She is alert and oriented to person, place, and time  Mental status is at baseline  Psychiatric:         Mood and Affect: Mood normal          Thought Content:  Thought content normal          Judgment: Judgment normal        Additional Data:     Labs:    Results from last 7 days   Lab Units 03/16/21  0454 03/15/21  0603   WBC Thousand/uL 7 22 6 64   HEMOGLOBIN g/dL 9 9* 10 2*   HEMATOCRIT % 32 3* 33 3*   PLATELETS Thousands/uL 181 181   NEUTROS PCT %  --  54   LYMPHS PCT %  --  25   MONOS PCT %  --  14*   EOS PCT %  --  5     Results from last 7 days   Lab Units 03/16/21  0454  03/14/21  1257   POTASSIUM mmol/L 4 2   < > 3 7   CHLORIDE mmol/L 104   < > 104   CO2 mmol/L 33*   < > 35*   BUN mg/dL 6   < > 6 CREATININE mg/dL 0 74   < > 0 71   CALCIUM mg/dL 8 4   < > 8 9   ALK PHOS U/L  --   --  156*   ALT U/L  --   --  31   AST U/L  --   --  24    < > = values in this interval not displayed  Results from last 7 days   Lab Units 03/14/21  1257   INR  1 10       * I Have Reviewed All Lab Data Listed Above  * Additional Pertinent Lab Tests Reviewed: Jr 66 Admission Reviewed    Recent Cultures (last 7 days):     Results from last 7 days   Lab Units 03/15/21  0745 03/14/21  1257   BLOOD CULTURE   --  No Growth at 24 hrs  No Growth at 24 hrs  GRAM STAIN RESULT  No polys seen*  1+ Gram positive cocci in pairs*  --        Last 24 Hours Medication List:   Current Facility-Administered Medications   Medication Dose Route Frequency Provider Last Rate    acetaminophen  650 mg Oral Q6H PRN Jens Hill MD      albuterol  2 puff Inhalation Q4H PRN Jens Hill MD      atorvastatin  80 mg Oral Daily Jens Hill MD      cefazolin  2,000 mg Intravenous Q8H Jens Hill MD 2,000 mg (03/16/21 0630)    heparin (porcine)  5,000 Units Subcutaneous Q8H [address] Jens Hill MD      Labetalol HCl  10 mg Intravenous Q4H PRN Jens Hill MD      levETIRAcetam  500 mg Oral Q12H [address] Jens Hill MD      metoprolol tartrate  25 mg Oral Q12H [address] Jens Hill MD      pantoprazole  40 mg Oral Early Morning Jens Hill MD      PHENobarbital  64 8 mg Oral BID Sagrario Boone MD          Today, Patient Was Seen By: AUREA Lee    ** Please Note: Dragon 360 Dictation voice to text software may have been used in the creation of this document   **

## 2021-03-16 NOTE — ASSESSMENT & PLAN NOTE
· Pt lives at home with her daughter, uses a cane/walker at baseline   · Having increased difficulties with ambulation in setting of underlying cellulitis and edema   · PT/OT recommending short-term rehab

## 2021-03-16 NOTE — CASE MANAGEMENT
CM discussed pt in interdisciplinary care rounds  CM discussed therapy recommendation w/ DTR Odessa Jaramillo [phone] (M) who states she declines STR and wants pt to return home at d/c    [facility]  Pt has ride home via [id number] [facility]

## 2021-03-16 NOTE — PLAN OF CARE
Problem: Prexisting or High Potential for Compromised Skin Integrity  Goal: Skin integrity is maintained or improved  Description: INTERVENTIONS:  - Identify patients at risk for skin breakdown  - Assess and monitor skin integrity  - Assess and monitor nutrition and hydration status  - Monitor labs   - Assess for incontinence   - Turn and reposition patient  - Assist with mobility/ambulation  - Relieve pressure over bony prominences  - Avoid friction and shearing  - Provide appropriate hygiene as needed including keeping skin clean and dry  - Evaluate need for skin moisturizer/barrier cream  - Collaborate with interdisciplinary team   - Patient/family teaching  - Consider wound care consult   Outcome: Progressing     Problem: Potential for Falls  Goal: Patient will remain free of falls  Description: INTERVENTIONS:  - Assess patient frequently for physical needs  -  Identify cognitive and physical deficits and behaviors that affect risk of falls    -  [place] fall precautions as indicated by assessment   - Educate patient/family on patient safety including physical limitations  - Instruct patient to call for assistance with activity based on assessment  - Modify environment to reduce risk of injury  - Consider OT/PT consult to assist with strengthening/mobility  Outcome: Progressing     Problem: PAIN - ADULT  Goal: Verbalizes/displays adequate comfort level or baseline comfort level  Description: Interventions:  - Encourage patient to monitor pain and request assistance  - Assess pain using appropriate pain scale  - Administer analgesics based on type and severity of pain and evaluate response  - Implement non-pharmacological measures as appropriate and evaluate response  - Consider cultural and social influences on pain and pain management  - Notify physician/advanced practitioner if interventions unsuccessful or patient reports new pain  Outcome: Progressing     Problem: INFECTION - ADULT  Goal: Absence or prevention of progression during hospitalization  Description: INTERVENTIONS:  - Assess and monitor for signs and symptoms of infection  - Monitor lab/diagnostic results  - Monitor all insertion sites, i e  indwelling lines, tubes, and drains  - Monitor endotracheal if appropriate and nasal secretions for changes in amount and color  - [place] appropriate cooling/warming therapies per order  - Administer medications as ordered  - Instruct and encourage patient and family to use good hand hygiene technique  - Identify and instruct in appropriate isolation precautions for identified infection/condition  Outcome: Progressing  Goal: Absence of fever/infection during neutropenic period  Description: INTERVENTIONS:  - Monitor WBC    Outcome: Progressing     Problem: SAFETY ADULT  Goal: Patient will remain free of falls  Description: INTERVENTIONS:  - Assess patient frequently for physical needs  -  Identify cognitive and physical deficits and behaviors that affect risk of falls    -  [place] fall precautions as indicated by assessment   - Educate patient/family on patient safety including physical limitations  - Instruct patient to call for assistance with activity based on assessment  - Modify environment to reduce risk of injury  - Consider OT/PT consult to assist with strengthening/mobility  Outcome: Progressing  Goal: Maintain or return to baseline ADL function  Description: INTERVENTIONS:  -  Assess patient's ability to carry out ADLs; assess patient's baseline for ADL function and identify physical deficits which impact ability to perform ADLs (bathing, care of mouth/teeth, toileting, grooming, dressing, etc )  - Assess/evaluate cause of self-care deficits   - Assess range of motion  - Assess patient's mobility; develop plan if impaired  - Assess patient's need for assistive devices and provide as appropriate  - Encourage maximum independence but intervene and supervise when necessary  - Involve family in performance of ADLs  - Assess for home care needs following discharge   - Consider OT consult to assist with ADL evaluation and planning for discharge  - Provide patient education as appropriate  Outcome: Progressing  Goal: Maintain or return mobility status to optimal level  Description: INTERVENTIONS:  - Assess patient's baseline mobility status (ambulation, transfers, stairs, etc )    - Identify cognitive and physical deficits and behaviors that affect mobility  - Identify mobility aids required to assist with transfers and/or ambulation (gait belt, sit-to-stand, lift, walker, cane, etc )  - [place] fall precautions as indicated by assessment  - Record patient progress and toleration of activity level on Mobility SBAR; progress patient to next Phase/Stage  - Instruct patient to call for assistance with activity based on assessment  - Consider rehabilitation consult to assist with strengthening/weightbearing, etc   Outcome: Progressing     Problem: DISCHARGE PLANNING  Goal: Discharge to home or other facility with appropriate resources  Description: INTERVENTIONS:  - Identify barriers to discharge w/patient and caregiver  - Arrange for needed discharge resources and transportation as appropriate  - Identify discharge learning needs (meds, wound care, etc )  - Arrange for interpretive services to assist at discharge as needed  - Refer to Case Management Department for coordinating discharge planning if the patient needs post-hospital services based on physician/advanced practitioner order or complex needs related to functional status, cognitive ability, or social support system  Outcome: Progressing     Problem: Knowledge Deficit  Goal: Patient/family/caregiver demonstrates understanding of disease process, treatment plan, medications, and discharge instructions  Description: Complete learning assessment and assess knowledge base    Interventions:  - Provide teaching at level of understanding  - Provide teaching via preferred learning methods  Outcome: Progressing

## 2021-03-16 NOTE — ASSESSMENT & PLAN NOTE
· Pt presented with left lower extremity warmth, erythema and tenderness with open wound of the posterior left leg   · CT scan left leg with subcutaneous edema and skin thickening consistent with nonspecific cellulitis without evidence of drainable collection, prominent blister posteriorly noted   · Baseline lower extremity edema from lymphedema bilaterally   · Pt remains afebrile without leukocytosis   · Continue high dose IV Ancef for now and monitor  · Blood cultures are negative for 24 hours and wound culture are positive for g positive cocci  · Wound care recommendations are in place  · Will likely have slow improvement in setting of significant baseline lymphedema, encourage leg elevation, compression

## 2021-03-16 NOTE — ASSESSMENT & PLAN NOTE
· Likely in the context of lymphedema  · , no respiratory complaints, no prior ECHO to review, low suspicion for CHF at this time   · Continue aggressive edema measures, compression and leg elevation   · Venous duplex negative for DVT  · Continue IV abx for cellulitis as above   · Monitor closely  · Wound care recommendations ordered

## 2021-03-17 PROBLEM — E87.6 HYPOKALEMIA: Status: RESOLVED | Noted: 2021-03-15 | Resolved: 2021-03-17

## 2021-03-17 LAB
BACTERIA WND AEROBE CULT: ABNORMAL
ERYTHROCYTE [DISTWIDTH] IN BLOOD BY AUTOMATED COUNT: 15.2 % (ref 11.6–15.1)
GRAM STN SPEC: ABNORMAL
GRAM STN SPEC: ABNORMAL
HCT VFR BLD AUTO: 35.4 % (ref 34.8–46.1)
HGB BLD-MCNC: 11 G/DL (ref 11.5–15.4)
MCH RBC QN AUTO: 29.6 PG (ref 26.8–34.3)
MCHC RBC AUTO-ENTMCNC: 31.1 G/DL (ref 31.4–37.4)
MCV RBC AUTO: 95 FL (ref 82–98)
PLATELET # BLD AUTO: 175 THOUSANDS/UL (ref 149–390)
PMV BLD AUTO: 10.1 FL (ref 8.9–12.7)
RBC # BLD AUTO: 3.72 MILLION/UL (ref 3.81–5.12)
WBC # BLD AUTO: 6.6 THOUSAND/UL (ref 4.31–10.16)

## 2021-03-17 PROCEDURE — 97530 THERAPEUTIC ACTIVITIES: CPT

## 2021-03-17 PROCEDURE — 85027 COMPLETE CBC AUTOMATED: CPT | Performed by: PHYSICIAN ASSISTANT

## 2021-03-17 PROCEDURE — 97110 THERAPEUTIC EXERCISES: CPT

## 2021-03-17 PROCEDURE — 99232 SBSQ HOSP IP/OBS MODERATE 35: CPT | Performed by: PHYSICIAN ASSISTANT

## 2021-03-17 RX ADMIN — ATORVASTATIN CALCIUM 80 MG: 40 TABLET, FILM COATED ORAL at 09:05

## 2021-03-17 RX ADMIN — HEPARIN SODIUM 5000 UNITS: 5000 INJECTION INTRAVENOUS; SUBCUTANEOUS at 15:23

## 2021-03-17 RX ADMIN — CEFAZOLIN SODIUM 2000 MG: 2 SOLUTION INTRAVENOUS at 15:23

## 2021-03-17 RX ADMIN — PHENOBARBITAL 64.8 MG: 32.4 TABLET ORAL at 09:05

## 2021-03-17 RX ADMIN — METOPROLOL TARTRATE 25 MG: 25 TABLET, FILM COATED ORAL at 09:05

## 2021-03-17 RX ADMIN — ACETAMINOPHEN 650 MG: 325 TABLET, FILM COATED ORAL at 00:10

## 2021-03-17 RX ADMIN — LEVETIRACETAM 500 MG: 500 TABLET, FILM COATED ORAL at 22:25

## 2021-03-17 RX ADMIN — CEFAZOLIN SODIUM 2000 MG: 2 SOLUTION INTRAVENOUS at 22:26

## 2021-03-17 RX ADMIN — PANTOPRAZOLE SODIUM 40 MG: 40 TABLET, DELAYED RELEASE ORAL at 05:53

## 2021-03-17 RX ADMIN — HEPARIN SODIUM 5000 UNITS: 5000 INJECTION INTRAVENOUS; SUBCUTANEOUS at 22:26

## 2021-03-17 RX ADMIN — LEVETIRACETAM 500 MG: 500 TABLET, FILM COATED ORAL at 09:05

## 2021-03-17 RX ADMIN — PHENOBARBITAL 64.8 MG: 32.4 TABLET ORAL at 17:08

## 2021-03-17 RX ADMIN — CEFAZOLIN SODIUM 2000 MG: 2 SOLUTION INTRAVENOUS at 06:02

## 2021-03-17 RX ADMIN — METOPROLOL TARTRATE 25 MG: 25 TABLET, FILM COATED ORAL at 22:26

## 2021-03-17 RX ADMIN — ACETAMINOPHEN 650 MG: 325 TABLET, FILM COATED ORAL at 17:08

## 2021-03-17 RX ADMIN — HEPARIN SODIUM 5000 UNITS: 5000 INJECTION INTRAVENOUS; SUBCUTANEOUS at 05:53

## 2021-03-17 NOTE — ASSESSMENT & PLAN NOTE
· Pt lives at home with her daughter, uses a cane/walker at baseline   · Having increased difficulties with ambulation in setting of underlying cellulitis and edema   · PT/OT recommending short-term rehab, however upon discussion with CM and family, currently declining, requesting home with [facility] [address] [place] [address]

## 2021-03-17 NOTE — PLAN OF CARE
Problem: PHYSICAL THERAPY ADULT  Goal: Performs mobility at highest level of function for planned discharge setting  See evaluation for individualized goals  Description: Treatment/Interventions: Functional transfer training, LE strengthening/ROM, Therapeutic exercise, Endurance training, Cognitive reorientation, Patient/family training, Bed mobility, Spoke to nursing, OT          See flowsheet documentation for full assessment, interventions and recommendations  Outcome: Progressing  Note: Prognosis: Good  Problem List: Decreased strength, Decreased endurance, Impaired balance, Decreased mobility, Decreased cognition, Decreased skin integrity, Pain  Assessment: Chart reviewed  Pt was received supine in bed in NAD and agreeable to PT session  Pt was seen for physical therapy on this date with interventions consisting of therapeutic activity including bed mobility and sit to stand transfers, therapeutic exercises consisting of A/AAROM, 10 reps, bilateral lower extremities in the sitting position, and gait training with emphasis on improving pt's ability to ambulate level surfaces 3 feet, bed to chair, with RW and min assist x 2 provided by therapist  In comparison to previous sessions pt with improvements in ability to ambulate a slightly increased distance with use of RW on level surface  Pt also tolerated seated therapeutic exercise well  Post session pt was left seated in recliner in NAD, +chair alarm  Pt continues to be functioning below baseline level and remains limited secondary to pain, decreased lower extremity strength, impaired balance, decreased endurance, gait deviations, and decreased functional mobility  Continue to recommend STR at time of discharge to maximize pt's functional independence and safety with mobility  PT will continue to see pt while here in order to address the deficits listed above and provide interventions consistent with the POC in effort to achieve short term goals    Barriers to Discharge: Decreased caregiver support     PT Discharge Recommendation: Post-Acute Rehabilitation Services     PT - OK to Discharge: Yes(when medically cleared, if to STR)    See flowsheet documentation for full assessment

## 2021-03-17 NOTE — ASSESSMENT & PLAN NOTE
· Pt presented with left lower extremity warmth, erythema and tenderness with open wound of the posterior left leg   · CT scan left leg with subcutaneous edema and skin thickening consistent with nonspecific cellulitis without evidence of drainable collection, prominent blister posteriorly noted   · Baseline lower extremity edema from lymphedema bilaterally   · Pt remains afebrile without leukocytosis   · Continue high dose IV Ancef for now and monitor  · Blood cultures are negative for 24 hours and wound culture are positive for g positive cocci in pairs, likely strep  Continue to follow final culture results   · Wound care recommendations are in place  · Will likely have slow improvement in setting of significant baseline lymphedema, encourage leg elevation, compression   · CT scan lower extremity on the left with blister noted 7 0 x 3 1 cm, likely hemorrhagic component  Hgb 9 9 yesterday, still awaiting AM labs, if continued down trended may need to consider repeat CT scan to evaluate for any extravasation     · If no significant improvement over next 24 hours, consider ID consultation

## 2021-03-17 NOTE — PHYSICAL THERAPY NOTE
Physical Therapy Treatment Note     03/17/21 1439   PT Last Visit   PT Visit Date 03/17/21   Note Type   Note Type Treatment   Pain Assessment   Pain Assessment Tool FLACC   Pain Rating: FLACC (Rest) - Face 0   Pain Rating: FLACC (Rest) - Legs 0   Pain Rating: FLACC (Rest) - Activity 0   Pain Rating: FLACC (Rest) - Cry 0   Pain Rating: FLACC (Rest) - Consolability 0   Score: FLACC (Rest) 0   Pain Rating: FLACC (Activity) - Face 1   Pain Rating: FLACC (Activity) - Legs 1   Pain Rating: FLACC (Activity) - Activity 1   Pain Rating: FLACC (Activity) - Cry 1   Pain Rating: FLACC (Activity) - Consolability 1   Score: FLACC (Activity) 5   General   Chart Reviewed Yes   Response to Previous Treatment Patient unable to report, no changes reported from family or staff   Family/Caregiver Present No   Cognition   Overall Cognitive Status Impaired   Arousal/Participation Alert; Responsive; Cooperative   Attention Attends with cues to redirect   Orientation Level Oriented to person;Disoriented to place; Disoriented to time;Disoriented to situation   Memory Decreased recall of precautions;Decreased recall of recent events;Decreased short term memory   Following Commands Follows one step commands with increased time or repetition   Subjective   Subjective "I'll get out of bed "   Bed Mobility   Supine to Sit 3  Moderate assistance   Additional items Assist x 2;HOB elevated; Bedrails; Increased time required;Verbal cues;LE management   Transfers   Sit to Stand 4  Minimal assistance   Additional items Assist x 2; Increased time required;Verbal cues   Stand to Sit 4  Minimal assistance   Additional items Assist x 2;Armrests; Increased time required;Verbal cues   Ambulation/Elevation   Gait pattern Excessively slow; Step to;Short stride; Shuffling;Decreased foot clearance; Antalgic   Gait Assistance 4  Minimal assist   Additional items Assist x 2;Verbal cues   Assistive Device Rolling walker   Distance 3 feet, bed to chair   Balance   Static Sitting Fair +   Dynamic Sitting Fair   Static Standing Poor +   Dynamic Standing Poor   Ambulatory Poor   Endurance Deficit   Endurance Deficit Yes   Activity Tolerance   Activity Tolerance Patient limited by pain   Nurse Made Aware Discussed case with RN Danika Avelar; post session pt was left seated in recliner in NAD, all belongings within reach, +chair alarm   Exercises   Glute Sets Sitting;10 reps;AROM; Bilateral   Hip Flexion Sitting;10 reps;AAROM;AROM; Bilateral   Knee AROM Long Arc Quad Sitting;10 reps;AAROM;AROM; Bilateral   Ankle Pumps Sitting;10 reps;AROM; Bilateral   Assessment   Prognosis Good   Problem List Decreased strength;Decreased endurance; Impaired balance;Decreased mobility; Decreased cognition;Decreased skin integrity;Pain   Assessment Chart reviewed  Pt was received supine in bed in NAD and agreeable to PT session  Pt was seen for physical therapy on this date with interventions consisting of therapeutic activity including bed mobility and sit to stand transfers, therapeutic exercises consisting of A/AAROM, 10 reps, bilateral lower extremities in the sitting position, and gait training with emphasis on improving pt's ability to ambulate level surfaces 3 feet, bed to chair, with RW and min assist x 2 provided by therapist  In comparison to previous sessions pt with improvements in ability to ambulate a slightly increased distance with use of RW on level surface  Pt also tolerated seated therapeutic exercise well  Post session pt was left seated in recliner in NAD, +chair alarm  Pt continues to be functioning below baseline level and remains limited secondary to pain, decreased lower extremity strength, impaired balance, decreased endurance, gait deviations, and decreased functional mobility  Continue to recommend STR at time of discharge to maximize pt's functional independence and safety with mobility   PT will continue to see pt while here in order to address the deficits listed above and provide interventions consistent with the POC in effort to achieve short term goals  Barriers to Discharge Decreased caregiver support   Goals   STG Expiration Date 03/25/21   Plan   Treatment/Interventions Functional transfer training;LE strengthening/ROM; Therapeutic exercise; Endurance training;Cognitive reorientation;Patient/family training;Bed mobility;Gait training;Spoke to nursing;OT   Progress Slow progress, decreased activity tolerance   PT Frequency Other (Comment)  (3-5x/wk)   Recommendation   PT Discharge Recommendation Post-Acute Rehabilitation Services   PT - OK to Discharge Yes  (when medically cleared, if to STR)   AM-PAC Basic Mobility Inpatient   Turning in Bed Without Bedrails 2   Lying on Back to Sitting on Edge of Flat Bed 1   Moving Bed to Chair 1   Standing Up From Chair 2   Walk in Room 2   Climb 3-5 Stairs 2   Basic Mobility Inpatient Raw Score 10   Turning Head Towards Sound 4   Follow Simple Instructions 3   Low Function Basic Mobility Raw Score 17   Low Function Basic Mobility Standardized Score 27 46     Magali Sandoval, PT, DPT    Time of PT treatment session: 6524-6820  29 minutes

## 2021-03-17 NOTE — PROGRESS NOTES
[facility]  Progress Note - Muriel Fajardo 1940, [de-identified] y o  female MRN: [id number]  Unit/Bed#: -01 Encounter: [id number]  Primary Care Provider: Ama Del Real DO   Date and time admitted to hospital: 3/14/2021 11:29 AM      DOS: 3/17/2021  * Lower extremity cellulitis  Assessment & Plan  · Pt presented with left lower extremity warmth, erythema and tenderness with open wound of the posterior left leg   · CT scan left leg with subcutaneous edema and skin thickening consistent with nonspecific cellulitis without evidence of drainable collection, prominent blister posteriorly noted   · Baseline lower extremity edema from lymphedema bilaterally   · Pt remains afebrile without leukocytosis   · Continue high dose IV Ancef for now and monitor  · Blood cultures are negative for 24 hours and wound culture are positive for g positive cocci in pairs, likely strep  Continue to follow final culture results   · Wound care recommendations are in place  · Will likely have slow improvement in setting of significant baseline lymphedema, encourage leg elevation, compression   · CT scan lower extremity on the left with blister noted 7 0 x 3 1 cm, likely hemorrhagic component  Hgb 9 9 yesterday, still awaiting AM labs, if continued down trended may need to consider repeat CT scan to evaluate for any extravasation     · If no significant improvement over next 24 hours, consider ID consultation     Lower extremity edema  Assessment & Plan  · Likely in the context of lymphedema  · , no respiratory complaints, no prior ECHO to review, low suspicion for CHF at this time   · Continue aggressive edema measures, compression and leg elevation   · Venous duplex negative for DVT  · Continue IV abx for cellulitis as above   · Monitor closely  · Wound care recommendations ordered      Ambulatory dysfunction  Assessment & Plan  · Pt lives at home with her daughter, uses a cane/walker at baseline   · Having increased difficulties with ambulation in setting of underlying cellulitis and edema   · PT/OT recommending short-term rehab, however upon discussion with CM and family, currently declining, requesting home with [address]     Essential hypertension  Assessment & Plan  · Hypertensive urgency on admission likely in setting of pain, improvement noted   · Continue Lopressor 25 mg BID   · PRN Labetalol for SBP > 190 mmHg   · Monitor     Seizure disorder (HCC)  Assessment & Plan  · Continue Keppra 500 mg BID and phenobarbital 64 8 mg BID  · No evidence of seizure like activity in the hospital thus far   · Seizure precautions     Hypokalemia-resolved as of 3/17/2021  Assessment & Plan  · Resolved with supplementation, most recent potassium 4 2      VTE Pharmacologic Prophylaxis:   Pharmacologic: Heparin  Mechanical VTE Prophylaxis in Place: No    Patient Centered Rounds: I have evaluated patient without nursing staff present due to Speaking to nurse outside patient's room    Discussions with Specialists or Other Care Team Provider:  Discussed with RN, monique and reviewed previous notes    Education and Discussions with Family / Patient:  Discussed with patient at bedside as well as patient's daughter over the phone per patient request    Time Spent for Care: 20 minutes  More than 50% of total time spent on counseling and coordination of care as described above  Current Length of Stay: 3 day(s)    Current Patient Status: Inpatient   Certification Statement: The patient will continue to require additional inpatient hospital stay due to IV antibiotics, monitoring hemoglobin    Discharge Plan:  Not medically stable as above, awaiting for continued improvement of cellulitis and edema    Code Status: Level 1 - Full Code      Subjective:   Patient reports that she feels okay today, however continues to have significant pain in her lower extremities  More so on the right compared to the left    She reports that she fell at home and that is when she noticed a blister on the back of her leg on the left  She denies any chest pain, shortness of breath, abdominal pain, nausea or vomiting  Reports that her appetite is good  Objective:     Vitals:   Temp (24hrs), Av 9 °F (37 2 °C), Min:98 6 °F (37 °C), Max:99 1 °F (37 3 °C)    Temp:  [98 6 °F (37 °C)-99 1 °F (37 3 °C)] 99 1 °F (37 3 °C)  HR:  [76-84] 80  Resp:  [16-18] 17  BP: (133-153)/() 138/98  SpO2:  [92 %-95 %] 92 %  Body mass index is 41 4 kg/m²  Input and Output Summary (last 24 hours): Intake/Output Summary (Last 24 hours) at 3/17/2021 1358  Last data filed at 3/17/2021 0101  Gross per 24 hour   Intake 180 ml   Output 850 ml   Net -670 ml       Physical Exam:     Physical Exam  Vitals signs reviewed  Constitutional:       General: She is not in acute distress  Appearance: She is not toxic-appearing  Comments: Patient is in no acute distress lying in her hospital bed resting comfortably  Appears chronically ill  Very pleasant and cooperative  HENT:      Head: Normocephalic and atraumatic  Eyes:      Conjunctiva/sclera: Conjunctivae normal       Pupils: Pupils are equal, round, and reactive to light  Cardiovascular:      Rate and Rhythm: Normal rate and regular rhythm  Pulses: Normal pulses  Pulmonary:      Effort: Pulmonary effort is normal  No respiratory distress  Breath sounds: Normal breath sounds  No wheezing  Abdominal:      General: Bowel sounds are normal       Palpations: Abdomen is soft  Tenderness: There is no abdominal tenderness  There is no guarding  Musculoskeletal:      Right lower leg: Edema present  Left lower leg: Edema present  Comments: Bilateral lower extremity edema   Skin:     General: Skin is warm and dry  Findings: Erythema (Left lower extremity erythema and warmth, posterior blister) present  Neurological:      Mental Status: She is alert     Psychiatric:         Mood and Affect: Mood normal          Additional Data:     Labs:    Results from last 7 days   Lab Units 03/16/21  0454 03/15/21  0603   WBC Thousand/uL 7 22 6 64   HEMOGLOBIN g/dL 9 9* 10 2*   HEMATOCRIT % 32 3* 33 3*   PLATELETS Thousands/uL 181 181   NEUTROS PCT %  --  54   LYMPHS PCT %  --  25   MONOS PCT %  --  14*   EOS PCT %  --  5     Results from last 7 days   Lab Units 03/16/21  0454  03/14/21  1257   POTASSIUM mmol/L 4 2   < > 3 7   CHLORIDE mmol/L 104   < > 104   CO2 mmol/L 33*   < > 35*   BUN mg/dL 6   < > 6   CREATININE mg/dL 0 74   < > 0 71   CALCIUM mg/dL 8 4   < > 8 9   ALK PHOS U/L  --   --  156*   ALT U/L  --   --  31   AST U/L  --   --  24    < > = values in this interval not displayed  Results from last 7 days   Lab Units 03/14/21  1257   INR  1 10       * I Have Reviewed All Lab Data Listed Above  * Additional Pertinent Lab Tests Reviewed: All Labs Within Last 24 Hours Reviewed    Imaging:    Imaging Reports Reviewed Today Include: CT lower extremity   Imaging Personally Reviewed by Myself Includes:  None    Recent Cultures (last 7 days):     Results from last 7 days   Lab Units 03/15/21  0745 03/14/21  1257   BLOOD CULTURE   --  No Growth at 48 hrs  No Growth at 48 hrs  GRAM STAIN RESULT  No polys seen*  1+ Gram positive cocci in pairs*  --    WOUND CULTURE  Culture results to follow    --        Last 24 Hours Medication List:   Current Facility-Administered Medications   Medication Dose Route Frequency Provider Last Rate    acetaminophen  650 mg Oral Q6H PRN Jens Hill MD      albuterol  2 puff Inhalation Q4H PRN Jens Hill MD      atorvastatin  80 mg Oral Daily Jens Hill MD      cefazolin  2,000 mg Intravenous Q8H Jens Hill MD 2,000 mg (03/17/21 0602)    heparin (porcine)  5,000 Units Subcutaneous Q8H [facility] Jens Hill MD      Labetalol HCl  10 mg Intravenous Q4H PRN Jens Hill MD      levETIRAcetam  500 mg Oral Q12H [facility] Jens Hill MD      metoprolol tartrate  25 mg Oral Q12H [address] MD      pantoprazole  40 mg Oral Early Morning Jens Hill MD      PHENobarbital  64 8 mg Oral BID Avi Britton MD          Today, Patient Was Seen By: Akin Rea PA-C    ** Please Note: Dictation voice to text software may have been used in the creation of this document   **

## 2021-03-17 NOTE — ASSESSMENT & PLAN NOTE
· Continue Keppra 500 mg BID and phenobarbital 64 8 mg BID  · No evidence of seizure like activity in the hospital thus far   · Seizure precautions

## 2021-03-18 PROBLEM — E66.01 MORBID OBESITY (HCC): Status: ACTIVE | Noted: 2021-03-18

## 2021-03-18 LAB
ANION GAP SERPL CALCULATED.3IONS-SCNC: 6 MMOL/L (ref 4–13)
BUN SERPL-MCNC: 4 MG/DL (ref 5–25)
CALCIUM SERPL-MCNC: 9.1 MG/DL (ref 8.3–10.1)
CHLORIDE SERPL-SCNC: 100 MMOL/L (ref 100–108)
CO2 SERPL-SCNC: 31 MMOL/L (ref 21–32)
CREAT SERPL-MCNC: 0.8 MG/DL (ref 0.6–1.3)
GFR SERPL CREATININE-BSD FRML MDRD: 81 ML/MIN/1.73SQ M
GLUCOSE SERPL-MCNC: 105 MG/DL (ref 65–140)
POTASSIUM SERPL-SCNC: 4.1 MMOL/L (ref 3.5–5.3)
SODIUM SERPL-SCNC: 137 MMOL/L (ref 136–145)

## 2021-03-18 PROCEDURE — 99232 SBSQ HOSP IP/OBS MODERATE 35: CPT | Performed by: NURSE PRACTITIONER

## 2021-03-18 PROCEDURE — 80048 BASIC METABOLIC PNL TOTAL CA: CPT | Performed by: PHYSICIAN ASSISTANT

## 2021-03-18 RX ADMIN — CEFAZOLIN SODIUM 2000 MG: 2 SOLUTION INTRAVENOUS at 22:10

## 2021-03-18 RX ADMIN — METOPROLOL TARTRATE 25 MG: 25 TABLET, FILM COATED ORAL at 08:43

## 2021-03-18 RX ADMIN — LEVETIRACETAM 500 MG: 500 TABLET, FILM COATED ORAL at 08:43

## 2021-03-18 RX ADMIN — LEVETIRACETAM 500 MG: 500 TABLET, FILM COATED ORAL at 22:09

## 2021-03-18 RX ADMIN — ATORVASTATIN CALCIUM 80 MG: 40 TABLET, FILM COATED ORAL at 08:43

## 2021-03-18 RX ADMIN — PANTOPRAZOLE SODIUM 40 MG: 40 TABLET, DELAYED RELEASE ORAL at 06:10

## 2021-03-18 RX ADMIN — PHENOBARBITAL 64.8 MG: 32.4 TABLET ORAL at 13:20

## 2021-03-18 RX ADMIN — CEFAZOLIN SODIUM 2000 MG: 2 SOLUTION INTRAVENOUS at 16:25

## 2021-03-18 RX ADMIN — HEPARIN SODIUM 5000 UNITS: 5000 INJECTION INTRAVENOUS; SUBCUTANEOUS at 06:10

## 2021-03-18 RX ADMIN — HEPARIN SODIUM 5000 UNITS: 5000 INJECTION INTRAVENOUS; SUBCUTANEOUS at 13:20

## 2021-03-18 RX ADMIN — HEPARIN SODIUM 5000 UNITS: 5000 INJECTION INTRAVENOUS; SUBCUTANEOUS at 22:10

## 2021-03-18 RX ADMIN — PHENOBARBITAL 64.8 MG: 32.4 TABLET ORAL at 18:29

## 2021-03-18 RX ADMIN — ACETAMINOPHEN 650 MG: 325 TABLET, FILM COATED ORAL at 16:25

## 2021-03-18 RX ADMIN — CEFAZOLIN SODIUM 2000 MG: 2 SOLUTION INTRAVENOUS at 06:10

## 2021-03-18 NOTE — ASSESSMENT & PLAN NOTE
· Pt lives at home with her daughter, uses a cane/walker at baseline   · Having increased difficulties with ambulation in setting of underlying cellulitis and edema   · PT/OT recommending short-term rehab, however upon discussion with CM and family, currently declining, requesting home with [facility] AT [facility] services

## 2021-03-18 NOTE — PROGRESS NOTES
[address] [facility]  Progress Note - Fidelina Begin 1940, [de-identified] y o  female MRN: [id number]  Unit/Bed#: -01 Encounter: [id number]  Primary Care Provider: Jamir Lugo DO   Date and time admitted to hospital: 3/14/2021 11:29 AM    * Lower extremity cellulitis  Assessment & Plan  · Pt presented with left lower extremity warmth, erythema and tenderness with open wound of the posterior left leg   · CT scan left leg with subcutaneous edema and skin thickening consistent with nonspecific cellulitis without evidence of drainable collection, prominent blister posteriorly noted   · Baseline lower extremity edema from lymphedema bilaterally   · Pt remains afebrile without leukocytosis   · Continue high dose IV Ancef for now and monitor  · Blood cultures are negative for 24 hours and wound culture are positive for g positive cocci in pairs, likely strep  Continue to follow final culture results   · Wound care recommendations are in place  · Will likely have slow improvement in setting of significant baseline lymphedema, encourage leg elevation, compression   · CT scan lower extremity on the left with blister noted 7 0 x 3 1 cm, likely hemorrhagic component  Hgb 9 9 yesterday, still awaiting AM labs, if continued down trended may need to consider repeat CT scan to evaluate for any extravasation     · If no significant improvement over next 24 hours, consider ID consultation     Ambulatory dysfunction  Assessment & Plan  · Pt lives at home with her daughter, uses a cane/walker at baseline   · Having increased difficulties with ambulation in setting of underlying cellulitis and edema   · PT/OT recommending short-term rehab, however upon discussion with CM and family, currently declining, requesting home with [facility] AT [facility] services     Morbid obesity ([address] )  Assessment & Plan  BMI 41  Lifestyle modifications encouraged    Lower extremity edema  Assessment & Plan  · Likely in the context of lymphedema  · , no respiratory complaints, no prior ECHO to review, low suspicion for CHF at this time   · Continue aggressive edema measures, compression and leg elevation   · Venous duplex negative for DVT  · Continue IV abx for cellulitis as above   · Monitor closely  · Wound care recommendations ordered    Essential hypertension  Assessment & Plan  · Hypertensive urgency on admission likely in setting of pain, improvement noted   · Continue Lopressor 25 mg BID   · PRN Labetalol for SBP > 190 mmHg   · Monitor     Seizure disorder (HCC)  Assessment & Plan  · Continue Keppra 500 mg BID and phenobarbital 64 8 mg BID  · No evidence of seizure like activity in the hospital thus far   · Seizure precautions          VTE Pharmacologic Prophylaxis:   Pharmacologic: Heparin  Mechanical VTE Prophylaxis in Place: Yes    Patient Centered Rounds: I have performed bedside rounds with nursing staff today  Discussions with Specialists or Other Care Team Provider:  Reviewed previous provider's notes discussed with case management primary RN    Education and Discussions with Family / Patient:  Discussed plan of care with patient family denies any additional questions or concerns at this time    Time Spent for Care: 20 minutes  More than 50% of total time spent on counseling and coordination of care as described above  Current Length of Stay: 4 day(s)    Current Patient Status: Inpatient   Certification Statement: The patient will continue to require additional inpatient hospital stay due to Slow improvement of cellulitis    Discharge Plan / Estimated Discharge Date:  24 hours pending improvement    Code Status: Level 1 - Full Code    Subjective:   Denies any chest pain chest tightness shortness of breath or difficulty breathing  Lower extremity edema seems stable and both legs at the same, the redness on the left leg does seem to be improved    She denies any lower extremity discomfort or pain she is able to ambulate to the restroom with her walker which is her baseline  Objective:     Vitals:   Temp (24hrs), Av 6 °F (37 6 °C), Min:99 3 °F (37 4 °C), Max:100 °F (37 8 °C)    Temp:  [99 3 °F (37 4 °C)-100 °F (37 8 °C)] 99 3 °F (37 4 °C)  HR:  [86-94] 90  Resp:  [17-18] 17  BP: (140-144)/(81-96) 144/81  SpO2:  [92 %-95 %] 95 %  Body mass index is 41 4 kg/m²  Input and Output Summary (last 24 hours): Intake/Output Summary (Last 24 hours) at 3/18/2021 1151  Last data filed at 3/18/2021 1119  Gross per 24 hour   Intake --   Output 5050 ml   Net -5050 ml       Physical Exam:     Physical Exam  Vitals signs and nursing note reviewed  Constitutional:       Appearance: Normal appearance  Cardiovascular:      Rate and Rhythm: Normal rate  Pulses: Normal pulses  Pulmonary:      Effort: Pulmonary effort is normal       Breath sounds: Normal breath sounds  Abdominal:      Palpations: Abdomen is soft  Musculoskeletal: Normal range of motion  Skin:     General: Skin is warm and dry  Findings: Erythema present  Neurological:      General: No focal deficit present  Mental Status: She is alert  Mental status is at baseline  Psychiatric:         Mood and Affect: Mood normal          Thought Content: Thought content normal          Judgment: Judgment normal          Additional Data:     Labs:    Results from last 7 days   Lab Units 21  1419  03/15/21  0603   WBC Thousand/uL 6 60   < > 6 64   HEMOGLOBIN g/dL 11 0*   < > 10 2*   HEMATOCRIT % 35 4   < > 33 3*   PLATELETS Thousands/uL 175   < > 181   NEUTROS PCT %  --   --  54   LYMPHS PCT %  --   --  25   MONOS PCT %  --   --  14*   EOS PCT %  --   --  5    < > = values in this interval not displayed       Results from last 7 days   Lab Units 21  0454  21  1257   POTASSIUM mmol/L 4 2   < > 3 7   CHLORIDE mmol/L 104   < > 104   CO2 mmol/L 33*   < > 35*   BUN mg/dL 6   < > 6   CREATININE mg/dL 0 74   < > 0 71   CALCIUM mg/dL 8 4   < > 8 9   ALK PHOS U/L  --   -- 156*   ALT U/L  --   --  31   AST U/L  --   --  24    < > = values in this interval not displayed  Results from last 7 days   Lab Units 03/14/21  1257   INR  1 10       * I Have Reviewed All Lab Data Listed Above  * Additional Pertinent Lab Tests Reviewed: Jr 66 Admission Reviewed    Recent Cultures (last 7 days):     Results from last 7 days   Lab Units 03/15/21  0745 03/14/21  1257   BLOOD CULTURE   --  No Growth at 72 hrs  No Growth at 72 hrs  GRAM STAIN RESULT  No polys seen*  1+ Gram positive cocci in pairs*  --    WOUND CULTURE  3+ Growth of   --        Last 24 Hours Medication List:   Current Facility-Administered Medications   Medication Dose Route Frequency Provider Last Rate    acetaminophen  650 mg Oral Q6H PRN Jens Hill MD      albuterol  2 puff Inhalation Q4H PRN Jens Hill MD      atorvastatin  80 mg Oral Daily Jens Hill MD      cefazolin  2,000 mg Intravenous Q8H Jens Hill MD 2,000 mg (03/18/21 0610)    heparin (porcine)  5,000 Units Subcutaneous Q8H [address] Jens Hill MD      Labetalol HCl  10 mg Intravenous Q4H PRN Jens Hill MD      levETIRAcetam  500 mg Oral Q12H [address] Jens Hill MD      metoprolol tartrate  25 mg Oral Q12H [address] Jens Hill MD      pantoprazole  40 mg Oral Early Morning Jens Hill MD      PHENobarbital  64 8 mg Oral BID Dion Bassett MD          Today, Patient Was Seen By: AUREA Redd    ** Please Note: Dragon 360 Dictation voice to text software may have been used in the creation of this document   **

## 2021-03-19 PROCEDURE — 99232 SBSQ HOSP IP/OBS MODERATE 35: CPT | Performed by: NURSE PRACTITIONER

## 2021-03-19 PROCEDURE — 99223 1ST HOSP IP/OBS HIGH 75: CPT | Performed by: INTERNAL MEDICINE

## 2021-03-19 RX ORDER — CEPHALEXIN 500 MG/1
500 CAPSULE ORAL EVERY 6 HOURS SCHEDULED
Status: DISCONTINUED | OUTPATIENT
Start: 2021-03-19 | End: 2021-03-20 | Stop reason: HOSPADM

## 2021-03-19 RX ADMIN — ATORVASTATIN CALCIUM 80 MG: 40 TABLET, FILM COATED ORAL at 09:26

## 2021-03-19 RX ADMIN — PHENOBARBITAL 64.8 MG: 32.4 TABLET ORAL at 09:30

## 2021-03-19 RX ADMIN — CEFAZOLIN SODIUM 2000 MG: 2 SOLUTION INTRAVENOUS at 15:24

## 2021-03-19 RX ADMIN — PHENOBARBITAL 64.8 MG: 32.4 TABLET ORAL at 18:30

## 2021-03-19 RX ADMIN — CEPHALEXIN 500 MG: 500 CAPSULE ORAL at 18:31

## 2021-03-19 RX ADMIN — HEPARIN SODIUM 5000 UNITS: 5000 INJECTION INTRAVENOUS; SUBCUTANEOUS at 23:24

## 2021-03-19 RX ADMIN — LEVETIRACETAM 500 MG: 500 TABLET, FILM COATED ORAL at 23:27

## 2021-03-19 RX ADMIN — PANTOPRAZOLE SODIUM 40 MG: 40 TABLET, DELAYED RELEASE ORAL at 06:50

## 2021-03-19 RX ADMIN — CEFAZOLIN SODIUM 2000 MG: 2 SOLUTION INTRAVENOUS at 06:50

## 2021-03-19 RX ADMIN — HEPARIN SODIUM 5000 UNITS: 5000 INJECTION INTRAVENOUS; SUBCUTANEOUS at 06:50

## 2021-03-19 RX ADMIN — METOPROLOL TARTRATE 25 MG: 25 TABLET, FILM COATED ORAL at 23:25

## 2021-03-19 RX ADMIN — LEVETIRACETAM 500 MG: 500 TABLET, FILM COATED ORAL at 09:26

## 2021-03-19 RX ADMIN — METOPROLOL TARTRATE 25 MG: 25 TABLET, FILM COATED ORAL at 09:26

## 2021-03-19 RX ADMIN — CEPHALEXIN 500 MG: 500 CAPSULE ORAL at 23:28

## 2021-03-19 RX ADMIN — HEPARIN SODIUM 5000 UNITS: 5000 INJECTION INTRAVENOUS; SUBCUTANEOUS at 15:23

## 2021-03-19 NOTE — CONSULTS
Consultation - Infectious Disease   Olivia Leung [de-identified] y o  female MRN: [id number]  Unit/Bed#: -01 Encounter: [id number]      IMPRESSION & RECOMMENDATIONS:   Impression/Recommendations:  1  Left leg cellulitis  Likely due to #2 in the setting of #3  Current exam with very mild erythema  No signs of sepsis  Clinically stable and nontoxic  Rec:  · Change antibiotics to Keflex to continue through 3/21 to complete 7 days total of antibiotics    2  Left posterior calf hematoma  In setting of recent reported fall  Consider also DTPI  Status post spontaneous drainage  No purulence noted  Rec:  · Continue [address] per nursing    3  Chronic lymphedema  Risk factor for infection as above  Rec:  · Continue leg elevation  · May benefit from better long-term control with compression stockings    4  Ambulatory dysfunction  With history of falls  Risk factor for above  5   Possible cognitive impairment  The above impression and plan was discussed in detail with AUREA Meléndez  The patient is stable from an ID standpoint  If the patient is still here, I will reassess the patient on Monday 3/22  Please call in the interim with new questions  Antibiotics:  Cefazolin #5    Thank you for this consultation  We will follow along with you  HISTORY OF PRESENT ILLNESS:  Reason for Consult: Cellulitis    HPI: Alisa Castillo is a [de-identified] y o  female with CHF, chronic lymphedema, cirrhosis, and seizure disorder  The patient is a poor historian so most of the history is obtained through the medical record  She presented to the ED on 3/14 with complaints noted to be bilateral leg swelling an on open draining wound on her left posterior calf  Upon presentation she was noted to be afebrile with a normal WBC  She had a wound noted on her posterior calf evaluated by wound care and felt to be hematoma versus DTPI  Patient did report she fell and hit her leg    A CT without contrast showed skin thickening without abscess and confirmed this superficial heamotoma  She was started on cefazolin  Today the hematoma unroofed and is draining  We are asked to comment on further evaluation and management of cellulitis  Review of PT notes reveals she has a history of falls and is noted to be unsteady with ambulation  Oriented only to self  In performing this consult, I have reviewed prior admission and outpatient visit records in detail  REVIEW OF SYSTEMS:  Complains of pain with manipulation of her left leg  Limited ROS due to poor historian  A complete system-based review of systems is otherwise negative  PAST MEDICAL HISTORY:  Past Medical History:   Diagnosis Date    [address]F [facility]estive [facility] [address]) [address] [address]     Liver cirrhosis (HCC)     Seizures (HCC)      Past Surgical History:   Procedure Laterality Date    HYSTERECTOMY      LEG SURGERY  2016    Pt family cannot recall the specific surgery or which leg it was preformed on  FAMILY HISTORY:  Non-contributory    SOCIAL HISTORY:  Social History     Substance and Sexual Activity   Alcohol Use Never    Frequency: Never     Social History     Substance and Sexual Activity   Drug Use No     Social History     Tobacco Use   Smoking Status Never Smoker   Smokeless Tobacco Never Used       ALLERGIES:  Allergies   Allergen Reactions    Penicillins Other (See Comments)     Pt unaware of reaction       MEDICATIONS:  All current active medications have been reviewed      PHYSICAL EXAM:  Vitals:  Temp:  [98 8 °F (37 1 °C)-99 3 °F (37 4 °C)] 99 3 °F (37 4 °C)  HR:  [82-85] 85  Resp:  [18] 18  BP: (108-189)/() 189/104  SpO2:  [93 %-95 %] 93 %  Temp (24hrs), Av 1 °F (37 3 °C), Min:98 8 °F (37 1 °C), Max:99 3 °F (37 4 °C)  Current: Temperature: 99 3 °F (37 4 °C)     Physical Exam:  General:  Frail elderly female, in no acute distress  Eyes:  Conjunctive clear with no hemorrhages or effusions  Oropharynx:  No ulcers, no lesions  Neck:  Supple, no lymphadenopathy  Lungs:  Normal respiratory excursion, no accessory muscle use  Cardiac:  Regular rate and rhythm, no murmurs  Abdomen:  Soft, non-tender, non-distended  Extremities:  No peripheral cyanosis, clubbing  Chronic lymphedema with hyperpigmented skin changes  Skin:  No rashes, no ulcers  Wound photo 3/14 reviewed:  Superficial hematoma over posterior calf  Neurological:  Moves all four extremities spontaneously, sensation grossly intact    LABS, IMAGING, & OTHER STUDIES:  Lab Results:  I have personally reviewed pertinent labs  Results from last 7 days   Lab Units 03/18/21  1228 03/16/21  0454 03/15/21  0603 03/14/21  1257   POTASSIUM mmol/L 4 1 4 2 3 4* 3 7   CHLORIDE mmol/L 100 104 104 104   CO2 mmol/L 31 33* 31 35*   BUN mg/dL 4* 6 5 6   CREATININE mg/dL 0 80 0 74 0 64 0 71   EGFR ml/min/1 73sq m 81 88 97 93   CALCIUM mg/dL 9 1 8 4 8 8 8 9   AST U/L  --   --   --  24   ALT U/L  --   --   --  31   ALK PHOS U/L  --   --   --  156*     Results from last 7 days   Lab Units 03/17/21  1419 03/16/21  0454 03/15/21  0603   WBC Thousand/uL 6 60 7 22 6 64   HEMOGLOBIN g/dL 11 0* 9 9* 10 2*   PLATELETS Thousands/uL 175 181 181     Results from last 7 days   Lab Units 03/15/21  0745 03/14/21  1257   BLOOD CULTURE   --  No Growth After 4 Days  No Growth After 4 Days  GRAM STAIN RESULT  No polys seen*  1+ Gram positive cocci in pairs*  --    WOUND CULTURE  3+ Growth of   --        Imaging Studies:   I have personally reviewed pertinent imaging study reports and images in PACS  CT left left reviewed personally superficial fluid collection left posterior thigh  EKG, Pathology, and Other Studies:   I have personally reviewed pertinent reports

## 2021-03-19 NOTE — ASSESSMENT & PLAN NOTE
· Likely in the context of lymphedema  · , no respiratory complaints, no prior ECHO to review, low suspicion for CHF at this time   · Continue aggressive edema measures, compression and leg elevation   · Venous duplex negative for DVT  · Continue IV abx for cellulitis as above   · ID consulted  · Monitor closely  · Wound care following recommendations ordered

## 2021-03-19 NOTE — ASSESSMENT & PLAN NOTE
· Hypertensive urgency on admission likely in setting of pain, improvement noted   · Continue Lopressor 25 mg BID   · PRN Labetalol for SBP > 190 mmHg

## 2021-03-19 NOTE — PROGRESS NOTES
[address] [facility]  Progress Note - Zach Rodgers 1940, [de-identified] y o  female MRN: [id number]  Unit/Bed#: -01 Encounter: [id number]  Primary Care Provider: Mirian Olson DO   Date and time admitted to hospital: 3/14/2021 11:29 AM    * Lower extremity cellulitis  Assessment & Plan  · Pt presented with left lower extremity warmth, erythema and tenderness with open wound of the posterior left leg   · CT scan left leg with subcutaneous edema and skin thickening consistent with nonspecific cellulitis without evidence of drainable collection, prominent blister posteriorly noted   · Baseline lower extremity edema from lymphedema bilaterally   · Pt remains afebrile without leukocytosis   · Continue high dose IV Ancef for now and monitor  · Blood cultures are negative for 4 days and wound culture are positive for g positive cocci in pairs, likely strep  Continue to follow final culture results   · Wound care recommendations are in place  · Will likely have slow improvement in setting of significant baseline lymphedema, encourage leg elevation, compression   · CT scan lower extremity on the left with blister noted 7 0 x 3 1 cm, likely hemorrhagic component   Hgb stable  · ID consulted    Ambulatory dysfunction  Assessment & Plan  · Pt lives at home with her daughter, uses a cane/walker at baseline   · Having increased difficulties with ambulation in setting of underlying cellulitis and edema   · PT/OT recommending short-term rehab, however upon discussion with CM and family, currently declining, requesting home with [facility]  services     Morbid obesity ([facility] [address] )  Assessment & Plan  BMI 41  Lifestyle modifications encouraged    Lower extremity edema  Assessment & Plan  · Likely in the context of lymphedema  · , no respiratory complaints, no prior ECHO to review, low suspicion for CHF at this time   · Continue aggressive edema measures, compression and leg elevation   · Venous duplex negative for DVT  · Continue IV abx for cellulitis as above   · ID consulted  · Monitor closely  · Wound care following recommendations ordered    Essential hypertension  Assessment & Plan  · Hypertensive urgency on admission likely in setting of pain, improvement noted   · Continue Lopressor 25 mg BID   · PRN Labetalol for SBP > 190 mmHg       Seizure disorder (HCC)  Assessment & Plan  · Continue Keppra 500 mg BID and phenobarbital 64 8 mg BID  · No evidence of seizure like activity in the hospital thus far   · Seizure precautions          VTE Pharmacologic Prophylaxis:   Pharmacologic: Heparin  Mechanical VTE Prophylaxis in Place: Yes    Patient Centered Rounds: I have performed bedside rounds with nursing staff today  Discussions with Specialists or Other Care Team Provider:  Discussed with case management primary RN id    Education and Discussions with Family / Patient:  Discussed plan of care with patient patient's daughter denies any additional questions or concerns at this time    Time Spent for Care: 20 minutes  More than 50% of total time spent on counseling and coordination of care as described above      Current Length of Stay: 5 day(s)    Current Patient Status: Inpatient   Certification Statement: The patient will continue to require additional inpatient hospital stay due to IV antibiotics and wound care    Discharge Plan / Estimated Discharge Date:  24-48 hours pending improvement of cellulitis      Code Status: Level 1 - Full Code      Subjective:   Patient's leg is still very very tender to touch she is able to stand and pivot with a walker and some assistance still refusing rehab denies any chest pain chest tightness shortness of breath or difficulty breathing    Objective:     Vitals:   Temp (24hrs), Av 2 °F (37 3 °C), Min:98 8 °F (37 1 °C), Max:99 5 °F (37 5 °C)    Temp:  [98 8 °F (37 1 °C)-99 5 °F (37 5 °C)] 99 5 °F (37 5 °C)  HR:  [82-94] 94  Resp:  [18] 18  BP: (108-189)/() 167/98  SpO2:  [61 %-95 %] 93 %  Body mass index is 41 4 kg/m²  Input and Output Summary (last 24 hours): Intake/Output Summary (Last 24 hours) at 3/19/2021 1555  Last data filed at 3/19/2021 1420  Gross per 24 hour   Intake 240 ml   Output 2600 ml   Net -2360 ml       Physical Exam:     Physical Exam  Vitals signs and nursing note reviewed  HENT:      Head: Normocephalic  Neck:      Musculoskeletal: Normal range of motion  Cardiovascular:      Rate and Rhythm: Normal rate  Pulmonary:      Effort: Pulmonary effort is normal    Abdominal:      General: Abdomen is flat  Palpations: Abdomen is soft  Musculoskeletal:         General: Swelling and tenderness present  Skin:     General: Skin is warm and dry  Findings: Erythema present  Neurological:      General: No focal deficit present  Mental Status: She is alert and oriented to person, place, and time  Mental status is at baseline  Psychiatric:         Mood and Affect: Mood normal          Thought Content: Thought content normal          Judgment: Judgment normal          Additional Data:     Labs:    Results from last 7 days   Lab Units 03/17/21  1419  03/15/21  0603   WBC Thousand/uL 6 60   < > 6 64   HEMOGLOBIN g/dL 11 0*   < > 10 2*   HEMATOCRIT % 35 4   < > 33 3*   PLATELETS Thousands/uL 175   < > 181   NEUTROS PCT %  --   --  54   LYMPHS PCT %  --   --  25   MONOS PCT %  --   --  14*   EOS PCT %  --   --  5    < > = values in this interval not displayed  Results from last 7 days   Lab Units 03/18/21  1228  03/14/21  1257   POTASSIUM mmol/L 4 1   < > 3 7   CHLORIDE mmol/L 100   < > 104   CO2 mmol/L 31   < > 35*   BUN mg/dL 4*   < > 6   CREATININE mg/dL 0 80   < > 0 71   CALCIUM mg/dL 9 1   < > 8 9   ALK PHOS U/L  --   --  156*   ALT U/L  --   --  31   AST U/L  --   --  24    < > = values in this interval not displayed       Results from last 7 days   Lab Units 03/14/21  1257   INR  1 10       * I Have Reviewed All Lab Data Listed Above   * Additional Pertinent Lab Tests Reviewed: Jr 66 Admission Reviewed    Recent Cultures (last 7 days):     Results from last 7 days   Lab Units 03/15/21  0745 03/14/21  1257   BLOOD CULTURE   --  No Growth After 4 Days  No Growth After 4 Days  GRAM STAIN RESULT  No polys seen*  1+ Gram positive cocci in pairs*  --    WOUND CULTURE  3+ Growth of   --        Last 24 Hours Medication List:   Current Facility-Administered Medications   Medication Dose Route Frequency Provider Last Rate    acetaminophen  650 mg Oral Q6H PRN Jens Hill MD      albuterol  2 puff Inhalation Q4H PRN Jens Hill MD      atorvastatin  80 mg Oral Daily Jens Hill MD      cephalexin  500 mg Oral Q6H German Morse MD      heparin (porcine)  5,000 Units Subcutaneous Q8H [facility] Jens Hill MD      Labetalol HCl  10 mg Intravenous Q4H PRN Jens Hill MD      levETIRAcetam  500 mg Oral Q12H [facility] Jens Hill MD      metoprolol tartrate  25 mg Oral Q12H [facility] Jens Hill MD      pantoprazole  40 mg Oral Early Morning Jens Hill MD      PHENobarbital  64 8 mg Oral BID Beatriz Ewing MD          Today, Patient Was Seen By: AUREA Cazares    ** Please Note: Dragon 360 Dictation voice to text software may have been used in the creation of this document   **

## 2021-03-19 NOTE — ASSESSMENT & PLAN NOTE
· Pt lives at home with her daughter, uses a cane/walker at baseline   · Having increased difficulties with ambulation in setting of underlying cellulitis and edema   · PT/OT recommending short-term rehab, however upon discussion with CM and family, currently declining, requesting home with [facility] AT [facility] services

## 2021-03-19 NOTE — ASSESSMENT & PLAN NOTE
· Pt presented with left lower extremity warmth, erythema and tenderness with open wound of the posterior left leg   · CT scan left leg with subcutaneous edema and skin thickening consistent with nonspecific cellulitis without evidence of drainable collection, prominent blister posteriorly noted   · Baseline lower extremity edema from lymphedema bilaterally   · Pt remains afebrile without leukocytosis   · Continue high dose IV Ancef for now and monitor  · Blood cultures are negative for 4 days and wound culture are positive for g positive cocci in pairs, likely strep  Continue to follow final culture results   · Wound care recommendations are in place  · Will likely have slow improvement in setting of significant baseline lymphedema, encourage leg elevation, compression   · CT scan lower extremity on the left with blister noted 7 0 x 3 1 cm, likely hemorrhagic component   Hgb stable  · ID consulted

## 2021-03-20 VITALS
TEMPERATURE: 99.2 F | OXYGEN SATURATION: 96 % | RESPIRATION RATE: 21 BRPM | SYSTOLIC BLOOD PRESSURE: 154 MMHG | BODY MASS INDEX: 41.33 KG/M2 | WEIGHT: 205 LBS | DIASTOLIC BLOOD PRESSURE: 96 MMHG | HEIGHT: 59 IN | HEART RATE: 89 BPM

## 2021-03-20 LAB
BACTERIA BLD CULT: NORMAL
BACTERIA BLD CULT: NORMAL

## 2021-03-20 PROCEDURE — 99239 HOSP IP/OBS DSCHRG MGMT >30: CPT | Performed by: NURSE PRACTITIONER

## 2021-03-20 RX ORDER — ACETAMINOPHEN 325 MG/1
650 TABLET ORAL EVERY 6 HOURS PRN
Refills: 0
Start: 2021-03-20

## 2021-03-20 RX ORDER — CEPHALEXIN 500 MG/1
500 CAPSULE ORAL EVERY 6 HOURS SCHEDULED
Qty: 16 CAPSULE | Refills: 0 | Status: SHIPPED | OUTPATIENT
Start: 2021-03-20 | End: 2021-03-24

## 2021-03-20 RX ADMIN — CEPHALEXIN 500 MG: 500 CAPSULE ORAL at 12:02

## 2021-03-20 RX ADMIN — PHENOBARBITAL 64.8 MG: 32.4 TABLET ORAL at 10:30

## 2021-03-20 RX ADMIN — LEVETIRACETAM 500 MG: 500 TABLET, FILM COATED ORAL at 09:55

## 2021-03-20 RX ADMIN — METOPROLOL TARTRATE 25 MG: 25 TABLET, FILM COATED ORAL at 09:54

## 2021-03-20 RX ADMIN — PANTOPRAZOLE SODIUM 40 MG: 40 TABLET, DELAYED RELEASE ORAL at 05:36

## 2021-03-20 RX ADMIN — CEPHALEXIN 500 MG: 500 CAPSULE ORAL at 05:36

## 2021-03-20 RX ADMIN — HEPARIN SODIUM 5000 UNITS: 5000 INJECTION INTRAVENOUS; SUBCUTANEOUS at 05:35

## 2021-03-20 RX ADMIN — ATORVASTATIN CALCIUM 80 MG: 40 TABLET, FILM COATED ORAL at 09:54

## 2021-03-20 NOTE — ASSESSMENT & PLAN NOTE
Physical Therapy    [x] daily progress note       [] discharge       Patient Name:  Kayla Long   :  7/15/1933 MRN: [id number]  Room:  [address] [place] [address] Date of Admission: 3/12/2019  Rehabilitation Diagnosis:   Pneumonia [J18.9]       Date 3/15/2019       Day of ARU Week:  4   Time IN//920   Individual Tx Minutes 50   Group Tx Minutes    Co-Treat Minutes    Concurrent Tx Minutes    TOTAL Tx Time Mins 50   Variance Time +5   Variance Time []   Refusal due to:     []   Medical hold/reason:    []   Illness   []   Off Unit for test/procedure  [x]   Extra time needed to complete task  []   Therapeutic need  []   Other (specify):   Restrictions Restrictions/Precautions  Restrictions/Precautions: Fall Risk, General Precautions, Isolation, Swallowing - Thickened Liquids(droplet precautions )      Communication with other providers: [x]   OK to see per nursing:     []   Spoke with team member regarding:      Subjective observations and cognitive status: Pt reports not sleeping well last night, alert, and cooperative.       Pain level/location: Location: shoulders (did not rate pain but reports increased stiffness and soreness)   Discharge recommendations  Anticipated discharge date:  19  Destination: []home alone   []home alone with assist PRN     [x] home w/ family      [] Continuous supervision  []SNF    [] Assisted living     [] Other:   Continued therapy: [x]HHC PT  []OUTPATIENT  PT   [] No Further PT  Equipment needs: has RW      Bed Mobility:           [x]   Pt received out of bed     Transfers:    Sit--> Stand: CGA->SBA   Stand --> Sit: CGA->SBA   Stand-Pivot:   CGA  Toilet transfer: Min A->SBA using grab bars    Assistive device required for transfer:   RW, elevated seat height    Gait:    Distance:  364 feet  Assistance:  CGA  Device:  RW  Gait Quality: step-through, slow anthony, stooped posture     Additional Therapeutic activities/exercises completed this date:     []   Nu-step:  Time:        Level: · Continue Keppra 500 mg BID and phenobarbital 64 8 mg BID  · No evidence of seizure like activity in the hospital thus far #Steps:       []   Rebounder:    []  Seated     []  Standing        [x]   Balance training: Pt pulled pants up in unsupported stance with Min A         []   Postural training    []   Supine ther ex (reps/sets):     []   Seated ther ex (reps/sets):     []   Standing ther ex (reps/sets):     []   Picked up object from floor     Assist Level:                     []   Reacher used   [x]   Other: Toileting task completed with Min A   []   Other:   []   Other:    Comments:      Patient/Caregiver Education and Training:   [x]   Bed Mobility/Transfer technique/safety  [x]   Gait technique/sequencing  []   Proper use of assistive device  []   Advanced mobility safety and technique  []   Reinforced patient's precautions/mobility while maintaining precautions  [x]   Postural awareness  []   Family training  [x]   Progress was updated and reviewed in Rehabtracker with patient and/or family this         date. Treatment Plan for Next Session: advanced gait training, transfers training        Assessment: Pt made progress in his activity tolerance today as reflected by distance ambulated. He also was able to complete sit<->stand transfers with lesser effort and VCs today. He tolerated room air throughout this tx session and SpO2 was 89-91%.       Treatment/Activity Tolerance:   [x] Tolerated treatment with no adverse effects    [] Patient limited by fatigue  [] Patient limited by pain   [] Patient limited by medical complications:    [] Adverse reaction to Tx:   [] Significant change in status    Safety:       []  bed alarm set    [x]  chair alarm set    []  Pt refused alarms                []  Telesitter activated      [x]  Gait belt used during tx session      []other:         Number of Minutes/Billable Intervention  Gait Training 30   Therapeutic Exercise    Neuro Re-Ed    Therapeutic Activity 20   Wheelchair Propulsion    Group    Other:    TOTAL 50         Social History  Social/Functional History  Lives With: Spouse(Maggie )  Type of Home: House  Home Layout: Two level, Bed/Bath upstairs(equipped with stair lift to access 2nd floor )  Home Access: Stairs to enter with rails  Entrance Stairs - Number of Steps: 3  Entrance Stairs - Rails: Right  Bathroom Shower/Tub: Tub/Shower unit  Bathroom Toilet: Handicap height  Bathroom Equipment: Grab bars around toilet, Shower chair  Bathroom Accessibility: Accessible  Home Equipment: Rolling walker, Cane(stair lift)  Receives Help From: Family  ADL Assistance: Independent  Homemaking Responsibilities: No(Pt reports light cleaning)  Ambulation Assistance: Independent(used walker at home, SPC in the community )  Transfer Assistance: Independent  Active : Yes  Mode of Transportation: Car  Additional Comments: sleeps in regular/flat bed, not on supplemental O2 at home, no falls in the past year; spouse organized pt's meds in pill box organizer due to hand limitations from arthritis, pt was able to manage meds Ind once organized by spouse; information verified by spouse due to pt's memory deficits     Objective                                                                                    Goals:  (Update in navigator)  Short term goals  Time Frame for Short term goals: 5 tx days:   Short term goal 1: Pt will complete sit<->stand transfers from elevated seat height with Min A. Short term goal 2: Pt will ambulate 150 ft using RW with CGA. Short term goal 3: Pt will ascend/descend 4 steps using bilat rails with CGA   Short term goal 4: Pt will ascend/descend curb step and ambulate over uneven surfaces using RW with CGA. :  Long term goals  Time Frame for Long term goals : 12 tx days or until discharge:   Long term goal 1: Pt will complete bed mobility and sup<->sit Ind. Long term goal 2: Pt will complete OOB transfers with RW with Sup. Long term goal 3: Pt will ambulate >/=150 ft using RW with Sup.    Long term goal 4: Pt will ascend/descend 4 steps using 1 rail + SPC with Sup. Long term goal 5: Pt will ascend/descend curb step and ambulate over uneven surfaces using RW with Sup. :        Plan of Care                                                                              Times per week: 5 days per week for a minimum of 60 minutes/day plus group as appropriate for 60 minutes.   Treatment to include Current Treatment Recommendations: Strengthening, Transfer Training, Endurance Training, Cognitive Reorientation, Patient/Caregiver Education & Training, ROM, Equipment Evaluation, Education, & procurement, Balance Training, Gait Training, Home Exercise Program, Functional Mobility Training, Cognitive/Perceptual Training, Stair training, Safety Education & Training    Electronically signed by   Christina Joe, PT   3/15/2019, 8:41 AM

## 2021-03-20 NOTE — DISCHARGE INSTR - AVS FIRST PAGE
Thank you for choosing [facility] for year care, please take all prescriptions as instructed, please make appropriate follow-up visits      Keep wound in back of year leg clean and dry elevate her legs as much as possible continued to complete antibiotics as prescribed

## 2021-03-20 NOTE — DISCHARGE SUMMARY
[facility]  Discharge- Kath Ours 1940, [de-identified] y o  female MRN: [id number]  Unit/Bed#: -[facility] Encounter: [id number]  Primary Care Provider: Didi Musa DO   Date and time admitted to hospital: 3/14/2021 11:29 AM    * Lower extremity cellulitis  Assessment & Plan  · Pt presented with left lower extremity warmth, erythema and tenderness with open wound of the posterior left leg   · CT scan left leg with subcutaneous edema and skin thickening consistent with nonspecific cellulitis without evidence of drainable collection, prominent blister posteriorly noted   · Baseline lower extremity edema from lymphedema bilaterally   · Pt remains afebrile without leukocytosis   · Transition to p o  Keflex to complete a 7 day course  · Blood cultures are negative for 4 days and wound culture are positive for g positive cocci in pairs, likely strep  · Wound care recommendations are in place, continue on discharge with home VNA  · Will likely have slow improvement in setting of significant baseline lymphedema, encourage leg elevation, compression   · CT scan lower extremity on the left with blister noted 7 0 x 3 1 cm, likely hemorrhagic component  Hgb stable  · ID evaluated recommend transition to p o   Keflex, continue leg elevation and local wound care    Ambulatory dysfunction  Assessment & Plan  · Pt lives at home with her daughter, uses a cane/walker at baseline   · Having increased difficulties with ambulation in setting of underlying cellulitis and edema   · PT/OT recommending short-term rehab, however upon discussion with CM and family, currently declining, requesting home with [place] [id number] services     Morbid obesity ([facility] [address] )  Assessment & Plan  BMI 41  Lifestyle modifications encouraged    Lower extremity edema  Assessment & Plan  · Likely in the context of lymphedema  · , no respiratory complaints, no prior ECHO to review, low suspicion for CHF at this time   · Continue aggressive edema measures, compression and leg elevation   · Venous duplex negative for DVT  · Will complete 7 day course of oral antibiotics with Keflex   · ID evaluated patient recommend continued elevation local wound care and transitioning to p o  Keflex  · Wound care following recommendations ordered home health care ordered to continue wound care at home    Essential hypertension  Assessment & Plan  · Hypertensive urgency on admission likely in setting of pain, improvement noted   · Continue Lopressor 25 mg BID       Seizure disorder (HCC)  Assessment & Plan  · Continue Keppra 500 mg BID and phenobarbital 64 8 mg BID  · No evidence of seizure like activity in the hospital thus far          Discharging Physician / Practitioner: uKlwinder Aburto  PCP: Susie Johnson DO  Admission Date:   Admission Orders (From admission, onward)     Ordered        03/14/21 1612  Inpatient Admission  Once                   Discharge Date: 03/20/21    Resolved Problems  Date Reviewed: 3/17/2021          Resolved    Hypokalemia 3/17/2021     Resolved by  Miguel Sapp PA-C        Consultations During Hospital Stay:   IP CONSULT TO CASE MANAGEMENT  IP CONSULT TO CASE MANAGEMENT  · IP CONSULT TO INFECTIOUS DISEASES    Procedures Performed:   · CT lower extremity B/l legs    Significant Findings / Test Results:      VAS lower limb venous duplex study, complete bilateral   Final Result by Aleks Medina MD (03/15 9016)      CT lower extremity wo contrast left   Final Result by Vladimir Hicks MD (03/14 8296)   1  Left lower extremity subcutaneous edema and skin thickening consistent with nonspecific cellulitis without evidence of drainable collection in this unenhanced examination  Prominent blister (vesicle) noted along the skin surface posteriorly as    above  No deep soft tissue infection and no evidence of osseous destructive change to indicate osteomyelitis     2   Healed tibiofibular fracture status post ORIF for the tibial fracture without hardware complication  Workstation performed: [id number]      CT lower extremity w contrast right   Final Result by Sherin Corado MD (03/14 0778)   1  Diffuse edema of the RIGHT lower leg, consistent with the history of lymphedema and anasarca  2   No evidence of abscess  3   No acute bony abnormality in the RIGHT tibia or fibula  4   The patient will be returning for a CT of her LEFT lower leg  Workstation performed: [id number]      XR chest 1 view portable   Final Result by Erik Jones MD (03/14 6575)   No acute cardiopulmonary disease  Workstation performed: [id number]      ·     Incidental Findings:   · none     Test Results Pending at Discharge (will require follow up):   · none     Outpatient Tests Requested:  · none    Complications:  none    Reason for Admission:    Chief Complaint   Patient presents with    Leg Swelling     pt presents with bilateral leg swelling for approx 1 week  pt was seen at PCP friday     Abscess     pt presents with large open area behind left calf- draining noted     Hospital Course:     Kenzie Trimble is a [de-identified] y o  female patient who originally presented to the hospital on 3/14/2021 due to , leg swelling after further evaluation was found to have hematoma and cellulitis  She was started on IV antibiotics and had improvement however was very slow secondary to her severe lymphedema  Also she had a CT scan which revealed the hematoma no abscess  The hematoma has now deroofed, has had some drainage but overall has been stable her hemoglobin has remained stable her redness has improved her pain is improved she was initially recommended short-term rehab however patient and family are refusing at this time she will complete the course of antibiotics at home with local wound care and home care and follow up with PCP    Please see above list of diagnoses and related plan for additional information       Condition at Discharge: stable     Discharge Day Visit / Exam:     Subjective:  Denies any chest pain chest tightness shortness of breath or difficulty breathing local wound care encouraged patient is feeling well today and is eager for discharge reports little bit of tenderness around hematoma but otherwise feeling fine  Vitals: Blood Pressure: 154/96 (03/20/21 0722)  Pulse: 89 (03/20/21 0722)  Temperature: 99 2 °F (37 3 °C) (03/20/21 0235)  Temp Source: Oral (03/14/21 1124)  Respirations: 21 (03/20/21 0722)  Height: 4' 11" (149 9 cm) (03/14/21 1700)  Weight - Scale: 93 kg (205 lb) (03/14/21 1124)  SpO2: 96 % (03/20/21 0722)  Exam:   Physical Exam  Vitals signs and nursing note reviewed  HENT:      Head: Normocephalic  Neck:      Musculoskeletal: Normal range of motion and neck supple  Cardiovascular:      Rate and Rhythm: Normal rate  Pulmonary:      Effort: Pulmonary effort is normal       Breath sounds: Normal breath sounds  Musculoskeletal: Normal range of motion  General: Tenderness present  Skin:     General: Skin is warm and dry  Coloration: Skin is pale  Findings: Erythema present  Neurological:      General: No focal deficit present  Mental Status: She is alert and oriented to person, place, and time  Psychiatric:         Mood and Affect: Mood normal          Thought Content: Thought content normal          Discussion with Family:  Discussed plan of care with daughter denies any shin questions or concerns at this time    Discharge instructions/Information to patient and family:   See after visit summary for information provided to patient and family  Provisions for Follow-Up Care:  See after visit summary for information related to follow-up care and any pertinent home health orders        Disposition:     Home with VNA Services (Reminder: Complete face to face encounter)    For Discharges to [facility]:   · Not Applicable to this Patient - Not Applicable to this Patient    Planned Readmission: No     Discharge Statement:  I spent 40 minutes discharging the patient  This time was spent on the day of discharge  I had direct contact with the patient on the day of discharge  Greater than 50% of the total time was spent examining patient, answering all patient questions, arranging and discussing plan of care with patient as well as directly providing post-discharge instructions  Additional time then spent on discharge activities  Discharge Medications:  See after visit summary for reconciled discharge medications provided to patient and family        ** Please Note: This note has been constructed using a voice recognition system **

## 2021-03-20 NOTE — ASSESSMENT & PLAN NOTE
· Pt lives at home with her daughter, uses a cane/walker at baseline   · Having increased difficulties with ambulation in setting of underlying cellulitis and edema   · PT/OT recommending short-term rehab, however upon discussion with CM and family, currently declining, requesting home with [facility] [address] [place] [address]

## 2021-03-20 NOTE — ASSESSMENT & PLAN NOTE
· Pt presented with left lower extremity warmth, erythema and tenderness with open wound of the posterior left leg   · CT scan left leg with subcutaneous edema and skin thickening consistent with nonspecific cellulitis without evidence of drainable collection, prominent blister posteriorly noted   · Baseline lower extremity edema from lymphedema bilaterally   · Pt remains afebrile without leukocytosis   · Transition to p o  Keflex to complete a 7 day course  · Blood cultures are negative for 4 days and wound culture are positive for g positive cocci in pairs, likely strep  · Wound care recommendations are in place, continue on discharge with home VNA  · Will likely have slow improvement in setting of significant baseline lymphedema, encourage leg elevation, compression   · CT scan lower extremity on the left with blister noted 7 0 x 3 1 cm, likely hemorrhagic component  Hgb stable  · ID evaluated recommend transition to p o   Keflex, continue leg elevation and local wound care

## 2021-03-20 NOTE — ASSESSMENT & PLAN NOTE
· Likely in the context of lymphedema  · , no respiratory complaints, no prior ECHO to review, low suspicion for CHF at this time   · Continue aggressive edema measures, compression and leg elevation   · Venous duplex negative for DVT  · Will complete 7 day course of oral antibiotics with Keflex   · ID evaluated patient recommend continued elevation local wound care and transitioning to p o   Keflex  · Wound care following recommendations ordered home health care ordered to continue wound care at home

## 2021-03-20 NOTE — ASSESSMENT & PLAN NOTE
· Hypertensive urgency on admission likely in setting of pain, improvement noted   · Continue Lopressor 25 mg BID

## 2021-03-20 NOTE — DISCHARGE INSTRUCTIONS
Cellulitis   WHAT YOU NEED TO KNOW:   Cellulitis is a skin infection caused by bacteria  Cellulitis may go away on its own or you may need treatment  Your healthcare provider may draw a [place] around the outside edges of your cellulitis  If your cellulitis spreads, your healthcare provider will see it outside of the [place]  DISCHARGE INSTRUCTIONS:   Call 911 if:   · You have sudden trouble breathing or chest pain  Return to the emergency department if:   · Your wound gets larger and more painful  · You feel a crackling under your skin when you touch it  · You have purple dots or bumps on your skin, or you see bleeding under your skin  · You have new swelling and pain in your legs  · The red, warm, swollen area gets larger  · You see red streaks coming from the infected area  Contact your healthcare provider if:   · You have a fever  · Your fever or pain does not go away or gets worse  · The area does not get smaller after 2 days of antibiotics  · Your skin is flaking or peeling off  · You have questions or concerns about your condition or care  Medicines:   · Antibiotics  help treat the bacterial infection  · NSAIDs , such as ibuprofen, help decrease swelling, pain, and fever  NSAIDs can cause stomach bleeding or kidney problems in certain people  If you take blood thinner medicine, always ask if NSAIDs are safe for you  Always read the medicine label and follow directions  Do not give these medicines to children under 10months of age without direction from your child's healthcare provider  · Acetaminophen  decreases pain and fever  It is available without a doctor's order  Ask how much to take and how often to take it  Follow directions  Read the labels of all other medicines you are using to see if they also contain acetaminophen, or ask your doctor or pharmacist  Acetaminophen can cause liver damage if not taken correctly   Do not use more than 4 grams (4,000 milligrams) total of acetaminophen in one day  · Take your medicine as directed  Contact your healthcare provider if you think your medicine is not helping or if you have side effects  Tell him or her if you are allergic to any medicine  Keep a list of the medicines, vitamins, and herbs you take  Include the amounts, and when and why you take them  Bring the list or the pill bottles to follow-up visits  Carry your medicine list with you in case of an emergency  Self-care:   · Elevate the area above the level of your heart  as often as you can  This will help decrease swelling and pain  Prop the area on pillows or blankets to keep it elevated comfortably  · Clean the area daily until the wound scabs over  Gently wash the area with soap and water  Pat dry  Use dressings as directed  · Place cool or warm, wet cloths on the area as directed  Use clean cloths and clean water  Leave it on the area until the cloth is room temperature  Pat the area dry with a clean, dry cloth  The cloths may help decrease pain  Prevent cellulitis:   · Do not scratch bug bites or areas of injury  You increase your risk for cellulitis by scratching these areas  · Do not share personal items, such as towels, clothing, and razors  · Clean exercise equipment  with germ-killing  before and after you use it  · Wash your hands often  Use soap and water  Wash your hands after you use the bathroom, change a child's diapers, or sneeze  Wash your hands before you prepare or eat food  Use lotion to prevent dry, cracked skin  · Wear pressure stockings as directed  You may be told to wear the stockings if you have peripheral edema  The stockings improve blood flow and decrease swelling  · Treat athlete's foot  This can help prevent the spread of a bacterial skin infection  Follow up with your healthcare provider within 3 days, or as directed:   Your healthcare provider will check if your cellulitis is getting better  You may need different medicine  Write down your questions so you remember to ask them during your visits  © Copyright [address] Information is for End User's use only and may not be sold, redistributed or otherwise used for commercial purposes  All illustrations and images included in CareNotes® are the copyrighted property of [employer] D [employer] M [employer]  or Jesse Momin  The above information is an  only  It is not intended as medical advice for individual conditions or treatments  Talk to your doctor, nurse or pharmacist before following any medical regimen to see if it is safe and effective for you  Chronic Hypertension   AMBULATORY CARE:   Hypertension  is high blood pressure  Your blood pressure is the force of your blood moving against the walls of your arteries  Hypertension causes your blood pressure to get so high that your heart has to work much harder than normal  This can damage your heart  Even if you have hypertension for years, lifestyle changes, medicines, or both can help bring your blood pressure to normal   Call 911 for any of the following:   · You have chest pain  · You have any of the following signs of a heart attack:      ? Squeezing, pressure, or pain in your chest    ? You may  also have any of the following:     § Discomfort or pain in your back, neck, jaw, stomach, or arm    § Shortness of breath    § Nausea or vomiting    § Lightheadedness or a sudden cold sweat    · You become confused or have difficulty speaking  · You suddenly feel lightheaded or have trouble breathing  Seek care immediately if:   · You have a severe headache or vision loss  · You have weakness in an arm or leg  Contact your healthcare provider if:   · You feel faint, dizzy, confused, or drowsy  · You have been taking your blood pressure medicine but your pressure is higher than your provider says it should be      · You have questions or concerns about your condition or care  Treatment for chronic hypertension  may include medicine to lower your blood pressure and cholesterol levels  A low cholesterol level helps prevent heart disease and makes it easier to control your blood pressure  Heart disease can make your blood pressure harder to control  You may also need to make lifestyle changes  What you need to know about the stages of hypertension:       · Normal blood pressure is 119/79 or lower   Your healthcare provider may only check your blood pressure each year if it stays at a normal level  · Elevated blood pressure is 120/79 to 129/79   This is sometimes called prehypertension  Your healthcare provider may suggest lifestyle changes to help lower your blood pressure to a normal level  He or she may then check it again in 3 to 6 months  · Stage 1 hypertension is 130/80  to 139/89   Your provider may recommend lifestyle changes, medication, and checks every 3 to 6 months until your blood pressure is controlled  · Stage 2 hypertension is 140/90 or higher   Your provider will recommend lifestyle changes and have you take 2 kinds of hypertension medicines  You will also need to have your blood pressure checked monthly until it is controlled  Manage chronic hypertension:   · Check your blood pressure at home  Avoid smoking, caffeine, and exercise at least 30 minutes before checking your blood pressure  Sit and rest for 5 minutes before you take your blood pressure  Extend your arm and support it on a flat surface  Your arm should be at the same level as your heart  Follow the directions that came with your blood pressure monitor  Check your blood pressure 2 times, 1 minute apart, before you take your medicine in the morning  Also check your blood pressure before your evening meal  Keep a record of your readings and bring it to your follow-up visits  Ask your healthcare provider what your blood pressure should be           · Manage any other health conditions you have  Health conditions such as diabetes can increase your risk for hypertension  Follow your healthcare provider's instructions and take all your medicines as directed  Talk to your healthcare provider about any new health conditions you have recently developed  · Ask about all medicines  Certain medicines can increase your blood pressure  Examples include oral birth control pills, decongestants, herbal supplements, and NSAIDs, such as ibuprofen  Your healthcare provider can tell you which medicines are safe for you to take  This includes prescription and over-the-counter medicines  Lifestyle changes you can make to lower your blood pressure: Your provider may want you to make more lifestyle changes if you are having trouble controlling your blood pressure  This may feel difficult over time, especially if you think you are making good changes but your pressure is still high  It might help to focus on one new change at a time  For example, try to add 1 more day of exercise, or exercise for an extra 10 minutes on 2 days  Small changes can make a big difference  Your healthcare provider can also refer you to specialists such as a dietitian who can help you make small changes  · Limit sodium (salt) as directed  Too much sodium can affect your fluid balance  Check labels to find low-sodium or no-salt-added foods  Some low-sodium foods use potassium salts for flavor  Too much potassium can also cause health problems  Your healthcare provider will tell you how much sodium and potassium are safe for you to have in a day  He or she may recommend that you limit sodium to 2,300 mg a day  · Follow the meal plan recommended by your healthcare provider  A dietitian or your provider can give you more information on low-sodium plans or the DASH (Dietary Approaches to Stop Hypertension) eating plan  The DASH plan is low in sodium, unhealthy fats, and total fat   It is high in potassium, calcium, and fiber  · Exercise to maintain a healthy weight  Exercise at least 30 minutes per day, on most days of the week  This will help decrease your blood pressure  Ask your healthcare provider about the best exercise plan for you  · Decrease stress  This may help lower your blood pressure  Learn ways to relax, such as deep breathing or listening to music  · Limit alcohol as directed  Alcohol can increase your blood pressure  A drink of alcohol is 12 ounces of beer, 5 ounces of wine, or 1½ ounces of liquor  · Do not smoke  Nicotine and other chemicals in cigarettes and cigars can increase your blood pressure and also cause lung damage  Ask your healthcare provider for information if you currently smoke and need help to quit  E-cigarettes or smokeless tobacco still contain nicotine  Talk to your healthcare provider before you use these products  Follow up with your healthcare provider as directed: You will need to return to have your blood pressure checked and to have other lab tests done  Write down your questions so you remember to ask them during your visits  © Copyright [address] Information is for End User's use only and may not be sold, redistributed or otherwise used for commercial purposes  All illustrations and images included in CareNotes® are the copyrighted property of A D A M , Inc  or [address] [place], [place] [address]   The above information is an  only  It is not intended as medical advice for individual conditions or treatments  Talk to your doctor, nurse or pharmacist before following any medical regimen to see if it is safe and effective for you

## 2022-01-19 ENCOUNTER — APPOINTMENT (EMERGENCY)
Dept: RADIOLOGY | Facility: HOSPITAL | Age: 82
End: 2022-01-19
Payer: MEDICARE

## 2022-01-19 ENCOUNTER — APPOINTMENT (EMERGENCY)
Dept: CT IMAGING | Facility: HOSPITAL | Age: 82
End: 2022-01-19
Payer: MEDICARE

## 2022-01-19 ENCOUNTER — HOSPITAL ENCOUNTER (EMERGENCY)
Facility: HOSPITAL | Age: 82
Discharge: HOME | End: 2022-01-19
Admitting: INTERNAL MEDICINE
Payer: MEDICARE

## 2022-01-19 VITALS
DIASTOLIC BLOOD PRESSURE: 75 MMHG | SYSTOLIC BLOOD PRESSURE: 165 MMHG | RESPIRATION RATE: 20 BRPM | OXYGEN SATURATION: 94 % | HEART RATE: 100 BPM

## 2022-01-19 DIAGNOSIS — R53.1 WEAKNESS: ICD-10-CM
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
## 2022-01-19 DIAGNOSIS — M24.411 CHRONIC DISLOCATION OF RIGHT SHOULDER: ICD-10-CM

## 2022-01-19 DIAGNOSIS — R26.2 AMBULATORY DYSFUNCTION: ICD-10-CM

## 2022-01-19 DIAGNOSIS — M79.89 SWELLING OF LOWER EXTREMITY: ICD-10-CM

## 2022-01-19 DIAGNOSIS — S09.90XA INJURY OF HEAD, INITIAL ENCOUNTER: ICD-10-CM

## 2022-01-19 LAB
ALBUMIN SERPL BCP-MCNC: 3.5 G/DL (ref 3.5–5)
ALP SERPL-CCNC: 201 U/L (ref 46–116)
ALT SERPL W P-5'-P-CCNC: 29 U/L (ref 12–78)
ANION GAP SERPL CALCULATED.3IONS-SCNC: 7 MMOL/L (ref 4–13)
AST SERPL W P-5'-P-CCNC: 25 U/L (ref 5–45)
BASOPHILS # BLD AUTO: 0.02 THOUSANDS/ΜL (ref 0–0.1)
BASOPHILS NFR BLD AUTO: 0 % (ref 0–1)
BILIRUB DIRECT SERPL-MCNC: 0.09 MG/DL (ref 0–0.2)
BILIRUB SERPL-MCNC: 0.38 MG/DL (ref 0.2–1)
BUN SERPL-MCNC: 14 MG/DL (ref 5–25)
CALCIUM SERPL-MCNC: 8.8 MG/DL (ref 8.3–10.1)
CARDIAC TROPONIN I PNL SERPL HS: 21 NG/L
CHLORIDE SERPL-SCNC: 103 MMOL/L (ref 100–108)
CO2 SERPL-SCNC: 32 MMOL/L (ref 21–32)
CREAT SERPL-MCNC: 0.87 MG/DL (ref 0.6–1.3)
EOSINOPHIL # BLD AUTO: 0.04 THOUSAND/ΜL (ref 0–0.61)
EOSINOPHIL NFR BLD AUTO: 1 % (ref 0–6)
ERYTHROCYTE [DISTWIDTH] IN BLOOD BY AUTOMATED COUNT: 14.7 % (ref 11.6–15.1)
GFR SERPL CREATININE-BSD FRML MDRD: 62 ML/MIN/1.73SQ M
GLUCOSE SERPL-MCNC: 96 MG/DL (ref 65–140)
HCT VFR BLD AUTO: 37.9 % (ref 34.8–46.1)
HGB BLD-MCNC: 11.6 G/DL (ref 11.5–15.4)
IMM GRANULOCYTES # BLD AUTO: 0.02 THOUSAND/UL (ref 0–0.2)
IMM GRANULOCYTES NFR BLD AUTO: 0 % (ref 0–2)
LYMPHOCYTES # BLD AUTO: 1.17 THOUSANDS/ΜL (ref 0.6–4.47)
LYMPHOCYTES NFR BLD AUTO: 15 % (ref 14–44)
MCH RBC QN AUTO: 29.2 PG (ref 26.8–34.3)
MCHC RBC AUTO-ENTMCNC: 30.6 G/DL (ref 31.4–37.4)
MCV RBC AUTO: 96 FL (ref 82–98)
MONOCYTES # BLD AUTO: 0.84 THOUSAND/ΜL (ref 0.17–1.22)
MONOCYTES NFR BLD AUTO: 10 % (ref 4–12)
NEUTROPHILS # BLD AUTO: 5.99 THOUSANDS/ΜL (ref 1.85–7.62)
NEUTS SEG NFR BLD AUTO: 74 % (ref 43–75)
NRBC BLD AUTO-RTO: 0 /100 WBCS
PLATELET # BLD AUTO: 162 THOUSANDS/UL (ref 149–390)
PMV BLD AUTO: 10.1 FL (ref 8.9–12.7)
POTASSIUM SERPL-SCNC: 3.9 MMOL/L (ref 3.5–5.3)
PROT SERPL-MCNC: 7.5 G/DL (ref 6.4–8.2)
RBC # BLD AUTO: 3.97 MILLION/UL (ref 3.81–5.12)
SODIUM SERPL-SCNC: 142 MMOL/L (ref 136–145)
WBC # BLD AUTO: 8.08 THOUSAND/UL (ref 4.31–10.16)

## 2022-01-19 PROCEDURE — 84484 ASSAY OF TROPONIN QUANT: CPT | Performed by: PHYSICIAN ASSISTANT

## 2022-01-19 PROCEDURE — G1004 CDSM NDSC: HCPCS

## 2022-01-19 PROCEDURE — 71045 X-RAY EXAM CHEST 1 VIEW: CPT

## 2022-01-19 PROCEDURE — 93005 ELECTROCARDIOGRAM TRACING: CPT

## 2022-01-19 PROCEDURE — 99285 EMERGENCY DEPT VISIT HI MDM: CPT | Performed by: PHYSICIAN ASSISTANT

## 2022-01-19 PROCEDURE — 99284 EMERGENCY DEPT VISIT MOD MDM: CPT

## 2022-01-19 PROCEDURE — 80076 HEPATIC FUNCTION PANEL: CPT | Performed by: PHYSICIAN ASSISTANT

## 2022-01-19 PROCEDURE — 85025 COMPLETE CBC W/AUTO DIFF WBC: CPT | Performed by: PHYSICIAN ASSISTANT

## 2022-01-19 PROCEDURE — 36415 COLL VENOUS BLD VENIPUNCTURE: CPT | Performed by: PHYSICIAN ASSISTANT

## 2022-01-19 PROCEDURE — 80048 BASIC METABOLIC PNL TOTAL CA: CPT | Performed by: PHYSICIAN ASSISTANT

## 2022-01-19 PROCEDURE — 70450 CT HEAD/BRAIN W/O DYE: CPT

## 2022-01-19 PROCEDURE — 73030 X-RAY EXAM OF SHOULDER: CPT

## 2022-01-19 PROCEDURE — 73630 X-RAY EXAM OF FOOT: CPT

## 2022-01-19 NOTE — CASE MANAGEMENT
Case Management Progress Note    Patient name Trini Islas  Location Z2H7/Z2H7 MRN [id number]  : 1940 Date 2022       LOS (days): 0  Geometric Mean LOS (GMLOS) (days):   Days to GMLOS:        OBJECTIVE:        Current admission status: Emergency  Preferred Pharmacy:   [address] [place] [address] [facility]  Phone: [phone] Fax: [phone]    Primary Care Provider: Khushi Toscano DO    Primary Insurance: MEDICARE  Secondary Insurance: [address]    PROGRESS NOTE:      CM met with patient and dtr at bedside to inform [address] [place] [address] have accepted and will service patient
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
## 2022-01-19 NOTE — ED PROVIDER NOTES
History  Chief Complaint   Patient presents with   Gabrielle  Fall     pt has new hospital bed, per daughter pt keeps falling out of bed, daughter also states pt was shaking when she fell, pt also has hx of treamors  no headstrike, brusing on right shoulder pt states pain 910      80-year-old female with past medical history significant for liver cirrhosis, congestive heart failure and seizure disorder presents to the emergency department via EMS with chief complaint of fall  Patient's daughter provides additional details at bedside  She reports the patient had a new hospital bed liver yesterday but did not have rales on it  She reports overnight at some time the patient fell at bed was found on the floor  She suspects that she hit her face secondary to findings of left periorbital swelling  Fall was not witnessed  The daughter reports that the patient has frequent falls due to getting confused and having weakness in her legs when she gets out of bed she has difficulty ambulating at baseline and this is usually the source for her falls  Daughter reports no blood thinner use  Onset of symptoms reported as last night  Location of symptoms reported as the face  Quality is reported as localized swelling dental pain  Severity reported as mild  Associated symptoms:  Denies vomiting  Denies chest pain  Denies fevers  Modifying factors:  Daughter reports that the bed not having side rales is likely contributing to patient's fall  She also reports the patient has a walker but fell recently and banded and has not been able to use it to assist with ambulation and suspect this is contributing to symptoms  History provided by:  Patient and relative   used: No    Fall  Associated symptoms: headaches        Prior to Admission Medications   Prescriptions Last Dose Informant Patient Reported? Taking?    PHENobarbital 64 8 mg tablet   Yes No   Sig: Take 64 8 mg by mouth 2 (two) times a day acetaminophen (TYLENOL) 325 mg tablet   No No   Sig: Take 2 tablets (650 mg total) by mouth every 6 (six) hours as needed for mild pain, headaches or fever   albuterol (PROVENTIL HFA,VENTOLIN HFA) 90 mcg/act inhaler   No No   Sig: Inhale 2 puffs every 4 (four) hours as needed for wheezing   atorvastatin (LIPITOR) 80 mg tablet   Yes No   Sig: Take 80 mg by mouth daily   cholecalciferol (VITAMIN D3) 1,000 units tablet   Yes No   Sig: Take 2,000 Units by mouth daily   folic acid (FOLVITE) 1 mg tablet   Yes No   Sig: Take by mouth daily   guaiFENesin (ROBITUSSIN) 100 mg/5 mL oral solution   No No   Sig: Take 10 mL (200 mg total) by mouth every 4 (four) hours as needed (cough)   levETIRAcetam (KEPPRA) 500 mg tablet   Yes No   Sig: Take 500 mg by mouth every 12 (twelve) hours   levETIRAcetam (KEPPRA) 500 mg tablet   No No   Sig: Take 1 tablet (500 mg total) by mouth every 12 (twelve) hours   lidocaine (LIDODERM) 5 %   No No   Sig: Apply 1 patch topically daily for 10 days Remove & Discard patch within 12 hours or as directed by MD   metoprolol tartrate (LOPRESSOR) 25 mg tablet   Yes No   Sig: Take 25 mg by mouth every 12 (twelve) hours   multivitamin-minerals (CENTRUM ADULTS) tablet   No No   Sig: Take 1 tablet by mouth daily   omeprazole (PriLOSEC) 20 mg delayed release capsule   Yes No   Sig: Take 20 mg by mouth daily      Facility-Administered Medications: None       Past Medical History:   Diagnosis Date    CHF (congestive heart failure) (HCC)     Liver cirrhosis (HCC)     Seizures (HCC)        Past Surgical History:   Procedure Laterality Date    HYSTERECTOMY      LEG SURGERY  05/2016    Pt family cannot recall the specific surgery or which leg it was preformed on  History reviewed  No pertinent family history  I have reviewed and agree with the history as documented      E-Cigarette/Vaping     E-Cigarette/Vaping Substances     Social History     Tobacco Use    Smoking status: Never Smoker    Smokeless tobacco: Never Used   Substance Use Topics    Alcohol use: Never    Drug use: No       Review of Systems   Constitutional: Positive for activity change  HENT: Positive for facial swelling  Cardiovascular: Positive for leg swelling  Musculoskeletal: Positive for gait problem  Skin: Positive for color change  Neurological: Positive for weakness and headaches  All other systems reviewed and are negative  Physical Exam  Physical Exam  Vitals and nursing note reviewed  Constitutional:       General: She is not in acute distress  Appearance: Normal appearance  She is well-developed  Comments: /75 (BP Location: Right arm)   Pulse 100   Resp 20   SpO2 94%      HENT:      Head: Normocephalic  Right Ear: External ear normal       Left Ear: External ear normal       Nose: Nose normal       Mouth/Throat:      Mouth: Mucous membranes are moist    Eyes:      General: No scleral icterus  Right eye: No discharge  Left eye: No discharge  Extraocular Movements: Extraocular movements intact  Conjunctiva/sclera: Conjunctivae normal       Comments: There is mild left periorbital swelling   Neck:      Thyroid: No thyromegaly  Vascular: No JVD  Trachea: No tracheal deviation  Cardiovascular:      Rate and Rhythm: Normal rate and regular rhythm  Pulses: Normal pulses  Pulmonary:      Effort: Pulmonary effort is normal  No respiratory distress  Abdominal:      General: There is no distension  Palpations: Abdomen is soft  There is no mass  Tenderness: There is no abdominal tenderness  There is no guarding  Hernia: No hernia is present  Musculoskeletal:         General: Swelling present  No tenderness or deformity  Normal range of motion  Cervical back: Normal range of motion and neck supple  No rigidity or tenderness  Right lower leg: Edema present  Left lower leg: Edema present          Legs:    Lymphadenopathy: Cervical: No cervical adenopathy  Skin:     General: Skin is warm  Capillary Refill: Capillary refill takes less than 2 seconds  Coloration: Skin is not jaundiced or pale  Findings: No erythema or rash  Comments: There are skin thickening changes consistent with chronic venous stasis and lymphedema to bilateral lower extremities  No evidence of cellulitis at this time  Neurological:      General: No focal deficit present  Mental Status: She is alert and oriented to person, place, and time  Mental status is at baseline  Cranial Nerves: No cranial nerve deficit  Sensory: No sensory deficit  Gait: Gait abnormal    Psychiatric:         Mood and Affect: Mood normal          Behavior: Behavior normal          Thought Content:  Thought content normal          Judgment: Judgment normal          Vital Signs  ED Triage Vitals [01/19/22 0644]   Temp Pulse Respirations Blood Pressure SpO2   -- 100 20 165/75 94 %      Temp src Heart Rate Source Patient Position - Orthostatic VS BP Location FiO2 (%)   -- Monitor Sitting Right arm --      Pain Score       7           Vitals:    01/19/22 0644   BP: 165/75   Pulse: 100   Patient Position - Orthostatic VS: Sitting         Visual Acuity      ED Medications  Medications - No data to display    Diagnostic Studies  Results Reviewed     Procedure Component Value Units Date/Time    Hepatic function panel [[id number]]  (Abnormal) Collected: 01/19/22 0903    Lab Status: Final result Specimen: Blood from Arm, Right Updated: 01/19/22 0953     Total Bilirubin 0 38 mg/dL      Bilirubin, Direct 0 09 mg/dL      Alkaline Phosphatase 201 U/L      AST 25 U/L      ALT 29 U/L      Total Protein 7 5 g/dL      Albumin 3 5 g/dL     HS Troponin 0hr (reflex protocol) [[id number]]  (Normal) Collected: 01/19/22 0903    Lab Status: Final result Specimen: Blood from Arm, Right Updated: 01/19/22 0952     hs TnI 0hr 21 ng/L     Basic metabolic panel [[id number]] Collected: 01/19/22 0903    Lab Status: Final result Specimen: Blood from Arm, Right Updated: 01/19/22 0952     Sodium 142 mmol/L      Potassium 3 9 mmol/L      Chloride 103 mmol/L      CO2 32 mmol/L      ANION GAP 7 mmol/L      BUN 14 mg/dL      Creatinine 0 87 mg/dL      Glucose 96 mg/dL      Calcium 8 8 mg/dL      eGFR 62 ml/min/1 73sq m     Narrative:      [place] guidelines for Chronic Kidney Disease (CKD):     Stage 1 with normal or high GFR (GFR > 90 mL/min/1 73 square meters)    Stage 2 Mild CKD (GFR = 60-89 mL/min/1 73 square meters)    Stage 3A Moderate CKD (GFR = 45-59 mL/min/1 73 square meters)    Stage 3B Moderate CKD (GFR = 30-44 mL/min/1 73 square meters)    Stage 4 Severe CKD (GFR = 15-29 mL/min/1 73 square meters)    Stage 5 End Stage CKD (GFR <15 mL/min/1 73 square meters)  Note: GFR calculation is accurate only with a steady state creatinine    CBC and differential [[id number]]  (Abnormal) Collected: 01/19/22 0903    Lab Status: Final result Specimen: Blood from Arm, Right Updated: 01/19/22 0909     WBC 8 08 Thousand/uL      RBC 3 97 Million/uL      Hemoglobin 11 6 g/dL      Hematocrit 37 9 %      MCV 96 fL      MCH 29 2 pg      MCHC 30 6 g/dL      RDW 14 7 %      MPV 10 1 fL      Platelets 541 Thousands/uL      nRBC 0 /100 WBCs      Neutrophils Relative 74 %      Immat GRANS % 0 %      Lymphocytes Relative 15 %      Monocytes Relative 10 %      Eosinophils Relative 1 %      Basophils Relative 0 %      Neutrophils Absolute 5 99 Thousands/µL      Immature Grans Absolute 0 02 Thousand/uL      Lymphocytes Absolute 1 17 Thousands/µL      Monocytes Absolute 0 84 Thousand/µL      Eosinophils Absolute 0 04 Thousand/µL      Basophils Absolute 0 02 Thousands/µL                  CT head without contrast   Final Result by Corwin Nix MD (01/19 2422)      No acute intracranial abnormality        Unchanged position of right frontal approach ventricular catheter with stable moderate ventriculomegaly  Unchanged multifocal encephalomalacia                    Workstation performed: [id number]         XR chest 1 view portable   Final Result by Radha Renteria MD (01/19 8050)   No acute consolidation   No pleural effusion or pneumothorax            Workstation performed: [id number]         XR foot 3+ views LEFT   Final Result by Radha Renteria MD (01/19 9352)      No acute displaced fracture   Linear density seen projecting over the 2nd proximal phalanx probably projectional          I personally discussed this study with [address] [place] [address] on 1/19/2022 at 8:43 AM                 Workstation performed: [id number]         XR foot 3+ views RIGHT   Final Result by Radha Renteria MD (01/19 2903)      No acute displaced fracture dislocation   Osteopenia            Workstation performed: [id number]         XR shoulder 2+ views RIGHT   Final Result by Radha Renteria MD (01/19 7469)      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   No acute displaced fracture            Workstation performed: [id number]                    Procedures  ECG 12 Lead Documentation Only    Date/Time: 1/19/2022 8:15 AM  Performed by: Stewart Valdez PA-C  Authorized by: Stewart Valdez PA-C     Indications / Diagnosis:  Fall  ECG reviewed by me, the ED Provider: yes    Patient location:  ED  Previous ECG:     Previous ECG:  Compared to current    Comparison ECG info:  March 14, 2021    Similarity:  Changes noted    Comparison to cardiac monitor: Yes    Interpretation:     Interpretation: normal    Rate:     ECG rate:  84    ECG rate assessment: normal    Rhythm:     Rhythm: sinus rhythm    Ectopy:     Ectopy: none    QRS:     QRS axis:  Normal    QRS intervals:  Normal  Conduction:     Conduction: normal    ST segments:     ST segments:  Non-specific  T waves:     T waves: normal               ED Course             HEART Risk Score      Most Recent Value   Heart Score Risk Calculator History 0 Filed at: 01/21/2022 0957   ECG 1 Filed at: 01/21/2022 0957   Age 2 Filed at: 01/21/2022 0957   Risk Factors 2 Filed at: 01/21/2022 0957   Troponin 1 Filed at: 01/21/2022 0957   HEART Score 6 Filed at: 01/21/2022 3135                                      MDM  Number of Diagnoses or Management Options  Ambulatory dysfunction: new and requires workup  Chronic dislocation of right shoulder: new and requires workup  Fall from bed, initial encounter: new and requires workup  Injury of head, initial encounter: new and requires workup  Swelling of lower extremity: new and requires workup  Weakness: new and requires workup  Diagnosis management comments: MDM: DDX includes but is not limited to: Dehydration, electrolyte abnormality, deconditioning, CVA/TIA, infection, anemia, thyroid disorder, cardiac disorder, dysrhythmia, intracranial hemorrhage, fracture, cardiac arrhythmia, pneumonia, urinary tract infection, anemia  Plan check labs, CT of the head and right shoulder for evaluation of injuries  X-ray images of the right shoulder independently visualized interpreted by me demonstrate chronic dislocation of right humerus  No acute fracture  Radiology report reviewed: There is chronic anterior subcoracoid dislocation of the humerus   This was also noted on the previous CT from December 13, 2020     Arthritic changes are seen within the [facility] joint     No lytic or blastic osseous lesion  Soft tissues are unremarkable  X-ray images of bilateral feet independently visualized interpreted by me demonstrate no acute fracture or dislocation  CT head images independently visualized interpreted by me    Radiology report reviewed:PARENCHYMA:     Decreased attenuation is noted in periventricular and subcortical white matter demonstrating an appearance that is statistically most likely to represent moderate microangiopathic change   Unchanged encephalomalacia in left frontal, bilateral parietal,   and right occipital lobes   Unchanged encephalomalacia and gliosis in right frontal lobe surrounding right frontal ventricular catheter  No CT signs of acute infarction   No intracranial mass, mass effect or midline shift   No acute parenchymal hemorrhage    Diffuse opacification falx cerebri, unchanged  Arterial calcifications of carotid siphons  VENTRICLES AND EXTRA-AXIAL SPACES: Unchanged right frontal approach ventricular catheter with tip in the left anteroinferior basal ganglia region   Unchanged moderate ventriculomegaly with component of ex vacuo dilatation of both lateral ventricles  VISUALIZED ORBITS AND PARANASAL SINUSES:  No acute abnormality involving the orbits   Moderate-to-severe mucosal thickening in right maxillary sinus with internal high density material, likely representing inspissated material or chronic allergic fungal   elements   Mild mucosal thickening in bilateral frontal sinuses   Bilateral nasal lulu bullosa   Chronic osteitis of right maxillary sinus  CALVARIUM AND EXTRACRANIAL SOFT TISSUES:  Right frontal betsy hole   No acute calvarial abnormality   Visualized Right  shunt catheter tubing is intact  Chest x-ray images independently visualized interpreted by me  Radiology report reviewed:  FINDINGS:  A ventricular peritoneal shunt catheter seen which demonstrates pericatheter calcification     Cardiomediastinal silhouette appears unremarkable  The lungs are clear   No pneumothorax or pleural effusion       Anterior subcoracoid dislocation of the shoulder, chronic        Amount and/or Complexity of Data Reviewed  Clinical lab tests: ordered and reviewed  Tests in the radiology section of CPT®: ordered and reviewed  Tests in the medicine section of CPT®: ordered and reviewed  Discussion of test results with the performing providers: yes  Obtain history from someone other than the patient: yes (Daughter at bedside)  Review and summarize past medical records: yes  Independent visualization of images, tracings, or specimens: yes    Risk of Complications, Morbidity, and/or Mortality  General comments: ED summary:  77-year-old female with multiple medical problems and baseline ambulatory dysfunction and weakness with visits the emergency department for evaluation of injury sustained after she fell out of bed sometime overnight  Daughter has been caring for her at home and she recently had a hospital bed delivered that had no rales on a  The rales or reportedly coming today  The patient fell out of bed overnight and sustained injuries to her face  CT scan demonstrated no acute fracture, intracranial hemorrhage or bleeding  Reviewed results with patient and daughter at bedside  Lab evaluation demonstrated no anemia, renal failure or electrolyte abnormality  EKG demonstrates treated no acute cardiac arrhythmia or ST elevations  Patient had clinical symptoms including pain in both feet and for right shoulder following her fall  X-rays of the feet demonstrated no fracture  X-ray of the shoulder demonstrates chronically dislocated shoulder about which the patient and family are aware  No acute fractures noted  Discussed treatment options with patient and daughter bedside  Offered admission due to ambulatory dysfunction frequent falls but patient and daughter declined  They would like to go home  They are hopeful that the bed rails will help keeping her in bed and prevent falls  I discussed her case with Case Management carried case minute consult was placed for home PT, OT and safety evaluation  Patient was seen by case management and outpatient services were coordinated through [facility]  Patient and daughter aware of pending evaluation  A prescription for a new walker was given  Patient and daughter rule requesting discharge home and will follow up with primary care physician in 3-5 days for further evaluation treatment of symptoms   Reviewed reasons to return to ed  Patient verbalized understanding of diagnosis and agreement with discharge plan of care as well as understanding of reasons to return to ed        Patient was seen during the outbreak of the corona virus epidemic   Resources are limited due to the severity of patient illnesses associated with virus   Testing is also limited at this time   Discussed with patient at the time of this evaluation   Due to the fact that limited resources are available -treatment options are limited  Patient Progress  Patient progress: stable      Disposition  Final diagnoses:   Fall from bed, initial encounter   Swelling of lower extremity   Weakness   Injury of head, initial encounter   Chronic dislocation of right shoulder   Ambulatory dysfunction     Time reflects when diagnosis was documented in both MDM as applicable and the Disposition within this note     Time User Action Codes Description Comment    1/19/2022 10:49 AM Nneka Marely Add [W06  KDKI] Fall from bed, initial encounter     1/19/2022 10:50 AM Nneka Marely Add [M79 89] Swelling of lower extremity     1/19/2022 10:50 AM Nneka Marely Add [R53 1] Weakness     1/19/2022 10:50 AM Nneka Marely Add [N76 16KG] Injury of head, initial encounter     1/19/2022 10:50 AM Nneka Marely Add [G31 947] Chronic dislocation of right shoulder     1/19/2022 10:50 AM Nneka Marely Add [R26 2] Ambulatory dysfunction       ED Disposition     ED Disposition Condition Date/Time Comment    Discharge Stable Wed Jan 19, 2022 10:49 AM Tamar Childs discharge to home/self care              Follow-up Information     Follow up With Specialties Details Why Contact Info Additional [address], DO Family Medicine Call in 2 days for further evaluation of symptoms [address] [facility] [id number]       [address] [facility] Emergency Department Emergency Medicine Go to  If symptoms worsen [address] [facility] Emergency Department, [address] [place], [place], 111 [facility] Case Management  Call in 1 day for further evaluation of symptoms [address] Case Management, [address] [place], [place], [place] [address]  Follow up  [address] [facility] [place] [address]  [phone]           Discharge Medication List as of 1/19/2022 10:52 AM      CONTINUE these medications which have NOT CHANGED    Details   acetaminophen (TYLENOL) 325 mg tablet Take 2 tablets (650 mg total) by mouth every 6 (six) hours as needed for mild pain, headaches or fever, Starting Sat 3/20/2021, No Print      albuterol (PROVENTIL HFA,VENTOLIN HFA) 90 mcg/act inhaler Inhale 2 puffs every 4 (four) hours as needed for wheezing, Starting Thu 12/24/2020, Normal      atorvastatin (LIPITOR) 80 mg tablet Take 80 mg by mouth daily, Historical Med      cholecalciferol (VITAMIN D3) 1,000 units tablet Take 2,000 Units by mouth daily, Historical Med      folic acid (FOLVITE) 1 mg tablet Take by mouth daily, Historical Med      guaiFENesin (ROBITUSSIN) 100 mg/5 mL oral solution Take 10 mL (200 mg total) by mouth every 4 (four) hours as needed (cough), Starting Thu 12/24/2020, Normal      !! levETIRAcetam (KEPPRA) 500 mg tablet Take 500 mg by mouth every 12 (twelve) hours, Historical Med      !! levETIRAcetam (KEPPRA) 500 mg tablet Take 1 tablet (500 mg total) by mouth every 12 (twelve) hours, Starting Wed 12/18/2019, Print      lidocaine (LIDODERM) 5 % Apply 1 patch topically daily for 10 days Remove & Discard patch within 12 hours or as directed by MD, Starting Fri 12/25/2020, Until Mon 1/4/2021, Normal      metoprolol tartrate (LOPRESSOR) 25 mg tablet Take 25 mg by mouth every 12 (twelve) hours, Historical Med      multivitamin-minerals (CENTRUM ADULTS) tablet Take 1 tablet by mouth daily, Starting Fri 12/25/2020, Until Sun 1/24/2021, Normal      omeprazole (PriLOSEC) 20 mg delayed release capsule Take 20 mg by mouth daily, Historical Med      PHENobarbital 64 8 mg tablet Take 64 8 mg by mouth 2 (two) times a day, Historical Med       !! - Potential duplicate medications found  Please discuss with provider            Outpatient Discharge Orders   [employer]       PDMP Review       Value Time User    PDMP Reviewed  Yes 12/24/2020 11:26 AM Eunice Ordoñez MD          ED Provider  Electronically Signed by           Nadine Ndiaye PA-C  01/21/22 0919

## 2022-01-19 NOTE — ED NOTES
Unable to obtain ordered labs and IV access, multiple ultrasound attempts, provider made aware        Berry Rubin RN  01/19/22 8150

## 2022-01-19 NOTE — ED NOTES
Per EMS pt has new hospital bed that she continues to fall out of  Daughter has voiced her concerns to pcp  Pt is confused at baseline  Asstx1 uses walker or wheelchair  Per ems pt has history of tremors, daughter was concerned as pt was shaking when she fell this morning   Visible bruise on right shoulder, pt states 7/10 pain      Shilpi Mills, TERESA  01/19/22 4254

## 2022-01-19 NOTE — CASE MANAGEMENT
Case Management Discharge Planning Note    Patient name Monae Nunez  Location Z2H7/Z2H7 MRN [id number]  : 1940 Date 2022       Current Admission Date: 2022  Current Admission Diagnosis:Right shoulder injury   Patient Active Problem List    Diagnosis Date Noted    Morbid obesity ([facility] ) 2021    Ambulatory dysfunction 2021    Lower extremity edema 2021    Acute respiratory failure with hypoxia ([facility] ) 2021    GERD (gastroesophageal reflux disease) 2020    Essential hypertension 2020    Chronic diastolic (congestive) heart failure [facility] ) 2020    Nonrheumatic aortic valve stenosis 2020    Hyperlipidemia 2020    Pneumonia due to COVID-19 virus 2020    Localization-related epilepsy, intractable ([facility] ) 02/15/2019    Lower extremity cellulitis 2018    Cough 2018    Shortness of breath 2018    Seizure disorder ([facility] ) 2018    Thrombocytopenia ([facility] ) 2018    Acute upper respiratory infection 2018    Alteration in skin integrity 2018      LOS (days): 0  Geometric Mean LOS (GMLOS) (days):   Days to GMLOS:     OBJECTIVE:            Current admission status: Emergency   Preferred Pharmacy:   [address] [facility], [address] [facility] [address]  Phone: [phone] Fax: [phone]    Primary Care Provider: Tia Hanks DO    Primary Insurance: MEDICARE  Secondary Insurance: [address]:            Requested  [address]         Is the patient interested in [facility] at discharge?: Yes  [address] requested[de-identified] [address] Name[de-identified] [address] Provider[de-identified] PCP  Home Health Services Needed[de-identified] Strengthening/Theraputic Exercises to Improve Function  Homebound Criteria Met[de-identified] Requires the Assistance of Another Person for Safe Ambulation or to Leave the Home,Uses an Assist Device (i e  cane, walker, etc)  Supporting Clincal Findings[de-identified] Limited Endurance    DME Referral Provided  Referral made for DME?: No    Other Referral/Resources/Interventions Provided:  Interventions: [facility]  Referral Comments: [facility] accepted patient            Treatment Team Recommendation: Home with [address] [employer]  Discharge Destination Plan[de-identified] [facility]

## 2022-01-20 LAB
ATRIAL RATE: 84 BPM
P AXIS: 46 DEGREES
PR INTERVAL: 150 MS
QRS AXIS: -2 DEGREES
QRSD INTERVAL: 78 MS
QT INTERVAL: 382 MS
QTC INTERVAL: 451 MS
T WAVE AXIS: 21 DEGREES
VENTRICULAR RATE: 84 BPM

## 2022-01-20 PROCEDURE — 93010 ELECTROCARDIOGRAM REPORT: CPT | Performed by: INTERNAL MEDICINE

## 2022-04-25 ENCOUNTER — APPOINTMENT (EMERGENCY)
Dept: RADIOLOGY | Facility: HOSPITAL | Age: 82
DRG: 178 | End: 2022-04-25
Payer: MEDICARE

## 2022-04-25 ENCOUNTER — APPOINTMENT (EMERGENCY)
Dept: CT IMAGING | Facility: HOSPITAL | Age: 82
DRG: 178 | End: 2022-04-25
Payer: MEDICARE

## 2022-04-25 ENCOUNTER — HOSPITAL ENCOUNTER (INPATIENT)
Facility: HOSPITAL | Age: 82
LOS: 7 days | DRG: 178 | End: 2022-05-02
Attending: EMERGENCY MEDICINE | Admitting: FAMILY MEDICINE
Payer: MEDICARE

## 2022-04-25 DIAGNOSIS — J69.0 ASPIRATION PNEUMONIA (HCC): ICD-10-CM

## 2022-04-25 DIAGNOSIS — N17.9 AKI (ACUTE KIDNEY INJURY) (HCC): ICD-10-CM

## 2022-04-25 DIAGNOSIS — R60.0 LOWER EXTREMITY EDEMA: ICD-10-CM

## 2022-04-25 DIAGNOSIS — L03.116 CELLULITIS OF LEFT LOWER EXTREMITY: ICD-10-CM

## 2022-04-25 DIAGNOSIS — Z78.9 POOR INTRAVENOUS ACCESS: ICD-10-CM

## 2022-04-25 DIAGNOSIS — J18.9 CAP (COMMUNITY ACQUIRED PNEUMONIA): Primary | ICD-10-CM

## 2022-04-25 DIAGNOSIS — Z78.9 DIFFICULT INTRAVENOUS ACCESS: ICD-10-CM

## 2022-04-25 PROBLEM — R11.2 INTRACTABLE NAUSEA AND VOMITING: Status: ACTIVE | Noted: 2022-04-25

## 2022-04-25 LAB
ALBUMIN SERPL BCP-MCNC: 3 G/DL (ref 3.5–5)
ALP SERPL-CCNC: 136 U/L (ref 46–116)
ALT SERPL W P-5'-P-CCNC: 45 U/L (ref 12–78)
ANION GAP SERPL CALCULATED.3IONS-SCNC: 7 MMOL/L (ref 4–13)
APTT PPP: 43 SECONDS (ref 23–37)
AST SERPL W P-5'-P-CCNC: 40 U/L (ref 5–45)
BASOPHILS # BLD MANUAL: 0 THOUSAND/UL (ref 0–0.1)
BASOPHILS NFR MAR MANUAL: 0 % (ref 0–1)
BILIRUB SERPL-MCNC: 0.91 MG/DL (ref 0.2–1)
BUN SERPL-MCNC: 35 MG/DL (ref 5–25)
CALCIUM ALBUM COR SERPL-MCNC: 10 MG/DL (ref 8.3–10.1)
CALCIUM SERPL-MCNC: 9.2 MG/DL (ref 8.3–10.1)
CHLORIDE SERPL-SCNC: 97 MMOL/L (ref 100–108)
CO2 SERPL-SCNC: 31 MMOL/L (ref 21–32)
CREAT SERPL-MCNC: 2.07 MG/DL (ref 0.6–1.3)
EOSINOPHIL # BLD MANUAL: 0 THOUSAND/UL (ref 0–0.4)
EOSINOPHIL NFR BLD MANUAL: 0 % (ref 0–6)
ERYTHROCYTE [DISTWIDTH] IN BLOOD BY AUTOMATED COUNT: 14.8 % (ref 11.6–15.1)
FLUAV RNA RESP QL NAA+PROBE: NEGATIVE
FLUBV RNA RESP QL NAA+PROBE: NEGATIVE
GFR SERPL CREATININE-BSD FRML MDRD: 21 ML/MIN/1.73SQ M
GLUCOSE SERPL-MCNC: 153 MG/DL (ref 65–140)
HCT VFR BLD AUTO: 37.1 % (ref 34.8–46.1)
HGB BLD-MCNC: 11.5 G/DL (ref 11.5–15.4)
INR PPP: 1.29 (ref 0.84–1.19)
LACTATE SERPL-SCNC: 1.3 MMOL/L (ref 0.5–2)
LACTATE SERPL-SCNC: 2.8 MMOL/L (ref 0.5–2)
LIPASE SERPL-CCNC: 27 U/L (ref 73–393)
LYMPHOCYTES # BLD AUTO: 1.3 THOUSAND/UL (ref 0.6–4.47)
LYMPHOCYTES # BLD AUTO: 16 % (ref 14–44)
MCH RBC QN AUTO: 30.3 PG (ref 26.8–34.3)
MCHC RBC AUTO-ENTMCNC: 31 G/DL (ref 31.4–37.4)
MCV RBC AUTO: 98 FL (ref 82–98)
MONOCYTES # BLD AUTO: 0 THOUSAND/UL (ref 0–1.22)
MONOCYTES NFR BLD: 0 % (ref 4–12)
NEUTROPHILS # BLD MANUAL: 6.83 THOUSAND/UL (ref 1.85–7.62)
NEUTS SEG NFR BLD AUTO: 84 % (ref 43–75)
PLATELET # BLD AUTO: 147 THOUSANDS/UL (ref 149–390)
PLATELET BLD QL SMEAR: ABNORMAL
PMV BLD AUTO: 10.2 FL (ref 8.9–12.7)
POTASSIUM SERPL-SCNC: 5.2 MMOL/L (ref 3.5–5.3)
PROCALCITONIN SERPL-MCNC: 6.07 NG/ML
PROT SERPL-MCNC: 7 G/DL (ref 6.4–8.2)
PROTHROMBIN TIME: 15.6 SECONDS (ref 11.6–14.5)
RBC # BLD AUTO: 3.8 MILLION/UL (ref 3.81–5.12)
RSV RNA RESP QL NAA+PROBE: NEGATIVE
SARS-COV-2 RNA RESP QL NAA+PROBE: NEGATIVE
SODIUM SERPL-SCNC: 135 MMOL/L (ref 136–145)
WBC # BLD AUTO: 8.13 THOUSAND/UL (ref 4.31–10.16)

## 2022-04-25 PROCEDURE — 84145 PROCALCITONIN (PCT): CPT | Performed by: PHYSICIAN ASSISTANT

## 2022-04-25 PROCEDURE — 96367 TX/PROPH/DG ADDL SEQ IV INF: CPT

## 2022-04-25 PROCEDURE — 99223 1ST HOSP IP/OBS HIGH 75: CPT | Performed by: INTERNAL MEDICINE

## 2022-04-25 PROCEDURE — 36415 COLL VENOUS BLD VENIPUNCTURE: CPT | Performed by: PHYSICIAN ASSISTANT

## 2022-04-25 PROCEDURE — 85610 PROTHROMBIN TIME: CPT | Performed by: PHYSICIAN ASSISTANT

## 2022-04-25 PROCEDURE — 96366 THER/PROPH/DIAG IV INF ADDON: CPT

## 2022-04-25 PROCEDURE — 85027 COMPLETE CBC AUTOMATED: CPT | Performed by: PHYSICIAN ASSISTANT

## 2022-04-25 PROCEDURE — 83605 ASSAY OF LACTIC ACID: CPT | Performed by: PHYSICIAN ASSISTANT

## 2022-04-25 PROCEDURE — 96365 THER/PROPH/DIAG IV INF INIT: CPT

## 2022-04-25 PROCEDURE — 85730 THROMBOPLASTIN TIME PARTIAL: CPT | Performed by: PHYSICIAN ASSISTANT

## 2022-04-25 PROCEDURE — 80053 COMPREHEN METABOLIC PANEL: CPT | Performed by: PHYSICIAN ASSISTANT

## 2022-04-25 PROCEDURE — 83690 ASSAY OF LIPASE: CPT | Performed by: PHYSICIAN ASSISTANT

## 2022-04-25 PROCEDURE — 85007 BL SMEAR W/DIFF WBC COUNT: CPT | Performed by: PHYSICIAN ASSISTANT

## 2022-04-25 PROCEDURE — 93005 ELECTROCARDIOGRAM TRACING: CPT

## 2022-04-25 PROCEDURE — 71045 X-RAY EXAM CHEST 1 VIEW: CPT

## 2022-04-25 PROCEDURE — 87040 BLOOD CULTURE FOR BACTERIA: CPT | Performed by: PHYSICIAN ASSISTANT

## 2022-04-25 PROCEDURE — 74176 CT ABD & PELVIS W/O CONTRAST: CPT

## 2022-04-25 PROCEDURE — 99285 EMERGENCY DEPT VISIT HI MDM: CPT | Performed by: PHYSICIAN ASSISTANT

## 2022-04-25 PROCEDURE — 96375 TX/PRO/DX INJ NEW DRUG ADDON: CPT

## 2022-04-25 PROCEDURE — 0241U HB NFCT DS VIR RESP RNA 4 TRGT: CPT | Performed by: PHYSICIAN ASSISTANT

## 2022-04-25 PROCEDURE — 99285 EMERGENCY DEPT VISIT HI MDM: CPT

## 2022-04-25 PROCEDURE — 71250 CT THORAX DX C-: CPT

## 2022-04-25 PROCEDURE — 96361 HYDRATE IV INFUSION ADD-ON: CPT

## 2022-04-25 RX ORDER — SODIUM CHLORIDE 9 MG/ML
3 INJECTION INTRAVENOUS
Status: DISCONTINUED | OUTPATIENT
Start: 2022-04-25 | End: 2022-05-02 | Stop reason: HOSPADM

## 2022-04-25 RX ORDER — ONDANSETRON 2 MG/ML
4 INJECTION INTRAMUSCULAR; INTRAVENOUS ONCE
Status: COMPLETED | OUTPATIENT
Start: 2022-04-25 | End: 2022-04-25

## 2022-04-25 RX ORDER — ONDANSETRON 2 MG/ML
4 INJECTION INTRAMUSCULAR; INTRAVENOUS EVERY 6 HOURS PRN
Status: DISCONTINUED | OUTPATIENT
Start: 2022-04-25 | End: 2022-05-02 | Stop reason: HOSPADM

## 2022-04-25 RX ORDER — METRONIDAZOLE 500 MG/1
500 TABLET ORAL EVERY 8 HOURS SCHEDULED
Status: DISCONTINUED | OUTPATIENT
Start: 2022-04-26 | End: 2022-04-28

## 2022-04-25 RX ORDER — SODIUM CHLORIDE 9 MG/ML
125 INJECTION, SOLUTION INTRAVENOUS CONTINUOUS
Status: DISCONTINUED | OUTPATIENT
Start: 2022-04-25 | End: 2022-04-26

## 2022-04-25 RX ORDER — HEPARIN SODIUM 5000 [USP'U]/ML
5000 INJECTION, SOLUTION INTRAVENOUS; SUBCUTANEOUS EVERY 8 HOURS SCHEDULED
Status: DISCONTINUED | OUTPATIENT
Start: 2022-04-26 | End: 2022-05-02 | Stop reason: HOSPADM

## 2022-04-25 RX ORDER — ACETAMINOPHEN 325 MG/1
650 TABLET ORAL EVERY 6 HOURS PRN
Status: DISCONTINUED | OUTPATIENT
Start: 2022-04-25 | End: 2022-05-02 | Stop reason: HOSPADM

## 2022-04-25 RX ORDER — ALBUTEROL SULFATE 2.5 MG/3ML
1 SOLUTION RESPIRATORY (INHALATION) ONCE
Status: COMPLETED | OUTPATIENT
Start: 2022-04-25 | End: 2022-04-25

## 2022-04-25 RX ORDER — METRONIDAZOLE 500 MG/1
500 TABLET ORAL ONCE
Status: DISCONTINUED | OUTPATIENT
Start: 2022-04-26 | End: 2022-04-25

## 2022-04-25 RX ORDER — IPRATROPIUM BROMIDE AND ALBUTEROL SULFATE .5; 3 MG/3ML; MG/3ML
2 SOLUTION RESPIRATORY (INHALATION) ONCE
Status: COMPLETED | OUTPATIENT
Start: 2022-04-25 | End: 2022-04-25

## 2022-04-25 RX ORDER — PHENOBARBITAL 32.4 MG/1
64.8 TABLET ORAL 2 TIMES DAILY
Status: DISCONTINUED | OUTPATIENT
Start: 2022-04-25 | End: 2022-05-01

## 2022-04-25 RX ORDER — METRONIDAZOLE 500 MG/1
500 TABLET ORAL ONCE
Status: COMPLETED | OUTPATIENT
Start: 2022-04-25 | End: 2022-04-25

## 2022-04-25 RX ADMIN — ONDANSETRON 4 MG: 2 INJECTION INTRAMUSCULAR; INTRAVENOUS at 17:47

## 2022-04-25 RX ADMIN — AZITHROMYCIN MONOHYDRATE 500 MG: 500 INJECTION, POWDER, LYOPHILIZED, FOR SOLUTION INTRAVENOUS at 18:17

## 2022-04-25 RX ADMIN — METRONIDAZOLE 500 MG: 500 TABLET ORAL at 17:50

## 2022-04-25 RX ADMIN — SODIUM CHLORIDE 125 ML/HR: 0.9 INJECTION, SOLUTION INTRAVENOUS at 20:59

## 2022-04-25 RX ADMIN — PHENOBARBITAL 64.8 MG: 32.4 TABLET ORAL at 20:59

## 2022-04-25 RX ADMIN — SODIUM CHLORIDE 1000 ML: 0.9 INJECTION, SOLUTION INTRAVENOUS at 17:39

## 2022-04-25 RX ADMIN — CEFEPIME HYDROCHLORIDE 2000 MG: 2 INJECTION, POWDER, FOR SOLUTION INTRAVENOUS at 17:48

## 2022-04-26 ENCOUNTER — APPOINTMENT (INPATIENT)
Dept: RADIOLOGY | Facility: HOSPITAL | Age: 82
DRG: 178 | End: 2022-04-26
Payer: MEDICARE

## 2022-04-26 LAB
ANION GAP SERPL CALCULATED.3IONS-SCNC: 8 MMOL/L (ref 4–13)
BUN SERPL-MCNC: 40 MG/DL (ref 5–25)
CALCIUM SERPL-MCNC: 8.7 MG/DL (ref 8.3–10.1)
CHLORIDE SERPL-SCNC: 101 MMOL/L (ref 100–108)
CO2 SERPL-SCNC: 26 MMOL/L (ref 21–32)
CREAT SERPL-MCNC: 1.79 MG/DL (ref 0.6–1.3)
ERYTHROCYTE [DISTWIDTH] IN BLOOD BY AUTOMATED COUNT: 14.7 % (ref 11.6–15.1)
EST. AVERAGE GLUCOSE BLD GHB EST-MCNC: 114 MG/DL
GFR SERPL CREATININE-BSD FRML MDRD: 26 ML/MIN/1.73SQ M
GLUCOSE SERPL-MCNC: 121 MG/DL (ref 65–140)
HBA1C MFR BLD: 5.6 %
HCT VFR BLD AUTO: 32.9 % (ref 34.8–46.1)
HGB BLD-MCNC: 10.1 G/DL (ref 11.5–15.4)
MCH RBC QN AUTO: 29.9 PG (ref 26.8–34.3)
MCHC RBC AUTO-ENTMCNC: 30.7 G/DL (ref 31.4–37.4)
MCV RBC AUTO: 97 FL (ref 82–98)
NRBC BLD AUTO-RTO: 0 /100 WBCS
PLATELET # BLD AUTO: 115 THOUSANDS/UL (ref 149–390)
PMV BLD AUTO: 11.3 FL (ref 8.9–12.7)
POTASSIUM SERPL-SCNC: 5.1 MMOL/L (ref 3.5–5.3)
RBC # BLD AUTO: 3.38 MILLION/UL (ref 3.81–5.12)
SODIUM SERPL-SCNC: 135 MMOL/L (ref 136–145)
WBC # BLD AUTO: 8 THOUSAND/UL (ref 4.31–10.16)

## 2022-04-26 PROCEDURE — 83036 HEMOGLOBIN GLYCOSYLATED A1C: CPT | Performed by: INTERNAL MEDICINE

## 2022-04-26 PROCEDURE — 99232 SBSQ HOSP IP/OBS MODERATE 35: CPT | Performed by: INTERNAL MEDICINE

## 2022-04-26 PROCEDURE — 36415 COLL VENOUS BLD VENIPUNCTURE: CPT | Performed by: INTERNAL MEDICINE

## 2022-04-26 PROCEDURE — 80048 BASIC METABOLIC PNL TOTAL CA: CPT | Performed by: INTERNAL MEDICINE

## 2022-04-26 PROCEDURE — 92610 EVALUATE SWALLOWING FUNCTION: CPT

## 2022-04-26 PROCEDURE — 73630 X-RAY EXAM OF FOOT: CPT

## 2022-04-26 PROCEDURE — 85027 COMPLETE CBC AUTOMATED: CPT | Performed by: INTERNAL MEDICINE

## 2022-04-26 RX ORDER — MIDODRINE HYDROCHLORIDE 5 MG/1
5 TABLET ORAL
Status: DISCONTINUED | OUTPATIENT
Start: 2022-04-26 | End: 2022-04-26

## 2022-04-26 RX ORDER — MIDODRINE HYDROCHLORIDE 5 MG/1
5 TABLET ORAL ONCE
Status: COMPLETED | OUTPATIENT
Start: 2022-04-26 | End: 2022-04-26

## 2022-04-26 RX ORDER — SODIUM CHLORIDE 9 MG/ML
50 INJECTION, SOLUTION INTRAVENOUS CONTINUOUS
Status: DISCONTINUED | OUTPATIENT
Start: 2022-04-26 | End: 2022-04-27

## 2022-04-26 RX ORDER — PANTOPRAZOLE SODIUM 20 MG/1
20 TABLET, DELAYED RELEASE ORAL
Status: DISCONTINUED | OUTPATIENT
Start: 2022-04-26 | End: 2022-05-02 | Stop reason: HOSPADM

## 2022-04-26 RX ORDER — ALBUTEROL SULFATE 90 UG/1
2 AEROSOL, METERED RESPIRATORY (INHALATION) EVERY 4 HOURS PRN
Status: DISCONTINUED | OUTPATIENT
Start: 2022-04-26 | End: 2022-05-02 | Stop reason: HOSPADM

## 2022-04-26 RX ORDER — LEVETIRACETAM 500 MG/1
500 TABLET ORAL EVERY 12 HOURS SCHEDULED
Status: DISCONTINUED | OUTPATIENT
Start: 2022-04-26 | End: 2022-05-02 | Stop reason: HOSPADM

## 2022-04-26 RX ORDER — MIDODRINE HYDROCHLORIDE 5 MG/1
10 TABLET ORAL
Status: DISCONTINUED | OUTPATIENT
Start: 2022-04-26 | End: 2022-05-02 | Stop reason: HOSPADM

## 2022-04-26 RX ORDER — ALBUMIN (HUMAN) 12.5 G/50ML
12.5 SOLUTION INTRAVENOUS ONCE
Status: COMPLETED | OUTPATIENT
Start: 2022-04-26 | End: 2022-04-26

## 2022-04-26 RX ORDER — ATORVASTATIN CALCIUM 40 MG/1
80 TABLET, FILM COATED ORAL DAILY
Status: DISCONTINUED | OUTPATIENT
Start: 2022-04-26 | End: 2022-05-02 | Stop reason: HOSPADM

## 2022-04-26 RX ADMIN — LEVETIRACETAM 500 MG: 500 TABLET, FILM COATED ORAL at 21:09

## 2022-04-26 RX ADMIN — MIDODRINE HYDROCHLORIDE 10 MG: 5 TABLET ORAL at 18:00

## 2022-04-26 RX ADMIN — METOPROLOL TARTRATE 25 MG: 25 TABLET, FILM COATED ORAL at 21:09

## 2022-04-26 RX ADMIN — PHENOBARBITAL 64.8 MG: 32.4 TABLET ORAL at 08:15

## 2022-04-26 RX ADMIN — HEPARIN SODIUM 5000 UNITS: 5000 INJECTION INTRAVENOUS; SUBCUTANEOUS at 05:24

## 2022-04-26 RX ADMIN — METRONIDAZOLE 500 MG: 500 TABLET ORAL at 21:09

## 2022-04-26 RX ADMIN — METRONIDAZOLE 500 MG: 500 TABLET ORAL at 07:22

## 2022-04-26 RX ADMIN — PANTOPRAZOLE SODIUM 20 MG: 20 TABLET, DELAYED RELEASE ORAL at 09:22

## 2022-04-26 RX ADMIN — MIDODRINE HYDROCHLORIDE 5 MG: 5 TABLET ORAL at 12:12

## 2022-04-26 RX ADMIN — ACETAMINOPHEN 650 MG: 325 TABLET ORAL at 14:28

## 2022-04-26 RX ADMIN — CEFTRIAXONE SODIUM 1000 MG: 10 INJECTION, POWDER, FOR SOLUTION INTRAVENOUS at 07:22

## 2022-04-26 RX ADMIN — METRONIDAZOLE 500 MG: 500 TABLET ORAL at 14:30

## 2022-04-26 RX ADMIN — HEPARIN SODIUM 5000 UNITS: 5000 INJECTION INTRAVENOUS; SUBCUTANEOUS at 21:08

## 2022-04-26 RX ADMIN — MIDODRINE HYDROCHLORIDE 5 MG: 5 TABLET ORAL at 14:29

## 2022-04-26 RX ADMIN — SODIUM CHLORIDE 50 ML/HR: 0.9 INJECTION, SOLUTION INTRAVENOUS at 03:22

## 2022-04-26 RX ADMIN — ALBUTEROL SULFATE 2 PUFF: 90 AEROSOL, METERED RESPIRATORY (INHALATION) at 09:22

## 2022-04-26 RX ADMIN — ALBUMIN (HUMAN) 12.5 G: 0.25 INJECTION, SOLUTION INTRAVENOUS at 02:32

## 2022-04-26 RX ADMIN — LEVETIRACETAM 500 MG: 500 TABLET, FILM COATED ORAL at 09:22

## 2022-04-26 RX ADMIN — ATORVASTATIN CALCIUM 80 MG: 40 TABLET, FILM COATED ORAL at 09:21

## 2022-04-26 RX ADMIN — PHENOBARBITAL 64.8 MG: 32.4 TABLET ORAL at 18:00

## 2022-04-26 RX ADMIN — HEPARIN SODIUM 5000 UNITS: 5000 INJECTION INTRAVENOUS; SUBCUTANEOUS at 14:30

## 2022-04-26 NOTE — CASE MANAGEMENT
Case Management Assessment & Discharge Planning Note    Patient name Lona Montilla  Location ED 16/ED 16 MRN [id number]  : 1940 Date 2022       Current Admission Date: 2022  Current Admission Diagnosis:Aspiration pneumonia [facility])   Patient Active Problem List    Diagnosis Date Noted    Intractable nausea and vomiting 2022    Aspiration pneumonia ([facility] ) 2022    EDUARDO (acute kidney injury) ([facility] ) 2022    Morbid obesity ([facility] ) 2021    Ambulatory dysfunction 2021    Lower extremity edema 2021    Acute respiratory failure with hypoxia ([facility] ) 2021    GERD (gastroesophageal reflux disease) 2020    Essential hypertension 2020    Chronic diastolic (congestive) heart failure ([facility] ) 2020    Nonrheumatic aortic valve stenosis 2020    Hyperlipidemia 2020    Pneumonia due to COVID-19 virus 2020    Localization-related epilepsy, intractable ([facility] ) 02/15/2019    Lower extremity cellulitis 2018    Cough 2018    Shortness of breath 2018    Seizure disorder ([facility] ) 2018    Thrombocytopenia ([facility] ) 2018    Acute upper respiratory infection 2018    Alteration in skin integrity 2018      LOS (days): 1  Geometric Mean LOS (GMLOS) (days): 4 00  Days to GMLOS:3 4     OBJECTIVE:    Risk of Unplanned Readmission Score: 15         Current admission status: Inpatient       Preferred Pharmacy:   [address] [place], [place] - [address] [place] [address] [address] [facility] [address]  Phone: [phone] Fax: [phone]    Primary Care Provider: Kerrie Jung DO    Primary Insurance: MEDICARE  Secondary Insurance: [address]    ASSESSMENT:  [address] [facility] Representative - Daughter   Primary Phone: [phone] (Mobile)  Home Phone: [phone]               Advance Directives  Does patient have a [address] [place] [address] Avenue?: No  Was patient offered paperwork?: Yes (Patient and patient's daughter declined paperwork at this time )  Does patient currently have a Health Care decision maker?: Yes, please see Health Care Proxy section (Patient identified her daughter as her health care representative )  Does patient have Advance Directives?: No  Was patient offered paperwork?: Yes (Patient and patient's daughter declined paperwork at this time )  Primary Contact: Patient's Daughter    Readmission Root Cause  30 Day Readmission: No    Patient Information  Admitted from[de-identified] Home  Mental Status: Alert  During Assessment patient was accompanied by: Not accompanied during assessment  Assessment information provided by[de-identified] Patient,Daughter  Primary Caregiver: Family  Caregiver's Name[de-identified] Diana Luo Relationship to Patient[de-identified] Family Member  Caregiver's Telephone Number[de-identified] [phone]  Support Systems: Daughter  [place] of Residence: [place] [address] do you live in?: [employer] [address] entry access options   Select all that apply : No steps to enter home  Type of Current Residence: [place]  In the last 12 months, was there a time when you were not able to pay the mortgage or rent on time?: No  In the last 12 months, how many places have you lived?: 1  In the last 12 months, was there a time when you did not have a steady place to sleep or slept in a shelter (including now)?: No  Homeless/housing insecurity resource given?: N/A  Living Arrangements: Lives w/ Daughter  Is patient a ?: No    Activities of Daily Living Prior to Admission  Functional Status: Assistance  Completes ADLs independently?: No  Level of ADL dependence: Assistance  Ambulates independently?: No  Level of ambulatory dependence: Assistance  Does patient use assisted devices?: Yes  Assisted Devices (DME) used: [employer]  Does patient currently own DME?: Yes  What DME does the patient currently own?: [employer]  Does patient have a history of Outpatient Therapy (PT/OT)?: No  Does the patient have a history of Short-Term Rehab?: Yes (Patient has STR Hx at [facility] )  Does patient have a history of [address]?: Yes (Per patient's daughter, patient has used HHC multiple times in the past  Patient most recently used [place] for wound care )  Does patient currently have [address]?: No    Patient Information Continued  Income Source: Pension/half-way  Does patient have prescription coverage?: Yes  Within the past 12 months, you worried that your food would run out before you got the money to buy more : Never true  Within the past 12 months, the food you bought just didnt last and you didnt have money to get more : Never true  Food insecurity resource given?: N/A  Does patient receive dialysis treatments?: No  Does patient have a history of substance abuse?: No  Does patient have a history of Mental Health Diagnosis?: No    PHQ 2/9 Screening   Reviewed PHQ 2/9 Depression Screening Score?: No    Means of Transportation  Means of Transport to Appts[de-identified] Family transport  In the past 12 months, has lack of transportation kept you from medical appointments or from getting medications?: No  In the past 12 months, has lack of transportation kept you from meetings, work, or from getting things needed for daily living?: No  Was application for public transport provided?: N/A    DISCHARGE DETAILS:    Discharge planning discussed with[de-identified] Patient and Patient's Daughter  Freedom of Choice: Yes  Comments - Freedom of Choice: CM discussed freedom of choice as it pertains to discharge planning  Patient denied any needs at this time and asked CM to call her daughter to further discuss DC plan  Patient's daughter reported that patient and patient's daughter would likely refuse STR if recommended and prefer that patient returns home with [facility]    CM contacted family/caregiver?: Yes  Were Treatment Team discharge recommendations reviewed with patient/caregiver?: Yes  Did patient/caregiver verbalize understanding of patient care needs?: Yes  Were patient/caregiver advised of the risks associated with not following Treatment Team discharge recommendations?: Yes    Contacts  Patient Contacts: Floyde Koyanagi  Relationship to Patient[de-identified] Family  Contact Method: Phone  Phone Number: [phone]  Reason/Outcome: Continuity of [address]         Is the patient interested in [facility] at discharge?: Yes  [address] requested[de-identified] Physical [facility] Name[de-identified] [address] Provider[de-identified] PCP  Home Health Services Needed[de-identified] Evaluate Functional Status and Safety,Strengthening/Theraputic Exercises to Improve Function,Heart Failure Management  Homebound Criteria Met[de-identified] Uses an Assist Device (i e  cane, walker, etc),Requires the Assistance of Another Person for Safe Ambulation or to Leave the Home  Supporting Clincal Findings[de-identified] Fatigues Easliy in Short Distances,Limited Endurance    Other Referral/Resources/Interventions Provided:  Interventions: [facility]  Referral Comments: CM sent a referral to [facility] via ecin to determine if they are able to resume care for patient at discharge      Would you like to participate in our [address] [place] [address] service program?  : No - Declined    Treatment Team Recommendation: Home with [address] [place] [address]  Discharge Destination Plan[de-identified] Home with [place] at Discharge : Family

## 2022-04-26 NOTE — H&P
Latosha Livingston [address] 1940, 80 y o  female MRN: [id number]  Unit/Bed#: ED 16 Encounter: [id number]  Primary Care Provider: Sara Mayes DO   Date and time admitted to hospital: 4/25/2022  4:53 PM    * Aspiration pneumonia [facility])  Assessment & Plan   Noted to have an aspiration event yesterday  CT scan right lower lobe pneumonia  Suspect aspiration pneumonia versus pneumonitis  Currently afebrile, WBC count within normal limits  However procalcitonin if acutely elevated  Will continue with ceftriaxone for now  Trend procalcitonin    EDUARDO (acute kidney injury) ([facility] )  Assessment & Plan  Baseline creatinine approximately 0 9 presented with a creatinine of 2 1  Likely prerenal secondary to volume depletion  IVF  Repeat BMP in a m  Intractable nausea and vomiting  Assessment & Plan  Unclear etiology  CT scan did not reveal any acute abnormalities  Advanced diet as tolerated Zofran p r n  IVF    Hyperlipidemia  Assessment & Plan  Continue statin    Localization-related epilepsy, intractable ([facility] )  Assessment & Plan  Continue with home keppra    GERD (gastroesophageal reflux disease)  Assessment & Plan  Continue PPI      VTE Prophylaxis: Heparin  / sequential compression device   Code Status: full code  POLST: There is no POLST form on file for this patient (pre-hospital)  Discussion with family: pt    Anticipated Length of Stay:  Patient will be admitted on an Emergency basis with an anticipated length of stay of  > 2 midnights  Justification for Hospital Stay:  Aspiration pneumonia    Total Time for Visit, including Counseling / Coordination of Care: 60 minutes  Greater than 50% of this total time spent on direct patient counseling and coordination of care  Chief Complaint:  Vomiting    History of Present Illness:    Sergio Knowles is a 80 y o  female with past medical history significant hypertension, seizure disorder initially presented with nausea and vomiting  Patient reports last couple days had nausea and vomiting poor appetite  Notes subjective fever yesterday  Denies any other complaints at this time  Review of Systems:    Review of Systems   Constitutional: Negative for activity change, appetite change, chills, diaphoresis, fever and unexpected weight change  HENT: Negative for congestion, facial swelling and rhinorrhea  Eyes: Negative for photophobia and visual disturbance  Respiratory: Negative for cough, shortness of breath and wheezing  Cardiovascular: Negative for chest pain and palpitations  Gastrointestinal: Positive for diarrhea, nausea and vomiting  Negative for abdominal pain, blood in stool and constipation  Genitourinary: Negative for decreased urine volume, difficulty urinating, dysuria, flank pain, frequency, hematuria and urgency  Musculoskeletal: Negative for arthralgias, back pain, joint swelling and myalgias  Neurological: Negative for dizziness, syncope, facial asymmetry, light-headedness, numbness and headaches  Psychiatric/Behavioral: Negative for confusion and decreased concentration  The patient is not nervous/anxious  Past Medical and Surgical History:     Past Medical History:   Diagnosis Date    CHF (congestive heart failure) ([facility] )     Liver cirrhosis ([facility] )     Seizures ([facility] )        Past Surgical History:   Procedure Laterality Date    HYSTERECTOMY      LEG SURGERY  05/2016    Pt family cannot recall the specific surgery or which leg it was preformed on  Meds/Allergies:    Prior to Admission medications    Medication Sig Start Date End Date Taking?  Authorizing Provider   acetaminophen (TYLENOL) 325 mg tablet Take 2 tablets (650 mg total) by mouth every 6 (six) hours as needed for mild pain, headaches or fever 3/20/21   AUREA Davis   albuterol (PROVENTIL HFA,VENTOLIN HFA) 90 mcg/act inhaler Inhale 2 puffs every 4 (four) hours as needed for wheezing 12/24/20   Arlin Mayo MD atorvastatin (LIPITOR) 80 mg tablet Take 80 mg by mouth daily    Historical Provider, MD   cholecalciferol (VITAMIN D3) 1,000 units tablet Take 2,000 Units by mouth daily    Historical Provider, MD   folic acid (FOLVITE) 1 mg tablet Take by mouth daily    Historical Provider, MD   guaiFENesin (ROBITUSSIN) 100 mg/5 mL oral solution Take 10 mL (200 mg total) by mouth every 4 (four) hours as needed (cough) 12/24/20   Jasmyn Chamberlain MD   levETIRAcetam (KEPPRA) 500 mg tablet Take 500 mg by mouth every 12 (twelve) hours    Historical Provider, MD   levETIRAcetam (KEPPRA) 500 mg tablet Take 1 tablet (500 mg total) by mouth every 12 (twelve) hours 12/18/19   Alejo Rebolledo PA-C   lidocaine (LIDODERM) 5 % Apply 1 patch topically daily for 10 days Remove & Discard patch within 12 hours or as directed by MD 12/25/20 1/4/21  Jasmyn Chamberlain MD   metoprolol tartrate (LOPRESSOR) 25 mg tablet Take 25 mg by mouth every 12 (twelve) hours    Historical Provider, MD   multivitamin-minerals (CENTRUM ADULTS) tablet Take 1 tablet by mouth daily 12/25/20 1/24/21  Jasmyn Chamberlain MD   omeprazole (PriLOSEC) 20 mg delayed release capsule Take 20 mg by mouth daily    Historical Provider, MD   PHENobarbital 64 8 mg tablet Take 64 8 mg by mouth 2 (two) times a day    Historical Provider, MD     I have reviewed home medications with patient personally  Allergies:    Allergies   Allergen Reactions    Penicillins Other (See Comments)     Pt unaware of reaction       Social History:     Marital Status: Single     Patient Pre-hospital Living Situation: home  Patient Pre-hospital Level of Mobility: independent  Patient Pre-hospital Diet Restrictions: none  Substance Use History:   Social History     Substance and Sexual Activity   Alcohol Use Never     Social History     Tobacco Use   Smoking Status Never Smoker   Smokeless Tobacco Never Used     Social History     Substance and Sexual Activity   Drug Use No       Family History:    History reviewed  No pertinent family history  Physical Exam:     Vitals:   Blood Pressure: 159/89 (04/25/22 1800)  Pulse: (!) 120 (04/25/22 1800)  Temperature: 98 7 °F (37 1 °C) (04/25/22 1707)  Temp Source: Oral (04/25/22 1707)  Respirations: 20 (04/25/22 1800)  SpO2: 92 % (04/25/22 1800)    Physical Exam  Constitutional:       General: She is not in acute distress  Appearance: She is well-developed  She is not diaphoretic  HENT:      Head: Normocephalic and atraumatic  Nose: Nose normal       Mouth/Throat:      Pharynx: No oropharyngeal exudate  Eyes:      General: No scleral icterus  Right eye: No discharge  Left eye: No discharge  Conjunctiva/sclera: Conjunctivae normal    Neck:      Thyroid: No thyromegaly  Vascular: No JVD  Cardiovascular:      Rate and Rhythm: Normal rate and regular rhythm  Heart sounds: Normal heart sounds  No murmur heard  No friction rub  No gallop  Pulmonary:      Effort: Pulmonary effort is normal  No respiratory distress  Breath sounds: Normal breath sounds  No wheezing or rales  Chest:      Chest wall: No tenderness  Abdominal:      General: Bowel sounds are normal  There is no distension  Palpations: Abdomen is soft  Tenderness: There is no abdominal tenderness  There is no guarding or rebound  Musculoskeletal:         General: No tenderness or deformity  Normal range of motion  Cervical back: Normal range of motion and neck supple  Skin:     General: Skin is warm and dry  Findings: No erythema or rash  Neurological:      Mental Status: She is alert  Mental status is at baseline  Cranial Nerves: No cranial nerve deficit  Sensory: No sensory deficit  Motor: No abnormal muscle tone  Coordination: Coordination normal              Additional Data:     Lab Results: I have personally reviewed pertinent reports        Results from last 7 days   Lab Units 04/25/22  7250 WBC Thousand/uL 8 13   HEMOGLOBIN g/dL 11 5   HEMATOCRIT % 37 1   PLATELETS Thousands/uL 147*   LYMPHO PCT % 16   MONO PCT % 0*   EOS PCT % 0     Results from last 7 days   Lab Units 04/25/22  1736   SODIUM mmol/L 135*   POTASSIUM mmol/L 5 2   CHLORIDE mmol/L 97*   CO2 mmol/L 31   BUN mg/dL 35*   CREATININE mg/dL 2 07*   ANION GAP mmol/L 7   CALCIUM mg/dL 9 2   ALBUMIN g/dL 3 0*   TOTAL BILIRUBIN mg/dL 0 91   ALK PHOS U/L 136*   ALT U/L 45   AST U/L 40   GLUCOSE RANDOM mg/dL 153*     Results from last 7 days   Lab Units 04/25/22  1736   INR  1 29*             Results from last 7 days   Lab Units 04/25/22  1736   LACTIC ACID mmol/L 2 8*   PROCALCITONIN ng/ml 6 07*       Imaging: I have personally reviewed pertinent reports  CT chest abdomen pelvis wo contrast   Final Result by Demi Florence MD (04/25 2002)         1  Patchy groundglass opacity in the medial right lower lobe concerning for aspiration pneumonia/pneumonitis  The study was marked in [facility] for immediate notification  Workstation performed: [id number]         XR chest portable    (Results Pending)       EKG, Pathology, and Other Studies Reviewed on Admission:   · EKG: revuiewed    Allscripts / Epic Records Reviewed: Yes     ** Please Note: This note has been constructed using a voice recognition system   **

## 2022-04-26 NOTE — SPEECH THERAPY NOTE
Speech-Language Pathology Bedside Swallow Evaluation    Patient Name: Lona Montilla  EMSWK'M Date: 4/26/2022     Problem List  Principal Problem:    Aspiration pneumonia ([facility] )  Active Problems:    GERD (gastroesophageal reflux disease)    Localization-related epilepsy, intractable (HCC)    Hyperlipidemia    Intractable nausea and vomiting    EDUARDO (acute kidney injury) [facility])       Past Medical History  Past Medical History:   Diagnosis Date    CHF [facility]congestive heart failure) ([facility] )     Liver cirrhosis ([facility] )     Seizures ([facility] )        Past Surgical History  Past Surgical History:   Procedure Laterality Date    HYSTERECTOMY      LEG SURGERY  05/2016    Pt family cannot recall the specific surgery or which leg it was preformed on  Summary/Impressions:    Pt presents with s/s suggestive of oropharyngeal dysphagia as per bedside observations  Received sleeping, but wakes in response to visual stimuli and light touch  Edentulous status, unable to follow commands for OME  Opens mouth in response to spoon presentations, adequate bolus acceptance and containment  Increased oral processing and transit time noted, pt chewing puree solid for extended period of time  Audible swallow, increased work of breath with audible wheeze following all liquids by cup sup  Weak/inefficient throat clear  Cannot r/o aspiration/silent aspiration  No signs of distress when given small quantities of thickened liquids and puree by teaspoon      Recommendations:   Diet: puree/level 1 diet and honey thick liquids - **All liquids by teaspoon   Meds: whole with puree / cut/crush larger  Feeding assistance: 1:1  Frequent Oral care: 2-4x/day  Aspiration precautions and compensatory swallowing strategies: upright posture, only feed when fully alert, slow rate of feeding, small bites/sips, liquids by teaspoon only and alternating bites and sips  Other Recommendations/ considerations: Aspiration precautions; VFSS/VBS     Current Medical Status  Pt is a 80 y o  female who presented to [facility] with shortness of breath, suspected aspiration pneumonia  Baseline cognitive function unknown, patient oriented to self; poor historian re: baseline diet  Past medical history:  Please see H&P for details    Special Studies:  CT chest 4/25/22:  1  Patchy groundglass opacity in the medial right lower lobe concerning for aspiration pneumonia/pneumonitis  Social/Education/Vocational Hx:  Pt lives with family    Swallow Information   Current Risks for Dysphagia & Aspiration: Suspected aspiration pna  Current Symptoms/Concerns: change in respiratory status  Current Diet: regular diet and thin liquids   Baseline Diet: Unknown    Baseline Assessment   Behavior/Cognition: decreased attention  Speech/Language Status: able to participate in basic conversation and able to follow commands inconsistently  Patient Positioning: upright in bed    Swallow Mechanism Exam   Facial: symmetrical  Labial: WFL  Lingual: unable to test 2/2 limited command following  Velum: unable to visualize  Mandible: adequate ROM  Dentition: edentulous  Vocal quality:clear/adequate   Volitional Cough: weak   Respiratory: NC    Consistencies Assessed and Performance   Consistencies Administered: ice chips, thin liquids, honey thick, puree and mechanical soft solids    Oral Stage: Impaired  Weak anterior bite  Adequate containment though prolonged oral processing and transfer  Mastication assessed x1 and judged to be reduced and incomplete for breakdown of moistened solid boluses  Mild oral residue  Pharyngeal Stage: Impairment suspected  Laryngeal rise noted upon palpation  Swallow initiation appears delayed  Audible swallows, increased work of breath and audible wheeze when taking all liquids by cup  Improved with teaspoon presentations of honey thick liquids    No overt s/s of aspiration or distress with use of small quantities, thickened liquid/puree and slow paced feeding by spoon          Esophageal Concerns: none reported      Results Reviewed with: patient, RN and MD     Plan  Will continue to follow for 3-5x  Dysphagia Goals: pt will tolerate puree solids with honey thick liquids by SPOON without s/s of aspiration x3-5 and pt will participate in VBS to determine least restrictive diet/further evaluation of pharyngeal swallow   Discharge recommendation: SNF    Speech Therapy Prognosis   Prognosis: deferred  Prognosis Considerations: cognitive status        Terri Cook [address], [address] [facility]  Speech-Language Pathologist  [place] #[id number]  [place] #[phone]

## 2022-04-26 NOTE — ASSESSMENT & PLAN NOTE
Baseline creatinine approximately 0 9 presented with a creatinine of 2 1  Likely  prerenal secondary to volume depletion  Improving with ivf  Repeat BMP in a m

## 2022-04-26 NOTE — PLAN OF CARE
Puree/honey  All liquids by teaspoon, small quantities, slow paced eating   1:1 feedings  Aspiration precautions  VFSS/VBS

## 2022-04-26 NOTE — PLAN OF CARE
Problem: MOBILITY - ADULT  Goal: Maintain or return to baseline ADL function  Description: INTERVENTIONS:  -  Assess patient's ability to carry out ADLs; assess patient's baseline for ADL function and identify physical deficits which impact ability to perform ADLs (bathing, care of mouth/teeth, toileting, grooming, dressing, etc )  - Assess/evaluate cause of self-care deficits   - Assess range of motion  - Assess patient's mobility; develop plan if impaired  - Assess patient's need for assistive devices and provide as appropriate  - Encourage maximum independence but intervene and supervise when necessary  - Involve family in performance of ADLs  - Assess for home care needs following discharge   - Consider OT consult to assist with ADL evaluation and planning for discharge  - Provide patient education as appropriate  Outcome: Progressing  Goal: Maintains/Returns to pre admission functional level  Description: INTERVENTIONS:  - Perform BMAT or MOVE assessment daily    - Set and communicate daily mobility goal to care team and patient/family/caregiver  - Collaborate with rehabilitation services on mobility goals if consulted  - Perform Range of Motion 2 times a day  - Reposition patient every 2 hours    - Dangle patient   - Record patient progress and toleration of activity level   Outcome: Progressing     Problem: Potential for Falls  Goal: Patient will remain free of falls  Description: INTERVENTIONS:  - Educate patient/family on patient safety including physical limitations  - Instruct patient to call for assistance with activity   - Consult OT/PT to assist with strengthening/mobility   - Keep Call bell within reach  - Keep bed low and locked with side rails adjusted as appropriate  - Keep care items and personal belongings within reach  - Initiate and maintain comfort rounds  - Make Fall Risk Sign visible to staff  - Offer Toileting every 2 Hours, in advance of need  - Initiate/Maintain alarm  - Obtain necessary fall risk management equipment  - Apply yellow socks and bracelet for high fall risk patients  - Consider moving patient to room near nurses station  Outcome: Progressing

## 2022-04-26 NOTE — ED PROVIDER NOTES
History  Chief Complaint   Patient presents with    Shortness of Breath     pt with fever, SOB , N/V and weakness for 3 days     81 yo female with sob  Onset today  Has had n/v/d for past few days  Possible aspiration  Low garde fever at home  Began telling family that she was sob today  No chest pain  Has chronic leg swelling - unchanged from baseline  No recent travels, traumas, surgeries  No abdominal pain  History provided by:  Patient   used: No    Shortness of Breath  Severity:  Severe  Onset quality:  Sudden  Duration:  1 day  Timing:  Constant  Progression:  Worsening  Chronicity:  New  Relieved by:  Nothing  Worsened by:  Nothing  Ineffective treatments:  None tried  Associated symptoms: cough and vomiting    Associated symptoms: no abdominal pain, no chest pain, no ear pain, no fever, no rash and no sore throat        Prior to Admission Medications   Prescriptions Last Dose Informant Patient Reported? Taking?    PHENobarbital 64 8 mg tablet   Yes No   Sig: Take 64 8 mg by mouth 2 (two) times a day   acetaminophen (TYLENOL) 325 mg tablet   No No   Sig: Take 2 tablets (650 mg total) by mouth every 6 (six) hours as needed for mild pain, headaches or fever   albuterol (PROVENTIL HFA,VENTOLIN HFA) 90 mcg/act inhaler   No No   Sig: Inhale 2 puffs every 4 (four) hours as needed for wheezing   atorvastatin (LIPITOR) 80 mg tablet   Yes No   Sig: Take 80 mg by mouth daily   cholecalciferol (VITAMIN D3) 1,000 units tablet   Yes No   Sig: Take 2,000 Units by mouth daily   folic acid (FOLVITE) 1 mg tablet   Yes No   Sig: Take by mouth daily   guaiFENesin (ROBITUSSIN) 100 mg/5 mL oral solution   No No   Sig: Take 10 mL (200 mg total) by mouth every 4 (four) hours as needed (cough)   levETIRAcetam (KEPPRA) 500 mg tablet   Yes No   Sig: Take 500 mg by mouth every 12 (twelve) hours   levETIRAcetam (KEPPRA) 500 mg tablet   No No   Sig: Take 1 tablet (500 mg total) by mouth every 12 (twelve) hours lidocaine (LIDODERM) 5 %   No No   Sig: Apply 1 patch topically daily for 10 days Remove & Discard patch within 12 hours or as directed by MD   metoprolol tartrate (LOPRESSOR) 25 mg tablet   Yes No   Sig: Take 25 mg by mouth every 12 (twelve) hours   multivitamin-minerals (CENTRUM ADULTS) tablet   No No   Sig: Take 1 tablet by mouth daily   omeprazole (PriLOSEC) 20 mg delayed release capsule   Yes No   Sig: Take 20 mg by mouth daily      Facility-Administered Medications: None       Past Medical History:   Diagnosis Date    [facility]F [facility]congestive heart failure) ([facility] )     Liver cirrhosis (HCC)     Seizures ([facility] )        Past Surgical History:   Procedure Laterality Date    HYSTERECTOMY      LEG SURGERY  05/2016    Pt family cannot recall the specific surgery or which leg it was preformed on  History reviewed  No pertinent family history  I have reviewed and agree with the history as documented  E-Cigarette/Vaping     E-Cigarette/Vaping Substances     Social History     Tobacco Use    Smoking status: Never Smoker    Smokeless tobacco: Never Used   Substance Use Topics    Alcohol use: Never    Drug use: No       Review of Systems   Constitutional: Negative for chills and fever  HENT: Negative for ear pain and sore throat  Eyes: Negative for pain and visual disturbance  Respiratory: Positive for cough and shortness of breath  Cardiovascular: Negative for chest pain and palpitations  Gastrointestinal: Positive for nausea and vomiting  Negative for abdominal pain  Genitourinary: Negative for dysuria and hematuria  Musculoskeletal: Negative for arthralgias and back pain  Skin: Negative for color change and rash  Neurological: Negative for seizures and syncope  All other systems reviewed and are negative  Physical Exam  Physical Exam  Vitals and nursing note reviewed  Constitutional:       General: She is not in acute distress  Appearance: She is well-developed     HENT: Head: Normocephalic and atraumatic  Eyes:      Conjunctiva/sclera: Conjunctivae normal    Cardiovascular:      Rate and Rhythm: Regular rhythm  Tachycardia present  Heart sounds: No murmur heard  Pulmonary:      Effort: Pulmonary effort is normal  No respiratory distress  Breath sounds: Wheezing (bilateral) present  Abdominal:      Palpations: Abdomen is soft  Tenderness: There is no abdominal tenderness  Musculoskeletal:      Cervical back: Neck supple  Skin:     General: Skin is warm and dry  Neurological:      Mental Status: She is alert           Vital Signs  ED Triage Vitals   Temperature Pulse Respirations Blood Pressure SpO2   04/25/22 1707 04/25/22 1700 04/25/22 1700 04/25/22 1700 04/25/22 1700   98 7 °F (37 1 °C) 64 20 111/57 90 %      Temp Source Heart Rate Source Patient Position - Orthostatic VS BP Location FiO2 (%)   04/25/22 1707 04/25/22 2000 04/25/22 2000 04/25/22 2000 --   Oral Monitor Lying Left arm       Pain Score       --                  Vitals:    04/25/22 1800 04/25/22 2000 04/25/22 2100 04/25/22 2200   BP: 159/89 102/69 99/58 90/52   Pulse: (!) 120 (!) 116 103 (!) 112   Patient Position - Orthostatic VS:  Lying Lying Lying         Visual Acuity      ED Medications  Medications   sodium chloride (PF) 0 9 % injection 3 mL (has no administration in time range)   azithromycin (ZITHROMAX) 500 mg in sodium chloride 0 9% 250mL IVPB 500 mg (0 mg Intravenous Stopped 4/25/22 1957)   heparin (porcine) subcutaneous injection 5,000 Units (has no administration in time range)   acetaminophen (TYLENOL) tablet 650 mg (has no administration in time range)   ondansetron (ZOFRAN) injection 4 mg (has no administration in time range)   sodium chloride 0 9 % infusion (125 mL/hr Intravenous New Bag 4/25/22 2059)   ceftriaxone (ROCEPHIN) 1 g/50 mL in dextrose IVPB (has no administration in time range)   PHENobarbital tablet 64 8 mg (64 8 mg Oral Given 4/25/22 2059)   metroNIDAZOLE (FLAGYL) tablet 500 mg (has no administration in time range)   cefepime (MAXIPIME) 2 g/50 mL dextrose IVPB (0 mg Intravenous Stopped 4/25/22 1818)   metroNIDAZOLE (FLAGYL) tablet 500 mg (500 mg Oral Given 4/25/22 1750)   sodium chloride 0 9 % bolus 1,000 mL (0 mL Intravenous Stopped 4/25/22 2059)   ondansetron (ZOFRAN) injection 4 mg (4 mg Intravenous Given 4/25/22 1747)   albuterol (FOR EMS ONLY) (2 5 mg/3 mL) 0 083 % inhalation solution 2 5 mg (0 mg Does not apply Given to EMS 4/25/22 1710)   ipratropium-albuterol (FOR EMS ONLY) (DUO-NEB) 0 5-2 5 mg/3 mL inhalation solution 6 mL (0 mL Does not apply Given to EMS 4/25/22 1710)       Diagnostic Studies  Results Reviewed     Procedure Component Value Units Date/Time    Lactic acid 2 Hours [[id number]]  (Normal) Collected: 04/25/22 2152    Lab Status: Final result Specimen: Blood from Arm, Left Updated: 04/25/22 2219     LACTIC ACID 1 3 mmol/L     Narrative:      Result may be elevated if tourniquet was used during collection  Blood culture #1 [[id number]] Collected: 04/25/22 1736    Lab Status: Preliminary result Specimen: Blood from Arm, Right Updated: 04/25/22 2101     Blood Culture Received in Microbiology Lab  Culture in Progress  Blood culture #2 [[id number]] Collected: 04/25/22 1736    Lab Status: Preliminary result Specimen: Blood from Arm, Left Updated: 04/25/22 2101     Blood Culture Received in Microbiology Lab  Culture in Progress  COVID/FLU/RSV - 2 hour TAT [[id number]]  (Normal) Collected: 04/25/22 1844    Lab Status: Final result Specimen: Nares from Nose Updated: 04/25/22 1932     SARS-CoV-2 Negative     INFLUENZA A PCR Negative     INFLUENZA B PCR Negative     RSV PCR Negative    Narrative:      FOR PEDIATRIC PATIENTS - copy/paste COVID Guidelines URL to browser: [email or web address]    SARS-CoV-2 assay is a Nucleic Acid Amplification assay intended for the  qualitative detection of nucleic acid from SARS-CoV-2 in nasopharyngeal  swabs  Results are for the presumptive identification of SARS-CoV-2 RNA  Positive results are indicative of infection with SARS-CoV-2, the virus  causing COVID-19, but do not rule out bacterial infection or co-infection  with other viruses  Laboratories within the [place] and its  territories are required to report all positive results to the appropriate  public health authorities  Negative results do not preclude SARS-CoV-2  infection and should not be used as the sole basis for treatment or other  patient management decisions  Negative results must be combined with  clinical observations, patient history, and epidemiological information  This test has not been FDA cleared or approved  This test has been authorized by FDA under an Emergency Use Authorization  (EUA)  This test is only authorized for the duration of time the  declaration that circumstances exist justifying the authorization of the  emergency use of an in vitro diagnostic tests for detection of SARS-CoV-2  virus and/or diagnosis of COVID-19 infection under section 564(b)(1) of  the Act, 21 U  S C  957KLF-6(W)(3), unless the authorization is terminated  or revoked sooner  The test has been validated but independent review by FDA  and CLIA is pending  Test performed using [employer] GeneXpert: This RT-PCR assay targets N2,  a region unique to SARS-CoV-2  A conserved region in the E-gene was chosen  for pan-Sarbecovirus detection which includes SARS-CoV-2      Procalcitonin [[id number]]  (Abnormal) Collected: 04/25/22 1736    Lab Status: Final result Specimen: Blood from Arm, Left Updated: 04/25/22 1815     Procalcitonin 6 07 ng/ml     CBC and differential [[id number]]  (Abnormal) Collected: 04/25/22 1736    Lab Status: Final result Specimen: Blood from Arm, Left Updated: 04/25/22 1814     WBC 8 13 Thousand/uL      RBC 3 80 Million/uL      Hemoglobin 11 5 g/dL      Hematocrit 37 1 %      MCV 98 fL      MCH 30 3 pg      MCHC 31 0 g/dL      RDW 14 8 %      MPV 10 2 fL      Platelets 904 Thousands/uL     Narrative: This is an appended report  These results have been appended to a previously verified report  Manual Differential(PHLEBS Do Not Order) [[id number]]  (Abnormal) Collected: 04/25/22 1736    Lab Status: Final result Specimen: Blood from Arm, Left Updated: 04/25/22 1814     Segmented % 84 %      Lymphocytes % 16 %      Monocytes % 0 %      Eosinophils, % 0 %      Basophils % 0 %      Absolute Neutrophils 6 83 Thousand/uL      Lymphocytes Absolute 1 30 Thousand/uL      Monocytes Absolute 0 00 Thousand/uL      Eosinophils Absolute 0 00 Thousand/uL      Basophils Absolute 0 00 Thousand/uL      Total Counted --     Platelet Estimate Borderline    Lactic Acid [[id number]]  (Abnormal) Collected: 04/25/22 1736    Lab Status: Final result Specimen: Blood from Arm, Left Updated: 04/25/22 1809     LACTIC ACID 2 8 mmol/L     Narrative:      Result may be elevated if tourniquet was used during collection      Comprehensive metabolic panel [[id number]]  (Abnormal) Collected: 04/25/22 1736    Lab Status: Final result Specimen: Blood from Arm, Left Updated: 04/25/22 1803     Sodium 135 mmol/L      Potassium 5 2 mmol/L      Chloride 97 mmol/L      CO2 31 mmol/L      ANION GAP 7 mmol/L      BUN 35 mg/dL      Creatinine 2 07 mg/dL      Glucose 153 mg/dL      Calcium 9 2 mg/dL      Corrected Calcium 10 0 mg/dL      AST 40 U/L      ALT 45 U/L      Alkaline Phosphatase 136 U/L      Total Protein 7 0 g/dL      Albumin 3 0 g/dL      Total Bilirubin 0 91 mg/dL      eGFR 21 ml/min/1 73sq m     Narrative:      [place] guidelines for Chronic Kidney Disease (CKD):     Stage 1 with normal or high GFR (GFR > 90 mL/min/1 73 square meters)    Stage 2 Mild CKD (GFR = 60-89 mL/min/1 73 square meters)    Stage 3A Moderate CKD (GFR = 45-59 mL/min/1 73 square meters)    Stage 3B Moderate CKD (GFR = 30-44 mL/min/1 73 square meters)    Stage 4 Severe CKD (GFR = 15-29 mL/min/1 73 square meters)    Stage 5 End Stage CKD (GFR <15 mL/min/1 73 square meters)  Note: GFR calculation is accurate only with a steady state creatinine    Lipase [[id number]]  (Abnormal) Collected: 04/25/22 1736    Lab Status: Final result Specimen: Blood from Arm, Left Updated: 04/25/22 1803     Lipase 27 u/L     Protime-INR [[id number]]  (Abnormal) Collected: 04/25/22 1736    Lab Status: Final result Specimen: Blood from Arm, Left Updated: 04/25/22 1800     Protime 15 6 seconds      INR 1 29    APTT [[id number]]  (Abnormal) Collected: 04/25/22 1736    Lab Status: Final result Specimen: Blood from Arm, Left Updated: 04/25/22 1800     PTT 43 seconds     UA w Reflex to Microscopic w Reflex to Culture [[id number]]     Lab Status: No result Specimen: Urine                  CT chest abdomen pelvis wo contrast   Final Result by Kneny Poon MD (04/25 2002)         1  Patchy groundglass opacity in the medial right lower lobe concerning for aspiration pneumonia/pneumonitis  The study was marked in [facility] for immediate notification                    Workstation performed: [id number]         XR chest portable    (Results Pending)              Procedures  ECG 12 Lead Documentation Only    Date/Time: 4/25/2022 9:23 PM  Performed by: Keith Nunez PA-C  Authorized by: Keith Nunez PA-C     ECG reviewed by me, the ED Provider: yes    Patient location:  ED  Interpretation:     Interpretation: normal    Quality:     Tracing quality:  Limited by artifact  Rate:     ECG rate:  119    ECG rate assessment: tachycardic    Rhythm:     Rhythm: sinus tachycardia    Ectopy:     Ectopy: none    QRS:     QRS axis:  Normal    QRS intervals:  Normal  Conduction:     Conduction: normal    ST segments:     ST segments:  Normal  T waves:     T waves: non-specific               ED Course                                             MDM  Number of Diagnoses or Management Options  CAP (community acquired pneumonia): new and requires workup  Diagnosis management comments: DDX including but not limited to: pneumonia, pleural effusion, CHF, PE, PTX, ACS, MI, asthma exacerbation, COPD exacerbation, COVID 19, EVALI, anemia, metabolic abnormality, renal failure  Plan: concerned for aspiration  XR chest         Amount and/or Complexity of Data Reviewed  Clinical lab tests: ordered and reviewed  Tests in the radiology section of CPT®: ordered and reviewed    Risk of Complications, Morbidity, and/or Mortality  Presenting problems: moderate  Management options: low  General comments: 81 yo with sob  Workup consistent with pneumonia  Suspect aspiration pneumonia  Treated with cefepime and zithro with added flagyl for possible aspiration  Admit given borderline hypoxia and meeting sepsis criteria (tachypnea, tachyardia)  Pt and family agree with plan  Stable at time of admission  Patient Progress  Patient progress: stable      Disposition  Final diagnoses:   CAP (community acquired pneumonia)     Time reflects when diagnosis was documented in both MDM as applicable and the Disposition within this note     Time User Action Codes Description Comment    4/25/2022  8:39 PM [place] DEE Add [J18 [id number]] CAP (community acquired pneumonia)       ED Disposition     ED Disposition Condition Date/Time Comment    Admit Stable Mon Apr 25, 2022  8:39 PM Case was discussed with Katalina Romero and the patient's admission status was agreed to be Admission Status: inpatient status to the service of Dr Ann Marie Azul   Follow-up Information    None         Patient's Medications   Discharge Prescriptions    No medications on file       No discharge procedures on file      PDMP Review       Value Time User    PDMP Reviewed  Yes 12/24/2020 11:26 AM Juanpablo Fay MD          ED Provider  Electronically Signed by           mC Salinas PA-C  04/25/22 4225

## 2022-04-26 NOTE — PROGRESS NOTES
[address] [facility]  Progress Note - Otilia Mcclellan 1940, 80 y o  female MRN: [id number]  Unit/Bed#: ED 16 Encounter: [id number]  Primary Care Provider: Iesha Mina DO   Date and time admitted to hospital: 4/25/2022  4:53 PM    * Aspiration pneumonia [facility])  Assessment & Plan   Noted to have an aspiration event yesterday  CT scan right lower lobe pneumonia  Suspect aspiration pneumonia versus pneumonitis  Currently afebrile, WBC count within normal limits  However procalcitonin if acutely elevated  Will continue with ceftriaxone for now  Trend procalcitonin    WILLIE (acute kidney injury) ([facility] )  Assessment & Plan  Baseline creatinine approximately 0 9 presented with a creatinine of 2 1  Likely  prerenal secondary to volume depletion  Improving with ivf  Repeat BMP in a m  Intractable nausea and vomiting  Assessment & Plan  A Likely secondary to viral gastroenteritis  Advanced diet as tolerated Zofran p r n  IVF    Hyperlipidemia  Assessment & Plan  Continue statin    Localization-related epilepsy, intractable ([facility] )  Assessment & Plan  Continue home seizure meds    GERD (gastroesophageal reflux disease)  Assessment & Plan   continue PPI      VTE Pharmacologic Prophylaxis:   Pharmacologic: Heparin  Mechanical VTE Prophylaxis in Place: Yes    Patient Centered Rounds: I have performed bedside rounds with nursing staff today  Discussions with Specialists or Other Care Team Provider: cm, nursing    Education and Discussions with Family / Patient: pt    Time Spent for Care: 30 minutes  More than 50% of total time spent on counseling and coordination of care as described above  Current Length of Stay: 1 day(s)    Current Patient Status: Inpatient   Certification Statement: The patient will continue to require additional inpatient hospital stay due to see below    Discharge Plan: pending improvement of willie    Code Status: Level 1 - Full Code      Subjective:   Pt seen and examined    Denies any chest pain shortness breath, fevers, chills, abdominal pain, nausea, or any other complaints  Objective:     Vitals:   Temp (24hrs), Av 7 °F (37 1 °C), Min:98 7 °F (37 1 °C), Max:98 7 °F (37 1 °C)    Temp:  [98 7 °F (37 1 °C)] 98 7 °F (37 1 °C)  HR:  [] 102  Resp:  [18-31] 20  BP: ()/(45-89) 89/54  SpO2:  [90 %-96 %] 96 %  There is no height or weight on file to calculate BMI  Input and Output Summary (last 24 hours): Intake/Output Summary (Last 24 hours) at 2022 0908  Last data filed at 2022 0222  Gross per 24 hour   Intake 722 92 ml   Output --   Net 722 92 ml       Physical Exam:     Physical Exam  Constitutional:       General: She is not in acute distress  Appearance: She is well-developed  She is not diaphoretic  HENT:      Head: Normocephalic and atraumatic  Nose: Nose normal       Mouth/Throat:      Pharynx: No oropharyngeal exudate  Eyes:      General: No scleral icterus  Right eye: No discharge  Left eye: No discharge  Conjunctiva/sclera: Conjunctivae normal    Neck:      Thyroid: No thyromegaly  Vascular: No JVD  Cardiovascular:      Rate and Rhythm: Normal rate and regular rhythm  Heart sounds: Normal heart sounds  No murmur heard  No friction rub  No gallop  Pulmonary:      Effort: Pulmonary effort is normal  No respiratory distress  Breath sounds: Normal breath sounds  No wheezing or rales  Chest:      Chest wall: No tenderness  Abdominal:      General: Bowel sounds are normal  There is no distension  Palpations: Abdomen is soft  Tenderness: There is no abdominal tenderness  There is no guarding or rebound  Musculoskeletal:         General: No tenderness or deformity  Normal range of motion  Cervical back: Normal range of motion and neck supple  Skin:     General: Skin is warm and dry  Findings: No erythema or rash  Neurological:      Mental Status: She is alert   Mental status is at baseline  Cranial Nerves: No cranial nerve deficit  Sensory: No sensory deficit  Motor: No abnormal muscle tone  Coordination: Coordination normal          Additional Data:     Labs:    Results from last 7 days   Lab Units 04/26/22  0534 04/25/22  1736 04/25/22  1736   WBC Thousand/uL 8 00   < > 8 13   HEMOGLOBIN g/dL 10 1*   < > 11 5   HEMATOCRIT % 32 9*   < > 37 1   PLATELETS Thousands/uL 115*   < > 147*   LYMPHO PCT %  --   --  16   MONO PCT %  --   --  0*   EOS PCT %  --   --  0    < > = values in this interval not displayed  Results from last 7 days   Lab Units 04/26/22  0534 04/25/22  1736 04/25/22  1736   SODIUM mmol/L 135*   < > 135*   POTASSIUM mmol/L 5 1   < > 5 2   CHLORIDE mmol/L 101   < > 97*   CO2 mmol/L 26   < > 31   BUN mg/dL 40*   < > 35*   CREATININE mg/dL 1 79*   < > 2 07*   ANION GAP mmol/L 8   < > 7   CALCIUM mg/dL 8 7   < > 9 2   ALBUMIN g/dL  --   --  3 0*   TOTAL BILIRUBIN mg/dL  --   --  0 91   ALK PHOS U/L  --   --  136*   ALT U/L  --   --  45   AST U/L  --   --  40   GLUCOSE RANDOM mg/dL 121   < > 153*    < > = values in this interval not displayed  Results from last 7 days   Lab Units 04/25/22  1736   INR  1 29*             Results from last 7 days   Lab Units 04/25/22  2152 04/25/22  1736   LACTIC ACID mmol/L 1 3 2 8*   PROCALCITONIN ng/ml  --  6 07*           * I Have Reviewed All Lab Data Listed Above  * Additional Pertinent Lab Tests Reviewed: All Labs Within Last 24 Hours Reviewed    Imaging:    Imaging Reports Reviewed Today Include: na  Imaging Personally Reviewed by Myself Includes:  na    Recent Cultures (last 7 days):     Results from last 7 days   Lab Units 04/25/22  1736   BLOOD CULTURE  Received in Microbiology Lab  Culture in Progress  Received in Microbiology Lab  Culture in Progress         Last 24 Hours Medication List:   Current Facility-Administered Medications   Medication Dose Route Frequency Provider Last Rate    acetaminophen  650 mg Oral Q6H PRN Jens Hill MD      albuterol  2 puff Inhalation Q4H PRN Jens Hill MD      atorvastatin  80 mg Oral Daily Jnes Hill MD      azithromycin  500 mg Intravenous Q24H Jacob Bailey PA-C Stopped (04/25/22 1957)    cefTRIAXone  1,000 mg Intravenous Q24H Jens Hill MD 1,000 mg (04/26/22 0722)    heparin (porcine)  5,000 Units Subcutaneous Q8H [facility] Jens Hill MD      levETIRAcetam  500 mg Oral Q12H [facility] Jens Hill MD      metoprolol tartrate  25 mg Oral Q12H [facility] Jens Hill MD      metroNIDAZOLE  500 mg Oral Q8H [facility] Jens Hill MD      ondansetron  4 mg Intravenous Q6H PRN Jens Hill MD      pantoprazole  20 mg Oral Early Morning Jens Hill MD      PHENobarbital  64 8 mg Oral BID Jens Hill MD      sodium chloride (PF)  3 mL Intravenous Q1H PRN Jacob Bailey PA-C      sodium chloride  125 mL/hr Intravenous Continuous Jens Hill MD 50 mL/hr (04/26/22 0322)        Today, Patient Was Seen By: Beatriz Ewing MD    ** Please Note: Dictation voice to text software may have been used in the creation of this document   **

## 2022-04-26 NOTE — ASSESSMENT & PLAN NOTE
Unclear etiology  CT scan did not reveal any acute abnormalities  Advanced diet as tolerated Zofran p r n    IVF

## 2022-04-26 NOTE — ASSESSMENT & PLAN NOTE
Noted to have an aspiration event yesterday  CT scan right lower lobe pneumonia  Suspect aspiration pneumonia versus pneumonitis  Currently afebrile, WBC count within normal limits  However procalcitonin if acutely elevated  Will continue with ceftriaxone for now  Trend procalcitonin

## 2022-04-26 NOTE — ASSESSMENT & PLAN NOTE
Baseline creatinine approximately 0 9 presented with a creatinine of 2 1  Likely prerenal secondary to volume depletion  IVF  Repeat BMP in a m

## 2022-04-27 ENCOUNTER — APPOINTMENT (INPATIENT)
Dept: RADIOLOGY | Facility: HOSPITAL | Age: 82
DRG: 178 | End: 2022-04-27
Payer: MEDICARE

## 2022-04-27 LAB
ATRIAL RATE: 119 BPM
P AXIS: 50 DEGREES
PR INTERVAL: 136 MS
QRS AXIS: 1 DEGREES
QRSD INTERVAL: 74 MS
QT INTERVAL: 308 MS
QTC INTERVAL: 433 MS
T WAVE AXIS: 72 DEGREES
VENTRICULAR RATE: 119 BPM

## 2022-04-27 PROCEDURE — 74230 X-RAY XM SWLNG FUNCJ C+: CPT

## 2022-04-27 PROCEDURE — 93010 ELECTROCARDIOGRAM REPORT: CPT | Performed by: INTERNAL MEDICINE

## 2022-04-27 PROCEDURE — 92526 ORAL FUNCTION THERAPY: CPT

## 2022-04-27 PROCEDURE — 99232 SBSQ HOSP IP/OBS MODERATE 35: CPT | Performed by: INTERNAL MEDICINE

## 2022-04-27 PROCEDURE — 92611 MOTION FLUOROSCOPY/SWALLOW: CPT

## 2022-04-27 RX ORDER — OXYCODONE HYDROCHLORIDE 5 MG/1
2.5 TABLET ORAL EVERY 4 HOURS PRN
Status: DISCONTINUED | OUTPATIENT
Start: 2022-04-27 | End: 2022-05-02 | Stop reason: HOSPADM

## 2022-04-27 RX ORDER — OXYCODONE HYDROCHLORIDE 5 MG/1
5 TABLET ORAL EVERY 4 HOURS PRN
Status: DISCONTINUED | OUTPATIENT
Start: 2022-04-27 | End: 2022-05-02 | Stop reason: HOSPADM

## 2022-04-27 RX ADMIN — MIDODRINE HYDROCHLORIDE 10 MG: 5 TABLET ORAL at 11:31

## 2022-04-27 RX ADMIN — PANTOPRAZOLE SODIUM 20 MG: 20 TABLET, DELAYED RELEASE ORAL at 05:01

## 2022-04-27 RX ADMIN — LEVETIRACETAM 500 MG: 500 TABLET, FILM COATED ORAL at 21:00

## 2022-04-27 RX ADMIN — METRONIDAZOLE 500 MG: 500 TABLET ORAL at 14:30

## 2022-04-27 RX ADMIN — METOPROLOL TARTRATE 25 MG: 25 TABLET, FILM COATED ORAL at 20:30

## 2022-04-27 RX ADMIN — MIDODRINE HYDROCHLORIDE 10 MG: 5 TABLET ORAL at 17:32

## 2022-04-27 RX ADMIN — HEPARIN SODIUM 5000 UNITS: 5000 INJECTION INTRAVENOUS; SUBCUTANEOUS at 21:00

## 2022-04-27 RX ADMIN — HEPARIN SODIUM 5000 UNITS: 5000 INJECTION INTRAVENOUS; SUBCUTANEOUS at 05:01

## 2022-04-27 RX ADMIN — CEFTRIAXONE SODIUM 1000 MG: 10 INJECTION, POWDER, FOR SOLUTION INTRAVENOUS at 06:01

## 2022-04-27 RX ADMIN — ACETAMINOPHEN 650 MG: 325 TABLET ORAL at 09:40

## 2022-04-27 RX ADMIN — PHENOBARBITAL 64.8 MG: 32.4 TABLET ORAL at 09:39

## 2022-04-27 RX ADMIN — METRONIDAZOLE 500 MG: 500 TABLET ORAL at 21:00

## 2022-04-27 RX ADMIN — MIDODRINE HYDROCHLORIDE 10 MG: 5 TABLET ORAL at 05:01

## 2022-04-27 RX ADMIN — PHENOBARBITAL 64.8 MG: 32.4 TABLET ORAL at 17:32

## 2022-04-27 RX ADMIN — METRONIDAZOLE 500 MG: 500 TABLET ORAL at 05:01

## 2022-04-27 RX ADMIN — LEVETIRACETAM 500 MG: 500 TABLET, FILM COATED ORAL at 09:40

## 2022-04-27 RX ADMIN — ATORVASTATIN CALCIUM 80 MG: 40 TABLET, FILM COATED ORAL at 09:39

## 2022-04-27 RX ADMIN — OXYCODONE HYDROCHLORIDE 5 MG: 5 TABLET ORAL at 12:04

## 2022-04-27 RX ADMIN — HEPARIN SODIUM 5000 UNITS: 5000 INJECTION INTRAVENOUS; SUBCUTANEOUS at 14:30

## 2022-04-27 NOTE — PROGRESS NOTES
[address] [facility]  Progress Note - Dedra Garcia 1940, 80 y o  female MRN: [id number]  Unit/Bed#: -02 Encounter: [id number]  Primary Care Provider: Marysol Morris DO   Date and time admitted to hospital: 4/25/2022  4:53 PM    * Aspiration pneumonia [facility])  Assessment & Plan   Noted to have an aspiration event yesterday  CT scan right lower lobe pneumonia  Suspect aspiration pneumonia versus pneumonitis  Currently afebrile, WBC count within normal limits  However procalcitonin if acutely elevated  Will continue with ceftriaxone and metronidazole for now    EDUARDO (acute kidney injury) ([facility] 75 )  Assessment & Plan    Baseline creatinine approximately 0 9 presented with a creatinine of 2 1  Likely  prerenal secondary to volume depletion  Improving with ivf  Repeat BMP in a m    Intractable nausea and vomiting  Assessment & Plan  Likely secondary viral gastroenteritis  Now tolerating p o  diet    Hyperlipidemia  Assessment & Plan  Continue statin    Localization-related epilepsy, intractable ([facility] 75 )  Assessment & Plan  Continue home seizure meds    GERD (gastroesophageal reflux disease)  Assessment & Plan  Continue PPI      VTE Pharmacologic Prophylaxis:   Pharmacologic: Heparin  Mechanical VTE Prophylaxis in Place: Yes    Patient Centered Rounds: I have performed bedside rounds with nursing staff today  Discussions with Specialists or Other Care Team Provider: cm,nursing    Education and Discussions with Family / Patient: pt    Time Spent for Care: 30 minutes  More than 50% of total time spent on counseling and coordination of care as described above  Current Length of Stay: 2 day(s)    Current Patient Status: Inpatient   Certification Statement: The patient will continue to require additional inpatient hospital stay due to see below    Discharge Plan:  Pending pain control improvement of kidney function    Will need rehab    Code Status: Level 1 - Full Code      Subjective:   Patient seen examined  Still reporting of lower extremity pain  Denies any chest pain, shortness breath, fevers, chills, headaches    Objective:     Vitals:   Temp (24hrs), Av 8 °F (36 6 °C), Min:97 5 °F (36 4 °C), Max:98 1 °F (36 7 °C)    Temp:  [97 5 °F (36 4 °C)-98 1 °F (36 7 °C)] 97 8 °F (36 6 °C)  HR:  [] 70  Resp:  [16-22] 16  BP: ()/(54-77) 104/54  SpO2:  [94 %-97 %] 97 %  Body mass index is 44 66 kg/m²  Input and Output Summary (last 24 hours): Intake/Output Summary (Last 24 hours) at 2022 1040  Last data filed at 2022 0404  Gross per 24 hour   Intake 60 ml   Output 0 ml   Net 60 ml       Physical Exam:     Physical Exam  Constitutional:       General: She is not in acute distress  Appearance: She is well-developed  She is not diaphoretic  HENT:      Head: Normocephalic and atraumatic  Nose: Nose normal       Mouth/Throat:      Pharynx: No oropharyngeal exudate  Eyes:      General: No scleral icterus  Right eye: No discharge  Left eye: No discharge  Conjunctiva/sclera: Conjunctivae normal    Neck:      Thyroid: No thyromegaly  Vascular: No JVD  Cardiovascular:      Rate and Rhythm: Normal rate and regular rhythm  Heart sounds: Normal heart sounds  No murmur heard  No friction rub  No gallop  Pulmonary:      Effort: Pulmonary effort is normal  No respiratory distress  Breath sounds: Normal breath sounds  No wheezing or rales  Chest:      Chest wall: No tenderness  Abdominal:      General: Bowel sounds are normal  There is no distension  Palpations: Abdomen is soft  Tenderness: There is no abdominal tenderness  There is no guarding or rebound  Musculoskeletal:         General: No tenderness or deformity  Normal range of motion  Cervical back: Normal range of motion and neck supple  Right lower leg: Edema present  Left lower leg: Edema present  Skin:     General: Skin is warm and dry        Findings: No erythema or rash  Neurological:      Mental Status: She is alert and oriented to person, place, and time  Mental status is at baseline  Cranial Nerves: No cranial nerve deficit  Sensory: No sensory deficit  Motor: No abnormal muscle tone  Coordination: Coordination normal        (    Additional Data:     Labs:    Results from last 7 days   Lab Units 04/26/22  0534 04/25/22  1736 04/25/22  1736   WBC Thousand/uL 8 00   < > 8 13   HEMOGLOBIN g/dL 10 1*   < > 11 5   HEMATOCRIT % 32 9*   < > 37 1   PLATELETS Thousands/uL 115*   < > 147*   LYMPHO PCT %  --   --  16   MONO PCT %  --   --  0*   EOS PCT %  --   --  0    < > = values in this interval not displayed  Results from last 7 days   Lab Units 04/26/22  0534 04/25/22  1736 04/25/22  1736   SODIUM mmol/L 135*   < > 135*   POTASSIUM mmol/L 5 1   < > 5 2   CHLORIDE mmol/L 101   < > 97*   CO2 mmol/L 26   < > 31   BUN mg/dL 40*   < > 35*   CREATININE mg/dL 1 79*   < > 2 07*   ANION GAP mmol/L 8   < > 7   CALCIUM mg/dL 8 7   < > 9 2   ALBUMIN g/dL  --   --  3 0*   TOTAL BILIRUBIN mg/dL  --   --  0 91   ALK PHOS U/L  --   --  136*   ALT U/L  --   --  45   AST U/L  --   --  40   GLUCOSE RANDOM mg/dL 121   < > 153*    < > = values in this interval not displayed  Results from last 7 days   Lab Units 04/25/22  1736   INR  1 29*         Results from last 7 days   Lab Units 04/26/22  0537   HEMOGLOBIN A1C % 5 6     Results from last 7 days   Lab Units 04/25/22  2152 04/25/22  1736   LACTIC ACID mmol/L 1 3 2 8*   PROCALCITONIN ng/ml  --  6 07*           * I Have Reviewed All Lab Data Listed Above  * Additional Pertinent Lab Tests Reviewed: All Labs Within Last 24 Hours Reviewed    Imaging:    Imaging Reports Reviewed Today Include: na  Imaging Personally Reviewed by Myself Includes:  na    Recent Cultures (last 7 days):     Results from last 7 days   Lab Units 04/25/22  1736   BLOOD CULTURE  No Growth at 24 hrs  No Growth at 24 hrs         Last 24 Hours Medication List:   Current Facility-Administered Medications   Medication Dose Route Frequency Provider Last Rate    acetaminophen  650 mg Oral Q6H PRN Jens Hill MD      albuterol  2 puff Inhalation Q4H PRN Jens Hill MD      atorvastatin  80 mg Oral Daily Jens Hill MD      cefTRIAXone  1,000 mg Intravenous Q24H Jens Hill MD 1,000 mg (04/27/22 0601)    Diclofenac Sodium  2 g Topical 4x Daily PRN Jens iHll MD      heparin (porcine)  5,000 Units Subcutaneous Q8H [facility] Jens Hill MD      levETIRAcetam  500 mg Oral Q12H [facility] Jens Hill MD      metoprolol tartrate  25 mg Oral Q12H [facility] Jens Hill MD      metroNIDAZOLE  500 mg Oral Q8H [facility] Jens Hill MD      midodrine  10 mg Oral TID AC Jens Hill MD      ondansetron  4 mg Intravenous Q6H PRN Jens Hill MD      oxyCODONE  2 5 mg Oral Q4H PRN Jens Hill MD      oxyCODONE  5 mg Oral Q4H PRN Jens Hill MD      pantoprazole  20 mg Oral Early Morning Jens Hill MD      PHENobarbital  64 8 mg Oral BID Jens Hill MD      sodium chloride (PF)  3 mL Intravenous Q1H PRN Franklin Barry PA-C          Today, Patient Was Seen By: Per Cruz MD    ** Please Note: Dictation voice to text software may have been used in the creation of this document   **

## 2022-04-27 NOTE — DISCHARGE INSTR - OTHER ORDERS
Skin care plans:  1-Hydraguard to bilateral sacrum, buttock, and posterior thighs twice a day and as needed with latoya-care  2-Elevate heels to offload pressure  3-Ehob cushion in chair when out of bed  4-Moisturize skin daily with skin nourishing cream   5-Turn/reposition every 2 hours for pressure re-distribution on skin  6-Right heel- Cleanse with NSS, pat dry  Apply Maxorb Alginate silver over wound bed, cover with Allevyn foam heel dressing  Change every other day and as needed for soilage/dislodgment  7-Left heel- Cleanse with NSS, pat dry  Apply Allevyn foam heel dressing  Change every other day and as needed for soilage/dislodgment  Follow-up with [facility] wound care center as an outpatient- call [phone] for appt

## 2022-04-27 NOTE — WOUND OSTOMY CARE
Progress Note - Wound   Olivia Anthony Forth 80 y o  female MRN: [id number]  Unit/Bed#: -02 Encounter: [id number]      Assessment:   Patient admitted due to aspiration pneumonia  History of CHF, liver cirrhosis, seizures, GERD  Wound care consulted for bilateral lower extremity wounds, right heel wound, and buttock wound  Patient agreeable to assessment, alert and oriented x2-3, incontinent of bowel and bladder, assist of 2 to turn for assessment, wedges placed for turning and repositioning, heels elevated off of mattress, is dependent for care  Primary RN and SLIM attending H  Antonette Dempsey made aware of assessment findings  Will order a bariatic low air loss mattress  1  Pressure injury left buttock, DTI-POA- Wound is oval in shape, dry and intact skin, 100% non-blanchable deep purple skin, no open aspects  Laura-wound is dry, intact, blanchable hyperpigmented skin, blanchable skin  This wound has the potential to evolve to a full thickness injury, stage 3 or 4         2  Bilateral posterior thighs (pictured above)- Skin is dry, intact, blanchable hypopigmented pink scar tissue and blanchable hyperpigmented skin  3  Posterior left lower extremity (pictured above)- This is a resolved wound  Skin is now dry, intact, blanchable hypopigmented pink scar tissue and blanchable hyperpigmented scar tissue, no open aspects  4  Pressure injury left lateral heel, unstageable-POA- Wound is oval in shape, true depth unknown, approx  60% dry pink tissue and 40% dry brown eschar, no drainage noted  Laura-wound is dry, intact, calloused skin, blanchable skin  5  Pressure injury right heel, unstageable-POA- Wound appears to be a blister that has burst with a small open aspect and majority of blister roof remains over wound bed  Wound is oval in shape, full-thickness, approx  20% yellow adhered slough vs tissue and 80% pink tissue, with small amount of milky purulent drainage (SLIM aware)   Laura-wound is dry, intact, blanchable skin, scaly skin  6  Bilateral lower extremity noted with swelling/edema, redness, and warm to touch  Patient reports pain to touch as well-SLIM and Primary RN aware  Educated patient on importance of frequent offloading of pressure via turning, repositioning, and weight-shifting  Verbalized understanding of plan of care  No induration, fluctuance, odor noted to the above noted wounds  New dressings applied  Patient tolerated fairly well, reports severe pain to the right heel wound  Primary nurse aware of plan of care  See flow sheets for more detailed assessment findings  Will follow along  Skin care plans:  1-Hydraguard to bilateral sacrum, buttock, and posterior thighs BID and PRN with latoya-care  2-Elevate heels to offload pressure  3-Ehob cushion in chair when out of bed  4-Moisturize skin daily with skin nourishing cream   5-Turn/reposition q2h for pressure re-distribution on skin  6-Right heel- Cleanse with NSS, pat dry  Apply Maxorb Alginate silver over wound bed, cover with Allevyn foam heel dressing  Change every other day and as needed for soilage/dislodgment  7-Left heel- Cleanse with NSS, pat dry  Apply Allevyn foam heel dressing  Change every other day and as needed for soilage/dislodgment  8- Bariatric low air loss mattress  Wound 03/14/21 Tibial Left;Posterior (Active)   Wound Image   04/27/22 0849   Wound Description Dry; Intact; Pink 04/27/22 0849   Latoya-wound Assessment Dry; Intact; Hyperpigmented;Scar Tissue 04/27/22 0849   Wound Length (cm) 0 cm 04/27/22 0849   Wound Width (cm) 0 cm 04/27/22 0849   Wound Depth (cm) 0 cm 04/27/22 0849   Wound Surface Area (cm^2) 0 cm^2 04/27/22 0849   Wound Volume (cm^3) 0 cm^3 04/27/22 0849   Calculated Wound Volume (cm^3) 0 cm^3 04/27/22 0849   Change in Wound Size % 100 04/27/22 0849       Wound 03/15/21 Pressure Injury Heel Right (Active)   Wound Image   04/27/22 0849   Wound Description Pink;Yellow;Slough 04/27/22 0849 Pressure Injury Stage U 04/27/22 0849   Laura-wound Assessment Dry; Intact;Scaly 04/27/22 0849   Wound Length (cm) 3 cm 04/27/22 0849   Wound Width (cm) 7 cm 04/27/22 0849   Wound Depth (cm) 0 2 cm 04/27/22 0849   Wound Surface Area (cm^2) 21 cm^2 04/27/22 0849   Wound Volume (cm^3) 4 2 cm^3 04/27/22 0849   Calculated Wound Volume (cm^3) 4 2 cm^3 04/27/22 0849   Change in Wound Size % -1515 38 04/27/22 0849   Tunneling 0 cm 04/27/22 0849   Undermining 0 04/27/22 0849   Drainage Amount Small 04/27/22 0849   Drainage Description Purulent;Milky 04/27/22 0849   Non-staged Wound Description Full thickness 04/27/22 0849   Treatments Cleansed;Irrigation with NSS;Site care 04/27/22 0849   Dressing Calcium Alginate with Silver; Foam, Silicon (eg  Allevyn, etc) 04/27/22 0849   Wound packed? No 04/27/22 0849   Dressing Changed Changed 04/27/22 0849   Patient Tolerance Tolerated poorly 04/27/22 0849   Dressing Status Clean;Dry; Intact 04/27/22 0849       Wound 04/27/22 Pressure Injury Heel Left (Active)   Wound Image   04/27/22 0850   Wound Description Brown;Pink 04/27/22 0850   Pressure Injury Stage U 04/27/22 0850   Laura-wound Assessment Dry; Intact;Callus 04/27/22 0850   Wound Length (cm) 0 6 cm 04/27/22 0850   Wound Width (cm) 0 8 cm 04/27/22 0850   Wound Depth (cm) 0 1 cm 04/27/22 0850   Wound Surface Area (cm^2) 0 48 cm^2 04/27/22 0850   Wound Volume (cm^3) 0 048 cm^3 04/27/22 0850   Calculated Wound Volume (cm^3) 0 05 cm^3 04/27/22 0850   Tunneling 0 cm 04/27/22 0850   Undermining 0 04/27/22 0850   Drainage Amount None 04/27/22 0850   Non-staged Wound Description Not applicable 99/62/96 0072   Treatments Cleansed;Site care 04/27/22 0850   Dressing Foam, Silicon (eg  Allevyn, etc) 04/27/22 0850   Wound packed? No 04/27/22 0850   Dressing Changed Changed 04/27/22 0850   Patient Tolerance Tolerated well 04/27/22 0850   Dressing Status Clean;Dry; Intact 04/27/22 0850       Wound 04/27/22 Pressure Injury Buttocks Left (Active) Wound Image   04/27/22 0850   Wound Description Dry; Intact; Light purple; Other (Comment) 04/27/22 0850   Pressure Injury Stage DTPI 04/27/22 0850   Laura-wound Assessment Dry; Intact; Hyperpigmented;Scar Tissue;Pink 04/27/22 0850   Wound Length (cm) 4 cm 04/27/22 0850   Wound Width (cm) 2 cm 04/27/22 0850   Wound Depth (cm) 0 cm 04/27/22 0850   Wound Surface Area (cm^2) 8 cm^2 04/27/22 0850   Wound Volume (cm^3) 0 cm^3 04/27/22 0850   Calculated Wound Volume (cm^3) 0 cm^3 04/27/22 0850   Tunneling 0 cm 04/27/22 0850   Undermining 0 04/27/22 0850   Drainage Amount None 04/27/22 0850   Non-staged Wound Description Not applicable 12/51/11 1216   Treatments Cleansed;Site care 04/27/22 0850   Dressing Moisture barrier 04/27/22 0850   Wound packed?  No 04/27/22 0850   Dressing Changed New 04/27/22 0850   Patient Tolerance Tolerated well 04/27/22 0850       Call or tigertext with any questions  Wound Care will continue to follow    Zonia Johnson care

## 2022-04-27 NOTE — ASSESSMENT & PLAN NOTE
Noted to have an aspiration event yesterday  CT scan right lower lobe pneumonia  Suspect aspiration pneumonia versus pneumonitis  Currently afebrile, WBC count within normal limits  However procalcitonin if acutely elevated  Will continue with ceftriaxone and metronidazole for now

## 2022-04-27 NOTE — OCCUPATIONAL THERAPY NOTE
Occupational Therapy Cancellation Note        Patient Name: Apolinar KASPER Date: 4/27/2022 04/27/22 1401   Note Type   Note type Cancelled Session   Cancel Reasons Patient off floor/test   Additional Comments OT order received  Chart review performed  At this time, OT evaluation cancelled as pt unavailable, off the floor for VBS  OT will follow and evaluate as appropriate

## 2022-04-27 NOTE — PLAN OF CARE
Problem: Prexisting or High Potential for Compromised Skin Integrity  Goal: Skin integrity is maintained or improved  Description: INTERVENTIONS:  - Identify patients at risk for skin breakdown  - Assess and monitor skin integrity  - Assess and monitor nutrition and hydration status  - Monitor labs   - Assess for incontinence   - Turn and reposition patient  - Assist with mobility/ambulation  - Relieve pressure over bony prominences  - Avoid friction and shearing  - Provide appropriate hygiene as needed including keeping skin clean and dry  - Evaluate need for skin moisturizer/barrier cream  - Collaborate with interdisciplinary team   - Patient/family teaching  - Consider wound care consult   Outcome: Progressing     Problem: Nutrition/Hydration-ADULT  Goal: Nutrient/Hydration intake appropriate for improving, restoring or maintaining nutritional needs  Description: Monitor and assess patient's nutrition/hydration status for malnutrition  Collaborate with interdisciplinary team and initiate plan and interventions as ordered  Monitor patient's weight and dietary intake as ordered or per policy  Utilize nutrition screening tool and intervene as necessary  Determine patient's food preferences and provide high-protein, high-caloric foods as appropriate       INTERVENTIONS:  - Monitor oral intake, urinary output, labs, and treatment plans  - Assess nutrition and hydration status and recommend course of action  - Evaluate amount of meals eaten  - Assist patient with eating if necessary   - Allow adequate time for meals  - Recommend/ encourage appropriate diets, oral nutritional supplements, and vitamin/mineral supplements  - Order, calculate, and assess calorie counts as needed  - Recommend, monitor, and adjust tube feedings and TPN/PPN based on assessed needs  - Assess need for intravenous fluids  - Provide specific nutrition/hydration education as appropriate  - Include patient/family/caregiver in decisions related to nutrition  Outcome: Progressing     Problem: Potential for Falls  Goal: Patient will remain free of falls  Description: INTERVENTIONS:  - Educate patient/family on patient safety including physical limitations  - Instruct patient to call for assistance with activity   - Consult OT/PT to assist with strengthening/mobility   - Keep Call bell within reach  - Keep bed low and locked with side rails adjusted as appropriate  - Keep care items and personal belongings within reach  - Initiate and maintain comfort rounds  - Make Fall Risk Sign visible to staff  - Offer Toileting every 2 Hours, in advance of need  - Initiate/Maintain alarm  - Obtain necessary fall risk management equipment:   - Apply yellow socks and bracelet for high fall risk patients  - Consider moving patient to room near nurses station  Outcome: Progressing

## 2022-04-27 NOTE — PHYSICAL THERAPY NOTE
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 ([facility] )    Nonrheumatic aortic valve stenosis    Hyperlipidemia    Pneumonia due to COVID-19 virus    Acute respiratory failure with hypoxia ([facility] )    Ambulatory dysfunction    Lower extremity edema    Morbid obesity ([facility] )    Intractable nausea and vomiting    Aspiration pneumonia ([facility]    EDUARDO (acute kidney injury) ([facility] )       Past Medical History  Past Medical History:   Diagnosis Date    CHF (congestive heart failure) ([facility] )     Liver cirrhosis (HCC)     Seizures ([facility] )        Past Surgical History  Past Surgical History:   Procedure Laterality Date    HYSTERECTOMY      LEG SURGERY  05/2016    Pt family cannot recall the specific surgery or which leg it was preformed on           04/27/22 1400   PT Last Visit   PT Visit Date 04/27/22   Note Type   Note type Cancelled Session   Cancel Reasons Patient off floor/test   Additional Comments PT order received  Chart review performed  At this time, PT evaluation cancelled as pt unavailable, off the floor for VBS  PT will follow and evaluate as appropriate           Liza Hernandez, PT, DPT

## 2022-04-27 NOTE — SPEECH THERAPY NOTE
Speech-Language Pathology Video Barium Swallow Study        Patient Name: Jeremy Castillo    [id number] Date: 4/27/2022     Problem List  Patient Active Problem List   Diagnosis    Cough    Shortness of breath    Seizure disorder (HCC)    Thrombocytopenia (HCC)    Acute upper respiratory infection    Alteration in skin integrity    Lower extremity cellulitis    GERD (gastroesophageal reflux disease)    Essential hypertension    Chronic diastolic (congestive) heart failure (HCC)    Localization-related epilepsy, intractable ([address] )    Nonrheumatic aortic valve stenosis    Hyperlipidemia    Pneumonia due to COVID-19 virus    Acute respiratory failure with hypoxia ([facility] [address] [facility]    Ambulatory dysfunction    Lower extremity edema    Morbid obesity ([address]    Intractable nausea and vomiting    Aspiration pneumonia ([facility]    EDUARDO (acute kidney injury) ([facility] )       Past Medical History  Past Medical History:   Diagnosis Date    CHF (congestive heart failure) (HCC)     Liver cirrhosis (HCC)     Seizures (HCC)        Past Surgical History  Past Surgical History:   Procedure Laterality Date    HYSTERECTOMY      LEG SURGERY  05/2016    Pt family cannot recall the specific surgery or which leg it was preformed on  General Information;   Pt is a 80 y o  female who presented to [facility] with shortness of breath, aspiration pneumonia  Pt was seen for a BSE, with recommendations for puree/nectar thick  VBS was recommended due to concerns for aspiration pneumonia and increased wheezing during oral intake  Pt was viewed in lateral position and was given trials of regular solid and thin liquid by cup  Study was limited 2/2 pt positioning in fluoro suite and body habitus  Limited view of lower pharynx  Previous VBS: None in chart  Results are as follows:   Oral stage;   Pt presents with mild oral dysphagia characterized by prolonged mastication, reduced breakdown of regular/hard solid bolus, increased transit time  Premature loss during mastication noted over the base of tongue (mild)  No oral residue  Pharyngeal stage; Pt presents with mild pharyngeal dysphagia characterized by delayed swallow response  Spillage of regular solid and thin liquids to the valleculae and pyriforms, mild  Adequate laryngeal rise  Airway protection appears timely and complete with no apparent shadi aspiration or laryngeal penetration  No pharyngeal residue  Limited view of lower pharynx  Esophageal stage;  Esophageal screening was not completed 2/2 pt positioning in fluoro  Belching observed following the study  Assessment Summary; Pt presents with mild oropharyngeal dysphagia characterized by reduced mastication (regular solids) and prolonged oral transfer  Reduced posterior containment and delayed swallow response resulted in spillage over the base of tongue and pooling into the valleculae and pyriforms  All material noted to clear upon trigger of pharyngeal swallow  No shadi aspiration or airway invasion was apparent, though view in fluoro very limited 2/2 pt body habitus and positioning  Will continue to manage closely at bedside  No changes in respiratory status or audible wheeze evident throughout the study (prev seen at bedside)  *Images available for direct review in PACS     Diagnosis/Prognosis;  mild oropharyngeal dysphagia    good prognosis for continued safe po intake given findings outlined above  Prognosis Considerations: respiratory status    Recommendations; Recommend mechanically altered/level 2 diet and thin liquids, with upright posture, only feed when fully alert, slow rate of feeding, small bites/sips and alternating bites and sips  Recommended Form of Meds: whole with puree   Aspiration precautions   If a dedicated assessment of the esophagus is desired, consider esophagram/barium swallow       Results reviewed with MD       Goals:  Pt will tolerate least restrictive diet w/out s/s aspiration or oral/pharyngeal difficulties      Dysphagia Goals: pt will tolerate dys2 solids with thin liquids without s/s of aspiration x1-3    Discharge recommendation: likely no follow up needed for swallowing        Terri Garcia [address], [address] [facility]  Speech-Language Pathologist  [place] #[id number]  [place] #[id number]

## 2022-04-27 NOTE — SPEECH THERAPY NOTE
Speech Language/Pathology     Speech/Language Pathology Progress Note     Patient Name: Mingo AMARAL Date: 4/27/2022     Problem List  Principal Problem:    Aspiration pneumonia ([facility] )  Active Problems:    GERD (gastroesophageal reflux disease)    Localization-related epilepsy, intractable (HCC)    Hyperlipidemia    Intractable nausea and vomiting    EDUARDO (acute kidney injury) ([facility] [address] )        Recommendations:   Diet: puree/level 1 diet and nectar thick liquids   Meds: whole with liquid   Feeding Assistance: 1:1  Frequent Oral care: 2-4x/day  Aspiration precautions and compensatory swallowing strategies: upright posture, only feed when fully alert, slow rate of feeding, small bites/sips and alternating bites and sips  Other Recommendations/ considerations: VBS/VFSS      Subjective:  Patient received awake, c/o pain with movement  Tolerates gradual head of bed elevation, breakfast tray set up and fed by clinician  Previous/current diet: puree/honey    Objective: The following consistencies were tested puree, honey, nectar, thin by cup sip  Patient presents with fuctional bolus containment and control on consistencies tested  Some impulsivity with limited bolus manipulation noted, brief tongue-palate mashing of solid texture and immediate transfer  Gradual advancement of liquid trials, pt with increased wheezing and shortness of breath  Assessment:  Oropharyngeal swallow function appears same from previous encounters  Tolerated current diet in addition to nectar thick  When attempting to advance to thin liquids, patient presents with increased audible wheeze and shortness of breath  Given current dx of aspiration pneumonia and  respiratory changes with advancement trials at bedside; recommend instrametnal measures prior to diet liberalizatoin for further evaluation of swallow function  Plan:  Continue current diet w/ VBS; orders placed by MD,  Will continue follow as indicated  Rochelle Lanes, [address] [facility] [address], [address] [facility]  Speech-Language Pathologist  [place] #[id number]  [place] #[id number]

## 2022-04-28 LAB
ANION GAP SERPL CALCULATED.3IONS-SCNC: 5 MMOL/L (ref 4–13)
BASOPHILS # BLD MANUAL: 0 THOUSAND/UL (ref 0–0.1)
BASOPHILS NFR MAR MANUAL: 0 % (ref 0–1)
BUN SERPL-MCNC: 36 MG/DL (ref 5–25)
CALCIUM SERPL-MCNC: 9.1 MG/DL (ref 8.3–10.1)
CHLORIDE SERPL-SCNC: 102 MMOL/L (ref 100–108)
CO2 SERPL-SCNC: 27 MMOL/L (ref 21–32)
CREAT SERPL-MCNC: 1.28 MG/DL (ref 0.6–1.3)
EOSINOPHIL # BLD MANUAL: 0 THOUSAND/UL (ref 0–0.4)
EOSINOPHIL NFR BLD MANUAL: 0 % (ref 0–6)
ERYTHROCYTE [DISTWIDTH] IN BLOOD BY AUTOMATED COUNT: 15.5 % (ref 11.6–15.1)
GFR SERPL CREATININE-BSD FRML MDRD: 39 ML/MIN/1.73SQ M
GLUCOSE SERPL-MCNC: 142 MG/DL (ref 65–140)
HCT VFR BLD AUTO: 35.7 % (ref 34.8–46.1)
HGB BLD-MCNC: 11 G/DL (ref 11.5–15.4)
LYMPHOCYTES # BLD AUTO: 1.26 THOUSAND/UL (ref 0.6–4.47)
LYMPHOCYTES # BLD AUTO: 8 % (ref 14–44)
MCH RBC QN AUTO: 30.6 PG (ref 26.8–34.3)
MCHC RBC AUTO-ENTMCNC: 30.8 G/DL (ref 31.4–37.4)
MCV RBC AUTO: 99 FL (ref 82–98)
MONOCYTES # BLD AUTO: 1.58 THOUSAND/UL (ref 0–1.22)
MONOCYTES NFR BLD: 10 % (ref 4–12)
NEUTROPHILS # BLD MANUAL: 12.92 THOUSAND/UL (ref 1.85–7.62)
NEUTS BAND NFR BLD MANUAL: 5 % (ref 0–8)
NEUTS SEG NFR BLD AUTO: 77 % (ref 43–75)
PLATELET # BLD AUTO: 174 THOUSANDS/UL (ref 149–390)
PLATELET BLD QL SMEAR: ADEQUATE
PMV BLD AUTO: 11.8 FL (ref 8.9–12.7)
POTASSIUM SERPL-SCNC: 4.4 MMOL/L (ref 3.5–5.3)
RBC # BLD AUTO: 3.6 MILLION/UL (ref 3.81–5.12)
RBC MORPH BLD: NORMAL
SODIUM SERPL-SCNC: 134 MMOL/L (ref 136–145)
WBC # BLD AUTO: 15.76 THOUSAND/UL (ref 4.31–10.16)

## 2022-04-28 PROCEDURE — 97167 OT EVAL HIGH COMPLEX 60 MIN: CPT

## 2022-04-28 PROCEDURE — 97163 PT EVAL HIGH COMPLEX 45 MIN: CPT

## 2022-04-28 PROCEDURE — 80048 BASIC METABOLIC PNL TOTAL CA: CPT | Performed by: INTERNAL MEDICINE

## 2022-04-28 PROCEDURE — 85027 COMPLETE CBC AUTOMATED: CPT | Performed by: INTERNAL MEDICINE

## 2022-04-28 PROCEDURE — 99232 SBSQ HOSP IP/OBS MODERATE 35: CPT | Performed by: INTERNAL MEDICINE

## 2022-04-28 PROCEDURE — 85007 BL SMEAR W/DIFF WBC COUNT: CPT | Performed by: INTERNAL MEDICINE

## 2022-04-28 PROCEDURE — 92526 ORAL FUNCTION THERAPY: CPT

## 2022-04-28 RX ORDER — SODIUM CHLORIDE 9 MG/ML
75 INJECTION, SOLUTION INTRAVENOUS CONTINUOUS
Status: DISCONTINUED | OUTPATIENT
Start: 2022-04-28 | End: 2022-05-02 | Stop reason: HOSPADM

## 2022-04-28 RX ADMIN — METRONIDAZOLE 500 MG: 500 TABLET ORAL at 05:00

## 2022-04-28 RX ADMIN — HEPARIN SODIUM 5000 UNITS: 5000 INJECTION INTRAVENOUS; SUBCUTANEOUS at 21:02

## 2022-04-28 RX ADMIN — PHENOBARBITAL 64.8 MG: 32.4 TABLET ORAL at 17:38

## 2022-04-28 RX ADMIN — MIDODRINE HYDROCHLORIDE 10 MG: 5 TABLET ORAL at 16:34

## 2022-04-28 RX ADMIN — ATORVASTATIN CALCIUM 80 MG: 40 TABLET, FILM COATED ORAL at 09:13

## 2022-04-28 RX ADMIN — HEPARIN SODIUM 5000 UNITS: 5000 INJECTION INTRAVENOUS; SUBCUTANEOUS at 15:11

## 2022-04-28 RX ADMIN — METOPROLOL TARTRATE 25 MG: 25 TABLET, FILM COATED ORAL at 21:03

## 2022-04-28 RX ADMIN — MIDODRINE HYDROCHLORIDE 10 MG: 5 TABLET ORAL at 12:12

## 2022-04-28 RX ADMIN — LEVETIRACETAM 500 MG: 500 TABLET, FILM COATED ORAL at 09:13

## 2022-04-28 RX ADMIN — LEVETIRACETAM 500 MG: 500 TABLET, FILM COATED ORAL at 21:03

## 2022-04-28 RX ADMIN — METOPROLOL TARTRATE 25 MG: 25 TABLET, FILM COATED ORAL at 09:13

## 2022-04-28 RX ADMIN — MIDODRINE HYDROCHLORIDE 10 MG: 5 TABLET ORAL at 05:01

## 2022-04-28 RX ADMIN — HEPARIN SODIUM 5000 UNITS: 5000 INJECTION INTRAVENOUS; SUBCUTANEOUS at 05:01

## 2022-04-28 RX ADMIN — Medication 750 MG: at 12:50

## 2022-04-28 RX ADMIN — PHENOBARBITAL 64.8 MG: 32.4 TABLET ORAL at 09:13

## 2022-04-28 RX ADMIN — PANTOPRAZOLE SODIUM 20 MG: 20 TABLET, DELAYED RELEASE ORAL at 05:01

## 2022-04-28 RX ADMIN — SODIUM CHLORIDE 75 ML/HR: 0.9 INJECTION, SOLUTION INTRAVENOUS at 12:50

## 2022-04-28 RX ADMIN — CEFTRIAXONE SODIUM 1000 MG: 10 INJECTION, POWDER, FOR SOLUTION INTRAVENOUS at 06:00

## 2022-04-28 RX ADMIN — CEFEPIME HYDROCHLORIDE 1000 MG: 2 INJECTION, POWDER, FOR SOLUTION INTRAVENOUS at 12:13

## 2022-04-28 NOTE — PHYSICAL THERAPY NOTE
Physical Therapy Evaluation   Time in: 1026  Time out: 1047  Total evaluation time: 21 minutes    Patient's Name: Lona Montilla    Admitting Diagnosis  SOB (shortness of breath) [R06 02]  CAP (community acquired pneumonia) [J18 9]  Fever [R50 9]    Problem List  Patient Active Problem List   Diagnosis    Cough    Shortness of breath    Seizure disorder (HCC)    Thrombocytopenia (HCC)    Acute upper respiratory infection    Alteration in skin integrity    Lower extremity cellulitis    GERD (gastroesophageal reflux disease)    Essential hypertension    Chronic diastolic (congestive) heart failure (HCC)    Localization-related epilepsy, intractable ([address] )    Nonrheumatic aortic valve stenosis    Hyperlipidemia    Pneumonia due to COVID-19 virus    Acute respiratory failure with hypoxia ([address] )    Ambulatory dysfunction    Lower extremity edema    Morbid obesity ([address] )    Intractable nausea and vomiting    Aspiration pneumonia ([address] )    EDUARDO (acute kidney injury) ([address] )       Past Medical History  Past Medical History:   Diagnosis Date    CHF (congestive heart failure) ([address] )     Liver cirrhosis (HCC)     Seizures ([facility] )        Past Surgical History  Past Surgical History:   Procedure Laterality Date    HYSTERECTOMY      LEG SURGERY  05/2016    Pt family cannot recall the specific surgery or which leg it was preformed on  PT performed at least 2 patient identifiers during session: Name and wristband         04/28/22 1024   PT Last Visit   PT Visit Date 04/28/22   Note Type   Note type Evaluation   Pain Assessment   Pain Assessment Tool FLACC   Pain Location/Orientation Orientation: Bilateral;Location: Leg   Pain Rating: FLACC (Rest) - Face 2   Pain Rating: FLACC (Rest) - Legs 1   Pain Rating: FLACC (Rest) - Activity 1   Pain Rating: FLACC (Rest) - Cry 2   Pain Rating: FLACC (Rest) - Consolability 2   Score: FLACC (Rest) 8   Pain Rating: FLACC (Activity) - Face 2   Pain Rating: FLACC (Activity) - Legs 2   Pain Rating: FLACC (Activity) - Activity 2   Pain Rating: FLACC (Activity) - Cry 2   Pain Rating: FLACC (Activity) - Consolability 2   Score: FLACC (Activity) 10   Restrictions/Precautions   Weight Bearing Precautions Per Order No   Braces or Orthoses   (none per pt's dtr)   Other Precautions Cognitive; Bed Alarm; Fall Risk;Pain   Home Living   Type of [address] One level;Performs ADLs on one level  (0 DAVIS)   Bathroom Shower/Tub Tub/shower unit   Bathroom Toilet Raised  (BSC frame over standard toilet)   Bathroom Equipment Grab bars in shower; Shower chair;Commode   [facility]; Wheelchair-manual  (pt using walker at baseline)   Additional Comments pt's dtr reporting pt has not been ambulatory recently, using manual w/c for locomotion d/t B/L lower leg/feet pain   Prior Function   Level of [place] Needs assistance with ADLs and functional mobility; Needs assistance with IADLs   Lives With Daughter   Receives Help From Family   ADL Assistance Needs assistance  (dtr A with bathing)   IADLs Needs assistance   Falls in the last 6 months 0  (dtr reports none recent, EMR reports +fall history)   Vocational Retired  (didn't work out of the home)   Comments pt enjoys watching tv   General   Family/Caregiver Present Yes   Cognition   Overall Cognitive Status Impaired   Arousal/Participation Arousable   Attention Difficulty attending to directions   Orientation Level Oriented to person   Memory Unable to assess   Following Commands Follows one step commands inconsistently   RUE Assessment   RUE Assessment   (defer to OT eval for comments)   LUE Assessment   LUE Assessment   (defer to OT eval for comments)   RLE Assessment   RLE Assessment   (unable to formally assess d/t pain, observed at least 2+/5)   LLE Assessment   LLE Assessment   (unable to formally assess d/t pain, observed at least 2+/5)   Vision-Basic Assessment   Visual History Cataracts  (per dtr)   Coordination   Movements are Fluid and Coordinated   (unable to formally assess d/t cognitive deficits)   Sensation X   Bed Mobility   Supine to Sit 2  Maximal assistance   Additional items Assist x 2;HOB elevated; Bedrails; Increased time required;Verbal cues;LE management   Sit to Supine 2  Maximal assistance   Additional items Assist x 3;HOB elevated; Bedrails; Increased time required;Verbal cues;LE management   Additional Comments pt only able to tolerate seated EOB < 30 sec duration, pt limited d/t pain, cognition, requiring max A x3 for return BTB d/t retropulsive seated posture and unsafe seated at EOB   Transfers   Sit to Stand Unable to assess   Balance   Static Sitting Poor   Dynamic Sitting Poor -   Endurance Deficit   Endurance Deficit Yes   Activity Tolerance   Activity Tolerance Patient limited by fatigue;Patient limited by pain   Medical Staff Made Aware OT Segun Wheeler and student [place]   Nurse Made Aware TERESA Carias   Assessment   Prognosis Guarded   Problem List Decreased strength;Decreased range of motion;Decreased endurance; Impaired balance;Decreased mobility; Decreased cognition;Decreased safety awareness; Impaired sensation;Decreased skin integrity;Pain;Obesity   Assessment Pt is 80 y o  female seen for high-complexity PT evaluation on 4/28/2022 s/p admit to [facility] on 4/25/2022 w/ Aspiration pneumonia ([address] )  PT was consulted to assess pt's functional mobility and d/c needs  Order placed for PT eval and tx, w/ up and OOB as tolerated order  PTA, pt resides with dtr in [facility] with 0 DAVIS, uses walker for ambulation, +fall history  Pt's dtr providing PLOF/home environment information  At time of eval, pt requiring  Max Ax2-3 for bed mobility; further mobility deferred d/t pain, cognition   Upon evaluation, pt presenting with impaired functional mobility d/t decreased strength, decreased ROM, decreased endurance, impaired balance, decreased mobility, decreased cognition, impaired sensation, obesity, decreased skin integrity, pain and activity intolerance  Pertinent PMHx and current co-morbidities affecting pt's physical performance at time of assessment include: aspiration pneumonia, LE cellulitis, GERD, epilepsy, HLD, EDUARDO, seizure disorder, thrombocytopenia, HTN, ambulatory dysfunction, morbid obesity, COVID19  Personal factors affecting pt at time of eval include: inaccessible home environment, inability to ambulate household distances, inability to navigate level surfaces w/o external assistance, limited home support and positive fall history  The following objective measures performed on IE also reveal limitations: Barthel Index: 30/100, Modified [place]: 5 (severe disability) and AM-PAC 6-Clicks: 9/65  Pt's clinical presentation is currently unstable/unpredictable seen in pt's presentation of advanced age, abnormal lab value(s), need for input for task focus and mobility technique, B/L LE pain impacting overall mobility status and ongoing medical assessment  Overall, pt's rehab potential and prognosis to return to PLOF is guarded as impacted by objective findings, warranting pt to receive further skilled PT interventions to address identified impairments, activity limitation(s), and participation restriction(s)  Pt to benefit from continued PT tx to address deficits as defined above and maximize level of functional independent mobility and consistency  From PT/mobility standpoint, recommendation at time of d/c would be post acute rehabilitation services pending progress in order to facilitate return to PLOF     Barriers to Discharge Inaccessible home environment;Decreased caregiver support   Goals   Patient Goals none expressed   STG Expiration Date 05/08/22   Short Term Goal #1 In 7-10 days: Increase bilateral LE strength 1/2 grade to facilitate independent mobility, Perform all bed mobility tasks with mod A of 1 to decrease caregiver burden, Increase static and dynamic sitting balance 1/2 grade to decrease risk for falls, Tolerate seated at EOB 15 minutes to facilitate functional task performance, Improve Barthel Index score to 45 or greater to facilitate independence, PT provider will perform functional balance assessment to determine fall risk and PT to see and establish goals for transfers, standing/ambulatory balance, ambulation when appropriate   PT Treatment Day 0   Plan   Treatment/Interventions LE strengthening/ROM; Therapeutic exercise; Endurance training;Patient/family training;Equipment eval/education; Bed mobility;Continued evaluation;Spoke to nursing;OT;Family   PT Frequency 3-5x/wk   Recommendation   PT Discharge Recommendation Post acute rehabilitation services   Equipment Recommended   (TBD)   AM-PAC Basic Mobility Inpatient   Turning in Bed Without Bedrails 1   Lying on Back to Sitting on Edge of Flat Bed 1   Moving Bed to Chair 1   Standing Up From Chair 1   Walk in Room 1   Climb 3-5 Stairs 1   Basic Mobility Inpatient Raw Score 6   Turning Head Towards Sound 3   Follow Simple Instructions 2   Low Function Basic Mobility Raw Score 11   Low Function Basic Mobility Standardized Score 16 55   Highest Level Of Mobility   -[facility] Goal 2: Bed activities/Dependent transfer   -[facility] Highest Level of Mobility 3: Sit at edge of bed   -[facility] Goal Achieved Yes   Modified Llewellyn Scale   Modified Llewellyn Scale 5   Barthel Index   Feeding 5   Bathing 0   Grooming Score 0   Dressing Score 5   Bladder Score 5   Bowels Score 10   Toilet Use Score 0   Transfers (Bed/Chair) Score 5   Mobility (Level Surface) Score 0   Stairs Score 0   Barthel Index Score 30       Elysevelyperfecto Howard, PT, DPT

## 2022-04-28 NOTE — ASSESSMENT & PLAN NOTE
Baseline creatinine approximately 0 9 presented with a creatinine of 2 1  Likely  prerenal secondary to volume depletion  Improved initially with IVF  However now stable at 1 8  nephro consult

## 2022-04-28 NOTE — PLAN OF CARE
Problem: OCCUPATIONAL THERAPY ADULT  Goal: Performs self-care activities at highest level of function for planned discharge setting  See evaluation for individualized goals  Description: Treatment Interventions: ADL retraining,Functional transfer training,Endurance training,Patient/family training,Equipment evaluation/education,Fine motor coordination activities,Compensatory technique education,Continued evaluation,Energy conservation,Activityengagement          See flowsheet documentation for full assessment, interventions and recommendations  Note: Limitation: Decreased ADL status,Decreased endurance,Decreased high-level ADLs,Mood limitation  Prognosis: Good  Assessment: Patient is a 80 yr  old female who was seen for an OT evaluation s/p admission to [address] [place] [address] on 4/25/22 due to nausea, vomiting, and poor apetite  Patient was diagnosed with aspiration pneumonia  Other contributing factors affecting the patients occupational performance at this time include acute kidney injury, hyperlipidemia, localized-related epilepsy, and GERD  Orders were placed for OT evaluation and treatment  At least two patient identifiers performed during session including name and wristband  Prior to admission patient reported assistance is needed with ADLs, IADLs, and functional mobility  Patient lives with daughter in a one story home with no steps to enter  Patient ambulates with RW at baseline but recently due to bilateral lower leg pain patient has not been ambulatory and has been using a manual w/c  Patient reports no falls and enjoys watching tv  Personal factors that are inhibiting the patients performance at time of evaluation include: difficulty performing ADLs, difficulty performing IADLs, and decreased initiation and engagement  Upon evaluation patient requires Max A x 2 for supine to sit, Max A x 3 for sit to supine, setup assist for eating, Max A for UB ADLs, and Total Assist for LB ADLs   Patients occupational performance is affected by the following deficits: impaired fine/gross motor coordination, decreased static/dynamic sitting balance, decreased activity tolerance, decreased endurance, and pain  Patient to benefit from continued Occupational Therapy treatment while in the hospital to address the stated deficits and improve occupational performance and overall quality of life  From an OT standpoint at this time, d/c recommendation would be post acute rehab services       OT Discharge Recommendation: Post acute rehabilitation services

## 2022-04-28 NOTE — SPEECH THERAPY NOTE
Speech Language/Pathology     Speech/Language Pathology Progress Note     Patient Name: nAjali Campbell    YICDG'V Date: 4/28/2022     Problem List  Principal Problem:    Aspiration pneumonia ([facility] [address] )  Active Problems:    Lower extremity cellulitis    GERD (gastroesophageal reflux disease)    Localization-related epilepsy, intractable (HCC)    Hyperlipidemia    Intractable nausea and vomiting    EDUARDO (acute kidney injury) ([facility] [address] )        Recommendations:   Diet: mechanically altered/level 2 diet and thin liquids   Meds: whole with liquid   Feeding Assistance: 1:1  Frequent Oral care: 2-4x/day  Aspiration precautions and compensatory swallowing strategies: upright posture, only feed when fully alert, slow rate of feeding, small bites/sips and alternating bites and sips  Other Recommendations/ considerations: aspiration precautions       Subjective:  Patient received sleeping, wakes to voice and light touch  Previous/current diet: dys2/thin    Objective: The following consistencies were tested dysphagia 2 solids, thin liquids  Patient presents with functional bolus containment, manipulation and control for consistencies tested  Transfer prompt  Mastication judged to be brief and incomplete  No overt s/s of aspiration or distress  Vocal quality noted to remain clear  Assessment:  Oropharyngeal swallow function appears unchanged from recent observations on video swallow study  Tolerated current/baseline diet  Plan:  Continue dys2/thin with close feeding assistance, 1:1   Will continue follow as indicated         Terri Jacob [address], [address] [facility]  Speech-Language Pathologist  [place] #[id number]  [place] #[id number]

## 2022-04-28 NOTE — PLAN OF CARE
Problem: PHYSICAL THERAPY ADULT  Goal: Performs mobility at highest level of function for planned discharge setting  See evaluation for individualized goals  Description: Treatment/Interventions: LE strengthening/ROM,Therapeutic exercise,Endurance training,Patient/family training,Equipment eval/education,Bed mobility,Continued evaluation,Spoke to nursing,OT,Family  Equipment Recommended:  (TBD)       See flowsheet documentation for full assessment, interventions and recommendations  4/28/2022 1253 by Sveta Aragon PT  Note: Prognosis: Guarded  Problem List: Decreased strength,Decreased range of motion,Decreased endurance,Impaired balance,Decreased mobility,Decreased cognition,Decreased safety awareness,Impaired sensation,Decreased skin integrity,Pain,Obesity  Assessment: Pt is 80 y o  female seen for high-complexity PT evaluation on 4/28/2022 s/p admit to [facility] on 4/25/2022 w/ Aspiration pneumonia ([facility] [address] )  PT was consulted to assess pt's functional mobility and d/c needs  Order placed for PT eval and tx, w/ up and OOB as tolerated order  PTA, pt resides with dtr in [facility] with 0 DAVIS, uses walker for ambulation, +fall history  Pt's dtr providing PLOF/home environment information  At time of eval, pt requiring  Max Ax2-3 for bed mobility; further mobility deferred d/t pain, cognition  Upon evaluation, pt presenting with impaired functional mobility d/t decreased strength, decreased ROM, decreased endurance, impaired balance, decreased mobility, decreased cognition, impaired sensation, obesity, decreased skin integrity, pain and activity intolerance  Pertinent PMHx and current co-morbidities affecting pt's physical performance at time of assessment include: aspiration pneumonia, LE cellulitis, GERD, epilepsy, HLD, EDUARDO, seizure disorder, thrombocytopenia, HTN, ambulatory dysfunction, morbid obesity, COVID19   Personal factors affecting pt at time of eval include: inaccessible home environment, inability to ambulate household distances, inability to navigate level surfaces w/o external assistance, limited home support and positive fall history  The following objective measures performed on IE also reveal limitations: Barthel Index: 30/100, Modified [place]: 5 (severe disability) and AM-PAC 6-Clicks: 7/52  Pt's clinical presentation is currently unstable/unpredictable seen in pt's presentation of advanced age, abnormal lab value(s), need for input for task focus and mobility technique, B/L LE pain impacting overall mobility status and ongoing medical assessment  Overall, pt's rehab potential and prognosis to return to PLOF is guarded as impacted by objective findings, warranting pt to receive further skilled PT interventions to address identified impairments, activity limitation(s), and participation restriction(s)  Pt to benefit from continued PT tx to address deficits as defined above and maximize level of functional independent mobility and consistency  From PT/mobility standpoint, recommendation at time of d/c would be post acute rehabilitation services pending progress in order to facilitate return to PLOF  Barriers to Discharge: Inaccessible home environment,Decreased caregiver support        PT Discharge Recommendation: Post acute rehabilitation services          See flowsheet documentation for full assessment  4/28/2022 1252 by Mami Morales PT  Note: Prognosis: Guarded  Problem List: Decreased strength,Decreased range of motion,Decreased endurance,Impaired balance,Decreased mobility,Decreased cognition,Decreased safety awareness,Impaired sensation,Decreased skin integrity,Pain,Obesity  Assessment: Pt is 80 y o  female seen for high-complexity PT evaluation on 4/28/2022 s/p admit to [facility] on 4/25/2022 w/ Aspiration pneumonia ([address] )  PT was consulted to assess pt's functional mobility and d/c needs  Order placed for PT eval and tx, w/ up and OOB as tolerated order   PTA, pt resides with dtr in [facility] with 0 DAVIS, uses walker for ambulation, +fall history  Pt's dtr providing PLOF/home environment information  At time of eval, pt requiring  Max Ax2-3 for bed mobility; further mobility deferred d/t pain, cognition  Upon evaluation, pt presenting with impaired functional mobility d/t decreased strength, decreased ROM, decreased endurance, impaired balance, decreased mobility, decreased cognition, impaired sensation, obesity, decreased skin integrity, pain and activity intolerance  Pertinent PMHx and current co-morbidities affecting pt's physical performance at time of assessment include: aspiration pneumonia, LE cellulitis, GERD, epilepsy, HLD, EDUARDO, seizure disorder, thrombocytopenia, HTN, ambulatory dysfunction, morbid obesity, COVID19  Personal factors affecting pt at time of eval include: inaccessible home environment, inability to ambulate household distances, inability to navigate level surfaces w/o external assistance, limited home support and positive fall history  The following objective measures performed on IE also reveal limitations: Barthel Index: 30/100, Modified [place]: 5 (severe disability) and AM-PAC 6-Clicks: 6/18  Pt's clinical presentation is currently unstable/unpredictable seen in pt's presentation of advanced age, abnormal lab value(s), need for input for task focus and mobility technique, B/L LE pain impacting overall mobility status and ongoing medical assessment  Overall, pt's rehab potential and prognosis to return to PLOF is guarded as impacted by objective findings, warranting pt to receive further skilled PT interventions to address identified impairments, activity limitation(s), and participation restriction(s)  Pt to benefit from continued PT tx to address deficits as defined above and maximize level of functional independent mobility and consistency   From PT/mobility standpoint, recommendation at time of d/c would be post acute rehabilitation services pending progress in order to facilitate return to PLOF  Barriers to Discharge: Inaccessible home environment,Decreased caregiver support        PT Discharge Recommendation: Post acute rehabilitation services          See flowsheet documentation for full assessment

## 2022-04-28 NOTE — OCCUPATIONAL THERAPY NOTE
Occupational Therapy Evaluation        Patient Name: Josh Marshall  KBCBS'X Date: 4/28/2022 04/28/22 1047   OT Last Visit   OT Visit Date 04/28/22   Note Type   Note type Evaluation   Restrictions/Precautions   Weight Bearing Precautions Per Order No   Braces or Orthoses Other (Comment)  (none per pt 's daughter)   Other Precautions Bed Alarm; Fall Risk;Pain   Pain Assessment   Pain Assessment Tool FLACC   Pain Location/Orientation Orientation: Bilateral;Location: Leg   Pain Rating: FLACC (Rest) - Face 2   Pain Rating: FLACC (Rest) - Legs 1   Pain Rating: FLACC (Rest) - Activity 1   Pain Rating: FLACC (Rest) - Cry 2   Pain Rating: FLACC (Rest) - Consolability 2   Score: FLACC (Rest) 8   Pain Rating: FLACC (Activity) - Face 2   Pain Rating: FLACC (Activity) - Legs 1   Pain Rating: FLACC (Activity) - Activity 2   Pain Rating: FLACC (Activity) - Cry 2   Pain Rating: FLACC (Activity) - Consolability 2   Score: FLACC (Activity) 9   Home Living   Type of Home House   Home Layout One level;Performs ADLs on one level  (no DAVIS)   Bathroom Shower/Tub Tub/shower unit   [employer] Raised   Bathroom Equipment Grab bars in shower;Commode; [address]; Wheelchair-manual   Additional Comments walker used at baseline, pt's dtr reporting pt has not been ambulatory recently, using manual w/c for locomotion d/t B/L lower leg/feet pain   Prior Function   Level of [place] Needs assistance with ADLs and functional mobility; Needs assistance with IADLs   Lives With Daughter   Receives Help From Family   ADL Assistance Needs assistance  (dtr A with bathing)   IADLs Needs assistance   Falls in the last 6 months 0  (dtr reports none recent, EMR reports +fall history)   Vocational Retired  (didn't work out of the home)   Comments  (-)   Lifestyle   Autonomy Patient reported assistance is needed with ADLs, IADLs, and functional mobility   Patient lives with daughter in a one story home with no steps to enter  Patient ambulates with RW at baseline but recently due to bilateral lower leg pain patient has not been ambulatory and has been using a manual w/c  Patient reports no falls and enjoys watching tv  Reciprocal Relationships supportive daughter   Intrinsic Gratification enjoys watching tv    Psychosocial   Psychosocial (WDL) WDL   Subjective   Subjective " You're hurting my legs"   ADL   Eating Assistance 5  Supervision/Setup   Grooming Assistance 4  Minimal Assistance   UB Bathing Assistance 2  Maximal Assistance   LB Bathing Assistance 1  Total Assistance   UB Dressing Assistance 2  Maximal Assistance   LB Dressing Assistance 1  Total [address]  2  Maximal Assistance   Functional Assistance 2  Maximal Assistance   Bed Mobility   Supine to Sit 2  Maximal assistance   Additional items Assist x 2;HOB elevated; Increased time required;Verbal cues;LE management   Sit to Supine 2  Maximal assistance   Additional items Assist x 3;HOB elevated;Verbal cues;LE management   Additional Comments pt only able to tolerate seated EOB < 30 sec duration, pt limited d/t pain requiring max A x3 for return BTB, retropulsive seated posture and unsafe seated at EOB   Transfers   Sit to Stand Unable to assess  (deferred unable to tolerate sitting at EOB)   Additional Comments transfer not attempted at this time due to high amount of pain and unsteady sitting balance    Balance   Static Sitting Poor -   Dynamic Sitting Poor -   Activity Tolerance   Activity Tolerance Patient limited by fatigue;Patient limited by pain   Medical Staff Made Aware PT Shyla Miss   Nurse Made Aware TERESA Barber   RUE Assessment   RUE Assessment   (grossly WFL)   LUE Assessment   LUE Assessment   (grossly WFL)   Hand Function   Gross Motor Coordination Impaired   Fine Motor Coordination Impaired   Sensation   Light Touch No apparent deficits  (BUEs)   Vision-Basic Assessment   Visual History Cataracts  (per dtr)   Cognition   Overall Cognitive Status Unable to assess  (Pt  states she understands but would not cooperate)   Arousal/Participation Alert; Uncooperative   Attention TARAN   Orientation Level Oriented to person   Memory Unable to assess   Following Commands Follows one step commands inconsistently   Assessment   Limitation Decreased ADL status; Decreased endurance;Decreased high-level ADLs;Mood limitation   Prognosis Good   Assessment Patient is a 80 yr  old female who was seen for an OT evaluation s/p admission to [address] [place] [address] on 4/25/22 due to nausea, vomiting, and poor apetite  Patient was diagnosed with aspiration pneumonia  Other contributing factors affecting the patients occupational performance at this time include acute kidney injury, hyperlipidemia, localized-related epilepsy, and GERD  Orders were placed for OT evaluation and treatment  At least two patient identifiers performed during session including name and wristband  Prior to admission patient reported assistance is needed with ADLs, IADLs, and functional mobility  Patient lives with daughter in a one story home with no steps to enter  Patient ambulates with RW at baseline but recently due to bilateral lower leg pain patient has not been ambulatory and has been using a manual w/c  Patient reports no falls and enjoys watching tv  Personal factors that are inhibiting the patients performance at time of evaluation include: difficulty performing ADLs, difficulty performing IADLs, and decreased initiation and engagement  Upon evaluation patient requires Max A x 2 for supine to sit, Max A x 3 for sit to supine, setup assist for eating, Max A for UB ADLs, and Total Assist for LB ADLs  Patients occupational performance is affected by the following deficits: impaired fine/gross motor coordination, decreased static/dynamic sitting balance, decreased activity tolerance, decreased endurance, and pain   Patient to benefit from continued Occupational Therapy treatment while in the hospital to address the stated deficits and improve occupational performance and overall quality of life  From an OT standpoint at this time, d/c recommendation would be post acute rehab services  Plan   Treatment Interventions ADL retraining;Functional transfer training; Endurance training;Patient/family training;Equipment evaluation/education; Fine motor coordination activities; Compensatory technique education;Continued evaluation; Energy conservation; Activityengagement   Goal Expiration Date 05/12/22   OT Frequency 3-5x/wk   Recommendation   OT Discharge Recommendation Post acute rehabilitation services   AM-PAC Daily Activity Inpatient   Lower Body Dressing 1   Bathing 2   Toileting 2   Upper Body Dressing 2   Grooming 3   Eating 3   Daily Activity Raw Score 13   Daily Activity Standardized Score (Calc for Raw Score >=11) 32 03   AM-PAC Applied Cognition Inpatient   Following a Speech/Presentation 4   Understanding Ordinary Conversation 4   Taking Medications 1   Remembering Where Things Are Placed or Put Away 3   Remembering List of 4-5 Errands 3   Taking Care of Complicated Tasks 1   Applied Cognition Raw Score 16   Applied Cognition Standardized Score 35 03   Barthel Index   Feeding 5   Bathing 0   Grooming Score 0   Dressing Score 5   Bladder Score 5   Bowels Score 10   Toilet Use Score 0   Transfers (Bed/Chair) Score 5   Mobility (Level Surface) Score 0   Stairs Score 0   Barthel Index Score 30   Modified [place] Scale   Modified [place] Scale 5       Patient will complete UB dressing while seated EOB with Mod A  Patient will complete LB ADLs while seated EOB with Mod A and the use of adaptative equipment  Patient will complete toileting hygiene with Min A  Patient will verbalize and demonstrate the use of deep breathing/energy conservation techniques during functional activities with no verbal cues      Patient will improve sitting tolerance to 15-20 min to increase activity tolerance during ADL/IADL tasks  Patient will complete transfers from bed to chair/toilet with Mod A and AD as indicated  Patient will demonstrate OOB/ sitting tolerance to 2-3 hours per day for increased participation in self care and leisure tasks with no exertion  Patient will demonstrate fair + unsupported dynamic/static sitting balance for increased participation in ADL/IADL activities  Patient will identify 3 leisure activities in hospital/home/community setting which promote physical/emotional or cognitive well being

## 2022-04-28 NOTE — ASSESSMENT & PLAN NOTE
Noted to have worsening lower extremity redness erythema and tenderness consistent with cellulitis  No significant improvement with ceftriaxone  Leukocytosis worsen  Will broaden antibiotics to vanco and cefepime for now

## 2022-04-28 NOTE — ASSESSMENT & PLAN NOTE
Noted to have an aspiration event yesterday  CT scan right lower lobe pneumonia  Suspect aspiration pneumonia versus pneumonitis  Continue vancomycin cefepime for now

## 2022-04-28 NOTE — PROGRESS NOTES
[facility]  Progress Note - Jeanine Moody 1940, 80 y o  female MRN: [id number]  Unit/Bed#: -02 Encounter: [id number]  Primary Care Provider: Zeke Alvarez DO   Date and time admitted to hospital: 4/25/2022  4:53 PM    Lower extremity cellulitis  Assessment & Plan  Noted to have worsening lower extremity redness erythema and tenderness consistent with cellulitis  No significant improvement with ceftriaxone  Leukocytosis worsen  Will broaden antibiotics to vanco and cefepime for now    * Aspiration pneumonia [facility])  Assessment & Plan   Noted to have an aspiration event yesterday  CT scan right lower lobe pneumonia  Suspect aspiration pneumonia versus pneumonitis  Continue vancomycin cefepime for now    EDUARDO (acute kidney injury) ([facility] )  Assessment & Plan    Baseline creatinine approximately 0 9 presented with a creatinine of 2 1  Likely  prerenal secondary to volume depletion  Improved initially with IVF  However now stable at 1 8  nephro consult    Intractable nausea and vomiting  Assessment & Plan  Likely secondary viral gastroenteritis  Now tolerating p o  diet    Hyperlipidemia  Assessment & Plan  C/w statin    Localization-related epilepsy, intractable ([facility] )  Assessment & Plan  Continue seizure meds    GERD (gastroesophageal reflux disease)  Assessment & Plan  Continue PPI        VTE Pharmacologic Prophylaxis:   Pharmacologic: Heparin  Mechanical VTE Prophylaxis in Place: Yes    Patient Centered Rounds: I have performed bedside rounds with nursing staff today  Discussions with Specialists or Other Care Team Provider: cm, nursing    Education and Discussions with Family / Patient: pt    Time Spent for Care: 30 minutes  More than 50% of total time spent on counseling and coordination of care as described above      Current Length of Stay: 3 day(s)    Current Patient Status: Inpatient   Certification Statement: The patient will continue to require additional inpatient hospital stay due to See below    Discharge Plan:  Still requiring IV antibiotics and further evaluation acute kidney injury  Will need rehab as well    Code Status: Level 1 - Full Code      Subjective:   No acute overnight events  Denies chest pain, cough, fevers, chills, abdominal pain, or any other complaints    Objective:     Vitals:   Temp (24hrs), Av °F (36 7 °C), Min:97 9 °F (36 6 °C), Max:98 °F (36 7 °C)    Temp:  [97 9 °F (36 6 °C)-98 °F (36 7 °C)] 97 9 °F (36 6 °C)  HR:  [84-93] 84  Resp:  [16] 16  BP: (104-115)/(55-57) 115/55  SpO2:  [97 %-99 %] 97 %  Body mass index is 44 53 kg/m²  Input and Output Summary (last 24 hours): Intake/Output Summary (Last 24 hours) at 2022 1123  Last data filed at 2022 0900  Gross per 24 hour   Intake 170 ml   Output 0 ml   Net 170 ml       Physical Exam:     Physical Exam  Constitutional:       General: She is not in acute distress  Appearance: She is well-developed  She is not diaphoretic  HENT:      Head: Normocephalic and atraumatic  Nose: Nose normal       Mouth/Throat:      Pharynx: No oropharyngeal exudate  Eyes:      General: No scleral icterus  Right eye: No discharge  Left eye: No discharge  Conjunctiva/sclera: Conjunctivae normal    Neck:      Thyroid: No thyromegaly  Vascular: No JVD  Cardiovascular:      Rate and Rhythm: Normal rate and regular rhythm  Heart sounds: Normal heart sounds  No murmur heard  No friction rub  No gallop  Pulmonary:      Effort: Pulmonary effort is normal  No respiratory distress  Breath sounds: Normal breath sounds  No wheezing or rales  Chest:      Chest wall: No tenderness  Abdominal:      General: Bowel sounds are normal  There is no distension  Palpations: Abdomen is soft  Tenderness: There is no abdominal tenderness  There is no guarding or rebound  Musculoskeletal:         General: No tenderness or deformity  Normal range of motion  Cervical back: Normal range of motion and neck supple  Skin:     General: Skin is warm and dry  Findings: No erythema or rash  Neurological:      Mental Status: She is alert  Mental status is at baseline  Cranial Nerves: No cranial nerve deficit  Sensory: No sensory deficit  Motor: No abnormal muscle tone  Coordination: Coordination normal          Additional Data:     Labs:    Results from last 7 days   Lab Units 04/28/22  1102 04/26/22  0534 04/25/22  1736   WBC Thousand/uL 15 76*   < > 8 13   HEMOGLOBIN g/dL 11 0*   < > 11 5   HEMATOCRIT % 35 7   < > 37 1   PLATELETS Thousands/uL 174   < > 147*   LYMPHO PCT %  --   --  16   MONO PCT %  --   --  0*   EOS PCT %  --   --  0    < > = values in this interval not displayed  Results from last 7 days   Lab Units 04/26/22  0534 04/25/22  1736 04/25/22  1736   SODIUM mmol/L 135*   < > 135*   POTASSIUM mmol/L 5 1   < > 5 2   CHLORIDE mmol/L 101   < > 97*   CO2 mmol/L 26   < > 31   BUN mg/dL 40*   < > 35*   CREATININE mg/dL 1 79*   < > 2 07*   ANION GAP mmol/L 8   < > 7   CALCIUM mg/dL 8 7   < > 9 2   ALBUMIN g/dL  --   --  3 0*   TOTAL BILIRUBIN mg/dL  --   --  0 91   ALK PHOS U/L  --   --  136*   ALT U/L  --   --  45   AST U/L  --   --  40   GLUCOSE RANDOM mg/dL 121   < > 153*    < > = values in this interval not displayed  Results from last 7 days   Lab Units 04/25/22  1736   INR  1 29*         Results from last 7 days   Lab Units 04/26/22  0537   HEMOGLOBIN A1C % 5 6     Results from last 7 days   Lab Units 04/25/22  2152 04/25/22  1736   LACTIC ACID mmol/L 1 3 2 8*   PROCALCITONIN ng/ml  --  6 07*           * I Have Reviewed All Lab Data Listed Above  * Additional Pertinent Lab Tests Reviewed:  All Labs Within Last 24 Hours Reviewed    Imaging:    Imaging Reports Reviewed Today Include: na  Imaging Personally Reviewed by Myself Includes:  na    Recent Cultures (last 7 days):     Results from last 7 days   Lab Units 04/25/22  1736 BLOOD CULTURE  No Growth at 48 hrs  No Growth at 48 hrs  Last 24 Hours Medication List:   Current Facility-Administered Medications   Medication Dose Route Frequency Provider Last Rate    acetaminophen  650 mg Oral Q6H PRN Jens Hill MD      albuterol  2 puff Inhalation Q4H PRN Jens Hill MD      atorvastatin  80 mg Oral Daily Jens Hill MD      cefepime  2,000 mg Intravenous Q12H Jens Hill MD      Diclofenac Sodium  2 g Topical 4x Daily PRN Jens Hill MD      heparin (porcine)  5,000 Units Subcutaneous Q8H [address] Jens Hill MD      levETIRAcetam  500 mg Oral Q12H [address] Jens Hill MD      metoprolol tartrate  25 mg Oral Q12H [address] Jens Hill MD      midodrine  10 mg Oral TID AC Jens Hill MD      ondansetron  4 mg Intravenous Q6H PRN Jens Hill MD      oxyCODONE  2 5 mg Oral Q4H PRN Jens Hill MD      oxyCODONE  5 mg Oral Q4H PRN Jens Hill MD      pantoprazole  20 mg Oral Early Morning Jens Hill MD      PHENobarbital  64 8 mg Oral BID Jens Hill MD      sodium chloride (PF)  3 mL Intravenous Q1H PRN Viral Kraft PA-C      vancomycin  12 5 mg/kg (Adjusted) Intravenous Q24H Dorita Erickson MD          Today, Patient Was Seen By: Dorita Erickson MD    ** Please Note: Dictation voice to text software may have been used in the creation of this document   **

## 2022-04-28 NOTE — CASE MANAGEMENT
Case Management Discharge Planning Note    Patient name Trudi Cronin  Location [address] [id number] MRN [id number]  : 1940 Date 2022       Current Admission Date: 2022  Current Admission Diagnosis:Aspiration pneumonia [facility])   Patient Active Problem List    Diagnosis Date Noted    Intractable nausea and vomiting 2022    Aspiration pneumonia ([facility] ) 2022    EDUARDO (acute kidney injury) ([facility] ) 2022    Morbid obesity ([facility] ) 2021    Ambulatory dysfunction 2021    Lower extremity edema 2021    [facility] failure with hypoxia [facility] 2021    GERD (gastroesophageal reflux disease) 2020    Essential hypertension 2020    Chronic diastolic (congestive) heart failure ([facility] ) 2020    Nonrheumatic aortic valve stenosis 2020    Hyperlipidemia 2020    Pneumonia due to COVID-19 virus 2020    Localization-related epilepsy, intractable ([facility] ) 02/15/2019    Lower extremity cellulitis 2018    Cough 2018    Shortness of breath 2018    Seizure disorder ([facility] ) 2018    Thrombocytopenia ([facility] ) 2018    Acute upper respiratory infection 2018    Alteration in skin integrity 2018      LOS (days): 3  Geometric Mean LOS (GMLOS) (days): 4 00  Days to GMLOS:1 5     OBJECTIVE:  Risk of Unplanned Readmission Score: 17         Current admission status: Inpatient   Preferred Pharmacy:   [address] [place], [place] - [facility] [address] [address]  Phone: [phone] Fax: [phone]    Primary Care Provider: Vineet Scott DO    Primary Insurance: MEDICARE  Secondary Insurance: [address] [place] [address] DETAILS:    Other Referral/Resources/Interventions Provided:  Referral Comments: [facility] has accepted pt

## 2022-04-28 NOTE — PLAN OF CARE
Problem: MOBILITY - ADULT  Goal: Maintain or return to baseline ADL function  Description: INTERVENTIONS:  -  Assess patient's ability to carry out ADLs; assess patient's baseline for ADL function and identify physical deficits which impact ability to perform ADLs (bathing, care of mouth/teeth, toileting, grooming, dressing, etc )  - Assess/evaluate cause of self-care deficits   - Assess range of motion  - Assess patient's mobility; develop plan if impaired  - Assess patient's need for assistive devices and provide as appropriate  - Encourage maximum independence but intervene and supervise when necessary  - Involve family in performance of ADLs  - Assess for home care needs following discharge   - Consider OT consult to assist with ADL evaluation and planning for discharge  - Provide patient education as appropriate  Outcome: Progressing  Goal: Maintains/Returns to pre admission functional level  Description: INTERVENTIONS:  - Perform BMAT or MOVE assessment daily    - Set and communicate daily mobility goal to care team and patient/family/caregiver  - Collaborate with rehabilitation services on mobility goals if consulted  - Perform Range of Motion  times a day  - Reposition patient every  hours    - Dangle patient  times a day  - Stand patient  times a day  - Ambulate patient  times a day  - Out of bed to chair  times a day   - Out of bed for meals  times a day  - Out of bed for toileting  - Record patient progress and toleration of activity level   Outcome: Progressing     Problem: Potential for Falls  Goal: Patient will remain free of falls  Description: INTERVENTIONS:  - Educate patient/family on patient safety including physical limitations  - Instruct patient to call for assistance with activity   - Consult OT/PT to assist with strengthening/mobility   - Keep Call bell within reach  - Keep bed low and locked with side rails adjusted as appropriate  - Keep care items and personal belongings within reach  - Initiate and maintain comfort rounds  - Make Fall Risk Sign visible to staff  - Offer Toileting every  Hours, in advance of need  - Initiate/Maintain alarm  - Obtain necessary fall risk management equipment  - Apply yellow socks and bracelet for high fall risk patients  - Consider moving patient to room near nurses station  Outcome: Progressing     Problem: Prexisting or High Potential for Compromised Skin Integrity  Goal: Skin integrity is maintained or improved  Description: INTERVENTIONS:  - Identify patients at risk for skin breakdown  - Assess and monitor skin integrity  - Assess and monitor nutrition and hydration status  - Monitor labs   - Assess for incontinence   - Turn and reposition patient  - Assist with mobility/ambulation  - Relieve pressure over bony prominences  - Avoid friction and shearing  - Provide appropriate hygiene as needed including keeping skin clean and dry  - Evaluate need for skin moisturizer/barrier cream  - Collaborate with interdisciplinary team   - Patient/family teaching  - Consider wound care consult   Outcome: Progressing     Problem: Nutrition/Hydration-ADULT  Goal: Nutrient/Hydration intake appropriate for improving, restoring or maintaining nutritional needs  Description: Monitor and assess patient's nutrition/hydration status for malnutrition  Collaborate with interdisciplinary team and initiate plan and interventions as ordered  Monitor patient's weight and dietary intake as ordered or per policy  Utilize nutrition screening tool and intervene as necessary  Determine patient's food preferences and provide high-protein, high-caloric foods as appropriate       INTERVENTIONS:  - Monitor oral intake, urinary output, labs, and treatment plans  - Assess nutrition and hydration status and recommend course of action  - Evaluate amount of meals eaten  - Assist patient with eating if necessary   - Allow adequate time for meals  - Recommend/ encourage appropriate diets, oral nutritional supplements, and vitamin/mineral supplements  - Order, calculate, and assess calorie counts as needed  - Recommend, monitor, and adjust tube feedings and TPN/PPN based on assessed needs  - Assess need for intravenous fluids  - Provide specific nutrition/hydration education as appropriate  - Include patient/family/caregiver in decisions related to nutrition  Outcome: Progressing

## 2022-04-28 NOTE — NURSING NOTE
Patients Richard not correctly interpreting due to poor profusion  Tried all fingers and ear lobe  When properly working, o2 95-98%  TT to MD to inquire about taking patient off Richard and doing spot checks  MD agreed

## 2022-04-29 ENCOUNTER — APPOINTMENT (INPATIENT)
Dept: NON INVASIVE DIAGNOSTICS | Facility: HOSPITAL | Age: 82
DRG: 178 | End: 2022-04-29
Attending: INTERNAL MEDICINE
Payer: MEDICARE

## 2022-04-29 LAB
ANION GAP SERPL CALCULATED.3IONS-SCNC: 8 MMOL/L (ref 4–13)
BUN SERPL-MCNC: 24 MG/DL (ref 5–25)
CALCIUM SERPL-MCNC: 9.2 MG/DL (ref 8.3–10.1)
CHLORIDE SERPL-SCNC: 104 MMOL/L (ref 100–108)
CO2 SERPL-SCNC: 27 MMOL/L (ref 21–32)
CREAT SERPL-MCNC: 0.85 MG/DL (ref 0.6–1.3)
ERYTHROCYTE [DISTWIDTH] IN BLOOD BY AUTOMATED COUNT: 15.5 % (ref 11.6–15.1)
GFR SERPL CREATININE-BSD FRML MDRD: 64 ML/MIN/1.73SQ M
GLUCOSE SERPL-MCNC: 104 MG/DL (ref 65–140)
HCT VFR BLD AUTO: 33.6 % (ref 34.8–46.1)
HGB BLD-MCNC: 10.3 G/DL (ref 11.5–15.4)
MCH RBC QN AUTO: 29.5 PG (ref 26.8–34.3)
MCHC RBC AUTO-ENTMCNC: 30.7 G/DL (ref 31.4–37.4)
MCV RBC AUTO: 96 FL (ref 82–98)
NRBC BLD AUTO-RTO: 0 /100 WBCS
PLATELET # BLD AUTO: 164 THOUSANDS/UL (ref 149–390)
PMV BLD AUTO: 10 FL (ref 8.9–12.7)
POTASSIUM SERPL-SCNC: 4.4 MMOL/L (ref 3.5–5.3)
RBC # BLD AUTO: 3.49 MILLION/UL (ref 3.81–5.12)
SODIUM SERPL-SCNC: 139 MMOL/L (ref 136–145)
WBC # BLD AUTO: 18.46 THOUSAND/UL (ref 4.31–10.16)

## 2022-04-29 PROCEDURE — 80048 BASIC METABOLIC PNL TOTAL CA: CPT | Performed by: INTERNAL MEDICINE

## 2022-04-29 PROCEDURE — 02HV33Z INSERTION OF INFUSION DEVICE INTO SUPERIOR VENA CAVA, PERCUTANEOUS APPROACH: ICD-10-PCS | Performed by: RADIOLOGY

## 2022-04-29 PROCEDURE — C1751 CATH, INF, PER/CENT/MIDLINE: HCPCS

## 2022-04-29 PROCEDURE — 85027 COMPLETE CBC AUTOMATED: CPT | Performed by: INTERNAL MEDICINE

## 2022-04-29 PROCEDURE — 36573 INSJ PICC RS&I 5 YR+: CPT | Performed by: RADIOLOGY

## 2022-04-29 PROCEDURE — 99232 SBSQ HOSP IP/OBS MODERATE 35: CPT | Performed by: INTERNAL MEDICINE

## 2022-04-29 RX ORDER — LIDOCAINE WITH 8.4% SOD BICARB 0.9%(10ML)
SYRINGE (ML) INJECTION CODE/TRAUMA/SEDATION MEDICATION
Status: COMPLETED | OUTPATIENT
Start: 2022-04-29 | End: 2022-04-29

## 2022-04-29 RX ADMIN — ATORVASTATIN CALCIUM 80 MG: 40 TABLET, FILM COATED ORAL at 08:10

## 2022-04-29 RX ADMIN — Medication 3 ML: at 12:33

## 2022-04-29 RX ADMIN — PHENOBARBITAL 64.8 MG: 32.4 TABLET ORAL at 08:11

## 2022-04-29 RX ADMIN — MIDODRINE HYDROCHLORIDE 10 MG: 5 TABLET ORAL at 16:16

## 2022-04-29 RX ADMIN — HEPARIN SODIUM 5000 UNITS: 5000 INJECTION INTRAVENOUS; SUBCUTANEOUS at 13:38

## 2022-04-29 RX ADMIN — Medication 750 MG: at 13:33

## 2022-04-29 RX ADMIN — CEFEPIME HYDROCHLORIDE 1000 MG: 2 INJECTION, POWDER, FOR SOLUTION INTRAVENOUS at 22:42

## 2022-04-29 RX ADMIN — PHENOBARBITAL 64.8 MG: 32.4 TABLET ORAL at 18:14

## 2022-04-29 RX ADMIN — PANTOPRAZOLE SODIUM 20 MG: 20 TABLET, DELAYED RELEASE ORAL at 05:19

## 2022-04-29 RX ADMIN — MIDODRINE HYDROCHLORIDE 10 MG: 5 TABLET ORAL at 11:30

## 2022-04-29 RX ADMIN — HEPARIN SODIUM 5000 UNITS: 5000 INJECTION INTRAVENOUS; SUBCUTANEOUS at 05:19

## 2022-04-29 RX ADMIN — MIDODRINE HYDROCHLORIDE 10 MG: 5 TABLET ORAL at 05:19

## 2022-04-29 RX ADMIN — LEVETIRACETAM 500 MG: 500 TABLET, FILM COATED ORAL at 22:42

## 2022-04-29 RX ADMIN — METOPROLOL TARTRATE 25 MG: 25 TABLET, FILM COATED ORAL at 08:11

## 2022-04-29 RX ADMIN — CEFEPIME HYDROCHLORIDE 1000 MG: 2 INJECTION, POWDER, FOR SOLUTION INTRAVENOUS at 12:55

## 2022-04-29 RX ADMIN — METOPROLOL TARTRATE 25 MG: 25 TABLET, FILM COATED ORAL at 22:42

## 2022-04-29 RX ADMIN — HEPARIN SODIUM 5000 UNITS: 5000 INJECTION INTRAVENOUS; SUBCUTANEOUS at 22:42

## 2022-04-29 RX ADMIN — LEVETIRACETAM 500 MG: 500 TABLET, FILM COATED ORAL at 08:11

## 2022-04-29 NOTE — PROGRESS NOTES
ICU RN was called again to remind if pt needing iv for fluids and antibiotics  Slim made aware of icu not coming to unit yet to put iv in

## 2022-04-29 NOTE — PROGRESS NOTES
Night shift apprised MD; and this nurse apprised day shift MD that even icu was not able to obtain iv access with ultrasound machine  ICU recommended IR consult for picc

## 2022-04-29 NOTE — PROGRESS NOTES
[facility]  Progress Note - Palmira Ott 1940, 80 y o  female MRN: [id number]  Unit/Bed#: -02 Encounter: [id number]  Primary Care Provider: Deborah Lake DO   Date and time admitted to hospital: 4/25/2022  4:53 PM    Lower extremity cellulitis  Assessment & Plan  Noted to have worsening lower extremity redness erythema and tenderness consistent with cellulitis  No significant improvement with ceftriaxone  Leukocytosis worsen  Continue with vancomycin cefepime  Currently without IV access  Patient does have dementia and unable to consent for PICC line consent obtained from daughter  PICC order placed    * Aspiration pneumonia [facility])  Assessment & Plan   Noted to have an aspiration event yesterday  CT scan right lower lobe pneumonia  Suspect aspiration pneumonia versus pneumonitis  Continue vancomycin cefepime for now    EDUARDO (acute kidney injury) ([facility] )  Assessment & Plan    Baseline creatinine approximately 0 9 presented with a creatinine of 2 1  Likely  prerenal secondary to volume depletion  Clinically improved with IVF    Morbid obesity (HCC)  Assessment & Plan  BMI of 41 4    in the setting of HTN, CHF, HLD, and GERD, as evidenced by BMI 41 4, requiring counseling for diet and lifestyle modifications          Hyperlipidemia  Assessment & Plan  Continue with statin    Localization-related epilepsy, intractable ([facility] )  Assessment & Plan  Continue seizure meds    GERD (gastroesophageal reflux disease)  Assessment & Plan  Continue PPI      VTE Pharmacologic Prophylaxis:   Pharmacologic: Heparin  Mechanical VTE Prophylaxis in Place: Yes    Patient Centered Rounds: I have performed bedside rounds with nursing staff today  Discussions with Specialists or Other Care Team Provider: cm, nursing    Education and Discussions with Family / Patient: pt    Time Spent for Care: 30 minutes    More than 50% of total time spent on counseling and coordination of care as described above     Current Length of Stay: 4 day(s)    Current Patient Status: Inpatient   Certification Statement: The patient will continue to require additional inpatient hospital stay due to See below    Discharge Plan:  Still requiring IV antibiotics    Code Status: Level 1 - Full Code      Subjective:   No acute overnight events  Denies chest pain, shortness breast, cough, fevers  Still reports lower extremity pain    Objective:     Vitals:   Temp (24hrs), Av 2 °F (36 8 °C), Min:98 2 °F (36 8 °C), Max:98 2 °F (36 8 °C)    Temp:  [98 2 °F (36 8 °C)] 98 2 °F (36 8 °C)  HR:  [] 92  Resp:  [16-22] 22  BP: (121-124)/(71-79) 121/79  SpO2:  [93 %-100 %] 100 %  Body mass index is 44 62 kg/m²  Input and Output Summary (last 24 hours): Intake/Output Summary (Last 24 hours) at 2022 1142  Last data filed at 2022 0848  Gross per 24 hour   Intake 230 ml   Output 200 ml   Net 30 ml       Physical Exam:     Physical Exam  Constitutional:       General: She is not in acute distress  Appearance: She is well-developed  She is not diaphoretic  HENT:      Head: Normocephalic and atraumatic  Nose: Nose normal       Mouth/Throat:      Pharynx: No oropharyngeal exudate  Eyes:      General: No scleral icterus  Right eye: No discharge  Left eye: No discharge  Conjunctiva/sclera: Conjunctivae normal    Neck:      Thyroid: No thyromegaly  Vascular: No JVD  Cardiovascular:      Rate and Rhythm: Normal rate and regular rhythm  Heart sounds: Normal heart sounds  No murmur heard  No friction rub  No gallop  Pulmonary:      Effort: Pulmonary effort is normal  No respiratory distress  Breath sounds: Normal breath sounds  No wheezing or rales  Chest:      Chest wall: No tenderness  Abdominal:      General: Bowel sounds are normal  There is no distension  Palpations: Abdomen is soft  Tenderness: There is no abdominal tenderness   There is no guarding or rebound  Musculoskeletal:         General: No tenderness or deformity  Normal range of motion  Cervical back: Normal range of motion and neck supple  Skin:     General: Skin is warm and dry  Findings: No erythema or rash  Neurological:      Mental Status: She is alert  Mental status is at baseline  Cranial Nerves: No cranial nerve deficit  Sensory: No sensory deficit  Motor: No abnormal muscle tone  Coordination: Coordination normal          Additional Data:     Labs:    Results from last 7 days   Lab Units 04/29/22  0653 04/28/22  1102 04/28/22  1102   WBC Thousand/uL 18 46*   < > 15 76*   HEMOGLOBIN g/dL 10 3*   < > 11 0*   HEMATOCRIT % 33 6*   < > 35 7   PLATELETS Thousands/uL 164   < > 174   BANDS PCT %  --   --  5   LYMPHO PCT %  --   --  8*   MONO PCT %  --   --  10   EOS PCT %  --   --  0    < > = values in this interval not displayed  Results from last 7 days   Lab Units 04/29/22  0653 04/26/22  0534 04/25/22  1736   SODIUM mmol/L 139   < > 135*   POTASSIUM mmol/L 4 4   < > 5 2   CHLORIDE mmol/L 104   < > 97*   CO2 mmol/L 27   < > 31   BUN mg/dL 24   < > 35*   CREATININE mg/dL 0 85   < > 2 07*   ANION GAP mmol/L 8   < > 7   CALCIUM mg/dL 9 2   < > 9 2   ALBUMIN g/dL  --   --  3 0*   TOTAL BILIRUBIN mg/dL  --   --  0 91   ALK PHOS U/L  --   --  136*   ALT U/L  --   --  45   AST U/L  --   --  40   GLUCOSE RANDOM mg/dL 104   < > 153*    < > = values in this interval not displayed  Results from last 7 days   Lab Units 04/25/22  1736   INR  1 29*         Results from last 7 days   Lab Units 04/26/22  0537   HEMOGLOBIN A1C % 5 6     Results from last 7 days   Lab Units 04/25/22  2152 04/25/22  1736   LACTIC ACID mmol/L 1 3 2 8*   PROCALCITONIN ng/ml  --  6 07*           * I Have Reviewed All Lab Data Listed Above  * Additional Pertinent Lab Tests Reviewed:  All Labs Within Last 24 Hours Reviewed    Imaging:    Imaging Reports Reviewed Today Include: na  Imaging Personally Reviewed by Myself Includes:  na    Recent Cultures (last 7 days):     Results from last 7 days   Lab Units 04/25/22  1736   BLOOD CULTURE  No Growth at 72 hrs  No Growth at 72 hrs  Last 24 Hours Medication List:   Current Facility-Administered Medications   Medication Dose Route Frequency Provider Last Rate    acetaminophen  650 mg Oral Q6H PRN Jens Hill MD      albuterol  2 puff Inhalation Q4H PRN Jens Hill MD      atorvastatin  80 mg Oral Daily Jens Hill MD      cefepime  1,000 mg Intravenous Q12H Jens Hill MD 1,000 mg (04/28/22 1213)    Diclofenac Sodium  2 g Topical 4x Daily PRN Jens Hill MD      heparin (porcine)  5,000 Units Subcutaneous Q8H [address] Jens Hill MD      levETIRAcetam  500 mg Oral Q12H [address] Jens Hill MD      metoprolol tartrate  25 mg Oral Q12H [address] Jens Hill MD      midodrine  10 mg Oral TID AC Jens Hill MD      ondansetron  4 mg Intravenous Q6H PRN Jens Hill MD      oxyCODONE  2 5 mg Oral Q4H PRN Jens Hill MD      oxyCODONE  5 mg Oral Q4H PRN Jens Hill MD      pantoprazole  20 mg Oral Early Morning Jens Hill MD      PHENobarbital  64 8 mg Oral BID Jens Hill MD      sodium chloride (PF)  3 mL Intravenous Q1H PRN Ezequiel Sharma PA-C      sodium chloride  75 mL/hr Intravenous Continuous Jens Hill MD 75 mL/hr (04/28/22 1250)    vancomycin  12 5 mg/kg (Adjusted) Intravenous Q24H Mirian Sommer MD          Today, Patient Was Seen By: Mirian Sommer MD    ** Please Note: Dictation voice to text software may have been used in the creation of this document   **

## 2022-04-29 NOTE — PLAN OF CARE
Md aware redness and blistering noted to BLE  Pt to resume iv abt's once iv access obtained  MD stated he would consult IR for PICC placement

## 2022-04-29 NOTE — PROGRESS NOTES
Iv noted to be infiltrated, [facility] nurse made aware and informed icu to help with iv insertion with ultrasound guidance since pt is very difficult stick  Primary nurse attempted to insert iv to no avail

## 2022-04-29 NOTE — ASSESSMENT & PLAN NOTE
Noted to have worsening lower extremity redness erythema and tenderness consistent with cellulitis  No significant improvement with ceftriaxone  Leukocytosis worsen  Continue with vancomycin cefepime  Currently without IV access  Patient does have dementia and unable to consent for PICC line consent obtained from daughter  PICC order placed

## 2022-04-29 NOTE — ASSESSMENT & PLAN NOTE
Baseline creatinine approximately 0 9 presented with a creatinine of 2 1  Likely  prerenal secondary to volume depletion  Clinically improved with IVF

## 2022-04-29 NOTE — ASSESSMENT & PLAN NOTE
BMI of 41 4    in the setting of HTN, CHF, HLD, and GERD, as evidenced by BMI 41 4, requiring counseling for diet and lifestyle modifications

## 2022-04-30 ENCOUNTER — APPOINTMENT (INPATIENT)
Dept: RADIOLOGY | Facility: HOSPITAL | Age: 82
DRG: 178 | End: 2022-04-30
Payer: MEDICARE

## 2022-04-30 LAB
ANION GAP SERPL CALCULATED.3IONS-SCNC: 5 MMOL/L (ref 4–13)
BACTERIA BLD CULT: NORMAL
BACTERIA BLD CULT: NORMAL
BUN SERPL-MCNC: 15 MG/DL (ref 5–25)
CALCIUM SERPL-MCNC: 8.6 MG/DL (ref 8.3–10.1)
CHLORIDE SERPL-SCNC: 104 MMOL/L (ref 100–108)
CO2 SERPL-SCNC: 30 MMOL/L (ref 21–32)
CREAT SERPL-MCNC: 0.71 MG/DL (ref 0.6–1.3)
ERYTHROCYTE [DISTWIDTH] IN BLOOD BY AUTOMATED COUNT: 15.6 % (ref 11.6–15.1)
GFR SERPL CREATININE-BSD FRML MDRD: 80 ML/MIN/1.73SQ M
GLUCOSE SERPL-MCNC: 105 MG/DL (ref 65–140)
HCT VFR BLD AUTO: 32 % (ref 34.8–46.1)
HGB BLD-MCNC: 9.8 G/DL (ref 11.5–15.4)
MCH RBC QN AUTO: 29.7 PG (ref 26.8–34.3)
MCHC RBC AUTO-ENTMCNC: 30.6 G/DL (ref 31.4–37.4)
MCV RBC AUTO: 97 FL (ref 82–98)
NRBC BLD AUTO-RTO: 0 /100 WBCS
PLATELET # BLD AUTO: 172 THOUSANDS/UL (ref 149–390)
PMV BLD AUTO: 9.8 FL (ref 8.9–12.7)
POTASSIUM SERPL-SCNC: 4 MMOL/L (ref 3.5–5.3)
RBC # BLD AUTO: 3.3 MILLION/UL (ref 3.81–5.12)
SODIUM SERPL-SCNC: 139 MMOL/L (ref 136–145)
WBC # BLD AUTO: 19.26 THOUSAND/UL (ref 4.31–10.16)

## 2022-04-30 PROCEDURE — 85027 COMPLETE CBC AUTOMATED: CPT | Performed by: INTERNAL MEDICINE

## 2022-04-30 PROCEDURE — 71046 X-RAY EXAM CHEST 2 VIEWS: CPT

## 2022-04-30 PROCEDURE — 80048 BASIC METABOLIC PNL TOTAL CA: CPT | Performed by: INTERNAL MEDICINE

## 2022-04-30 PROCEDURE — 99223 1ST HOSP IP/OBS HIGH 75: CPT | Performed by: INTERNAL MEDICINE

## 2022-04-30 PROCEDURE — 99233 SBSQ HOSP IP/OBS HIGH 50: CPT | Performed by: HOSPITALIST

## 2022-04-30 RX ADMIN — METOPROLOL TARTRATE 25 MG: 25 TABLET, FILM COATED ORAL at 22:09

## 2022-04-30 RX ADMIN — SODIUM CHLORIDE 75 ML/HR: 0.9 INJECTION, SOLUTION INTRAVENOUS at 00:57

## 2022-04-30 RX ADMIN — PHENOBARBITAL 64.8 MG: 32.4 TABLET ORAL at 15:29

## 2022-04-30 RX ADMIN — LEVETIRACETAM 500 MG: 500 TABLET, FILM COATED ORAL at 22:09

## 2022-04-30 RX ADMIN — PANTOPRAZOLE SODIUM 20 MG: 20 TABLET, DELAYED RELEASE ORAL at 06:35

## 2022-04-30 RX ADMIN — PHENOBARBITAL 64.8 MG: 32.4 TABLET ORAL at 17:20

## 2022-04-30 RX ADMIN — HEPARIN SODIUM 5000 UNITS: 5000 INJECTION INTRAVENOUS; SUBCUTANEOUS at 22:09

## 2022-04-30 RX ADMIN — HEPARIN SODIUM 5000 UNITS: 5000 INJECTION INTRAVENOUS; SUBCUTANEOUS at 14:22

## 2022-04-30 RX ADMIN — CEFEPIME HYDROCHLORIDE 1000 MG: 2 INJECTION, POWDER, FOR SOLUTION INTRAVENOUS at 11:44

## 2022-04-30 RX ADMIN — SODIUM CHLORIDE 75 ML/HR: 0.9 INJECTION, SOLUTION INTRAVENOUS at 14:27

## 2022-04-30 RX ADMIN — MIDODRINE HYDROCHLORIDE 10 MG: 5 TABLET ORAL at 15:30

## 2022-04-30 RX ADMIN — HEPARIN SODIUM 5000 UNITS: 5000 INJECTION INTRAVENOUS; SUBCUTANEOUS at 06:35

## 2022-04-30 RX ADMIN — MIDODRINE HYDROCHLORIDE 10 MG: 5 TABLET ORAL at 06:35

## 2022-04-30 RX ADMIN — Medication 750 MG: at 12:32

## 2022-04-30 NOTE — PROGRESS NOTES
MD apprised pt condition appears worse today  Legs are more edematous and weeping more  Pt lethargic    Unable to get pt to take any am meds and pt refused breakfast

## 2022-04-30 NOTE — ASSESSMENT & PLAN NOTE
Noted to have worsening lower extremity redness erythema and tenderness consistent with cellulitis  No significant improvement with ceftriaxone  Leukocytosis worsen  Continue with vancomycin cefepime  Patient does have dementia and unable to consent for PICC line consent obtained from daughter  PICC in place  Consult ID

## 2022-04-30 NOTE — PLAN OF CARE
Pt condition not improving as she has been very lethargic all day refusing food and refusing all po meds

## 2022-04-30 NOTE — ASSESSMENT & PLAN NOTE
Noted to have an aspiration event yesterday  CT scan right lower lobe pneumonia  Suspect aspiration pneumonia versus pneumonitis  Continue vancomycin cefepime for now  Repeat CXr

## 2022-04-30 NOTE — PROGRESS NOTES
[facility]  Progress Note - Lona Montilla 1940, 80 y o  female MRN: [id number]  Unit/Bed#: -02 Encounter: [id number]  Primary Care Provider: Kerrie Jung DO   Date and time admitted to hospital: 4/25/2022  4:53 PM    Lower extremity cellulitis  Assessment & Plan  Noted to have worsening lower extremity redness erythema and tenderness consistent with cellulitis  No significant improvement with ceftriaxone  Leukocytosis worsen  Continue with vancomycin cefepime  Patient does have dementia and unable to consent for PICC line consent obtained from daughter  PICC in place  Consult ID    * Aspiration pneumonia [facility])  Assessment & Plan   Noted to have an aspiration event yesterday  CT scan right lower lobe pneumonia  Suspect aspiration pneumonia versus pneumonitis  Continue vancomycin cefepime for now  Repeat CXr    EDUARDO (acute kidney injury) ([facility] )  Assessment & Plan    Baseline creatinine approximately 0 9 presented with a creatinine of 2 1  Likely  prerenal secondary to volume depletion  Clinically improved with IVF    Intractable nausea and vomiting  Assessment & Plan  Likely secondary viral gastroenteritis  Now tolerating p o  diet    Morbid obesity ([facility] )  Assessment & Plan  BMI of 41 4    in the setting of HTN, CHF, HLD, and GERD, as evidenced by BMI 41 4, requiring counseling for diet and lifestyle modifications          Hyperlipidemia  Assessment & Plan  Continue with statin    Localization-related epilepsy, intractable ([facility] )  Assessment & Plan  Continue seizure meds    GERD (gastroesophageal reflux disease)  Assessment & Plan  Continue PPI      VTE Pharmacologic Prophylaxis: VTE Score: 6 High Risk (Score >/= 5) - Pharmacological DVT Prophylaxis Ordered: heparin  Sequential Compression Devices Ordered  Patient Centered Rounds: I performed bedside rounds with nursing staff today        Education and Discussions with Family / Patient: Updated  (daughter) via phone     Time Spent for Care: 30 minutes  More than 50% of total time spent on counseling and coordination of care as described above  Current Length of Stay: 5 day(s)  Current Patient Status: Inpatient   Certification Statement: The patient will continue to require additional inpatient hospital stay due to worsening overall condition  Discharge Plan: Anticipate discharge in >72 hrs to discharge location to be determined pending rehab evaluations  Code Status: Level 1 - Full Code    Subjective:   Pt lethargic today, but awakes to sternal rub    Objective:     Vitals:   No data recorded  HR:  [] 97  Resp:  [20-22] 22  BP: (119-152)/(61-97) 127/97  SpO2:  [91 %-100 %] 91 %  Body mass index is 44 44 kg/m²  Input and Output Summary (last 24 hours): Intake/Output Summary (Last 24 hours) at 4/30/2022 9698  Last data filed at 4/30/2022 0900  Gross per 24 hour   Intake 0 ml   Output --   Net 0 ml       Physical Exam:   Physical Exam  Constitutional:       Appearance: Normal appearance  HENT:      Head: Normocephalic and atraumatic  Nose: Nose normal       Mouth/Throat:      Mouth: Mucous membranes are moist       Pharynx: Oropharynx is clear  Eyes:      Extraocular Movements: Extraocular movements intact  Conjunctiva/sclera: Conjunctivae normal    Cardiovascular:      Rate and Rhythm: Normal rate and regular rhythm  Heart sounds: Normal heart sounds  Pulmonary:      Effort: Pulmonary effort is normal       Breath sounds: Normal breath sounds  Abdominal:      General: Bowel sounds are normal       Palpations: Abdomen is soft  Skin:     General: Skin is warm  Capillary Refill: Capillary refill takes less than 2 seconds  Findings: Erythema present  Neurological:      General: No focal deficit present  Mental Status: She is disoriented            Additional Data:     Labs:  Results from last 7 days   Lab Units 04/30/22  0520 04/29/22  0653 04/28/22  1102   WBC Thousand/uL 19 26*   < > 15 76*   HEMOGLOBIN g/dL 9 8*   < > 11 0*   HEMATOCRIT % 32 0*   < > 35 7   PLATELETS Thousands/uL 172   < > 174   BANDS PCT %  --   --  5   LYMPHO PCT %  --   --  8*   MONO PCT %  --   --  10   EOS PCT %  --   --  0    < > = values in this interval not displayed  Results from last 7 days   Lab Units 04/30/22  0520 04/26/22  0534 04/25/22  1736   SODIUM mmol/L 139   < > 135*   POTASSIUM mmol/L 4 0   < > 5 2   CHLORIDE mmol/L 104   < > 97*   CO2 mmol/L 30   < > 31   BUN mg/dL 15   < > 35*   CREATININE mg/dL 0 71   < > 2 07*   ANION GAP mmol/L 5   < > 7   CALCIUM mg/dL 8 6   < > 9 2   ALBUMIN g/dL  --   --  3 0*   TOTAL BILIRUBIN mg/dL  --   --  0 91   ALK PHOS U/L  --   --  136*   ALT U/L  --   --  45   AST U/L  --   --  40   GLUCOSE RANDOM mg/dL 105   < > 153*    < > = values in this interval not displayed  Results from last 7 days   Lab Units 04/25/22  1736   INR  1 29*         Results from last 7 days   Lab Units 04/26/22  0537   HEMOGLOBIN A1C % 5 6     Results from last 7 days   Lab Units 04/25/22  2152 04/25/22  1736   LACTIC ACID mmol/L 1 3 2 8*   PROCALCITONIN ng/ml  --  6 07*       Lines/Drains:  Invasive Devices  Report    Peripherally Inserted Central Catheter Line            PICC Line 04/29/22 Right Brachial <1 day                           Imaging: No pertinent imaging reviewed  Recent Cultures (last 7 days):   Results from last 7 days   Lab Units 04/25/22  1736   BLOOD CULTURE  No Growth After 4 Days  No Growth After 4 Days         Last 24 Hours Medication List:   Current Facility-Administered Medications   Medication Dose Route Frequency Provider Last Rate    acetaminophen  650 mg Oral Q6H PRN Jens Hill MD      albuterol  2 puff Inhalation Q4H PRN Jens Hill MD      atorvastatin  80 mg Oral Daily Jens Hill MD      cefepime  1,000 mg Intravenous Q12H Jens Hill MD 1,000 mg (04/29/22 2242)    Diclofenac Sodium  2 g Topical 4x Daily PRN Jens Hill, MD      heparin (porcine)  5,000 Units Subcutaneous Q8H [facility] Jens Hill MD      levETIRAcetam  500 mg Oral Q12H [facility] Jens Hill MD      metoprolol tartrate  25 mg Oral Q12H [facility] Jens Hill MD      midodrine  10 mg Oral TID AC Jens Hill MD      ondansetron  4 mg Intravenous Q6H PRN Jens Hill MD      oxyCODONE  2 5 mg Oral Q4H PRN Jens Hill MD      oxyCODONE  5 mg Oral Q4H PRN Jens Hill MD      pantoprazole  20 mg Oral Early Morning Jens Hill MD      PHENobarbital  64 8 mg Oral BID Jens Hill MD      sodium chloride (PF)  3 mL Intravenous Q1H PRN Anish Marquez PA-C      sodium chloride  75 mL/hr Intravenous Continuous Jens Hill MD 75 mL/hr (04/30/22 0057)    vancomycin  12 5 mg/kg (Adjusted) Intravenous Q24H Ed Barrera MD          Today, Patient Was Seen By: Molly Byers MD    **Please Note: This note may have been constructed using a voice recognition system  **

## 2022-04-30 NOTE — CONSULTS
Consultation - Infectious Disease   [facility] 80 [facility]  [facility] MRN: [id number]  Unit/Bed#: -05 Encounter: [id number]    Extensive review of the medical records in Epic including review of the notes, radiographs, and laboratory results  IMPRESSION/RECOMMENDATIONS:   1  Lower extremity cellulitis (bilateral):  Bilateral lower extremity erythema noted in a patient with chronic venous stasis dermatitis and hyperpigmentation  Unclear why patient had persistent erythema and leukocytosis despite broad-spectrum antibiotic therapy  She remains afebrile and hemodynamically stable  Possible skin source of infection is pressure ulcers of heels  Blood cultures:  No growth to date  Lactate is WNL  o Serial exams  o Bilateral lower extremity elevation  o Continue local wound care  o Continue vancomycin IV, dosing per pharmacy protocol for target trough of 15-20   o Continue cefepime IV but increase dose to 2g iv q12 hours  o Repeat CBC in am   o Monitor clinical response, temperature curve  2  Right lower lobe aspiration pneumonitis:  Leukocytosis is noted  Patient's respiratory status and oxygen requirements appear to be currently stable  Blood cultures have been no growth to date  o Continue current antibiotic regimen as stated above  o Continue supplemental oxygen as per primary team  o Monitor respiratory status, oxygen saturation  3  EDUARDO (resolved):  Likely prerenal due to volume depletion as patient had presented with intractable nausea and vomiting  Creatinine has improved and is at baseline  o Renal dose medications, avoid nephrotoxins  4  Morbid obesity with BMI of 41  o This is a risk factor for severe infection  5  History of penicillin allergy:  Unknown reaction  Patient has tolerated ceftriaxone and cefepime during this admission  My recommendations were briefly discussed with the patient  My recommendations were discussed with Dr Jared Bryan, from the primary service via secure text    Thank you for allowing me to participate in the care of this patient  The ID service will follow  HISTORY OF PRESENT ILLNESS:  Reason for Consult: Cellulitis LLE, aspiration pneumonia  CC: vomiting  HPI: Tiara Johnson is a 80y o  year old female with a PMH of seizure disorder, hypertension who was admitted on April 25th for evaluation of nausea and vomiting  Her symptoms have began few days a couple days prior to admission  She also reports decreased appetite and felt feverish the day before admission  Initial CT demonstrated findings of right lower lobe pneumonia  Patient was treated with ceftriaxone IV empirically for aspiration pneumonia  On 04/28 patient was reported to have redness, erythema and tenderness of her left leg consistent with cellulitis despite treatment with ceftriaxone IV  Her antibiotic therapy was broadened to vancomycin and cefepime IV empirically on 04/28  Yesterday, patient was without IV access and PICC line was ordered  Patient says she feels cold  She says her nausea and vomiting have improved  She denies abdominal pain or leg pain  She appears to be tolerating current antibiotic therapy  REVIEW OF SYSTEMS:  Constitutional: +fevers, chills  Eyes: Negative for change in vision  Respiratory: Negative cough, shortness of breath  Cardiovascular: Negative for chest pain, palpitations  Gastrointestinal: +nausea, vomiting, diarrhea  Negative for abdominal pain  Skin: +heel pressure ulcers  Negative for rash  Neurological: Negative for syncope  Hematological: Negative for excessive bleeding  Psychiatric/Behavioral: Negative for hallucination        PAST MEDICAL HISTORY:  Past Medical History:   Diagnosis Date    CHF [facility]congestive heart failure) ([facility] [address] )     Liver cirrhosis (HCC)     Seizures (HCC)      Past Surgical History:   Procedure Laterality Date    HYSTERECTOMY      IR PICC PLACEMENT SINGLE LUMEN  4/29/2022    LEG SURGERY  05/2016    Pt family cannot recall the specific surgery or which leg it was preformed on  FAMILY HISTORY:  History reviewed  No pertinent family history  SOCIAL HISTORY:  Social History   Social History     Substance and Sexual Activity   Alcohol Use Never     Social History     Substance and Sexual Activity   Drug Use No     Social History     Tobacco Use   Smoking Status Never Smoker   Smokeless Tobacco Never Used     ALLERGIES:  Allergies   Allergen Reactions    Penicillins Other (See Comments)     Pt unaware of reaction     MEDICATIONS:  All current active medications have been reviewed    Antibiotics:  Vancomycin, cefepime since 04/28, systemic antibiotic therapy since 4/25  Scheduled Meds:  Current Facility-Administered Medications   Medication Dose Route Frequency Provider Last Rate    acetaminophen  650 mg Oral Q6H PRN Jens Hill MD      albuterol  2 puff Inhalation Q4H PRN Jens Hill MD      atorvastatin  80 mg Oral Daily Jens Hill MD      cefepime  1,000 mg Intravenous Q12H Jens Hill MD 1,000 mg (04/30/22 1144)    Diclofenac Sodium  2 g Topical 4x Daily PRN Jens Hill MD      heparin (porcine)  5,000 Units Subcutaneous Q8H [facility] Jens Hill MD      levETIRAcetam  500 mg Oral Q12H [facility] Jens Hill MD      metoprolol tartrate  25 mg Oral Q12H [facility] Jens iHll MD      midodrine  10 mg Oral TID AC Jens Hill MD      ondansetron  4 mg Intravenous Q6H PRN Jens Hill MD      oxyCODONE  2 5 mg Oral Q4H PRN Jens Hill MD      oxyCODONE  5 mg Oral Q4H PRN Jens Hill MD      pantoprazole  20 mg Oral Early Morning Jens Hill MD      PHENobarbital  64 8 mg Oral BID Jens Hill MD      sodium chloride (PF)  3 mL Intravenous Q1H PRN Carolyne Davis PA-C      sodium chloride  75 mL/hr Intravenous Continuous Jens Hill MD 75 mL/hr (04/30/22 1427)    vancomycin  12 5 mg/kg (Adjusted) Intravenous Q24H Jens Hill MD       Continuous Infusions:sodium chloride, 75 mL/hr, Last Rate: 75 mL/hr (22 1427)      PRN Meds:   acetaminophen    albuterol    Diclofenac Sodium    ondansetron    oxyCODONE    oxyCODONE    sodium chloride (PF)     PHYSICAL EXAM:  Temp:  [98 4 °F (36 9 °C)] 98 4 °F (36 9 °C)  HR:  [] 74  Resp:  [19-22] 19  BP: (119-146)/(61-99) 146/99  SpO2:  [91 %-100 %] 100 %  Temp (24hrs), Av 4 °F (36 9 °C), Min:98 4 °F (36 9 °C), Max:98 4 °F (36 9 °C)  Current: Temperature: 98 4 °F (36 9 °C)    Intake/Output Summary (Last 24 hours) at 2022 1644  Last data filed at 2022 0900  Gross per 24 hour   Intake 0 ml   Output --   Net 0 ml     General Appearance:  Elderly woman, resting in bed, appears stated age, no acute distress   Head:  Normocephalic, without obvious abnormality, atraumatic   Eyes:  EOMI, Conjunctiva pink and sclera anicteric, both eyes   Nose: O2 4L via NC intact, nares normal, mucosa normal, no drainage   Throat: Oropharynx moist, pt appears to be edentulous   Neck: Supple   Lungs:  Clear to auscultation bilaterally, respirations unlabored   Heart:   S1, S2, regular rate,rhythm, 2/6 systolic murmur left sternal border   Abdomen: Rounded, soft, non-tender, non-distended   :  No Reyn[place] catheter present   Extremities:   RUE PICC line intact  Bilateral lower extremity lymphedema   Skin: Erythema of bilateral lower extremity with chronic venous stasis changes, hyperpigmentation with blister formation noted  Heel pressure ulcers noted in media section, which are covered with protective dressing  Neurologic: Alert and oriented to person, slow speech, responds to some questions appropriately but not consistently   Psychiatric: Calm, cooperative with exam     LABS, IMAGING, & OTHER STUDIES:  Lab Results:  I have personally reviewed pertinent labs and cultures    Results from last 7 days   Lab Units 22  0520 22  0653 22  1102   WBC Thousand/uL 19 26* 18 46* 15 76*   HEMOGLOBIN g/dL 9 8* 10 3* 11 0*   PLATELETS Thousands/uL 172 164 174 Results from last 7 days   Lab Units 04/30/22  0520 04/29/22  0653 04/29/22  0653 04/28/22  1102 04/28/22  1102 04/26/22  0534 04/25/22  1736   SODIUM mmol/L 139  --  139  --  134*   < > 135*   POTASSIUM mmol/L 4 0   < > 4 4   < > 4 4   < > 5 2   CHLORIDE mmol/L 104   < > 104   < > 102   < > 97*   CO2 mmol/L 30   < > 27   < > 27   < > 31   BUN mg/dL 15   < > 24   < > 36*   < > 35*   CREATININE mg/dL 0 71   < > 0 85   < > 1 28   < > 2 07*   EGFR ml/min/1 73sq m 80   < > 64   < > 39   < > 21   CALCIUM mg/dL 8 6   < > 9 2   < > 9 1   < > 9 2   AST U/L  --   --   --   --   --   --  40   ALT U/L  --   --   --   --   --   --  45   ALK PHOS U/L  --   --   --   --   --   --  136*    < > = values in this interval not displayed  Results from last 7 days   Lab Units 04/25/22  1736   BLOOD CULTURE  No Growth After 4 Days  No Growth After 4 Days  Results from last 7 days   Lab Units 04/25/22  1736   PROCALCITONIN ng/ml 6 07*     Imaging Studies:   4/25 CT chest: Patchy groundglass opacity in the medial right lower lobe concerning for aspiration pneumonia/pneumonitis  I have personally reviewed pertinent imaging study reports and images in PACS

## 2022-05-01 LAB
ALBUMIN SERPL BCP-MCNC: 1.8 G/DL (ref 3.5–5)
ALP SERPL-CCNC: 107 U/L (ref 46–116)
ALT SERPL W P-5'-P-CCNC: 16 U/L (ref 12–78)
AMMONIA PLAS-SCNC: 19 UMOL/L (ref 11–35)
ANION GAP SERPL CALCULATED.3IONS-SCNC: 6 MMOL/L (ref 4–13)
AST SERPL W P-5'-P-CCNC: 24 U/L (ref 5–45)
BILIRUB SERPL-MCNC: 0.32 MG/DL (ref 0.2–1)
BUN SERPL-MCNC: 11 MG/DL (ref 5–25)
CALCIUM ALBUM COR SERPL-MCNC: 10.4 MG/DL (ref 8.3–10.1)
CALCIUM SERPL-MCNC: 8.6 MG/DL (ref 8.3–10.1)
CHLORIDE SERPL-SCNC: 105 MMOL/L (ref 100–108)
CO2 SERPL-SCNC: 29 MMOL/L (ref 21–32)
CREAT SERPL-MCNC: 0.64 MG/DL (ref 0.6–1.3)
ERYTHROCYTE [DISTWIDTH] IN BLOOD BY AUTOMATED COUNT: 15.6 % (ref 11.6–15.1)
GFR SERPL CREATININE-BSD FRML MDRD: 83 ML/MIN/1.73SQ M
GLUCOSE SERPL-MCNC: 97 MG/DL (ref 65–140)
HCT VFR BLD AUTO: 30.9 % (ref 34.8–46.1)
HGB BLD-MCNC: 9.4 G/DL (ref 11.5–15.4)
LACTATE SERPL-SCNC: 0.6 MMOL/L (ref 0.5–2)
MCH RBC QN AUTO: 29.7 PG (ref 26.8–34.3)
MCHC RBC AUTO-ENTMCNC: 30.4 G/DL (ref 31.4–37.4)
MCV RBC AUTO: 98 FL (ref 82–98)
PLATELET # BLD AUTO: 178 THOUSANDS/UL (ref 149–390)
PMV BLD AUTO: 10 FL (ref 8.9–12.7)
POTASSIUM SERPL-SCNC: 3.8 MMOL/L (ref 3.5–5.3)
PROT SERPL-MCNC: 5.9 G/DL (ref 6.4–8.2)
RBC # BLD AUTO: 3.17 MILLION/UL (ref 3.81–5.12)
SODIUM SERPL-SCNC: 140 MMOL/L (ref 136–145)
WBC # BLD AUTO: 19.09 THOUSAND/UL (ref 4.31–10.16)

## 2022-05-01 PROCEDURE — 80053 COMPREHEN METABOLIC PANEL: CPT | Performed by: HOSPITALIST

## 2022-05-01 PROCEDURE — 99233 SBSQ HOSP IP/OBS HIGH 50: CPT | Performed by: HOSPITALIST

## 2022-05-01 PROCEDURE — 99232 SBSQ HOSP IP/OBS MODERATE 35: CPT | Performed by: INTERNAL MEDICINE

## 2022-05-01 PROCEDURE — 85027 COMPLETE CBC AUTOMATED: CPT | Performed by: HOSPITALIST

## 2022-05-01 PROCEDURE — 83605 ASSAY OF LACTIC ACID: CPT | Performed by: HOSPITALIST

## 2022-05-01 PROCEDURE — 82140 ASSAY OF AMMONIA: CPT | Performed by: HOSPITALIST

## 2022-05-01 RX ORDER — PHENOBARBITAL 32.4 MG/1
32.4 TABLET ORAL 2 TIMES DAILY
Status: DISCONTINUED | OUTPATIENT
Start: 2022-05-01 | End: 2022-05-02 | Stop reason: HOSPADM

## 2022-05-01 RX ADMIN — CEFEPIME HYDROCHLORIDE 2000 MG: 2 INJECTION, POWDER, FOR SOLUTION INTRAVENOUS at 01:01

## 2022-05-01 RX ADMIN — SODIUM CHLORIDE 75 ML/HR: 0.9 INJECTION, SOLUTION INTRAVENOUS at 06:56

## 2022-05-01 RX ADMIN — MIDODRINE HYDROCHLORIDE 10 MG: 5 TABLET ORAL at 06:23

## 2022-05-01 RX ADMIN — LEVETIRACETAM 500 MG: 500 TABLET, FILM COATED ORAL at 21:40

## 2022-05-01 RX ADMIN — PANTOPRAZOLE SODIUM 20 MG: 20 TABLET, DELAYED RELEASE ORAL at 06:23

## 2022-05-01 RX ADMIN — HEPARIN SODIUM 5000 UNITS: 5000 INJECTION INTRAVENOUS; SUBCUTANEOUS at 21:40

## 2022-05-01 RX ADMIN — VANCOMYCIN HYDROCHLORIDE 750 MG: 1 INJECTION, POWDER, LYOPHILIZED, FOR SOLUTION INTRAVENOUS at 01:32

## 2022-05-01 RX ADMIN — VANCOMYCIN HYDROCHLORIDE 750 MG: 1 INJECTION, POWDER, LYOPHILIZED, FOR SOLUTION INTRAVENOUS at 13:25

## 2022-05-01 RX ADMIN — CEFEPIME HYDROCHLORIDE 2000 MG: 2 INJECTION, POWDER, FOR SOLUTION INTRAVENOUS at 12:29

## 2022-05-01 RX ADMIN — METOPROLOL TARTRATE 25 MG: 25 TABLET, FILM COATED ORAL at 21:40

## 2022-05-01 RX ADMIN — HEPARIN SODIUM 5000 UNITS: 5000 INJECTION INTRAVENOUS; SUBCUTANEOUS at 06:23

## 2022-05-01 NOTE — NURSING NOTE
MD is aware that patient has been lethargic all day and has not been able to cooperate and take PO meds

## 2022-05-01 NOTE — PROGRESS NOTES
[address] [facility]  Progress Note - Chantelle Yañez 1940, 80 y o  female MRN: [id number]  Unit/Bed#: -02 Encounter: [id number]  Primary Care Provider: Ramon Sanchez DO   Date and time admitted to hospital: 4/25/2022  4:53 PM    Lower extremity cellulitis  Assessment & Plan  Noted to have worsening lower extremity redness erythema and tenderness consistent with cellulitis  No significant improvement with ceftriaxone  Leukocytosis worsen  Continue with vancomycin cefepime  Patient does have dementia and unable to consent for PICC line consent obtained from daughter  PICC in place  ID following    * Aspiration pneumonia [facility])  Assessment & Plan   Noted to have an aspiration event yesterday  CT scan right lower lobe pneumonia  Suspect aspiration pneumonia versus pneumonitis  Continue vancomycin cefepime for now  Repeat CXr    EDUARDO (acute kidney injury) ([facility] [address] )  Assessment & Plan    Baseline creatinine approximately 0 9 presented with a creatinine of 2 1  Likely  prerenal secondary to volume depletion  Clinically improved with IVF    Intractable nausea and vomiting  Assessment & Plan  Likely secondary viral gastroenteritis  Now tolerating p o  diet    Morbid obesity ([facility] [address] )  Assessment & Plan  BMI of 41 4    in the setting of HTN, CHF, HLD, and GERD, as evidenced by BMI 41 4, requiring counseling for diet and lifestyle modifications          Hyperlipidemia  Assessment & Plan  Continue with statin    Localization-related epilepsy, intractable ([facility] [address] )  Assessment & Plan  Continue seizure meds    GERD (gastroesophageal reflux disease)  Assessment & Plan  Continue PPI      VTE Pharmacologic Prophylaxis: VTE Score: 6 High Risk (Score >/= 5) - Pharmacological DVT Prophylaxis Ordered: heparin  Sequential Compression Devices Ordered  Patient Centered Rounds: I performed bedside rounds with nursing staff today        Education and Discussions with Family / Patient: Updated  (daughter) via phone     Time Spent for Care: 30 minutes  More than 50% of total time spent on counseling and coordination of care as described above  Current Length of Stay: 6 day(s)  Current Patient Status: Inpatient   Certification Statement: The patient will continue to require additional inpatient hospital stay due to worsening overall condition  Discharge Plan: Anticipate discharge in >72 hrs to discharge location to be determined pending rehab evaluations  Code Status: Level 1 - Full Code    Subjective:   Pt more alert today, but still very lethargic    Objective:     Vitals:   Temp (24hrs), Av 4 °F (36 9 °C), Min:98 3 °F (36 8 °C), Max:98 4 °F (36 9 °C)    Temp:  [98 3 °F (36 8 °C)-98 4 °F (36 9 °C)] 98 3 °F (36 8 °C)  HR:  [] 101  Resp:  [16-19] 16  BP: (122-146)/(77-99) 138/79  SpO2:  [97 %-100 %] 97 %  Body mass index is 45 33 kg/m²  Input and Output Summary (last 24 hours): Intake/Output Summary (Last 24 hours) at 2022 1346  Last data filed at 2022 6032  Gross per 24 hour   Intake 2190 ml   Output --   Net 2190 ml       Physical Exam:   Physical Exam  Constitutional:       Appearance: Normal appearance  She is ill-appearing  HENT:      Head: Normocephalic and atraumatic  Nose: Nose normal       Mouth/Throat:      Mouth: Mucous membranes are moist       Pharynx: Oropharynx is clear  Eyes:      Extraocular Movements: Extraocular movements intact  Conjunctiva/sclera: Conjunctivae normal    Cardiovascular:      Rate and Rhythm: Normal rate and regular rhythm  Heart sounds: Normal heart sounds  Pulmonary:      Effort: Pulmonary effort is normal       Breath sounds: Normal breath sounds  Abdominal:      General: Bowel sounds are normal       Palpations: Abdomen is soft  Skin:     General: Skin is warm  Capillary Refill: Capillary refill takes less than 2 seconds  Findings: Erythema present  Neurological:      General: No focal deficit present        Mental Status: She is disoriented  Additional Data:     Labs:  Results from last 7 days   Lab Units 05/01/22  0602 04/29/22  0653 04/28/22  1102   WBC Thousand/uL 19 09*   < > 15 76*   HEMOGLOBIN g/dL 9 4*   < > 11 0*   HEMATOCRIT % 30 9*   < > 35 7   PLATELETS Thousands/uL 178   < > 174   BANDS PCT %  --   --  5   LYMPHO PCT %  --   --  8*   MONO PCT %  --   --  10   EOS PCT %  --   --  0    < > = values in this interval not displayed  Results from last 7 days   Lab Units 05/01/22  0602   SODIUM mmol/L 140   POTASSIUM mmol/L 3 8   CHLORIDE mmol/L 105   CO2 mmol/L 29   BUN mg/dL 11   CREATININE mg/dL 0 64   ANION GAP mmol/L 6   CALCIUM mg/dL 8 6   ALBUMIN g/dL 1 8*   TOTAL BILIRUBIN mg/dL 0 32   ALK PHOS U/L 107   ALT U/L 16   AST U/L 24   GLUCOSE RANDOM mg/dL 97     Results from last 7 days   Lab Units 04/25/22  1736   INR  1 29*         Results from last 7 days   Lab Units 04/26/22  0537   HEMOGLOBIN A1C % 5 6     Results from last 7 days   Lab Units 05/01/22  0908 04/25/22  2152 04/25/22  1736   LACTIC ACID mmol/L 0 6 1 3 2 8*   PROCALCITONIN ng/ml  --   --  6 07*       Lines/Drains:  Invasive Devices  Report    Peripherally Inserted Central Catheter Line            PICC Line 04/29/22 Right Brachial 2 days                           Imaging: No pertinent imaging reviewed  Recent Cultures (last 7 days):   Results from last 7 days   Lab Units 04/25/22  1736   BLOOD CULTURE  No Growth After 5 Days  No Growth After 5 Days         Last 24 Hours Medication List:   Current Facility-Administered Medications   Medication Dose Route Frequency Provider Last Rate    acetaminophen  650 mg Oral Q6H PRN Jens Hill MD      albuterol  2 puff Inhalation Q4H PRN Jens Hill MD      atorvastatin  80 mg Oral Daily Jens Hill MD      cefepime  2,000 mg Intravenous Q12H Pina Elmore DO 2,000 mg (05/01/22 1229)    Diclofenac Sodium  2 g Topical 4x Daily PRN Jens Hill MD      heparin (porcine)  5,000 Units Subcutaneous Q8H [address] Jens Hill MD      levETIRAcetam  500 mg Oral Q12H [address] Jens Hill MD      metoprolol tartrate  25 mg Oral Q12H [address] Jens Hill MD      midodrine  10 mg Oral TID AC Jens Hill MD      ondansetron  4 mg Intravenous Q6H PRN Jens Hill MD      oxyCODONE  2 5 mg Oral Q4H PRN Jens Hill MD      oxyCODONE  5 mg Oral Q4H PRN Jens Hill MD      pantoprazole  20 mg Oral Early Morning Jens Hill MD      PHENobarbital  64 8 mg Oral BID Jens Hill MD      sodium chloride (PF)  3 mL Intravenous Q1H PRN Ana Mchugh PA-C      sodium chloride  75 mL/hr Intravenous Continuous Jens Hill MD 75 mL/hr (05/01/22 0728)    vancomycin  12 5 mg/kg (Adjusted) Intravenous Q12H Melissa Panda MD          Today, Patient Was Seen By: Eunice Burleson MD    **Please Note: This note may have been constructed using a voice recognition system  **

## 2022-05-01 NOTE — ASSESSMENT & PLAN NOTE
Noted to have worsening lower extremity redness erythema and tenderness consistent with cellulitis  Family is requesting transfer to higher level of care  No significant improvement with ceftriaxone  Leukocytosis 16 91  Continue with vancomycin cefepime  Patient does have dementia and unable to consent for PICC line consent obtained from daughter  PICC in place  Consult dermatology (?biopsy)  ID following  CT of legs ordered

## 2022-05-01 NOTE — PROGRESS NOTES
Progress Note - Infectious Disease   Olivia Rutherford 80 y o  female MRN: [id number]  Unit/Bed#: -02 Encounter: [id number]    Impression/Recommendations:  1  Lower extremity cellulitis (bilateral):  Bilateral lower extremity erythema noted in a patient with chronic venous stasis dermatitis and hyperpigmentation  Persistent erythema and leukocytosis are noted despite broad-spectrum antibiotic therapy  Leukocytosis appears to have peaked  She remains afebrile and hemodynamically stable  Possible skin source of infection is pressure ulcers of heels  Blood cultures:  No growth to date  Lactate is WNL  ? Serial exams  ? Bilateral lower extremity elevation  ? Continue local wound care  ? Continue vancomycin IV, dosing per pharmacy protocol for target trough of 15-20   ? Continue cefepime 2g iv q12 hours  ? Repeat CBC in am   ? Monitor clinical response, temperature curve    2  Right lower lobe aspiration pneumonitis:  Leukocytosis is noted  Patient's respiratory status and oxygen requirements appear to be currently stable  Blood cultures have had no growth to date  ? Continue current antibiotic regimen as stated above  ? Continue supplemental oxygen as per primary team  ? Monitor respiratory status, oxygen saturation    3  EDUARDO (resolved):  Likely prerenal due to volume depletion as patient had presented with intractable nausea and vomiting  Creatinine has improved and is at baseline  ? Renal dose medications, avoid nephrotoxins    4  Morbid obesity with BMI of 41  ? This is a risk factor for severe infection    5  History of penicillin allergy:  Unknown reaction  Patient has tolerated ceftriaxone and cefepime during this admission  My recommendations were discussed with the patient who verbalized understanding and TERESA Santos  Thank you for allowing me to participate in the care of this patient  The ID service will follow      Antibiotics: Vancomycin, cefepime since 04/28, systemic antibiotic therapy since   Scheduled Meds:  Current Facility-Administered Medications   Medication Dose Route Frequency Provider Last Rate    acetaminophen  650 mg Oral Q6H PRN Jens Hill MD      albuterol  2 puff Inhalation Q4H PRN Jens Hill MD      atorvastatin  80 mg Oral Daily Jens Hill MD      cefepime  2,000 mg Intravenous Q12H Pina Elmore DO 2,000 mg (22 1229)    Diclofenac Sodium  2 g Topical 4x Daily PRN Jens Hill MD      heparin (porcine)  5,000 Units Subcutaneous Q8H [facility] Jens Hill MD      levETIRAcetam  500 mg Oral Q12H [facility] Jens Hill MD      metoprolol tartrate  25 mg Oral Q12H [facility] Jens Hill MD      midodrine  10 mg Oral TID AC Jens Hill MD      ondansetron  4 mg Intravenous Q6H PRN Jens Hill MD      oxyCODONE  2 5 mg Oral Q4H PRN Jens Hill MD      oxyCODONE  5 mg Oral Q4H PRN Jens Hill MD      pantoprazole  20 mg Oral Early Morning Jens Hill MD      PHENobarbital  32 4 mg Oral BID Regina Horne MD      sodium chloride (PF)  3 mL Intravenous Q1H PRN Raysa Jason PA-C      sodium chloride  75 mL/hr Intravenous Continuous Jens Hill MD 75 mL/hr (22)    vancomycin  12 5 mg/kg (Adjusted) Intravenous Q12H Jens Hill MD       Continuous Infusions:sodium chloride, 75 mL/hr, Last Rate: 75 mL/hr (22)      PRN Meds:   acetaminophen    albuterol    Diclofenac Sodium    ondansetron    oxyCODONE    oxyCODONE    sodium chloride (PF)    Subjective:  Patient reports lower leg pain  She has had urine incontinence and purewick catheter was placed today  No fevers today  She says her breathing is okay  She denies nausea, vomiting, diarrhea  No new symptoms      Objective:  Vitals:  Temp:  [98 °F (36 7 °C)-98 3 °F (36 8 °C)] 98 °F (36 7 °C)  HR:  [100-101] 100  Resp:  [16-19] 19  BP: (122-140)/(77-87) 140/87  SpO2:  [97 %-100 %] 100 %  Temp (24hrs), Av 2 °F (36 8 °C), Min:98 °F (36 7 °C), Max:98 3 °F (36 8 °C)  Current: Temperature: 98 °F (36 7 °C)  Physical Exam:   General Appearance:  Elderly woman, remains bedbound, appears stated age, no acute distress   ENT: O2 4L via NC intact, oropharynx moist without lesions   Lungs:   Clear to auscultation bilaterally; respirations unlabored   Heart:  S1, S2, RRR, 2/6 systolic murmur left sternal border   Abdomen:   Rounded, soft, non-tender, non-distended   : Purewick catheter present   Extremities: RUE PICC line intact  Bilateral lower extremity lymphedema  Neurologic: AAO to person, slow speech   Skin: Erythema of bilateral lower extremity with chronic venous stasis changes, hyperpigmentation with multiple blisters noted with serous drainage- no foul odor or purulence noted  Heel pressure ulcers noted in media section, which are covered with protective dressing  Labs, Cultures, Imaging, & Other studies:   All pertinent labs, cultures and imaging studies were personally reviewed  Results from last 7 days   Lab Units 05/01/22  0602 04/30/22  0520 04/29/22  0653   WBC Thousand/uL 19 09* 19 26* 18 46*   HEMOGLOBIN g/dL 9 4* 9 8* 10 3*   PLATELETS Thousands/uL 178 172 164     Results from last 7 days   Lab Units 05/01/22  0602 04/30/22  0520 04/30/22  0520 04/29/22  0653 04/29/22  0653 04/26/22  0534 04/25/22  1736   SODIUM mmol/L 140  --  139  --  139   < > 135*   POTASSIUM mmol/L 3 8   < > 4 0   < > 4 4   < > 5 2   CHLORIDE mmol/L 105   < > 104   < > 104   < > 97*   CO2 mmol/L 29   < > 30   < > 27   < > 31   BUN mg/dL 11   < > 15   < > 24   < > 35*   CREATININE mg/dL 0 64   < > 0 71   < > 0 85   < > 2 07*   EGFR ml/min/1 73sq m 83   < > 80   < > 64   < > 21   CALCIUM mg/dL 8 6   < > 8 6   < > 9 2   < > 9 2   AST U/L 24  --   --   --   --   --  40   ALT U/L 16  --   --   --   --   --  45   ALK PHOS U/L 107  --   --   --   --   --  136*    < > = values in this interval not displayed  Results from last 7 days   Lab Units 04/25/22  7481   BLOOD CULTURE  No Growth After 5 Days  No Growth After 5 Days       Results from last 7 days   Lab Units 04/25/22  1736   PROCALCITONIN ng/ml 6 07*

## 2022-05-01 NOTE — NURSING NOTE
1000: Patients daughter got upset because this RN didn't respond to her request to talk to me in a "timely fashion  " - Patients daughter asked if the ID doctor has came to see patient  ID did not, latest note was from yesterday  Daughter made aware of that  1245: Daughter called this RN on phone  Asked what we are doing for her mom  RN stated that she is on 2 antibiotics  Daughter demanded that ID see patient today to re-evaluate, and then hung up on this RN  MD made aware of above

## 2022-05-01 NOTE — ASSESSMENT & PLAN NOTE
Noted to have an aspiration event yesterday  CT scan right lower lobe pneumonia  Suspect aspiration pneumonia versus pneumonitis  Continue vancomycin cefepime for now  Repeat CXR=negative

## 2022-05-01 NOTE — PROGRESS NOTES
Vancomycin IV Pharmacy-to-Dose Consultation    Dominic English is a 80 y o  female who is currently receiving Vancomycin IV with management by the Pharmacy Consult service  Assessment/Plan:  The patient was reviewed  Renal function is stable and no signs or symptoms of nephrotoxicity and/or infusion reactions were documented in the chart  Based on todays assessment, continue current vancomycin (day # 2) dosing of 750 mg IV q 12 H, with a plan for trough to be drawn at 1215 on 5/2/22  We will continue to follow the patients culture results and clinical progress daily      Nelli Moses, Pharmacist

## 2022-05-01 NOTE — ASSESSMENT & PLAN NOTE
Baseline creatinine approximately 0 9 presented with a creatinine of 2 1, today creatine=0 63  Likely  prerenal secondary to volume depletion  Clinically improved with IVF

## 2022-05-01 NOTE — PLAN OF CARE
Problem: MOBILITY - ADULT  Goal: Maintain or return to baseline ADL function  Description: INTERVENTIONS:  -  Assess patient's ability to carry out ADLs; assess patient's baseline for ADL function and identify physical deficits which impact ability to perform ADLs (bathing, care of mouth/teeth, toileting, grooming, dressing, etc )  - Assess/evaluate cause of self-care deficits   - Assess range of motion  - Assess patient's mobility; develop plan if impaired  - Assess patient's need for assistive devices and provide as appropriate  - Encourage maximum independence but intervene and supervise when necessary  - Involve family in performance of ADLs  - Assess for home care needs following discharge   - Consider OT consult to assist with ADL evaluation and planning for discharge  - Provide patient education as appropriate  Outcome: Progressing  Goal: Maintains/Returns to pre admission functional level  Description: INTERVENTIONS:  - Perform BMAT or MOVE assessment daily    - Set and communicate daily mobility goal to care team and patient/family/caregiver  - Collaborate with rehabilitation services on mobility goals if consulted  - Perform Range of Motion 3 times a day  - Reposition patient every 2 hours    - Dangle patient 3 times a day  - Stand patient 3 times a day  - Ambulate patient 3 times a day  - Out of bed to chair 3 times a day   - Out of bed for meals 3 times a day  - Out of bed for toileting  - Record patient progress and toleration of activity level   Outcome: Progressing     Problem: Potential for Falls  Goal: Patient will remain free of falls  Description: INTERVENTIONS:  - Educate patient/family on patient safety including physical limitations  - Instruct patient to call for assistance with activity   - Consult OT/PT to assist with strengthening/mobility   - Keep Call bell within reach  - Keep bed low and locked with side rails adjusted as appropriate  - Keep care items and personal belongings within reach  - Initiate and maintain comfort rounds  - Make Fall Risk Sign visible to staff  - Offer Toileting every 2 Hours, in advance of need  - Initiate/Maintain bedalarm  - Obtain necessary fall risk management equipment:   - Apply yellow socks and bracelet for high fall risk patients  - Consider moving patient to room near nurses station  Outcome: Progressing     Problem: Prexisting or High Potential for Compromised Skin Integrity  Goal: Skin integrity is maintained or improved  Description: INTERVENTIONS:  - Identify patients at risk for skin breakdown  - Assess and monitor skin integrity  - Assess and monitor nutrition and hydration status  - Monitor labs   - Assess for incontinence   - Turn and reposition patient  - Assist with mobility/ambulation  - Relieve pressure over bony prominences  - Avoid friction and shearing  - Provide appropriate hygiene as needed including keeping skin clean and dry  - Evaluate need for skin moisturizer/barrier cream  - Collaborate with interdisciplinary team   - Patient/family teaching  - Consider wound care consult   Outcome: Progressing     Problem: Nutrition/Hydration-ADULT  Goal: Nutrient/Hydration intake appropriate for improving, restoring or maintaining nutritional needs  Description: Monitor and assess patient's nutrition/hydration status for malnutrition  Collaborate with interdisciplinary team and initiate plan and interventions as ordered  Monitor patient's weight and dietary intake as ordered or per policy  Utilize nutrition screening tool and intervene as necessary  Determine patient's food preferences and provide high-protein, high-caloric foods as appropriate       INTERVENTIONS:  - Monitor oral intake, urinary output, labs, and treatment plans  - Assess nutrition and hydration status and recommend course of action  - Evaluate amount of meals eaten  - Assist patient with eating if necessary   - Allow adequate time for meals  - Recommend/ encourage appropriate diets, oral nutritional supplements, and vitamin/mineral supplements  - Order, calculate, and assess calorie counts as needed  - Recommend, monitor, and adjust tube feedings and TPN/PPN based on assessed needs  - Assess need for intravenous fluids  - Provide specific nutrition/hydration education as appropriate  - Include patient/family/caregiver in decisions related to nutrition  Outcome: Progressing

## 2022-05-02 ENCOUNTER — APPOINTMENT (INPATIENT)
Dept: CT IMAGING | Facility: HOSPITAL | Age: 82
DRG: 178 | End: 2022-05-02
Payer: MEDICARE

## 2022-05-02 ENCOUNTER — APPOINTMENT (INPATIENT)
Dept: NON INVASIVE DIAGNOSTICS | Facility: HOSPITAL | Age: 82
DRG: 177 | End: 2022-05-02
Payer: MEDICARE

## 2022-05-02 ENCOUNTER — HOSPITAL ENCOUNTER (INPATIENT)
Facility: HOSPITAL | Age: 82
LOS: 9 days | Discharge: NURSING, REHAB OR LONG-TERM CARE | DRG: 177 | End: 2022-05-11
Attending: INTERNAL MEDICINE | Admitting: INTERNAL MEDICINE
Payer: MEDICARE

## 2022-05-02 VITALS
WEIGHT: 222.88 LBS | DIASTOLIC BLOOD PRESSURE: 81 MMHG | SYSTOLIC BLOOD PRESSURE: 143 MMHG | BODY MASS INDEX: 45.02 KG/M2 | RESPIRATION RATE: 18 BRPM | TEMPERATURE: 97.8 F | HEART RATE: 104 BPM | OXYGEN SATURATION: 100 %

## 2022-05-02 DIAGNOSIS — R60.0 LEG EDEMA: ICD-10-CM

## 2022-05-02 DIAGNOSIS — M19.90 ARTHRITIS: ICD-10-CM

## 2022-05-02 DIAGNOSIS — K56.41 FECAL IMPACTION (HCC): ICD-10-CM

## 2022-05-02 DIAGNOSIS — R60.0 LOWER EXTREMITY EDEMA: ICD-10-CM

## 2022-05-02 DIAGNOSIS — L03.116 CELLULITIS OF LEFT LOWER EXTREMITY: Primary | ICD-10-CM

## 2022-05-02 DIAGNOSIS — G40.909 EPILEPSY (HCC): ICD-10-CM

## 2022-05-02 DIAGNOSIS — N17.9 AKI (ACUTE KIDNEY INJURY) (HCC): ICD-10-CM

## 2022-05-02 DIAGNOSIS — I35.0 AORTIC STENOSIS: ICD-10-CM

## 2022-05-02 DIAGNOSIS — I50.32 CHRONIC DIASTOLIC (CONGESTIVE) HEART FAILURE (HCC): ICD-10-CM

## 2022-05-02 PROBLEM — D69.6 THROMBOCYTOPENIA (HCC): Status: RESOLVED | Noted: 2018-02-11 | Resolved: 2022-05-02

## 2022-05-02 LAB
ANION GAP SERPL CALCULATED.3IONS-SCNC: 3 MMOL/L (ref 4–13)
BASOPHILS # BLD MANUAL: 0 THOUSAND/UL (ref 0–0.1)
BASOPHILS NFR MAR MANUAL: 0 % (ref 0–1)
BUN SERPL-MCNC: 8 MG/DL (ref 5–25)
CALCIUM SERPL-MCNC: 8.6 MG/DL (ref 8.3–10.1)
CHLORIDE SERPL-SCNC: 105 MMOL/L (ref 100–108)
CO2 SERPL-SCNC: 31 MMOL/L (ref 21–32)
CREAT SERPL-MCNC: 0.63 MG/DL (ref 0.6–1.3)
EOSINOPHIL # BLD MANUAL: 0.34 THOUSAND/UL (ref 0–0.4)
EOSINOPHIL NFR BLD MANUAL: 2 % (ref 0–6)
ERYTHROCYTE [DISTWIDTH] IN BLOOD BY AUTOMATED COUNT: 15.6 % (ref 11.6–15.1)
GFR SERPL CREATININE-BSD FRML MDRD: 84 ML/MIN/1.73SQ M
GLUCOSE SERPL-MCNC: 99 MG/DL (ref 65–140)
HCT VFR BLD AUTO: 30.6 % (ref 34.8–46.1)
HGB BLD-MCNC: 9.4 G/DL (ref 11.5–15.4)
LYMPHOCYTES # BLD AUTO: 13 % (ref 14–44)
LYMPHOCYTES # BLD AUTO: 2.2 THOUSAND/UL (ref 0.6–4.47)
MCH RBC QN AUTO: 29.8 PG (ref 26.8–34.3)
MCHC RBC AUTO-ENTMCNC: 30.7 G/DL (ref 31.4–37.4)
MCV RBC AUTO: 97 FL (ref 82–98)
MONOCYTES # BLD AUTO: 1.69 THOUSAND/UL (ref 0–1.22)
MONOCYTES NFR BLD: 10 % (ref 4–12)
MYELOCYTES NFR BLD MANUAL: 1 % (ref 0–1)
NEUTROPHILS # BLD MANUAL: 12.51 THOUSAND/UL (ref 1.85–7.62)
NEUTS SEG NFR BLD AUTO: 74 % (ref 43–75)
PLATELET # BLD AUTO: 187 THOUSANDS/UL (ref 149–390)
PLATELET BLD QL SMEAR: ADEQUATE
PMV BLD AUTO: 9.7 FL (ref 8.9–12.7)
POTASSIUM SERPL-SCNC: 3.6 MMOL/L (ref 3.5–5.3)
RBC # BLD AUTO: 3.15 MILLION/UL (ref 3.81–5.12)
SODIUM SERPL-SCNC: 139 MMOL/L (ref 136–145)
VANCOMYCIN TROUGH SERPL-MCNC: 12 UG/ML (ref 10–20)
WBC # BLD AUTO: 16.91 THOUSAND/UL (ref 4.31–10.16)

## 2022-05-02 PROCEDURE — 85027 COMPLETE CBC AUTOMATED: CPT | Performed by: HOSPITALIST

## 2022-05-02 PROCEDURE — 80048 BASIC METABOLIC PNL TOTAL CA: CPT | Performed by: HOSPITALIST

## 2022-05-02 PROCEDURE — G1004 CDSM NDSC: HCPCS

## 2022-05-02 PROCEDURE — 99223 1ST HOSP IP/OBS HIGH 75: CPT | Performed by: INTERNAL MEDICINE

## 2022-05-02 PROCEDURE — 92526 ORAL FUNCTION THERAPY: CPT

## 2022-05-02 PROCEDURE — 99239 HOSP IP/OBS DSCHRG MGMT >30: CPT | Performed by: HOSPITALIST

## 2022-05-02 PROCEDURE — 73701 CT LOWER EXTREMITY W/DYE: CPT

## 2022-05-02 PROCEDURE — 80202 ASSAY OF VANCOMYCIN: CPT | Performed by: HOSPITALIST

## 2022-05-02 PROCEDURE — 85007 BL SMEAR W/DIFF WBC COUNT: CPT | Performed by: HOSPITALIST

## 2022-05-02 RX ORDER — PHENOBARBITAL 64.8 MG/1
32.4 TABLET ORAL 2 TIMES DAILY
Status: DISCONTINUED | OUTPATIENT
Start: 2022-05-02 | End: 2022-05-11 | Stop reason: HOSPADM

## 2022-05-02 RX ORDER — SODIUM CHLORIDE 9 MG/ML
3 INJECTION INTRAVENOUS
Status: DISCONTINUED | OUTPATIENT
Start: 2022-05-02 | End: 2022-05-11 | Stop reason: HOSPADM

## 2022-05-02 RX ORDER — MIDODRINE HYDROCHLORIDE 5 MG/1
10 TABLET ORAL
Status: DISCONTINUED | OUTPATIENT
Start: 2022-05-02 | End: 2022-05-03

## 2022-05-02 RX ORDER — OXYCODONE HYDROCHLORIDE 5 MG/1
2.5 TABLET ORAL EVERY 4 HOURS PRN
Status: DISCONTINUED | OUTPATIENT
Start: 2022-05-02 | End: 2022-05-11 | Stop reason: HOSPADM

## 2022-05-02 RX ORDER — ALBUTEROL SULFATE 90 UG/1
2 AEROSOL, METERED RESPIRATORY (INHALATION) EVERY 4 HOURS PRN
Status: DISCONTINUED | OUTPATIENT
Start: 2022-05-02 | End: 2022-05-11 | Stop reason: HOSPADM

## 2022-05-02 RX ORDER — ATORVASTATIN CALCIUM 40 MG/1
80 TABLET, FILM COATED ORAL DAILY
Status: CANCELLED | OUTPATIENT
Start: 2022-05-02

## 2022-05-02 RX ORDER — OXYCODONE HYDROCHLORIDE 5 MG/1
2.5 TABLET ORAL EVERY 4 HOURS PRN
Status: CANCELLED | OUTPATIENT
Start: 2022-05-02

## 2022-05-02 RX ORDER — LEVETIRACETAM 500 MG/1
500 TABLET ORAL EVERY 12 HOURS SCHEDULED
Status: CANCELLED | OUTPATIENT
Start: 2022-05-02

## 2022-05-02 RX ORDER — PHENOBARBITAL 32.4 MG/1
32.4 TABLET ORAL 2 TIMES DAILY
Status: CANCELLED | OUTPATIENT
Start: 2022-05-02

## 2022-05-02 RX ORDER — ACETAMINOPHEN 325 MG/1
650 TABLET ORAL EVERY 6 HOURS PRN
Status: CANCELLED | OUTPATIENT
Start: 2022-05-02

## 2022-05-02 RX ORDER — PANTOPRAZOLE SODIUM 20 MG/1
20 TABLET, DELAYED RELEASE ORAL
Status: DISCONTINUED | OUTPATIENT
Start: 2022-05-03 | End: 2022-05-11 | Stop reason: HOSPADM

## 2022-05-02 RX ORDER — ACETAMINOPHEN 325 MG/1
650 TABLET ORAL EVERY 6 HOURS PRN
Status: DISCONTINUED | OUTPATIENT
Start: 2022-05-02 | End: 2022-05-04

## 2022-05-02 RX ORDER — SODIUM CHLORIDE 9 MG/ML
75 INJECTION, SOLUTION INTRAVENOUS CONTINUOUS
Status: CANCELLED | OUTPATIENT
Start: 2022-05-02

## 2022-05-02 RX ORDER — HEPARIN SODIUM 5000 [USP'U]/ML
5000 INJECTION, SOLUTION INTRAVENOUS; SUBCUTANEOUS EVERY 8 HOURS SCHEDULED
Status: CANCELLED | OUTPATIENT
Start: 2022-05-02

## 2022-05-02 RX ORDER — OXYCODONE HYDROCHLORIDE 5 MG/1
5 TABLET ORAL EVERY 4 HOURS PRN
Status: DISCONTINUED | OUTPATIENT
Start: 2022-05-02 | End: 2022-05-11 | Stop reason: HOSPADM

## 2022-05-02 RX ORDER — LEVETIRACETAM 500 MG/1
500 TABLET ORAL EVERY 12 HOURS SCHEDULED
Status: DISCONTINUED | OUTPATIENT
Start: 2022-05-02 | End: 2022-05-11 | Stop reason: HOSPADM

## 2022-05-02 RX ORDER — MIDODRINE HYDROCHLORIDE 5 MG/1
10 TABLET ORAL
Status: CANCELLED | OUTPATIENT
Start: 2022-05-02

## 2022-05-02 RX ORDER — PANTOPRAZOLE SODIUM 20 MG/1
20 TABLET, DELAYED RELEASE ORAL
Status: CANCELLED | OUTPATIENT
Start: 2022-05-02

## 2022-05-02 RX ORDER — SODIUM CHLORIDE 9 MG/ML
3 INJECTION INTRAVENOUS
Status: CANCELLED | OUTPATIENT
Start: 2022-05-02

## 2022-05-02 RX ORDER — ALBUTEROL SULFATE 90 UG/1
2 AEROSOL, METERED RESPIRATORY (INHALATION) EVERY 4 HOURS PRN
Status: CANCELLED | OUTPATIENT
Start: 2022-05-02

## 2022-05-02 RX ORDER — FUROSEMIDE 10 MG/ML
20 INJECTION INTRAMUSCULAR; INTRAVENOUS ONCE
Status: COMPLETED | OUTPATIENT
Start: 2022-05-02 | End: 2022-05-02

## 2022-05-02 RX ORDER — OXYCODONE HYDROCHLORIDE 5 MG/1
5 TABLET ORAL EVERY 4 HOURS PRN
Status: CANCELLED | OUTPATIENT
Start: 2022-05-02

## 2022-05-02 RX ORDER — HEPARIN SODIUM 5000 [USP'U]/ML
5000 INJECTION, SOLUTION INTRAVENOUS; SUBCUTANEOUS EVERY 8 HOURS SCHEDULED
Status: DISCONTINUED | OUTPATIENT
Start: 2022-05-02 | End: 2022-05-02

## 2022-05-02 RX ORDER — ONDANSETRON 2 MG/ML
4 INJECTION INTRAMUSCULAR; INTRAVENOUS EVERY 6 HOURS PRN
Status: CANCELLED | OUTPATIENT
Start: 2022-05-02

## 2022-05-02 RX ORDER — ONDANSETRON 2 MG/ML
4 INJECTION INTRAMUSCULAR; INTRAVENOUS EVERY 6 HOURS PRN
Status: DISCONTINUED | OUTPATIENT
Start: 2022-05-02 | End: 2022-05-11 | Stop reason: HOSPADM

## 2022-05-02 RX ORDER — VANCOMYCIN HYDROCHLORIDE 1 G/200ML
1000 INJECTION, SOLUTION INTRAVENOUS EVERY 12 HOURS
Status: DISCONTINUED | OUTPATIENT
Start: 2022-05-03 | End: 2022-05-02 | Stop reason: HOSPADM

## 2022-05-02 RX ORDER — ATORVASTATIN CALCIUM 80 MG/1
80 TABLET, FILM COATED ORAL DAILY
Status: DISCONTINUED | OUTPATIENT
Start: 2022-05-03 | End: 2022-05-11 | Stop reason: HOSPADM

## 2022-05-02 RX ORDER — HEPARIN SODIUM 5000 [USP'U]/ML
7500 INJECTION, SOLUTION INTRAVENOUS; SUBCUTANEOUS EVERY 8 HOURS SCHEDULED
Status: DISCONTINUED | OUTPATIENT
Start: 2022-05-02 | End: 2022-05-11 | Stop reason: HOSPADM

## 2022-05-02 RX ADMIN — LEVETIRACETAM 500 MG: 500 TABLET, FILM COATED ORAL at 08:12

## 2022-05-02 RX ADMIN — HEPARIN SODIUM 5000 UNITS: 5000 INJECTION INTRAVENOUS; SUBCUTANEOUS at 05:20

## 2022-05-02 RX ADMIN — PANTOPRAZOLE SODIUM 20 MG: 20 TABLET, DELAYED RELEASE ORAL at 05:20

## 2022-05-02 RX ADMIN — VANCOMYCIN HYDROCHLORIDE 1000 MG: 10 INJECTION, POWDER, LYOPHILIZED, FOR SOLUTION INTRAVENOUS at 21:21

## 2022-05-02 RX ADMIN — HEPARIN SODIUM 5000 UNITS: 5000 INJECTION INTRAVENOUS; SUBCUTANEOUS at 13:05

## 2022-05-02 RX ADMIN — CEFEPIME HYDROCHLORIDE 2000 MG: 2 INJECTION, POWDER, FOR SOLUTION INTRAVENOUS at 12:19

## 2022-05-02 RX ADMIN — HEPARIN SODIUM 7500 UNITS: 5000 INJECTION INTRAVENOUS; SUBCUTANEOUS at 21:12

## 2022-05-02 RX ADMIN — FUROSEMIDE 20 MG: 10 INJECTION, SOLUTION INTRAMUSCULAR; INTRAVENOUS at 18:18

## 2022-05-02 RX ADMIN — ATORVASTATIN CALCIUM 80 MG: 40 TABLET, FILM COATED ORAL at 08:13

## 2022-05-02 RX ADMIN — OXYCODONE HYDROCHLORIDE 5 MG: 5 TABLET ORAL at 21:17

## 2022-05-02 RX ADMIN — MIDODRINE HYDROCHLORIDE 10 MG: 5 TABLET ORAL at 08:12

## 2022-05-02 RX ADMIN — PHENOBARBITAL 32.4 MG: 32.4 TABLET ORAL at 08:13

## 2022-05-02 RX ADMIN — METOPROLOL TARTRATE 25 MG: 25 TABLET, FILM COATED ORAL at 08:13

## 2022-05-02 RX ADMIN — VANCOMYCIN HYDROCHLORIDE 750 MG: 1 INJECTION, POWDER, LYOPHILIZED, FOR SOLUTION INTRAVENOUS at 00:34

## 2022-05-02 RX ADMIN — LEVETIRACETAM 500 MG: 500 TABLET, FILM COATED ORAL at 21:12

## 2022-05-02 RX ADMIN — CEFEPIME HYDROCHLORIDE 2000 MG: 2 INJECTION, POWDER, FOR SOLUTION INTRAVENOUS at 00:34

## 2022-05-02 RX ADMIN — METOPROLOL TARTRATE 25 MG: 25 TABLET, FILM COATED ORAL at 21:12

## 2022-05-02 RX ADMIN — VANCOMYCIN HYDROCHLORIDE 750 MG: 1 INJECTION, POWDER, LYOPHILIZED, FOR SOLUTION INTRAVENOUS at 13:05

## 2022-05-02 NOTE — PROGRESS NOTES
[facility]  Progress Note - Dedra Garcia 1940, 80 y o  female MRN: [id number]  Unit/Bed#: -02 Encounter: [id number]  Primary Care Provider: Marysol Morris DO   Date and time admitted to hospital: 4/25/2022  4:53 PM    Lower extremity cellulitis  Assessment & Plan  Noted to have worsening lower extremity redness erythema and tenderness consistent with cellulitis  Family is requesting transfer to higher level of care  No significant improvement with ceftriaxone  Leukocytosis 16 91  Continue with vancomycin cefepime  Patient does have dementia and unable to consent for PICC line consent obtained from daughter  PICC in place  Consult dermatology (?biopsy)  ID following    * Aspiration pneumonia ([facility] )  Assessment & Plan   Noted to have an aspiration event yesterday  CT scan right lower lobe pneumonia  Suspect aspiration pneumonia versus pneumonitis  Continue vancomycin cefepime for now  Repeat CXR=negative    EDUARDO (acute kidney injury) ([facility] )  Assessment & Plan    Baseline creatinine approximately 0 9 presented with a creatinine of 2 1, today creatine=0 63  Likely  prerenal secondary to volume depletion  Clinically improved with IVF    Intractable nausea and vomiting  Assessment & Plan  Likely secondary viral gastroenteritis  Now tolerating p o  diet    Morbid obesity ([facility] )  Assessment & Plan  BMI of 41 4    in the setting of HTN, CHF, HLD, and GERD, as evidenced by BMI 41 4, requiring counseling for diet and lifestyle modifications          Hyperlipidemia  Assessment & Plan  Continue with statin    Localization-related epilepsy, intractable ([facility] )  Assessment & Plan  Continue seizure meds    GERD (gastroesophageal reflux disease)  Assessment & Plan  Continue PPI      VTE Pharmacologic Prophylaxis: VTE Score: 6 High Risk (Score >/= 5) - Pharmacological DVT Prophylaxis Ordered: heparin  Sequential Compression Devices Ordered      Patient Centered Rounds: I performed bedside rounds with nursing staff today  Education and Discussions with Family / Patient: Updated  (daughter) at bedside  Time Spent for Care: 30 minutes  More than 50% of total time spent on counseling and coordination of care as described above  Current Length of Stay: 7 day(s)  Current Patient Status: Inpatient   Certification Statement: The patient will continue to require additional inpatient hospital stay due to worsening overall condition  Discharge Plan: Anticipate discharge in >72 hrs to discharge location to be determined pending rehab evaluations  Code Status: Level 1 - Full Code    Subjective:   Pt more alert today  Complained of pain to dtg, but not to me  Objective:     Vitals:   Temp (24hrs), Av 9 °F (36 6 °C), Min:97 8 °F (36 6 °C), Max:98 °F (36 7 °C)    Temp:  [97 8 °F (36 6 °C)-98 °F (36 7 °C)] 97 8 °F (36 6 °C)  HR:  [100-104] 104  Resp:  [18-19] 18  BP: (137-143)/(74-87) 143/81  SpO2:  [100 %] 100 %  Body mass index is 45 02 kg/m²  Input and Output Summary (last 24 hours): Intake/Output Summary (Last 24 hours) at 2022 0911  Last data filed at 2022 0543  Gross per 24 hour   Intake 50 ml   Output 200 ml   Net -150 ml       Physical Exam:   Physical Exam  Constitutional:       Appearance: Normal appearance  She is ill-appearing  HENT:      Head: Normocephalic and atraumatic  Nose: Nose normal       Mouth/Throat:      Mouth: Mucous membranes are moist       Pharynx: Oropharynx is clear  Eyes:      Extraocular Movements: Extraocular movements intact  Conjunctiva/sclera: Conjunctivae normal    Cardiovascular:      Rate and Rhythm: Normal rate and regular rhythm  Heart sounds: Normal heart sounds  Pulmonary:      Effort: Pulmonary effort is normal       Breath sounds: Normal breath sounds  Abdominal:      General: Bowel sounds are normal       Palpations: Abdomen is soft  Skin:     General: Skin is warm        Capillary Refill: Capillary refill takes less than 2 seconds  Findings: Erythema present  Neurological:      General: No focal deficit present  Mental Status: She is disoriented  Additional Data:     Labs:  Results from last 7 days   Lab Units 05/02/22  0524 04/29/22  0653 04/28/22  1102   WBC Thousand/uL 16 91*   < > 15 76*   HEMOGLOBIN g/dL 9 4*   < > 11 0*   HEMATOCRIT % 30 6*   < > 35 7   PLATELETS Thousands/uL 187   < > 174   BANDS PCT %  --   --  5   LYMPHO PCT % 13*  --  8*   MONO PCT % 10  --  10   EOS PCT % 2  --  0    < > = values in this interval not displayed  Results from last 7 days   Lab Units 05/02/22  0521 05/01/22  0602 05/01/22  0602   SODIUM mmol/L 139   < > 140   POTASSIUM mmol/L 3 6   < > 3 8   CHLORIDE mmol/L 105   < > 105   CO2 mmol/L 31   < > 29   BUN mg/dL 8   < > 11   CREATININE mg/dL 0 63   < > 0 64   ANION GAP mmol/L 3*   < > 6   CALCIUM mg/dL 8 6   < > 8 6   ALBUMIN g/dL  --   --  1 8*   TOTAL BILIRUBIN mg/dL  --   --  0 32   ALK PHOS U/L  --   --  107   ALT U/L  --   --  16   AST U/L  --   --  24   GLUCOSE RANDOM mg/dL 99   < > 97    < > = values in this interval not displayed  Results from last 7 days   Lab Units 04/25/22  1736   INR  1 29*         Results from last 7 days   Lab Units 04/26/22  0537   HEMOGLOBIN A1C % 5 6     Results from last 7 days   Lab Units 05/01/22  0908 04/25/22  2152 04/25/22  1736   LACTIC ACID mmol/L 0 6 1 3 2 8*   PROCALCITONIN ng/ml  --   --  6 07*       Lines/Drains:  Invasive Devices  Report    Peripherally Inserted Central Catheter Line            PICC Line 04/29/22 Right Brachial 2 days                           Imaging: No pertinent imaging reviewed  Recent Cultures (last 7 days):   Results from last 7 days   Lab Units 04/25/22  1736   BLOOD CULTURE  No Growth After 5 Days  No Growth After 5 Days         Last 24 Hours Medication List:   Current Facility-Administered Medications   Medication Dose Route Frequency Provider Last Rate    acetaminophen  650 mg Oral Q6H PRN Jens Hill MD      albuterol  2 puff Inhalation Q4H PRN Jens Hill MD      atorvastatin  80 mg Oral Daily Jens Hill MD      cefepime  2,000 mg Intravenous Q12H Pina Elmore DO 2,000 mg (05/02/22 0034)    Diclofenac Sodium  2 g Topical 4x Daily PRN Jens Hill MD      heparin (porcine)  5,000 Units Subcutaneous Q8H [address] Jens Hill MD      levETIRAcetam  500 mg Oral Q12H [address] Jens Hill MD      metoprolol tartrate  25 mg Oral Q12H [address] Jens Hill MD      midodrine  10 mg Oral TID AC Jens Hill MD      ondansetron  4 mg Intravenous Q6H PRN Jens Hill MD      oxyCODONE  2 5 mg Oral Q4H PRN Jens Hill MD      oxyCODONE  5 mg Oral Q4H PRN Jens Hill MD      pantoprazole  20 mg Oral Early Morning Jens Hill MD      PHENobarbital  32 4 mg Oral BID Lizett Pete MD      sodium chloride (PF)  3 mL Intravenous Q1H PRN Covington Donell, PA-C      sodium chloride  75 mL/hr Intravenous Continuous Jens Hill MD 75 mL/hr (05/01/22 0728)    vancomycin  12 5 mg/kg (Adjusted) Intravenous Q12H Sagrario Boone MD          Today, Patient Was Seen By: Lizett Pete MD    **Please Note: This note may have been constructed using a voice recognition system  **

## 2022-05-02 NOTE — PROGRESS NOTES
Pt's bl lower extremities wounds as pictured below      L foot:      L leg:             R foot:      R leg:

## 2022-05-02 NOTE — PLAN OF CARE
Dys2/thin  Maintain aspiration precautions  1:1 feedings; feed slowly   Alternate solids/liq  Upright for all intake and 30m following

## 2022-05-02 NOTE — ASSESSMENT & PLAN NOTE
· POA at [facility]  · S/p IV hydration at [facility], creatinine has stabilized,  discontinue IVF

## 2022-05-02 NOTE — ASSESSMENT & PLAN NOTE
Wt Readings from Last 3 Encounters:   05/02/22 101 kg (222 lb 14 2 oz)   03/14/21 93 kg (205 lb)   12/23/20 91 6 kg (202 lb)     · Low Na diet  · Monitor intake and output  · Monitor daily weights  · Does not appear to be on any diuretics at home  · Given multiple days of IVF after normalization of creatinine for EDUARDO she may have some volume overload  · Will try diuresis to see if this helps with her respiratory symptoms

## 2022-05-02 NOTE — NURSING NOTE
Patient transported to [place] via transport team  Report called to [facility] on P9

## 2022-05-02 NOTE — PROGRESS NOTES
Vancomycin Assessment    Yash Chapin is a 80 y o  female who is currently receiving vancomycin 750 mg IV q12h for skin-soft tissue infection     Relevant clinical data and objective history reviewed:  Creatinine   Date Value Ref Range Status   05/02/2022 0 63 0 60 - 1 30 mg/dL Final     Comment:     Standardized to IDMS reference method   05/01/2022 0 64 0 60 - 1 30 mg/dL Final     Comment:     Standardized to IDMS reference method   04/30/2022 0 71 0 60 - 1 30 mg/dL Final     Comment:     Standardized to IDMS reference method     /81 (BP Location: Left arm)   Pulse 104   Temp 97 8 °F (36 6 °C) (Oral)   Resp 18   Wt 101 kg (222 lb 14 2 oz)   SpO2 100%   BMI 45 02 kg/m²   I/O last 3 completed shifts: In: 1100 [P O :100; I V :900; IV Piggyback:100]  Out: 200 [Urine:200]  Lab Results   Component Value Date/Time    BUN 8 05/02/2022 05:21 AM    WBC 16 91 (H) 05/02/2022 05:24 AM    HGB 9 4 (L) 05/02/2022 05:24 AM    HCT 30 6 (L) 05/02/2022 05:24 AM    MCV 97 05/02/2022 05:24 AM     05/02/2022 05:24 AM     Temp Readings from Last 3 Encounters:   05/02/22 97 8 °F (36 6 °C) (Oral)   03/20/21 99 2 °F (37 3 °C)   12/24/20 97 9 °F (36 6 °C)     Vancomycin Days of Therapy: 3    Assessment/Plan  The patient is currently on vancomycin utilizing scheduled dosing  Baseline risks associated with therapy include: concomitant nephrotoxic medications and advanced age  The patient is receiving 750 mg IV q12h with the most recent vancomycin level being at steady-state and sub-therapeutic based on a goal of 15-20 (appropriate for most indications) ; therefore, after clinical evaluation will be changed to 1000 mg IV q12h   Pharmacy will continue to follow closely for s/sx of nephrotoxicity, infusion reactions, and appropriateness of therapy  BMP and CBC will be ordered per protocol  Plan for trough as patient approaches steady state, 30 min before vanco dose due at 1200 on 5-4-22   Pharmacy will continue to follow the patients culture results and clinical progress daily      Kinjal Hanks, Pharmacist

## 2022-05-02 NOTE — ASSESSMENT & PLAN NOTE
· Transferred from [facility] after she developed worsening edema, erythema and pain of the lower extremities  · Of note she arrived with EDUARDO and has been on IVF for multiple days prior  · History of CHF and cirrhosis with hypoalbuminemia noted  · She has chronic venous stasis dermatitis so likely there is a component of third spacing causing edema  · No improvement with ceftriaxone so antibiotics were changed to cefepime and vancomycin  · CT scan of the LE was negative for abscess formation  · She was transferred to [facility] for a dermatology evaluation  · There is significant tenderness to palpitation and multiple vesicles, intact and ruptured, which will require wound care and management  · WBCs have remained elevated but she has also been aspirating which may be affecting the WBC count  · Will check lower extremity venous dopplers to evaluate for DVT since she has been bedbound  · She will likely need diuresis for volume management, with possibly the addition of albumin given her hypoalbuminemia    · Will recheck procalcitonin and CBC in AM, consider discontinuing antibiotics  · Consult podiatry for local wound care  · Consult dermatology per family request

## 2022-05-02 NOTE — DISCHARGE SUMMARY
[facility]  Discharge- Otilia Mcclellan 1940, 80 y o  female MRN: [id number]  Unit/Bed#: -02 Encounter: [id number]  Primary Care Provider: Iesha Mina DO   Date and time admitted to hospital: 4/25/2022  4:53 PM    Lower extremity cellulitis  Assessment & Plan  Noted to have worsening lower extremity redness erythema and tenderness consistent with cellulitis  Family is requesting transfer to higher level of care  No significant improvement with ceftriaxone  Leukocytosis 16 91  Continue with vancomycin cefepime  Patient does have dementia and unable to consent for PICC line consent obtained from daughter  PICC in place  Consult dermatology (?biopsy)  ID following  CT of legs ordered    * Aspiration pneumonia [facility])  Assessment & Plan   Noted to have an aspiration event yesterday  CT scan right lower lobe pneumonia  Suspect aspiration pneumonia versus pneumonitis  Continue vancomycin cefepime for now  Repeat CXR=negative    EDUARDO (acute kidney injury) ([facility] [address] )  Assessment & Plan      Baseline creatinine approximately 0 9 presented with a creatinine of 2 1, today creatine=0 63  Likely  prerenal secondary to volume depletion  Clinically improved with IVF    Intractable nausea and vomiting  Assessment & Plan  Likely secondary viral gastroenteritis  Now tolerating p o  diet    Morbid obesity ([address] )  Assessment & Plan  BMI of 41 4    in the setting of HTN, CHF, HLD, and GERD, as evidenced by BMI 41 4, requiring counseling for diet and lifestyle modifications          Hyperlipidemia  Assessment & Plan  Continue with statin    Localization-related epilepsy, intractable ([address] )  Assessment & Plan  Continue seizure meds    GERD (gastroesophageal reflux disease)  Assessment & Plan  Continue PPI      Medical Problems             Resolved Problems  Date Reviewed: 4/29/2022    None              Discharging Physician / Practitioner: Dennise Koyanagi, MD  PCP: Iesha Mina DO  Admission Date:   Admission Orders (From admission, onward)     Ordered        04/25/22 2039  Inpatient Admission  Once                      Discharge Date: 05/02/22    Consultations During Hospital Stay:  · ID    Procedures Performed:   · none    Significant Findings / Test Results:   · none    Incidental Findings:   · none     Test Results Pending at Discharge (will require follow up):   · Ct legs pending     Outpatient Tests Requested:  · none    Complications:  none    Reason for Admission: N/V    Hospital Course:   Sergio Knowles is a 80 y o  female patient who originally presented to the hospital on 4/25/2022 due to N/V  She was in EDUARDO and had an aspiration pneumonia  She has developed a cellluilits of legs and her encephalopathy has not improved despite broad spectrum antibiotics  ID has been following  Family requested transfer of care  Please see above list of diagnoses and related plan for additional information  Condition at Discharge: poor    Discharge Day Visit / Exam:   * Please refer to separate progress note for these details *    Discussion with Family: Updated  (daughter) at bedside  Discharge instructions/Information to patient and family:   See after visit summary for information provided to patient and family  Provisions for Follow-Up Care:  See after visit summary for information related to follow-up care and any pertinent home health orders  Disposition:   [address] Transfer to [address] [place] [address]    Planned Readmission: no     Discharge Statement:  I spent 45 minutes discharging the patient  This time was spent on the day of discharge  I had direct contact with the patient on the day of discharge  Greater than 50% of the total time was spent examining patient, answering all patient questions, arranging and discussing plan of care with patient as well as directly providing post-discharge instructions  Additional time then spent on discharge activities      Discharge Medications:  See after visit summary for reconciled discharge medications provided to patient and/or family        **Please Note: This note may have been constructed using a voice recognition system**

## 2022-05-02 NOTE — PLAN OF CARE
Problem: Prexisting or High Potential for Compromised Skin Integrity  Goal: Skin integrity is maintained or improved  Description: INTERVENTIONS:  - Identify patients at risk for skin breakdown  - Assess and monitor skin integrity  - Assess and monitor nutrition and hydration status  - Monitor labs   - Assess for incontinence   - Turn and reposition patient  - Assist with mobility/ambulation  - Relieve pressure over bony prominences  - Avoid friction and shearing  - Provide appropriate hygiene as needed including keeping skin clean and dry  - Evaluate need for skin moisturizer/barrier cream  - Collaborate with interdisciplinary team   - Patient/family teaching  - Consider wound care consult   Outcome: Progressing     Problem: MOBILITY - ADULT  Goal: Maintain or return to baseline ADL function  Description: INTERVENTIONS:  -  Assess patient's ability to carry out ADLs; assess patient's baseline for ADL function and identify physical deficits which impact ability to perform ADLs (bathing, care of mouth/teeth, toileting, grooming, dressing, etc )  - Assess/evaluate cause of self-care deficits   - Assess range of motion  - Assess patient's mobility; develop plan if impaired  - Assess patient's need for assistive devices and provide as appropriate  - Encourage maximum independence but intervene and supervise when necessary  - Involve family in performance of ADLs  - Assess for home care needs following discharge   - Consider OT consult to assist with ADL evaluation and planning for discharge  - Provide patient education as appropriate  Outcome: Progressing  Goal: Maintains/Returns to pre admission functional level  Description: INTERVENTIONS:  - Perform BMAT or MOVE assessment daily    - Set and communicate daily mobility goal to care team and patient/family/caregiver  - Collaborate with rehabilitation services on mobility goals if consulted  - Perform Range of Motion  times a day    - Reposition patient every hours   - Dangle patient  times a day  - Stand patient  times a day  - Ambulate patient  times a day  - Out of bed to chair  times a day   - Out of bed for meals times a day  - Out of bed for toileting  - Record patient progress and toleration of activity level   Outcome: Progressing     Problem: Potential for Falls  Goal: Patient will remain free of falls  Description: INTERVENTIONS:  - Educate patient/family on patient safety including physical limitations  - Instruct patient to call for assistance with activity   - Consult OT/PT to assist with strengthening/mobility   - Keep Call bell within reach  - Keep bed low and locked with side rails adjusted as appropriate  - Keep care items and personal belongings within reach  - Initiate and maintain comfort rounds  - Make Fall Risk Sign visible to staff  - Offer Toileting every  Hours, in advance of need  - Initiate/Maintain alarm  - Obtain necessary fall risk management equipment:   - Apply yellow socks and bracelet for high fall risk patients  - Consider moving patient to room near nurses station  Outcome: Progressing

## 2022-05-02 NOTE — SPEECH THERAPY NOTE
Speech Language/Pathology     Speech/Language Pathology Progress Note     Patient Name: Dominic English    GQDNI'S Date: 5/2/2022     Problem List  Principal Problem:    Aspiration pneumonia ([facility] [address] )  Active Problems:    Lower extremity cellulitis    GERD (gastroesophageal reflux disease)    Localization-related epilepsy, intractable (HCC)    Hyperlipidemia    Morbid obesity (HCC)    Intractable nausea and vomiting    EDUARDO (acute kidney injury) ([facility] [address] )        Recommendations:   Diet: mechanically altered/level 2 diet and thin liquids   Meds: whole with liquid   Feeding Assistance: 1:1  Frequent Oral care: 2-4x/day  Aspiration precautions and compensatory swallowing strategies: upright posture, only feed when fully alert, slow rate of feeding, small bites/sips and alternating bites and sips  Other Recommendations/ considerations: aspiration precautions       Subjective:  Patient received following CT scan, lunch tray at bedside  Clinician fed <50% of lunch before patient requesting to return to sleep  Previous/current diet: dys2/thin    Objective: The following consistencies were tested puree solids, mech soft/moistened solids, thin liquids by straw  Patient presents with functional bolus containment, manipulation and control  Mastication judged to be incomplete, patient appears to prefer more moistened and puree-like textures  No s/s suggestive of aspiration during this encounter  Assessment:  Oropharyngeal swallow function appears unchanged from previous encounters  Tolerated small quantities of soft and moistened/puree solid trials along with thin liquids by straw  Audible wheeze x1 when taking consecutive sips of thin liquids by straw  See VBS report  Oral intake limited 2/2 lethargy  Should lethargy persist, may consider downgrade to puree as patient appears to gravitate to this texture  Discussion with RN/PCA; patient is reportedly managing well with current diet recommendations    Recent chest xray is clear  Suggest continuing with current diet, increase puree offerings if preferred by patient  Speech dept to continue to follow closely  Plan:  Dys2/thin; aspiration precautions  Will continue follow closely to ensure ongoing tolerance to oral diet w/ safe feeding guidelines         Terri Montalvo [address], [address] [facility]  Speech-Language Pathologist  [place] #[id number]  [place] #[id number]

## 2022-05-02 NOTE — ASSESSMENT & PLAN NOTE
· POA on admission at [facility], possibly respirated again prior to transfer    · Aspiration precautions  · Modified diet  · Continue cefepime and vancomycin for now  · CXr on 4/30 was clear  · Respiratory protocol

## 2022-05-02 NOTE — H&P
[facility]  H&P- Kath Ours 1940, 80 y o  female MRN: [id number]  Unit/Bed#: [facility] 903-01 Encounter: [id number]  Primary Care Provider: Didi Musa DO   Date and time admitted to hospital: 5/2/2022  4:17 PM    * Bilateral lower extremity edema  Assessment & Plan  · Transferred from [facility] after she developed worsening edema, erythema and pain of the lower extremities  · Of note she arrived with EDUARDO and has been on IVF for multiple days prior  · History of CHF and cirrhosis with hypoalbuminemia noted  · She has chronic venous stasis dermatitis so likely there is a component of third spacing causing edema  · No improvement with ceftriaxone so antibiotics were changed to cefepime and vancomycin  · CT scan of the LE was negative for abscess formation  · She was transferred to [facility] for a dermatology evaluation  · There is significant tenderness to palpitation and multiple vesicles, intact and ruptured, which will require wound care and management  · WBCs have remained elevated but she has also been aspirating which may be affecting the WBC count  · Will check lower extremity venous dopplers to evaluate for DVT since she has been bedbound  · She will likely need diuresis for volume management, with possibly the addition of albumin given her hypoalbuminemia  · Will recheck procalcitonin and CBC in AM, consider discontinuing antibiotics  · Consult podiatry for local wound care  · Consult dermatology per family request     EDUARDO (acute kidney injury) [facility])  Assessment & Plan  · POA at [facility]  · S/p IV hydration at [facility], creatinine has stabilized,  discontinue IVF    Aspiration pneumonia [facility])  Assessment & Plan  · POA on admission at [facility], possibly respirated again prior to transfer    · Aspiration precautions  · Modified diet  · Continue cefepime and vancomycin for now  · CXr on 4/30 was clear  · Respiratory protocol    Morbid obesity [facility])  Assessment & Plan  · supportive care  · Turn Q2h  · Weight loss counseling as an outpatient when medically stable    Localization-related epilepsy, intractable (HCC)  Assessment & Plan  · Continue keppra and phenobarbitol    Chronic diastolic (congestive) heart failure (HCC)  Assessment & Plan  Wt Readings from Last 3 Encounters:   05/02/22 101 kg (222 lb 14 2 oz)   03/14/21 93 kg (205 lb)   12/23/20 91 6 kg (202 lb)     · Low Na diet  · Monitor intake and output  · Monitor daily weights  · Does not appear to be on any diuretics at home  · Given multiple days of IVF after normalization of creatinine for EDUARDO she may have some volume overload  · Will try diuresis to see if this helps with her respiratory symptoms  VTE Pharmacologic Prophylaxis:   Moderate Risk (Score 3-4) - Pharmacological DVT Prophylaxis Ordered: heparin  Code Status: Level 1 - Full Code   Discussion with family: Updated  (daughter) via phone  Anticipated Length of Stay: Patient will be admitted on an inpatient basis with an anticipated length of stay of greater than 2 midnights secondary to cellulitis, edema  Total Time for Visit, including Counseling / Coordination of Care: 45 minutes Greater than 50% of this total time spent on direct patient counseling and coordination of care  Chief Complaint: leg edema    History of Present Illness:  Trudi Cronin is a 80 y o  female with a PMH of CHF and dirrhosis who presents with leg edema  History gathering is limited due to the patient's medical condition  She does report some shortness of breath and painful swelling in her legs  She was initially admitted to [facility] for pneumonia and EDUARDO  During that time she received ceftriaxone and IVF  During her hospital course she developed painful edema and erythema of the lower extremities with weeping vesicles   Her daughter reports that she was not able to get an answer from the physicians there and requested a transfer to a higher level of care  Review of Systems:  Review of Systems   All other systems reviewed and are negative  Past Medical and Surgical History:   Past Medical History:   Diagnosis Date    CHF [facility]congestive heart failure) ([facility] [address] )     Liver cirrhosis (HCC)     Seizures ([facility] )        Past Surgical History:   Procedure Laterality Date    HYSTERECTOMY      IR PICC PLACEMENT SINGLE LUMEN  4/29/2022    LEG SURGERY  05/2016    Pt family cannot recall the specific surgery or which leg it was preformed on  Meds/Allergies:  Prior to Admission medications    Medication Sig Start Date End Date Taking?  Authorizing Provider   acetaminophen (TYLENOL) 325 mg tablet Take 2 tablets (650 mg total) by mouth every 6 (six) hours as needed for mild pain, headaches or fever 3/20/21   AUREA Byrd   albuterol (PROVENTIL HFA,VENTOLIN HFA) 90 mcg/act inhaler Inhale 2 puffs every 4 (four) hours as needed for wheezing 12/24/20   Giorgio Jauregui MD   atorvastatin (LIPITOR) 80 mg tablet Take 80 mg by mouth daily    Historical Provider, MD   cholecalciferol (VITAMIN D3) 1,000 units tablet Take 2,000 Units by mouth daily    Historical Provider, MD   folic acid (FOLVITE) 1 mg tablet Take by mouth daily    Historical Provider, MD   guaiFENesin (ROBITUSSIN) 100 mg/5 mL oral solution Take 10 mL (200 mg total) by mouth every 4 (four) hours as needed (cough) 12/24/20   Giorgio Jauregui MD   levETIRAcetam (KEPPRA) 500 mg tablet Take 500 mg by mouth every 12 (twelve) hours    Historical Provider, MD   levETIRAcetam (KEPPRA) 500 mg tablet Take 1 tablet (500 mg total) by mouth every 12 (twelve) hours 12/18/19   Austin Dougherty PA-C   lidocaine (LIDODERM) 5 % Apply 1 patch topically daily for 10 days Remove & Discard patch within 12 hours or as directed by MD 12/25/20 1/4/21  Giorgio Jauregui MD   metoprolol tartrate (LOPRESSOR) 25 mg tablet Take 25 mg by mouth every 12 (twelve) hours    Historical Provider, MD multivitamin-minerals (CENTRUM ADULTS) tablet Take 1 tablet by mouth daily 12/25/20 1/24/21  Rosa Mcdonald MD   omeprazole (PriLOSEC) 20 mg delayed release capsule Take 20 mg by mouth daily    Historical Provider, MD   PHENobarbital 64 8 mg tablet Take 64 8 mg by mouth 2 (two) times a day    Historical Provider, MD     I have reviewed home medications using recent Epic encounter  Allergies: Allergies   Allergen Reactions    Penicillins Other (See Comments)     Pt unaware of reaction       Social History:  Marital Status: Single   Occupation: retired  Patient Pre-hospital Living Situation: Home  Patient Pre-hospital Level of Mobility: walks with walker  Patient Pre-hospital Diet Restrictions: none  Substance Use History:   Social History     Substance and Sexual Activity   Alcohol Use Never     Social History     Tobacco Use   Smoking Status Never Smoker   Smokeless Tobacco Never Used     Social History     Substance and Sexual Activity   Drug Use No       Family History:  No family history on file  Physical Exam:     Vitals:   Blood Pressure: 120/71 (05/02/22 1618)  Pulse: 69 (05/02/22 1618)  Temperature: 98 3 °F (36 8 °C) (05/02/22 1618)  Respirations: 19 (05/02/22 1618)  SpO2: (!) 86 % (05/02/22 1618)    Physical Exam  Constitutional:       General: She is not in acute distress  Appearance: She is obese  She is not toxic-appearing  HENT:      Head: Normocephalic and atraumatic  Nose: Nose normal       Mouth/Throat:      Mouth: Mucous membranes are moist       Pharynx: Oropharynx is clear  Eyes:      Extraocular Movements: Extraocular movements intact  Cardiovascular:      Rate and Rhythm: Regular rhythm  Tachycardia present  Pulmonary:      Breath sounds: Wheezing present  Abdominal:      General: Abdomen is flat  Palpations: Abdomen is soft  Musculoskeletal:         General: Swelling and tenderness present  Right lower leg: Edema present        Left lower leg: Edema present  Skin:     Findings: Erythema present  Neurological:      Mental Status: She is alert and oriented to person, place, and time  Motor: Weakness present  Psychiatric:         Mood and Affect: Mood normal          Behavior: Behavior normal           Additional Data:     Lab Results:  Results from last 7 days   Lab Units 05/02/22  0524 04/29/22  0653 04/28/22  1102   WBC Thousand/uL 16 91*   < > 15 76*   HEMOGLOBIN g/dL 9 4*   < > 11 0*   HEMATOCRIT % 30 6*   < > 35 7   PLATELETS Thousands/uL 187   < > 174   BANDS PCT %  --   --  5   LYMPHO PCT % 13*  --  8*   MONO PCT % 10  --  10   EOS PCT % 2  --  0    < > = values in this interval not displayed  Results from last 7 days   Lab Units 05/02/22  0521 05/01/22  0602 05/01/22  0602   SODIUM mmol/L 139   < > 140   POTASSIUM mmol/L 3 6   < > 3 8   CHLORIDE mmol/L 105   < > 105   CO2 mmol/L 31   < > 29   BUN mg/dL 8   < > 11   CREATININE mg/dL 0 63   < > 0 64   ANION GAP mmol/L 3*   < > 6   CALCIUM mg/dL 8 6   < > 8 6   ALBUMIN g/dL  --   --  1 8*   TOTAL BILIRUBIN mg/dL  --   --  0 32   ALK PHOS U/L  --   --  107   ALT U/L  --   --  16   AST U/L  --   --  24   GLUCOSE RANDOM mg/dL 99   < > 97    < > = values in this interval not displayed  Results from last 7 days   Lab Units 04/25/22  1736   INR  1 29*         Results from last 7 days   Lab Units 04/26/22  0537   HEMOGLOBIN A1C % 5 6     Results from last 7 days   Lab Units 05/01/22  0908 04/25/22  2152 04/25/22  1736   LACTIC ACID mmol/L 0 6 1 3 2 8*   PROCALCITONIN ng/ml  --   --  6 07*       Imaging: Reviewed radiology reports from this admission including: chest xray and CT legs  VAS lower limb venous duplex study, complete bilateral    (Results Pending)       EKG and Other Studies Reviewed on Admission:   · EKG: No EKG obtained  ** Please Note: This note has been constructed using a voice recognition system   **

## 2022-05-02 NOTE — RESPIRATORY THERAPY NOTE
RT Protocol Note  Otilia Mcclellan 80 y o  female MRN: [id number]  [facility] Encounter: [id number]    Assessment    Principal Problem:    Bilateral lower extremity edema  Active Problems:    Chronic diastolic (congestive) heart failure (HCC)    Localization-related epilepsy, intractable (HCC)    Morbid obesity (HCC)    Aspiration pneumonia (HCC)    EDUARDO (acute kidney injury) ([facility] [address] )      Home Pulmonary Medications:  albuterol       Past Medical History:   Diagnosis Date    CHF (congestive heart failure) (HCC)     Liver cirrhosis (HCC)     Seizures (HCC)      Social History     Socioeconomic History    Marital status: Single     Spouse name: Not on file    Number of children: Not on file    Years of education: Not on file    Highest education level: Not on file   Occupational History    Not on file   Tobacco Use    Smoking status: Never Smoker    Smokeless tobacco: Never Used   Substance and Sexual Activity    Alcohol use: Never    Drug use: No    Sexual activity: Not on file   Other Topics Concern    Not on file   Social History Narrative    Not on file     Social Determinants of Health     Financial Resource Strain: Not on file   Food Insecurity: No Food Insecurity    Worried About Running Out of Food in the Last Year: Never true    [place]alo of Food in the Last Year: Never true   Transportation Needs: No Transportation Needs    Lack of Transportation (Medical): No    Lack of Transportation (Non-Medical):  No   Physical Activity: Not on file   Stress: Not on file   Social Connections: Not on file   Intimate Partner Violence: Not on file   Housing Stability: Low Risk     Unable to Pay for Housing in the Last Year: No    Number of Places Lived in the Last Year: 1    Unstable Housing in the Last Year: No       Subjective         Objective    Physical Exam:   Assessment Type: Assess only  General Appearance: Awake  Respiratory Pattern: Dyspnea with exertion  Chest Assessment: Chest expansion symmetrical  Bilateral Breath Sounds: Clear,Diminished    Vitals:  Blood pressure 120/71, pulse 69, temperature 98 3 °F (36 8 °C), resp  rate 19, height 4' 11" (1 499 m), weight 101 kg (222 lb), SpO2 (!) 86 %  Imaging and other studies: I have personally reviewed pertinent reports  Plan    Respiratory Plan: Home Bronchodilator Patient pathway        Resp Comments: (P) pt assessed for respiratory protocol  pt has albuterol mdi listed on med hx   will continue with that at this time and continue to monitor pt per protocol

## 2022-05-03 ENCOUNTER — APPOINTMENT (INPATIENT)
Dept: NON INVASIVE DIAGNOSTICS | Facility: HOSPITAL | Age: 82
DRG: 177 | End: 2022-05-03
Payer: MEDICARE

## 2022-05-03 LAB
ALBUMIN SERPL BCP-MCNC: 2 G/DL (ref 3.5–5)
ALP SERPL-CCNC: 114 U/L (ref 46–116)
ALT SERPL W P-5'-P-CCNC: 17 U/L (ref 12–78)
ANION GAP SERPL CALCULATED.3IONS-SCNC: 5 MMOL/L (ref 4–13)
AORTIC VALVE MEAN VELOCITY: 29 M/S
APICAL FOUR CHAMBER EJECTION FRACTION: 61 %
AST SERPL W P-5'-P-CCNC: 22 U/L (ref 5–45)
AV AREA BY CONTINUOUS VTI: 0.8 CM2
AV AREA PEAK VELOCITY: 0.9 CM2
AV LVOT MEAN GRADIENT: 3 MMHG
AV LVOT PEAK GRADIENT: 9 MMHG
AV MEAN GRADIENT: 37 MMHG
AV PEAK GRADIENT: 58 MMHG
AV VALVE AREA: 0.81 CM2
AV VELOCITY RATIO: 0.39
BASOPHILS # BLD MANUAL: 0 THOUSAND/UL (ref 0–0.1)
BASOPHILS NFR MAR MANUAL: 0 % (ref 0–1)
BILIRUB DIRECT SERPL-MCNC: 0.09 MG/DL (ref 0–0.2)
BILIRUB SERPL-MCNC: 0.34 MG/DL (ref 0.2–1)
BUN SERPL-MCNC: 8 MG/DL (ref 5–25)
CALCIUM SERPL-MCNC: 9.4 MG/DL (ref 8.3–10.1)
CHLORIDE SERPL-SCNC: 103 MMOL/L (ref 100–108)
CO2 SERPL-SCNC: 31 MMOL/L (ref 21–32)
CREAT SERPL-MCNC: 0.55 MG/DL (ref 0.6–1.3)
DOP CALC AO PEAK VEL: 3.78 M/S
DOP CALC AO VTI: 63.95 CM
DOP CALC LVOT AREA: 2.27 CM2
DOP CALC LVOT DIAMETER: 1.7 CM
DOP CALC LVOT PEAK VEL VTI: 22.73 CM
DOP CALC LVOT PEAK VEL: 1.49 M/S
DOP CALC LVOT STROKE INDEX: 25.4 ML/M2
DOP CALC LVOT STROKE VOLUME: 51.57 CM3
E WAVE DECELERATION TIME: 108 MS
EOSINOPHIL # BLD MANUAL: 0.16 THOUSAND/UL (ref 0–0.4)
EOSINOPHIL NFR BLD MANUAL: 1 % (ref 0–6)
ERYTHROCYTE [DISTWIDTH] IN BLOOD BY AUTOMATED COUNT: 15.4 % (ref 11.6–15.1)
GFR SERPL CREATININE-BSD FRML MDRD: 88 ML/MIN/1.73SQ M
GLUCOSE SERPL-MCNC: 81 MG/DL (ref 65–140)
HCT VFR BLD AUTO: 31.6 % (ref 34.8–46.1)
HGB BLD-MCNC: 9.7 G/DL (ref 11.5–15.4)
LAAS-AP4: 18.4 CM2
LYMPHOCYTES # BLD AUTO: 1.59 THOUSAND/UL (ref 0.6–4.47)
LYMPHOCYTES # BLD AUTO: 10 % (ref 14–44)
MAGNESIUM SERPL-MCNC: 1.6 MG/DL (ref 1.6–2.6)
MCH RBC QN AUTO: 29.8 PG (ref 26.8–34.3)
MCHC RBC AUTO-ENTMCNC: 30.7 G/DL (ref 31.4–37.4)
MCV RBC AUTO: 97 FL (ref 82–98)
MONOCYTES # BLD AUTO: 0.8 THOUSAND/UL (ref 0–1.22)
MONOCYTES NFR BLD: 5 % (ref 4–12)
MV E'TISSUE VEL-SEP: 6 CM/S
MV PEAK A VEL: 1.29 M/S
MV PEAK E VEL: 65 CM/S
MV STENOSIS PRESSURE HALF TIME: 31 MS
MV VALVE AREA P 1/2 METHOD: 7.1 CM2
NEUTROPHILS # BLD MANUAL: 13.36 THOUSAND/UL (ref 1.85–7.62)
NEUTS BAND NFR BLD MANUAL: 1 % (ref 0–8)
NEUTS SEG NFR BLD AUTO: 83 % (ref 43–75)
NT-PROBNP SERPL-MCNC: 1778 PG/ML
PHOSPHATE SERPL-MCNC: 2.4 MG/DL (ref 2.3–4.1)
PLATELET # BLD AUTO: 200 THOUSANDS/UL (ref 149–390)
PLATELET BLD QL SMEAR: ADEQUATE
PMV BLD AUTO: 10.3 FL (ref 8.9–12.7)
POTASSIUM SERPL-SCNC: 3.3 MMOL/L (ref 3.5–5.3)
PROCALCITONIN SERPL-MCNC: 0.83 NG/ML
PROT SERPL-MCNC: 7 G/DL (ref 6.4–8.2)
RBC # BLD AUTO: 3.25 MILLION/UL (ref 3.81–5.12)
RIGHT ATRIAL 2D VOLUME: 21 ML
RIGHT ATRIUM AREA SYSTOLE A4C: 10.2 CM2
RIGHT VENTRICLE ID DIMENSION: 2.6 CM
SL CV LV EF: 70
SODIUM SERPL-SCNC: 139 MMOL/L (ref 136–145)
WBC # BLD AUTO: 15.9 THOUSAND/UL (ref 4.31–10.16)

## 2022-05-03 PROCEDURE — 99223 1ST HOSP IP/OBS HIGH 75: CPT | Performed by: INTERNAL MEDICINE

## 2022-05-03 PROCEDURE — 83880 ASSAY OF NATRIURETIC PEPTIDE: CPT | Performed by: INTERNAL MEDICINE

## 2022-05-03 PROCEDURE — 80076 HEPATIC FUNCTION PANEL: CPT | Performed by: INTERNAL MEDICINE

## 2022-05-03 PROCEDURE — 85007 BL SMEAR W/DIFF WBC COUNT: CPT | Performed by: INTERNAL MEDICINE

## 2022-05-03 PROCEDURE — 84145 PROCALCITONIN (PCT): CPT | Performed by: INTERNAL MEDICINE

## 2022-05-03 PROCEDURE — 97163 PT EVAL HIGH COMPLEX 45 MIN: CPT

## 2022-05-03 PROCEDURE — 97167 OT EVAL HIGH COMPLEX 60 MIN: CPT

## 2022-05-03 PROCEDURE — NC001 PR NO CHARGE: Performed by: DERMATOLOGY

## 2022-05-03 PROCEDURE — 85027 COMPLETE CBC AUTOMATED: CPT | Performed by: INTERNAL MEDICINE

## 2022-05-03 PROCEDURE — 92610 EVALUATE SWALLOWING FUNCTION: CPT

## 2022-05-03 PROCEDURE — 83735 ASSAY OF MAGNESIUM: CPT | Performed by: INTERNAL MEDICINE

## 2022-05-03 PROCEDURE — 99232 SBSQ HOSP IP/OBS MODERATE 35: CPT | Performed by: INTERNAL MEDICINE

## 2022-05-03 PROCEDURE — 93306 TTE W/DOPPLER COMPLETE: CPT

## 2022-05-03 PROCEDURE — 84100 ASSAY OF PHOSPHORUS: CPT | Performed by: INTERNAL MEDICINE

## 2022-05-03 PROCEDURE — 80048 BASIC METABOLIC PNL TOTAL CA: CPT | Performed by: INTERNAL MEDICINE

## 2022-05-03 PROCEDURE — 93306 TTE W/DOPPLER COMPLETE: CPT | Performed by: INTERNAL MEDICINE

## 2022-05-03 RX ORDER — POTASSIUM CHLORIDE 20 MEQ/1
40 TABLET, EXTENDED RELEASE ORAL EVERY 4 HOURS
Status: COMPLETED | OUTPATIENT
Start: 2022-05-03 | End: 2022-05-03

## 2022-05-03 RX ORDER — MIDODRINE HYDROCHLORIDE 5 MG/1
5 TABLET ORAL
Status: DISCONTINUED | OUTPATIENT
Start: 2022-05-03 | End: 2022-05-04

## 2022-05-03 RX ORDER — FUROSEMIDE 10 MG/ML
40 INJECTION INTRAMUSCULAR; INTRAVENOUS DAILY
Status: DISCONTINUED | OUTPATIENT
Start: 2022-05-04 | End: 2022-05-04

## 2022-05-03 RX ORDER — HYDROMORPHONE HCL IN WATER/PF 6 MG/30 ML
0.2 PATIENT CONTROLLED ANALGESIA SYRINGE INTRAVENOUS EVERY 6 HOURS PRN
Status: DISCONTINUED | OUTPATIENT
Start: 2022-05-03 | End: 2022-05-11 | Stop reason: HOSPADM

## 2022-05-03 RX ORDER — FUROSEMIDE 10 MG/ML
40 INJECTION INTRAMUSCULAR; INTRAVENOUS ONCE
Status: COMPLETED | OUTPATIENT
Start: 2022-05-03 | End: 2022-05-03

## 2022-05-03 RX ADMIN — MIDODRINE HYDROCHLORIDE 10 MG: 5 TABLET ORAL at 12:03

## 2022-05-03 RX ADMIN — PANTOPRAZOLE SODIUM 20 MG: 20 TABLET, DELAYED RELEASE ORAL at 05:41

## 2022-05-03 RX ADMIN — ATORVASTATIN CALCIUM 80 MG: 80 TABLET, FILM COATED ORAL at 09:49

## 2022-05-03 RX ADMIN — MIDODRINE HYDROCHLORIDE 10 MG: 5 TABLET ORAL at 06:23

## 2022-05-03 RX ADMIN — HEPARIN SODIUM 7500 UNITS: 5000 INJECTION INTRAVENOUS; SUBCUTANEOUS at 05:41

## 2022-05-03 RX ADMIN — METOPROLOL TARTRATE 25 MG: 25 TABLET, FILM COATED ORAL at 20:45

## 2022-05-03 RX ADMIN — LEVETIRACETAM 500 MG: 500 TABLET, FILM COATED ORAL at 09:49

## 2022-05-03 RX ADMIN — FUROSEMIDE 40 MG: 10 INJECTION, SOLUTION INTRAMUSCULAR; INTRAVENOUS at 09:50

## 2022-05-03 RX ADMIN — MAGNESIUM OXIDE TAB 400 MG (241.3 MG ELEMENTAL MG) 400 MG: 400 (241.3 MG) TAB at 20:48

## 2022-05-03 RX ADMIN — POTASSIUM CHLORIDE 40 MEQ: 1500 TABLET, EXTENDED RELEASE ORAL at 20:48

## 2022-05-03 RX ADMIN — HYDROMORPHONE HYDROCHLORIDE 0.2 MG: 0.2 INJECTION, SOLUTION INTRAMUSCULAR; INTRAVENOUS; SUBCUTANEOUS at 14:35

## 2022-05-03 RX ADMIN — MAGNESIUM OXIDE TAB 400 MG (241.3 MG ELEMENTAL MG) 400 MG: 400 (241.3 MG) TAB at 17:51

## 2022-05-03 RX ADMIN — HEPARIN SODIUM 7500 UNITS: 5000 INJECTION INTRAVENOUS; SUBCUTANEOUS at 21:59

## 2022-05-03 RX ADMIN — LEVETIRACETAM 500 MG: 500 TABLET, FILM COATED ORAL at 20:45

## 2022-05-03 RX ADMIN — HEPARIN SODIUM 7500 UNITS: 5000 INJECTION INTRAVENOUS; SUBCUTANEOUS at 17:47

## 2022-05-03 RX ADMIN — METOPROLOL TARTRATE 25 MG: 25 TABLET, FILM COATED ORAL at 09:49

## 2022-05-03 RX ADMIN — OXYCODONE HYDROCHLORIDE 5 MG: 5 TABLET ORAL at 21:59

## 2022-05-03 RX ADMIN — PHENOBARBITAL 32.4 MG: 64.8 TABLET ORAL at 17:57

## 2022-05-03 RX ADMIN — POTASSIUM CHLORIDE 40 MEQ: 1500 TABLET, EXTENDED RELEASE ORAL at 17:53

## 2022-05-03 RX ADMIN — CEFEPIME HYDROCHLORIDE 2000 MG: 2 INJECTION, POWDER, FOR SOLUTION INTRAVENOUS at 00:41

## 2022-05-03 RX ADMIN — PHENOBARBITAL 32.4 MG: 64.8 TABLET ORAL at 09:49

## 2022-05-03 RX ADMIN — MIDODRINE HYDROCHLORIDE 5 MG: 5 TABLET ORAL at 17:52

## 2022-05-03 RX ADMIN — VANCOMYCIN HYDROCHLORIDE 1000 MG: 10 INJECTION, POWDER, LYOPHILIZED, FOR SOLUTION INTRAVENOUS at 09:56

## 2022-05-03 NOTE — ASSESSMENT & PLAN NOTE
POA at [facility]  · S/p IV hydration at [facility], creatinine has stabilized  · Now off IVF  · Trialing IV lasix  · monitor BMP

## 2022-05-03 NOTE — CONSULTS
Vancomycin IV Pharmacy-to-Dose Consultation    Reuben Osorio is a 80 y o  female who was receiving Vancomycin IV with management by the Pharmacy Consult service  The patient's Vancomycin therapy has been discontinued  Thank you for allowing us to take part in this patient's care  Pharmacy will sign-off now; please call or re-consult if there are any questions          Remington Tolliver, pharmacist

## 2022-05-03 NOTE — PLAN OF CARE
No changes from previously reported, safety precaution maintained  Bilateral lower ext  Edema remained, with redness to the extremities, painful to touch, pain med given and effective as patient falls asleep

## 2022-05-03 NOTE — ASSESSMENT & PLAN NOTE
Transferred from [facility] after she developed worsening edema, erythema and pain of the lower extremities despite IV abx for suspected cellulitis  Of note she arrived with EDUARDO and had been on IVF for multiple days prior  · History of CHF and cirrhosis with hypoalbuminemia noted  Also with chronic venous stasis dermatitis so likely there is a component of third spacing causing edema  · Currently on IV cefepime/vancomycin per ID, appreciate input  · CT scan of the bilateral LE were negative for abscess formation  · She was transferred to [facility] for a dermatology evaluation  · There is significant tenderness to palpitation and multiple vesicles, intact and ruptured, which will require wound care and management     · Lower extremity venous dopplers ordered to check for DVT however unable to tolerate given significant pain  · Recent negative duplex March 2021  · S/P 1x dose of IV lasix-will give again  · Appreciate podiatry input as well

## 2022-05-03 NOTE — PLAN OF CARE
Problem: OCCUPATIONAL THERAPY ADULT  Goal: Performs self-care activities at highest level of function for planned discharge setting  See evaluation for individualized goals  Description: Treatment Interventions: ADL retraining,Functional transfer training,UE strengthening/ROM,Endurance training,Cognitive reorientation,Patient/family training,Equipment evaluation/education,Compensatory technique education,Fine motor coordination activities,Energy conservation,Activityengagement          See flowsheet documentation for full assessment, interventions and recommendations  Note: Limitation: Decreased ADL status,Decreased UE ROM,Decreased cognition,Decreased UE strength,Decreased Safe judgement during ADL,Decreased endurance,Decreased sensation,Decreased fine motor control,Decreased self-care trans,Decreased high-level ADLs  Prognosis: Fair  Assessment: Pt is a 81 yo Female who presented to [facility] on 5/2/2022 with B/L LE edema  Pt initially presented to [address] [place] [address] on 4/25 with aspiration PNA, N/V, encephalopathy, and LE cellulitis  Pt WBAT on B/L LE per podiatry  Pt transferred to [facility] on 5/2/2022 per family's request for transfer of care  Pt  has a past medical history of CHF (congestive heart failure) ([facility] [address] ), Liver cirrhosis ([facility] [address] ), and Seizures ([facility] [address] )  Pt greeted bedside for OT evaluation on 5/3/2022  Pt resides with daughter in [facility] with 1 DAVIS  PTA, Per chart review- Pt requires assistance with ADLs from daughter (bathing) and requires assistance with IADLs  Pt completes functional mobility with RW, however, has been utilizing a WC due to B/L LE pain  Pt with supportive daughter who assists with transportation  Pt poor historian and all information retrieved via chart review  Pt demonstrating the following occupational deficits: max A with UB ADLs, total assist with LB ADLs, and max AX2 with rolling in bed   Limitations that impact functional performance include decreased ADL status, decreased UE ROM, decreased UE strength, decreased safe judgement during ADLs, decreased cognition, decreased endurance, decreased fine motor coordination, decreased self care transfers, decreased high level ADLs and pain  Occupational performance areas to address ADL retraining, functional transfer training, UE strengthening/ROM, endurance training, cognitive reorientation, Pt/caregiver education, equipment evaluation/education, fine motor coordination activities, compensatory technique education, energy conservation and activity engagement   Pt would benefit from continued skilled OT services while in hospital to maximize independence with ADLs  Will continue to follow Pt's progress  Pt would benefit from post acute rehabilitation services upon DC to maximize safety and independence with ADLs and functional tasks of choice  OT Discharge Recommendation: Post acute rehabilitation services  OT - OK to Discharge:  Yes

## 2022-05-03 NOTE — CONSULTS
DERMATOLOGY:  CONSULTATION   Olivia Casiano 80 y o  female MRN: [id number]  Unit/Bed#: [facility] 903-01 Encounter: [id number]      Consults      Assessment/Recommendations   Based on a thorough discussion of this condition and the management approach to it (including a comprehensive discussion of the known risks, side effects and potential benefits of treatment), the patient (family) agrees to implement the following specific plan:    1  Edema Blisters w/ underlying Chronic Venous Stasis: Clinically the patient appears volume overloaded w/ pitting edema of the bilateral lower extremities and upper extremities  Also, chronic venous stasis changes appreciated on the bilateral lower extremities  Trial of IV lasix per primary team  Would recommend compression stocking, elevating the lower extremities, and wound care  Please set up discharge follow up by calling our office: [phone])     Thank you for involving me in the care of your patient  Please call with questions, change in clinical status or if tests recommended above are abnormal      Discussed with the primary service  History:     HISTORY OF PRESENT ILLNESS:    Reason for Consult:   HPI: Mingo Bond is a 80y o  year old female w/ PMHx of HTN and cirrhosis presenting with leg edema following aggressive IVF for EDUARDO at [address] [place] [address]  During her hospital course she developed painful edema and weeping vesicles of the lower extremities  Daughter asked for the patient to be transferred to [facility] for further evaluation and specifically a dermatology consult  ROS:  ROS is negative otherwise stated in the HPI         PAST MEDICAL HISTORY:  Past Medical History:   Diagnosis Date    CHF (congestive heart failure) ([address] )     Liver cirrhosis (HCC)     Seizures (HCC)      Past Surgical History:   Procedure Laterality Date    HYSTERECTOMY      IR PICC PLACEMENT SINGLE LUMEN  4/29/2022    LEG SURGERY  05/2016    Pt family cannot recall the specific surgery or which leg it was preformed on  FAMILY HISTORY:  Non-contributory    SOCIAL HISTORY:  Social History   Single  Social History     Substance and Sexual Activity   Alcohol Use Never     Social History     Substance and Sexual Activity   Drug Use No     Social History     Tobacco Use   Smoking Status Never Smoker   Smokeless Tobacco Never Used       ALLERGIES:  Allergies   Allergen Reactions    Penicillins Other (See Comments)     Pt unaware of reaction       MEDICATIONS:  All current active medications have been reviewed         Physical exam:     Temp:  [98 °F (36 7 °C)-99 °F (37 2 °C)] 98 °F (36 7 °C)  HR:  [] 108  Resp:  [16-20] 20  BP: (120-159)/(71-95) 159/95  SpO2:  [86 %-100 %] 93 %  Temp (24hrs), Av 6 °F (37 °C), Min:98 °F (36 7 °C), Max:99 °F (37 2 °C)  Current: Temperature: 98 °F (36 7 °C)    PSYCH: Normal mood and affect  EYES: Normal conjunctiva  ENT: Normal lips and oral mucosa  CARDIOVASCULAR: No edema  RESPIRATORY: Normal respirations  HEME/LYMPH/IMMUNO:  No regional lymphadenopathy except as noted below in ASSESSMENT AND PLAN BY DIAGNOSIS    FULL ORGAN SYSTEM SKIN EXAM (SKIN)  Hair, Scalp, Ears, Face Normal except as noted below in Assessment   Neck, Cervical Chain Nodes Normal except as noted below in Assessment   Right Arm/Hand/Fingers Normal except as noted below in Assessment   Left Arm/Hand/Fingers Normal except as noted below in Assessment   Chest/Breasts/Axillae Viewed areas Normal except as noted below in Assessment   Abdomen, Umbilicus Normal except as noted below in Assessment   Back/Spine Normal except as noted below in Assessment   Groin/Genitalia/Buttocks Viewed areas Normal except as noted below in Assessment   Right Leg, Foot, Toes Chronic venous stasis changes with overlying edema blisters    Left Leg, Foot, Toes Chronic venous stasis changes with overlying edema blisters                   Labs, Imaging, & Other Studies     Lab Results:  I have personally reviewed pertinent labs  Results from last 7 days   Lab Units 05/03/22  0449 05/02/22  0524 05/01/22  0602   WBC Thousand/uL 15 90* 16 91* 19 09*   HEMOGLOBIN g/dL 9 7* 9 4* 9 4*   PLATELETS Thousands/uL 200 187 178     Results from last 7 days   Lab Units 05/03/22  0450 05/02/22  0521 05/01/22  0602   POTASSIUM mmol/L 3 3*   < > 3 8   CHLORIDE mmol/L 103   < > 105   CO2 mmol/L 31   < > 29   BUN mg/dL 8   < > 11   CREATININE mg/dL 0 55*   < > 0 64   EGFR ml/min/1 73sq m 88   < > 83   CALCIUM mg/dL 9 4   < > 8 6   AST U/L 22  --  24   ALT U/L 17  --  16   ALK PHOS U/L 114  --  107    < > = values in this interval not displayed  Pathology:   I have personally reviewed pertinent reports

## 2022-05-03 NOTE — DISCHARGE INSTR - OTHER ORDERS
Discharge Instructions - Podiatry    Weight Bearing Status: Weight bearing as tolerated                    Pain: Continue analgesics as directed    Follow-up appointment instructions: Please make an appointment within one week of discharge with Dr Contreras Ready  Contact sooner if any increase in pain, or signs of infection occur    Wound Care: Leave dressings clean, dry, and intact between professional dressing changes    Nursing Instructions: Please apply Donnalee Quince and DSD to right heel and adaptic/maxorb to lower legs  Then cover with Gauze and secure with Kerlix and tape  Please change dressing every Monday, Wednesday, and Friday   Skin care plans:  1-Hydraguard to bilateral sacrum, buttock, and posterior thighs BID and PRN with latoya-care  2-Elevate heels to offload pressure  3-Ehob cushion in chair when out of bed  4-Moisturize skin daily with skin nourishing cream   5-Turn/reposition q2h for pressure re-distribution on skin    6-B/L legs/heels per podiatry orders above

## 2022-05-03 NOTE — PHYSICAL THERAPY NOTE
Physical Therapy Evaluation    Patient's Name: Lona Montilla    Admitting Diagnosis  Cellulitis of lower extremity [X66 170]    Problem List  Patient Active Problem List   Diagnosis    Cough    Shortness of breath    Acute upper respiratory infection    Alteration in skin integrity    Lower extremity cellulitis    GERD (gastroesophageal reflux disease)    Essential hypertension    Chronic diastolic (congestive) heart failure (HCC)    Localization-related epilepsy, intractable (HCC)    Nonrheumatic aortic valve stenosis    Hyperlipidemia    Pneumonia due to COVID-19 virus    Acute respiratory failure with hypoxia (HCC)    Ambulatory dysfunction    Bilateral lower extremity edema    Morbid obesity ([address] )    Intractable nausea and vomiting    Aspiration pneumonia ([address] )    [facility] )       Past Medical History  Past Medical History:   Diagnosis Date    CHF (congestive heart failure) (HCC)     Liver cirrhosis (HCC)     Seizures (HCC)        Past Surgical History  Past Surgical History:   Procedure Laterality Date    HYSTERECTOMY      IR PICC PLACEMENT SINGLE LUMEN  4/29/2022    LEG SURGERY  05/2016    Pt family cannot recall the specific surgery or which leg it was preformed on         05/03/22 1438   PT Last Visit   PT Visit Date 05/03/22   Note Type   Note type Evaluation   Pain Assessment   Pain Assessment Tool FLACC   Pain Rating: FLACC (Rest) - Face 0   Pain Rating: FLACC (Rest) - Legs 0   Pain Rating: FLACC (Rest) - Activity 0   Pain Rating: FLACC (Rest) - Cry 0   Pain Rating: FLACC (Rest) - Consolability 0   Score: FLACC (Rest) 0   Pain Rating: FLACC (Activity) - Face 1   Pain Rating: FLACC (Activity) - Legs 0   Pain Rating: FLACC (Activity) - Activity 1   Pain Rating: FLACC (Activity) - Cry 2   Pain Rating: FLACC (Activity) - Consolability 1   Score: FLACC (Activity) 5   Restrictions/Precautions   Weight Bearing Precautions Per Order Yes   RLE Weight Bearing Per Order WBAT  (per podiatry)   LLE Weight Bearing Per Order WBAT  (per podiatry)   Other Precautions Cognitive; Chair Alarm; Bed Alarm; Fall Risk;Pain;Multiple lines   Home Living   Type of [address] One level  (1 DAVIS)   Home Equipment Walker; Wheelchair-manual   Prior Function   Level of [place] Needs assistance with ADLs and functional mobility   Lives With Daughter   Receives Help From Family   ADL Assistance Needs assistance   IADLs Needs assistance   Comments Pt is a poor historian, information obtained from chart review  Pt was ambulatory with RW, resides with daughter who assists with ADL's  Recently has required use of W/C due to B/L LE pain and weakness leading to admission  General   Family/Caregiver Present No   Cognition   Overall Cognitive Status Impaired   Arousal/Participation Arousable   Orientation Level Disoriented X4   Following Commands Follows one step commands inconsistently   Comments Pt with limited participation, lethargic   RLE Assessment   RLE Assessment X  (limited secondary to pain with movement, at least 1/5)   LLE Assessment   LLE Assessment X  (limited secondary to pain with movement, at least 1/5)   Bed Mobility   Rolling R 2  Maximal assistance   Additional items Assist x 2; Increased time required;Verbal cues   Rolling L 2  Maximal assistance   Additional items Assist x 2; Increased time required;Verbal cues   Additional Comments Attempted supine to sit transfer, however pt yelling in pain, deferred further mobility    RN assisted with rolling tasks for sheet change due to incontinence, improved tolerance to mobility with repetition   Transfers   Sit to Stand Unable to assess   Activity Tolerance   Activity Tolerance Patient limited by pain   Medical Staff Made Aware Co-evaluation with OT due to medical complexity and limited activity tolerance   Nurse Made Aware RN updated   Assessment   Prognosis Guarded   Problem List Decreased strength;Decreased range of motion;Decreased endurance; Impaired balance;Decreased mobility; Decreased cognition;Decreased safety awareness;Decreased skin integrity;Pain   Assessment Pt is a 80 y o  female seen for PT evaluation s/p admit to [facility] on 5/2/[facility]22  Pt was admitted with a primary dx of: B/L LE edema/cellulitis  Pt evaluated by podiatry, WBAT B/L LE's  PT now consulted for assessment of mobility and d/c needs  Pt with Up and OOB as tolerated  orders  Pts current comorbidities effecting treatment include: CHF, Morbid Obesity, Seizure disorder, Cirrhosis, Aspiration PNA  Pts current clinical presentation is Unstable/ Unpredictable (high complexity) due to Ongoing medical management for primary dx, Decreased activity tolerance compared to baseline, Fall risk, Increased assistance needed from caregiver at current time, Cog status, Trending lab values  Prior to admission, pt was able to ambulate short distance with RW, however recently W/C level secondary to progressive LE pain and weakness  Upon evaluation, pt currently is requiring maxA x2 for bed mobility; unable to progress further secondary to pain  Pt presents at PT eval functioning below baseline and currently w/ overall mobility deficits 2* to: BLE weakness, impaired balance, decreased endurance, pain, decreased activity tolerance compared to baseline, decreased functional mobility tolerance compared to baseline, decreased safety awareness, fall risk  Pt currently at a fall risk 2* to impairments listed above  Pt will continue to benefit from skilled acute PT interventions to address stated impairments; to maximize functional mobility; for ongoing pt/ family training; and DME needs  At conclusion of PT session pt returned BTB and bed alarm engaged with phone and call bell within reach  Pt denies any further questions at this time  Recommend IP rehab upon hospital D/C     Goals   Patient Goals Pt unable to state   STG Expiration Date 05/17/22   Short Term Goal #1 In 14 days pt will be able to: 1  Demonstrate ability to perform all aspects of bed mobility with modA to increase functional independence  2  Improve LE strength grades by 1 to increase ease of functional mobility with transfers and gait  3  Pt will demonstrate improved balance by one grade in order to decrease risk of falls  4  Pt will be able to tolerate sitting EOB > 10 minutes with Srini in preparation for standing activities  PT Treatment Day 0   Plan   Treatment/Interventions Functional transfer training;LE strengthening/ROM; Therapeutic exercise; Endurance training;Cognitive reorientation;Patient/family training;Equipment eval/education; Bed mobility   PT Frequency 2-3x/wk   Recommendation   PT Discharge Recommendation Post acute rehabilitation services   AM-PAC Basic Mobility Inpatient   Turning in Bed Without Bedrails 1   Lying on Back to Sitting on Edge of Flat Bed 1   Moving Bed to Chair 1   Standing Up From Chair 1   Walk in Room 1   Climb 3-5 Stairs 1   Basic Mobility Inpatient Raw Score 6   Turning Head Towards Sound 2   Follow Simple Instructions 2   Low Function Basic Mobility Raw Score 10   Low Function Basic Mobility Standardized Score 14 65   Highest Level Of Mobility   JH-HLM Goal 2: Bed activities/Dependent transfer   JH-HLM Highest Level of Mobility 2: Bed activities/Dependent transfer   JH-HLM Goal Achieved Yes       Nika Thapa, PT, DPT, GCS

## 2022-05-03 NOTE — CONSULTS
Consultation - Infectious Disease   Olivia Alicea [place] 80 y o  female MRN: [id number]  [facility] Encounter: [id number]      IMPRESSION & RECOMMENDATIONS:   Impression/Recommendations:  1  SIRS versus sepsis, POA  WBC, HR  Afebrile  Consider due to initial aspiration event +/- #2  Initial Flu/RSV/COVID PCR, blood cultures CT [facility] negative  Procalcitonin improved after 7 days IV antibiotics  Persistent WBC may be leukomoid to phlebitis, anemia  Hemodynamically stable  Rec:  · Discontinue antibiotics and follow closely  · Follow temperatures closely  · Check CBC in AM  · Supportive care as per the primary service    2  Bilateral leg cellulitis  Difficult to distinguish infection versus edema at this point, although suspect a large component of erythema and blistering on exam due to volume overload at this point  Status post >7 days broad-spectrum antibiotics  Rec:  · Discontinue antibiotics and follow closely  · Continue leg elevation  · Await Podiatry consult for [facility] - consider compression wrap  · Diuresis per SLIM    3   Bilateral heel pressure ulcers  Appear superficial without apparent superinfection  Xrays negative for osteomyelitis  Rec:  · Continue LWC and off-loading per nursing    4  Acute encephalopathy  Suspect toxic-metabolic and multifactorial   Consider role of recent high-dose cefepime  Recent ammonia level negative  Exam non-focal   Rec:  · Discontinue antibiotics as above  · Follow mental status closely    5  Acute hypoxic respiratory failure  Likely multifactorial due to volume overload, aspiration, atelectasis  No clinical or radiographic evidence for active pneumonia  Rec:  · Follow closely off antibiotics  · Diuresis per SLIM  · Aspiration precautions and modified diet  6   Chronic lower extremity venous stasis  7   Alcoholic cirrhosis  Per chart, PCP notes  8   Chronic CHF  9   MO  With ambulatory dysfunction      10   Seizure disorder  The above impression and plan was discussed in detail with Jared Lawler with SLIM  Antibiotics:  Cefepime #6  Vancomycin #6  Antibiotics #9    Thank you for this consultation  We will follow along with you  HISTORY OF PRESENT ILLNESS:  Reason for Consult:  Cellulitis    HPI: Josh Marshall is a 80 y o  female who is a poor historian due to lethargy so the history is obtained through the medical record  She has multiple medical problems and initially presented to [facility] [place] with several days of nausea and vomiting as well as subjective fever  She was initially treated for possible aspiration pneumonia with ceftriaxone and Flagyl  She also had EDUARDO on presentation and receivec aggressive IV hydration  Over the course of her hospital stay she developed increased bilateral leg edema, redness, and blistering and antibiotics were broadened to vancomycin and cefepime  She is also noted to be on supplemental O2  Weight is 14 lbs higher than last weight in early April  Her family requested a transfer [facility] for further evaluation  We are asked to comment on further evaluation and management  In performing this consult, I have reviewed prior admission and outpatient visit records in detail  REVIEW OF SYSTEMS:  Limited to do lethargy  A complete system-based review of systems is otherwise negative  PAST MEDICAL HISTORY:  Past Medical History:   Diagnosis Date    [facility]est[facility]) [facility]     Liver cirrhosis (HCC)     Seizures (HCC)      Past Surgical History:   Procedure Laterality Date    HYSTERECTOMY      IR PICC PLACEMENT SINGLE LUMEN  4/29/2022    LEG SURGERY  05/2016    Pt family cannot recall the specific surgery or which leg it was preformed on         FAMILY HISTORY:  Non-contributory    SOCIAL HISTORY:  Social History     Substance and Sexual Activity   Alcohol Use Never     Social History     Substance and Sexual Activity   Drug Use No     Social History     Tobacco Use   Smoking Status Never Smoker   Smokeless Tobacco Never Used       ALLERGIES:  Allergies   Allergen Reactions    Penicillins Other (See Comments)     Pt unaware of reaction       MEDICATIONS:  All current active medications have been reviewed  PHYSICAL EXAM:  Vitals:  Temp:  [98 °F (36 7 °C)-99 °F (37 2 °C)] 98 °F (36 7 °C)  HR:  [] 108  Resp:  [16-20] 20  BP: (120-159)/(71-95) 159/95  SpO2:  [86 %-100 %] 93 %  Temp (24hrs), Av 6 °F (37 °C), Min:98 °F (36 7 °C), Max:99 °F (37 2 °C)  Current: Temperature: 98 °F (36 7 °C)     Physical Exam:  General:  Well-nourished, well-developed, in no acute distress  Eyes:  Conjunctive clear with no hemorrhages or effusions  Oropharynx:  No ulcers, no lesions  Neck:  Supple, no lymphadenopathy  Lungs:  Diminised respiratory excursion, no accessory muscle use, on 3L O2  Cardiac:  Tachycardic with a regular rhythm, 3+ edema legs to hips with brawny skin changes calves, superficial blistering, blanching erythema  Abdomen:  Soft, non-tender, non-distended  Extremities:  No peripheral cyanosis, clubbing  Superficial dried ulcers bialteral heels  Skin:  No rashes  Neurological:  Moves all four extremities spontaneously, sensation grossly intact    LABS, IMAGING, & OTHER STUDIES:  Lab Results:  I have personally reviewed pertinent labs    Results from last 7 days   Lab Units 22  0450 22  0521 22  0602   POTASSIUM mmol/L 3 3* 3 6 3 8   CHLORIDE mmol/L 103 105 105   CO2 mmol/L 31 31 29   BUN mg/dL 8 8 11   CREATININE mg/dL 0 55* 0 63 0 64   EGFR ml/min/1 73sq m 88 84 83   CALCIUM mg/dL 9 4 8 6 8 6   AST U/L 22  --  24   ALT U/L 17  --  16   ALK PHOS U/L 114  --  107     Results from last 7 days   Lab Units 22  0449 22  0524 22  0602   WBC Thousand/uL 15 90* 16 91* 19 09*   HEMOGLOBIN g/dL 9 7* 9 4* 9 4*   PLATELETS Thousands/uL 200 187 178           Imaging Studies:   I have personally reviewed pertinent imaging study reports and images in PACS  CXR reviewed personally poor inspiratory effort, no focal infilrates    EKG, Pathology, and Other Studies:   I have personally reviewed pertinent reports

## 2022-05-03 NOTE — PROGRESS NOTES
[address] [facility]  Progress Note - Dominci English 1940, 80 y o  female MRN: [id number]  Unit/Bed#: [facility] 903-01 Encounter: [id number]  Primary Care Provider: Miguel Boateng DO   Date and time admitted to hospital: 5/2/2022  4:17 PM    Addendum: echo with moderate AS, G1DD, EF 70%  Spoke with derm, felt to be chronic venous stasis  Will ask for cards input for assistance with diuresis in setting of new moderate AS  * Bilateral lower extremity edema  Assessment & Plan  Transferred from [facility] after she developed worsening edema, erythema and pain of the lower extremities despite IV abx for suspected cellulitis  Of note she arrived with EDUARDO and had been on IVF for multiple days prior  · History of CHF and cirrhosis with hypoalbuminemia noted  Also with chronic venous stasis dermatitis so likely there is a component of third spacing causing edema  · Currently on IV cefepime/vancomycin per ID, appreciate input  · CT scan of the bilateral LE were negative for abscess formation  · She was transferred to [facility] for a dermatology evaluation  · There is significant tenderness to palpitation and multiple vesicles, intact and ruptured, which will require wound care and management     · Lower extremity venous dopplers ordered to check for DVT however unable to tolerate given significant pain  · Recent negative duplex March 2021  · S/P 1x dose of IV lasix-will give again  · Appreciate podiatry input as well    EDUARDO (acute kidney injury) [facility])  Assessment & Plan  POA at [facility]  · S/p IV hydration at [facility], creatinine has stabilized  · Now off IVF  · Trialing IV lasix  · monitor BMP    Aspiration pneumonia [facility])  Assessment & Plan  POA on admission at [facility] vs pneumonitis   · Aspiration precautions  · Modified diet, speech consult will be ordered  · Wean oxygen as able   · On abx as above     Morbid obesity (HCC)  Assessment & Plan  Supportive care  · Turn Q2h  · Weight loss counseling as an outpatient when medically stable    Localization-related epilepsy, intractable ([facility] [address] )  Assessment & Plan  · Continue keppra and phenobarbitol    Chronic diastolic (congestive) heart failure (HCC)  Assessment & Plan  Wt Readings from Last 3 Encounters:   22 101 kg (222 lb)   22 101 kg (222 lb 14 2 oz)   21 93 kg (205 lb)     · No echo on file, will obtain given severe edema + murmur on exam  · Monitor intake and output  · Monitor daily weights  · Does not appear to be on any diuretics at home  · Given multiple days of IVF after normalization of creatinine for EDUARDO she may have some volume overload  · S/P 1x dose of IV Lasix 5/, will give another 5/3  · Consider cards consult             VTE Pharmacologic Prophylaxis:   Moderate Risk (Score 3-4) - Pharmacological DVT Prophylaxis Ordered: heparin  Patient Centered Rounds: I performed bedside rounds with nursing staff today  Discussions with Specialists or Other Care Team Provider: appreciate specialists  TT"D Dermatology on call    Education and Discussions with Family / Patient: Updated  (daughter) via phone  Time Spent for Care: 30 minutes  More than 50% of total time spent on counseling and coordination of care as described above  Current Length of Stay: 1 day(s)  Current Patient Status: Inpatient   Certification Statement: The patient will continue to require additional inpatient hospital stay due to ongoing diuresis, dermatology evaluation   Discharge Plan: Anticipate discharge in >72 hrs to discharge location to be determined pending rehab evaluations  Code Status: Level 1 - Full Code    Subjective:   Pt unable to provide any significnat history  Screams in pain when lower extremities are touched       Objective:     Vitals:   Temp (24hrs), Av 6 °F (37 °C), Min:98 °F (36 7 °C), Max:99 °F (37 2 °C)    Temp:  [98 °F (36 7 °C)-99 °F (37 2 °C)] 98 °F (36 7 °C)  HR:  [] 99  Resp:  [16-20] 20  BP: (120-159)/(71-95) 159/95  SpO2:  [86 %-100 %] 93 %  Body mass index is 44 84 kg/m²  Input and Output Summary (last 24 hours): Intake/Output Summary (Last 24 hours) at 5/3/2022 0837  Last data filed at 5/2/2022 1946  Gross per 24 hour   Intake --   Output 850 ml   Net -850 ml       Physical Exam:   Physical Exam  Vitals and nursing note reviewed  Constitutional:       General: She is not in acute distress  Appearance: She is obese  Comments: On nasal cannula    Cardiovascular:      Rate and Rhythm: Normal rate and regular rhythm  Heart sounds: Murmur heard  Pulmonary:      Effort: No respiratory distress  Comments: Poor effort   Abdominal:      General: There is no distension  Tenderness: There is no abdominal tenderness  Musculoskeletal:      Right lower leg: Edema present  Left lower leg: Edema present  Skin:     Comments: Severe edema of b/l LE, fluid filled blisters noted, some lymphedema type changes appreciated  TTP          Additional Data:     Labs:  Results from last 7 days   Lab Units 05/03/22  0449 05/02/22  0524 05/02/22  0524 04/29/22  0653 04/28/22  1102   WBC Thousand/uL 15 90*   < > 16 91*   < > 15 76*   HEMOGLOBIN g/dL 9 7*   < > 9 4*   < > 11 0*   HEMATOCRIT % 31 6*   < > 30 6*   < > 35 7   PLATELETS Thousands/uL 200   < > 187   < > 174   BANDS PCT %  --   --   --   --  5   LYMPHO PCT %  --   --  13*  --  8*   MONO PCT %  --   --  10  --  10   EOS PCT %  --   --  2  --  0    < > = values in this interval not displayed       Results from last 7 days   Lab Units 05/03/22  0450   SODIUM mmol/L 139   POTASSIUM mmol/L 3 3*   CHLORIDE mmol/L 103   CO2 mmol/L 31   BUN mg/dL 8   CREATININE mg/dL 0 55*   ANION GAP mmol/L 5   CALCIUM mg/dL 9 4   ALBUMIN g/dL 2 0*   TOTAL BILIRUBIN mg/dL 0 34   ALK PHOS U/L 114   ALT U/L 17   AST U/L 22   GLUCOSE RANDOM mg/dL 81                 Results from last 7 days   Lab Units 05/03/22  0450 05/01/22  0908   LACTIC ACID mmol/L  --  0 6   PROCALCITONIN ng/ml 0 83*  --        Lines/Drains:  Invasive Devices  Report    Peripherally Inserted Central Catheter Line            PICC Line 04/29/22 Right Brachial 3 days          Drain            External Urinary Catheter <1 day                Central Line:  Goal for removal: Will discontinue when peripheral access obtained  Imaging: Reviewed radiology reports from this admission including: CT bilateral lower extremities     Recent Cultures (last 7 days):         Last 24 Hours Medication List:   Current Facility-Administered Medications   Medication Dose Route Frequency Provider Last Rate    acetaminophen  650 mg Oral Q6H PRN Fiona Guerrero MD      albuterol  2 puff Inhalation Q4H PRN Fiona Guerrero MD      atorvastatin  80 mg Oral Daily Fiona Guerrero MD      cefepime  2,000 mg Intravenous Q12H Fiona Guerrero MD 2,000 mg (05/03/22 0041)    Diclofenac Sodium  2 g Topical 4x Daily PRN Fiona Guerrero MD      furosemide  40 mg Intravenous Once Bishop Siemens, PA-C      heparin (porcine)  7,500 Units Subcutaneous Q8H [facility] Fiona Guerrero MD      levETIRAcetam  500 mg Oral Q12H Beth Lynne MD      metoprolol tartrate  25 mg Oral Q12H [facility] Fiona Guerrero MD      midodrine  10 mg Oral TID AC Fiona Guerrero MD      ondansetron  4 mg Intravenous Q6H PRN Fiona Guerrero MD      oxyCODONE  2 5 mg Oral Q4H PRN Fiona Guerrero MD      oxyCODONE  5 mg Oral Q4H PRN Fiona Guerrero MD      pantoprazole  20 mg Oral Early Morning Fiona Guerrero MD      PHENobarbital  32 4 mg Oral BID Fiona Guerrero MD      sodium chloride (PF)  3 mL Intravenous Q1H PRN Fiona Guerrero MD      vancomycin  1,000 mg Intravenous Q12H Carmen Bob MD          Today, Patient Was Seen By: Bishop Siemens, PA-C    **Please Note: This note may have been constructed using a voice recognition system  **

## 2022-05-03 NOTE — CONSULTS
Consultation - General Cardiology Team 2  Santiago Mayers 80 y o  female MRN: [id number]  Unit/Bed#: [facility] 903-01 Encounter: [id number]            Inpatient consult to Cardiology     Performed by  Brian Meneses DO     Authorized by Lucille Cadena PA-C            PCP: Fuentes Mendez DO   Outpatient Cardiologist: Dr Ingrid Mao at [facility]    History of Present Illness   Physician Requesting Consult: Jinny Chandler DO  Reason for Consult / Principal Problem: Moderate aortic Stenosis w/ LE edema and assistance w/ diuresis       HPI: Santiago Mayers is a 80y o  year old female with a history of Chronic diastolic congestive HF (recent EF of 99%, grade 1 diastolic dysf, noted moderate AS), HTN, Seizure disorder, cirrhosis, GERD presented for SOB at [facility] and initially admitted to [facility] for aspiration pneumonia and EDUARDO  During that time she received IV antbx (7 days) and IVF  EDUARDO resolved w/ fluids  During her hospital course she developed painful edema and erythema of the lower extremities with weeping vesicles  Her daughter reports that she was not able to get an answer from the physicians there and requested a transfer to a higher level of care and for dermatology evaluation that , felt to be chronic venous stasis    Initially put IV cefepime/vancomycin for suspected cellulitis and stopped today by Id and hard to determine if infection vs edema  Lower extremity venous dopplers ordered to check for DVT however unable to tolerate given significant pain  Recent negative duplex March 2021    Cardiology was consulted for noted moderate aortic stenosis that was unsure if it was new and for assistance and diuresis  Patient's chart showed that patient follow with a cardiologist at [facility] and has had moderate aortic stenosis back in 2021 as well as in 2018 on echocardiogram   Patient had good urine output in response to 20 mg of IV Lasix on day of transfer    Patient was given 40 mg of IV Lasix 05/03 and at about 2 L of urine output as well  Patient was not a good historian when evaluated but denied chest pain, shortness of breath, and abdominal pain  Patient screamed when LE were touched both in the legs and feet  Review of Systems   Unable to perform ROS: Other   Constitutional:        Patient is noted to not be a good historian and could not answer appropriate ROS questions     ROS as noted above  Historical Information   Past Medical History:   Diagnosis Date    CHF (congestive heart failure) ([address] )     Liver cirrhosis (HCC)     Seizures (HCC)      Past Surgical History:   Procedure Laterality Date    HYSTERECTOMY      IR PICC PLACEMENT SINGLE LUMEN  4/29/2022    LEG SURGERY  05/2016    Pt family cannot recall the specific surgery or which leg it was preformed on  Social History     Substance and Sexual Activity   Alcohol Use Never     Social History     Substance and Sexual Activity   Drug Use No     Social History     Tobacco Use   Smoking Status Never Smoker   Smokeless Tobacco Never Used     Family History: No family history on file      Meds/Allergies   Hospital Medications:   Current Facility-Administered Medications   Medication Dose Route Frequency    acetaminophen (TYLENOL) tablet 650 mg  650 mg Oral Q6H PRN    albuterol (PROVENTIL HFA,VENTOLIN HFA) inhaler 2 puff  2 puff Inhalation Q4H PRN    atorvastatin (LIPITOR) tablet 80 mg  80 mg Oral Daily    Diclofenac Sodium (VOLTAREN) 1 % topical gel 2 g  2 g Topical 4x Daily PRN    heparin (porcine) subcutaneous injection 7,500 Units  7,500 Units Subcutaneous Q8H [address]    HYDROmorphone HCl (DILAUDID) injection 0 2 mg  0 2 mg Intravenous Q6H PRN    levETIRAcetam (KEPPRA) tablet 500 mg  500 mg Oral Q12H [address]    magnesium oxide (MAG-OX) tablet 400 mg  400 mg Oral BID    metoprolol tartrate (LOPRESSOR) tablet 25 mg  25 mg Oral Q12H ARACELI    midodrine (PROAMATINE) tablet 5 mg  5 mg Oral TID AC    ondansetron (ZOFRAN) injection 4 mg 4 mg Intravenous Q6H PRN    oxyCODONE (ROXICODONE) IR tablet 2 5 mg  2 5 mg Oral Q4H PRN    oxyCODONE (ROXICODONE) IR tablet 5 mg  5 mg Oral Q4H PRN    pantoprazole (PROTONIX) EC tablet 20 mg  20 mg Oral Early Morning    PHENobarbital tablet 32 4 mg  32 4 mg Oral BID    potassium chloride (K-DUR,KLOR-CON) CR tablet 40 mEq  40 mEq Oral Q4H    sodium chloride (PF) 0 9 % injection 3 mL  3 mL Intravenous Q1H PRN     Home Medications:   Medications Prior to Admission   Medication    acetaminophen (TYLENOL) 325 mg tablet    albuterol (PROVENTIL HFA,VENTOLIN HFA) 90 mcg/act inhaler    atorvastatin (LIPITOR) 80 mg tablet    cholecalciferol (VITAMIN D3) 1,000 units tablet    folic acid (FOLVITE) 1 mg tablet    guaiFENesin (ROBITUSSIN) 100 mg/5 mL oral solution    levETIRAcetam (KEPPRA) 500 mg tablet    levETIRAcetam (KEPPRA) 500 mg tablet    lidocaine (LIDODERM) 5 %    metoprolol tartrate (LOPRESSOR) 25 mg tablet    multivitamin-minerals (CENTRUM ADULTS) tablet    omeprazole (PriLOSEC) 20 mg delayed release capsule    PHENobarbital 64 8 mg tablet       Allergies   Allergen Reactions    Penicillins Other (See Comments)     Pt unaware of reaction       Objective   Vitals: Blood pressure 116/84, pulse 82, temperature 99 °F (37 2 °C), resp  rate 20, height 4' 11" (1 499 m), weight 101 kg (222 lb), SpO2 91 %    Orthostatic Blood Pressures      Most Recent Value   Blood Pressure 116/84 filed at 05/03/2022 1444            Invasive Devices  Report    Peripherally Inserted Central Catheter Line            PICC Line 04/29/22 Right Brachial 4 days          Drain            External Urinary Catheter <1 day                Physical Exam    GEN: Otilia Mcclellan appears well, alert and oriented x 3, pleasant and cooperative   HEENT:  Normocephalic, atraumatic, anicteric, moist mucous membranes  NECK: No JVD or carotid bruits   HEART: Regular rhythm, Regular rate, normal S1 and decreased S2, crescendo-decrescendo 3/6 Murmur w/o palpable thrill appreciated in aortic auscultation area, clicks, gallops or rubs   LUNGS: Clear to auscultation bilaterally; no wheezes, rales, or rhonchi; respiration nonlabored   ABDOMEN:  Normoactive bowel sounds, soft, no tenderness, no distention  EXTREMITIES: peripheral pulses palpable; 2+ pitting edema bilateral to below knees  NEURO: no gross focal findings; cranial nerves grossly intact   SKIN:  Dry, intact, warm to touch, noted weeping vesicles appreciated in bilat LE    Lab Results:   CBC with diff:   Results from last 7 days   Lab Units 05/03/22  0449   WBC Thousand/uL 15 90*   RBC Million/uL 3 25*   HEMOGLOBIN g/dL 9 7*   HEMATOCRIT % 31 6*   MCV fL 97   MCH pg 29 8   MCHC g/dL 30 7*   RDW % 15 4*   MPV fL 10 3   PLATELETS Thousands/uL 200     CMP:   Results from last 7 days   Lab Units 05/03/22  0450   SODIUM mmol/L 139   CHLORIDE mmol/L 103   CO2 mmol/L 31   BUN mg/dL 8   CREATININE mg/dL 0 55*   CALCIUM mg/dL 9 4   AST U/L 22   ALT U/L 17   ALK PHOS U/L 114   EGFR ml/min/1 73sq m 88     HS Troponin:   0   Lab Value Date/Time    HSTNI0 21 01/19/2022 0903     BNP:   Results from last 7 days   Lab Units 05/03/22  0450   POTASSIUM mmol/L 3 3*   CHLORIDE mmol/L 103   CO2 mmol/L 31   BUN mg/dL 8   CREATININE mg/dL 0 55*   CALCIUM mg/dL 9 4   EGFR ml/min/1 73sq m 88     Coags:     TSH:     Magnesium:   Results from last 7 days   Lab Units 05/03/22  0450   MAGNESIUM mg/dL 1 6     Lipid Profile:     Results from last 7 days   Lab Units 05/03/22  0450   NT-PRO BNP pg/mL 1,778*     Results from last 7 days   Lab Units 05/03/22  0450 05/02/22  0521 05/01/22  0602   POTASSIUM mmol/L 3 3* 3 6 3 8   CO2 mmol/L 31 31 29   CHLORIDE mmol/L 103 105 105   BUN mg/dL 8 8 11   CREATININE mg/dL 0 55* 0 63 0 64     Results from last 7 days   Lab Units 05/03/22  0449 05/02/22  0524 05/01/22  0602   HEMOGLOBIN g/dL 9 7* 9 4* 9 4*   HEMATOCRIT % 31 6* 30 6* 30 9*   PLATELETS Thousands/uL 200 187 178 Imaging: I have personally reviewed pertinent reports  Telemetry:   No telemetry     EKG:   Date: 04/25/2022  Interpretation: Sinus Tachycardia w/ HR of 120, Normal Axis,       Previous STRESS TEST:  05/03/2022: TTE Left Ventricle: Left ventricular cavity size is normal  Wall thickness is normal  The left ventricular ejection fraction is 70%  Systolic function is hyperdynamic  Wall motion cannot be accurately assessed  Diastolic function is mildly abnormal, consistent with grade I (abnormal) relaxation    Right Ventricle: Right ventricular cavity size is normal  Systolic function is normal     Aortic Valve: The aortic valve is trileaflet  The leaflets are moderately thickened  The leaflets are moderately calcified  There is probably moderately reduced mobility  There is moderate stenosis based on a limited assessment of hemodynamic data, the accuracy of which is limited by increased LVOT velocities  The aortic valve peak velocity is 3 78 m/s (increased due to a combination of aortic stenosis and LVOT obstruction)  The aortic valve peak gradient is 58 mmHg  The aortic valve mean gradient is 37 mmHg  The DVI is 0 39  There is also a dynamic LVOT gradient upto 36 mm of Hg  The valve was very poorly visualized and LVOT diameter could not be measured    Mitral Valve: There is moderate annular calcification  There is mild regurgitation    Study Details: Study quality was poor  Previous Cath/PCI:  No results found for this or any previous visit  ECHO:  No results found for this or any previous visit  No results found for this or any previous visit  HOLTER  No results found for this or any previous visit          VTE Prophylaxis: Heparin       Assessment/Plan         Acute on Chronic diastolic (congestive) heart failure   Impression: Likely in the setting of getting appropriate IVF for treatment of sepsis and due to underlying HF, became volume overloaded since heart was not able to appropriately compensate fluid/volume status  Likely had baseline LE edema prior to coming into the hospital given chronic venous stasis  I/Os = -1150ml (was -850 yest)  Daily Wts: 222lbs     Plan   - continue IV lasix 40mg daily give had good UOP of about 2L today in response to daily 40mg IV  - Monitor intake and output  - Monitor daily weights    HTN  - can reduce midodrine to 5mg TID and then stop after 1 more day; initially put on midodrine for what seems like hypotension while at [facility]  - continue home metoprolol     Aortic stenosis  Moderate stenosis consistent with past echos  Had a decreased S2 clinically indicated can having some progression of moderate stenosis     W/U:   1  Echo February 2019 showed normal EF, mild pulmonary hypertension and mild-moderate aortic stenosis Vmax 2 6 m/sec, mean gradient 19 mmHg  2  TTE 7/13/21 - Normal EF, moderate AS, Vm 3 7 m/sec, mean 34 mmHg, DD1  Plan  - no indication for  Procedural intervention at this time given moderate stenosis    - continue w/ diuresis              Case discussed and reviewed with Dr Rubi Esquivel and is pending their agreement of the assessment and plan  Thank you for involving us in the care of your patient         ==========================================================================================    Counseling / Coordination of Care  Total floor / unit time spent today 45 minutes minutes  Greater than 50% of total time was spent with the patient and / or family counseling and / or coordination of care  Epic/ Allscripts/Care Everywhere records reviewed: Yes    ** Please Note: Fluency DirectDictation voice to text software may have been used in the creation of this document   **

## 2022-05-03 NOTE — ASSESSMENT & PLAN NOTE
POA on admission at [facility] vs pneumonitis   · Aspiration precautions  · Modified diet, speech consult will be ordered  · Wean oxygen as able   · On abx as above

## 2022-05-03 NOTE — PLAN OF CARE
Problem: PHYSICAL THERAPY ADULT  Goal: Performs mobility at highest level of function for planned discharge setting  See evaluation for individualized goals  Description: Treatment/Interventions: Functional transfer training,LE strengthening/ROM,Therapeutic exercise,Endurance training,Cognitive reorientation,Patient/family training,Equipment eval/education,Bed mobility          See flowsheet documentation for full assessment, interventions and recommendations  Note: Prognosis: Guarded  Problem List: Decreased strength,Decreased range of motion,Decreased endurance,Impaired balance,Decreased mobility,Decreased cognition,Decreased safety awareness,Decreased skin integrity,Pain  Assessment: Pt is a 80 y o  female seen for PT evaluation s/p admit to [facility] on 5/2/2022  Pt was admitted with a primary dx of: B/L LE edema/cellulitis  Pt evaluated by podiatry, WBAT B/L LE's  PT now consulted for assessment of mobility and d/c needs  Pt with Up and OOB as tolerated  orders  Pts current comorbidities effecting treatment include: CHF, Morbid Obesity, Seizure disorder, Cirrhosis, Aspiration PNA  Pts current clinical presentation is Unstable/ Unpredictable (high complexity) due to Ongoing medical management for primary dx, Decreased activity tolerance compared to baseline, Fall risk, Increased assistance needed from caregiver at current time, Cog status, Trending lab values  Prior to admission, pt was able to ambulate short distance with RW, however recently W/C level secondary to progressive LE pain and weakness  Upon evaluation, pt currently is requiring maxA x2 for bed mobility; unable to progress further secondary to pain    Pt presents at PT eval functioning below baseline and currently w/ overall mobility deficits 2* to: BLE weakness, impaired balance, decreased endurance, pain, decreased activity tolerance compared to baseline, decreased functional mobility tolerance compared to baseline, decreased safety awareness, fall risk  Pt currently at a fall risk 2* to impairments listed above  Pt will continue to benefit from skilled acute PT interventions to address stated impairments; to maximize functional mobility; for ongoing pt/ family training; and DME needs  At conclusion of PT session pt returned BTB and bed alarm engaged with phone and call bell within reach  Pt denies any further questions at this time  Recommend IP rehab upon hospital D/C  PT Discharge Recommendation: Post acute rehabilitation services          See flowsheet documentation for full assessment

## 2022-05-03 NOTE — SPEECH THERAPY NOTE
Speech-Language Pathology Bedside Swallow Evaluation    Patient Name: Josh Marshall    IGYTD'D Date: 5/3/2022     Problem List  Principal Problem:    Bilateral lower extremity edema  Active Problems:    Chronic diastolic (congestive) heart failure ([facility] )    Localization-related epilepsy, intractable ([facility] )    Morbid obesity ([facility] )    Aspiration pneumonia ([facility] )    EDUARDO (acute kidney injury) ([facility] )    Past Medical History  Past Medical History:   Diagnosis Date    CHF (congestive heart failure) ([facility] )     Liver cirrhosis ([facility] )     Seizures ([facility] )      Past Surgical History  Past Surgical History:   Procedure Laterality Date    HYSTERECTOMY      IR PICC PLACEMENT SINGLE LUMEN  4/29/2022    LEG SURGERY  05/2016    Pt family cannot recall the specific surgery or which leg it was preformed on  Summary  Pt presented with s/s suggestive of mild oropharyngeal dysphagia  Symptoms or concerns included decreased mastication and suspected decreased control of liquids/premature spillage, as well as suspected pharyngeal swallow delay  No s/s aspiration with trials offered today  Pt had a recent VBS at [address] [place] [address], see below  ST will f/u briefly for diet tolerance and to determine need for further diet adjustment is needed  Risk/s for Aspiration: low    Recommended Diet: mechanically altered/level 2 diet and thin liquids   Recommended Form of Meds: whole with puree  Aspiration precautions and swallowing strategies: upright posture  Other Recommendations: Continue frequent oral care      Current Medical Status per H&P 5/2/22  Josh Marshall is a 80 y o  female with a PMH of CHF and dirrhosis who presents with leg edema  History gathering is limited due to the patient's medical condition  She does report some shortness of breath and painful swelling in her legs  She was initially admitted to [facility] for pneumonia and EDUARDO  During that time she received ceftriaxone and IVF   During her hospital course she developed painful edema and erythema of the lower extremities with weeping vesicles  Her daughter reports that she was not able to get an answer from the physicians there and requested a transfer to a higher level of care  Special Studies:  CXR 4/30/22   IMPRESSION:  Right PICC line in place  No acute cardiopulmonary disease  VBS 4/27/22   Assessment Summary; Pt presents with mild oropharyngeal dysphagia characterized by reduced mastication (regular solids) and prolonged oral transfer  Reduced posterior containment and delayed swallow response resulted in spillage over the base of tongue and pooling into the valleculae and pyriforms  All material noted to clear upon trigger of pharyngeal swallow  No shadi aspiration or airway invasion was apparent, though view in fluoro very limited 2/2 pt body habitus and positioning  Will continue to manage closely at bedside  No changes in respiratory status or audible wheeze evident throughout the study (prev seen at bedside)  *Images available for direct review in PACS   Diagnosis/Prognosis;  mild oropharyngeal dysphagia    good prognosis for continued safe po intake given findings outlined above  Prognosis Considerations: respiratory status  Recommendations; Recommend mechanically altered/level 2 diet and thin liquids, with upright posture, only feed when fully alert, slow rate of feeding, small bites/sips and alternating bites and sips  Recommended Form of Meds: whole with puree   Aspiration precautions   If a dedicated assessment of the esophagus is desired, consider esophagram/barium swallow       Social/Education/Vocational Hx:  Pt lives with family    Swallow Information   Current Risks for Dysphagia & Aspiration: known history of dysphagia  Current Diet: mechanically altered/level 2 diet and thin liquids   Baseline Diet: mechanically altered/level 2 diet and thin liquids (SL Mo)      Baseline Assessment   Behavior/Cognition: alert  Speech/Language Status: able to participate in basic conversation and able to follow commands  Patient Positioning: upright in bed  Pain Status/Interventions/Response to Interventions: No report of or nonverbal indications of pain  Swallow Mechanism Exam  Facial: symmetrical  Labial: WFL  Lingual: WFL  Velum: symmetrical  Mandible: adequate ROM  Dentition: edentulous  Vocal quality:clear/adequate   Volitional Cough: adequate   Respiratory Status: on 2L O2      Consistencies Assessed and Performance   Consistencies Administered: thin liquids, puree and solid (danielle cracker)     Oral Stage: mild, decreased mastication and suspected decreased control of liquids      Pharyngeal Stage: mild and suspected pharyngeal swallow delay  No coughing, throat clearing, change in vocal quality or respiratory status noted today  Esophageal Concerns: hx GERD      Summary and Recommendations (see above)    Results Reviewed with: patient and RN     Treatment Recommended: yes     Frequency of treatment: 2-3x f/u    Dysphagia LTG  -Patient will demonstrate safe and effective oral intake (without overt s/s significant oral/pharyngeal dysphagia including s/s penetration or aspiration) for the highest appropriate diet level  Short Term Goals:  -Pt will tolerate Dysphagia 2/mechanical soft diet and thin liquid with no significant s/s oral or pharyngeal dysphagia across 1-3 diagnostic sessions

## 2022-05-03 NOTE — CONSULTS
Podiatry - Consultation    Patient Information:   Trudi Cronin 80 y o  female MRN: [id number]  [facility] Encounter: [id number]  PCP: Vineet Scott DO  Date of Admission:  5/2/2022  Date of Consultation: 05/03/22  Requesting Physician: Lauren Solis DO      ASSESSMENT:    Trudi Cronin is a 80 y o  female with:    1  Right heel wound-POA  2  Bilateral LE edema with skin blistering-POA  3  Bilateral LE venous stasis dermatitis    PLAN:    · No acute need for podiatric surgical intervention at this time  Bilateral LE wounds and blisters stable, continue local wound care  Podiatry will continue to follow weekly for LE wound evaluation  · Local wound care consisting of dermagran/DSD to right heel and adaptic/maxorb/DSD/light ACE wrap to bilateral legs  Wound care instructions placed  · Elevation on green foam wedges or pillows when non-ambulatory, prevalon boot to RLE to be worn while NWB  · Rest of care per primary team   · Will discuss this plan with my attending and update as needed  Weightbearing status: as tolerated    SUBJECTIVE:    History of Present Illness:    Trudi Cronin is a 80 y o  female who is originally admitted 5/2/2022 due to worsening edema, erythema and pain of lower extremities  Patient has a past medical history of morbid obesity, EDUARDO, chronic heart failure, aspiration pneumonia, bilateral LE edema  We are consulted for bilateral LE edema and wounds  Medical history was limited due to patients medical condition  Patient reports pain to bilateral lower legs  No history of wounds could be obtained per chart review        Review of Systems:  Unable to obtain due to patients medical condition    Past Medical and Surgical History:     Past Medical History:   Diagnosis Date    [facility]     Liver cirrhosis [facility] )     Seizures ([facility] )        Past Surgical History:   Procedure Laterality Date    HYSTERECTOMY      IR PICC PLACEMENT SINGLE LUMEN 4/29/2022    LEG SURGERY  05/2016    Pt family cannot recall the specific surgery or which leg it was preformed on  Meds/Allergies:    Medications Prior to Admission   Medication    acetaminophen (TYLENOL) 325 mg tablet    albuterol (PROVENTIL HFA,VENTOLIN HFA) 90 mcg/act inhaler    atorvastatin (LIPITOR) 80 mg tablet    cholecalciferol (VITAMIN D3) 1,000 units tablet    folic acid (FOLVITE) 1 mg tablet    guaiFENesin (ROBITUSSIN) 100 mg/5 mL oral solution    levETIRAcetam (KEPPRA) 500 mg tablet    levETIRAcetam (KEPPRA) 500 mg tablet    lidocaine (LIDODERM) 5 %    metoprolol tartrate (LOPRESSOR) 25 mg tablet    multivitamin-minerals (CENTRUM ADULTS) tablet    omeprazole (PriLOSEC) 20 mg delayed release capsule    PHENobarbital 64 8 mg tablet       Allergies   Allergen Reactions    Penicillins Other (See Comments)     Pt unaware of reaction       Social History:     Marital Status: Single    Substance Use History:   Social History     Substance and Sexual Activity   Alcohol Use Never     Social History     Tobacco Use   Smoking Status Never Smoker   Smokeless Tobacco Never Used     Social History     Substance and Sexual Activity   Drug Use No       Family History:    No family history on file  OBJECTIVE:    Vitals:   Blood Pressure: 159/95 (05/03/22 0659)  Pulse: 99 (05/03/22 0659)  Temperature: 98 °F (36 7 °C) (05/03/22 0659)  Respirations: 20 (05/03/22 0659)  Height: 4' 11" (149 9 cm) (05/02/22 1618)  Weight - Scale: 101 kg (222 lb) (05/02/22 1618)  SpO2: 93 % (05/03/22 0659)    Physical Exam:    Lower Extremity:  Vascular:   Right DP pulse is Dopplerable  Left DP and PT pulses are Dopplerable  CRT < 3 seconds at the digits  +3/4 edema noted at bilateral lower extremities  Pedal hair is absent  Skin temperature is WNL bilaterally  Musculoskeletal:  MMT and ROM unable to evaluate due to patients condition  Pain on palpation of bilateral leg, foot, ankle  No gross deformities noted  Dermatological:  Lower extremity wound(s) as noted below:    Wound #: 1  Location: right heel  Length 3cm: Width 2cm: Depth 0 1cm:   Deepest Tissue Noted in Base: subcutaneous  Probe to Bone: No  Peripheral Skin Description: Attached  Granulation: 100% Fibrotic Tissue: 0% Necrotic Tissue: 0%   Drainage Amount: minimal, clear  Signs of Infection: No      Bilateral venous stasis skin changes with serous, superficial blistering of bilateral legs  Severe edema of bilateral LE  No purulence, no malodor, no ascending erythema, no crepitus, no fluctuance  Neurological:  Gross sensation is intact  Protective sensation is intact  Patient Denies numbness and/or paresthesias  Clinical Images 05/03/22: Additional data:     Lab Results: I have personally reviewed pertinent labs including:    Results from last 7 days   Lab Units 05/03/22  0449 05/02/22  0524 05/02/22  0524   WBC Thousand/uL 15 90*   < > 16 91*   HEMOGLOBIN g/dL 9 7*   < > 9 4*   HEMATOCRIT % 31 6*   < > 30 6*   PLATELETS Thousands/uL 200   < > 187   LYMPHO PCT %  --   --  13*   MONO PCT %  --   --  10   EOS PCT %  --   --  2    < > = values in this interval not displayed  Results from last 7 days   Lab Units 05/03/22  0450   POTASSIUM mmol/L 3 3*   CHLORIDE mmol/L 103   CO2 mmol/L 31   BUN mg/dL 8   CREATININE mg/dL 0 55*   CALCIUM mg/dL 9 4   ALK PHOS U/L 114   ALT U/L 17   AST U/L 22           Cultures: I have personally reviewed pertinent cultures including:              Imaging: I have personally reviewed pertinent reports in PACS  EKG, Pathology, and Other Studies: I have personally reviewed pertinent reports  ** Please Note: Portions of the record may have been created with voice recognition software  Occasional wrong word or "sound a like" substitutions may have occurred due to the inherent limitations of voice recognition software   Read the chart carefully and recognize, using context, where substitutions have occurred   **

## 2022-05-03 NOTE — CASE MANAGEMENT
Case Management Assessment & Discharge Planning Note    Patient name Bill Root  Location [facility] MRN [id number]  : 1940 Date 5/3/2022       Current Admission Date: 2022  Current Admission Diagnosis:Bilateral lower extremity edema   Patient Active Problem List    Diagnosis Date Noted    Intractable nausea and vomiting 2022    [facility] pneumonia [facility] 2022    [facility] 2022    Morbid obesity ([facility] ) 2021    Ambulatory dysfunction 2021    Bilateral lower extremity edema 2021    Acute respiratory failure with hypoxia ([facility] ) 2021    GERD (gastroesophageal reflux disease) 2020    Essential hypertension 2020    Chronic diastolic (congestive) heart failure ([facility] ) 2020    Nonrheumatic aortic valve stenosis 2020    Hyperlipidemia 2020    Pneumonia due to COVID-19 virus 2020    Localization-related epilepsy, intractable ([facility] ) 02/15/2019    Lower extremity cellulitis 2018    Cough 2018    Shortness of breath 2018    Acute upper respiratory infection 2018    Alteration in skin integrity 2018      LOS (days): 1  Geometric Mean LOS (GMLOS) (days): 4 60  Days to GMLOS:3 8     OBJECTIVE:  PATIENT READMITTED TO HOSPITAL  Risk of Unplanned Readmission Score: 14         Current admission status: Inpatient       Preferred Pharmacy:   [address] [facility], [facility] [facility] [address]  Phone: [phone] Fax: [phone]    Primary Care Provider: Hayder Lee DO    Primary Insurance: MEDICARE  Secondary Insurance: [address]    ASSESSMENT:  [address] [facility] Representative - Daughter   Primary Phone: [phone] (Mobile)  Home Phone: [phone]                              Patient Information  Admitted from[de-identified] Other (comment) [facility])  Mental Status: Alert  During Assessment patient was accompanied by: Not accompanied during assessment  Assessment information provided by[de-identified] Daughter Lianet Sandoval)  Primary Caregiver: Child (daughter Stacy Churchill)  [address] Name[de-identified] Yajaira Augustine Relationship to Patient[de-identified] Family Member  Caregiver's Telephone Number[de-identified] [phone]  Support Systems: Daughter  What city do you live in?: [place] [facility] [address] entry access options   Select all that apply : No steps to enter home  Type of Current Residence: [facility]  In the last 12 months, was there a time when you were not able to pay the mortgage or rent on time?: No  In the last 12 months, how many places have you lived?: 1  In the last 12 months, was there a time when you did not have a steady place to sleep or slept in a shelter (including now)?: No  Living Arrangements: Lives w/ Daughter (son in law and grandson)    Activities of Daily Living Prior to Admission  Functional Status: Assistance  Completes ADLs independently?: No  Level of ADL dependence: Assistance (daughter states that pt is able to feed herself, otherwise the daughter completes the task for her mother)  Ambulates independently?: No  Level of ambulatory dependence: Assistance  Does patient use assisted devices?: Yes  Assisted Devices (DME) used: Annalee Ascencio (daughter states she is able to walk household distances and then out to the car   she does however need assistance getting in to the car)  Does patient currently own DME?: Yes  What DME does the patient currently own?: Annalee Ascencio  Does patient have a history of Outpatient Therapy (PT/OT)?: No  Does the patient have a history of Short-Term Rehab?: Yes ([address])  Does patient have a history of HHC?: Yes (was using [facility] [address] for wound care)  Does patient currently have [address]?: No (states that they signed off 3 weeks ago)         Patient Information Continued  Income Source: Pension/FPC  Does patient have prescription coverage?: Yes  Within the past 12 months, you worried that your food would run out before you got the money to buy more : Never true  Within the past 12 months, the food you bought just didnt last and you didnt have money to get more : Never true  Food insecurity resource given?: N/A  Does patient receive dialysis treatments?: No  Does patient have a history of substance abuse?: No  Does patient have a history of Mental Health Diagnosis?: No         Means of Transportation  Means of Transport to Appts[de-identified] Family transport (transport provided by daughter, or son in law)  In the past 12 months, has lack of transportation kept you from medical appointments or from getting medications?: No  In the past 12 months, has lack of transportation kept you from meetings, work, or from getting things needed for daily living?: No        DISCHARGE DETAILS:    Discharge planning discussed with[de-identified] dexter Salmeron Kelly of Choice: Yes     CM contacted family/caregiver?: Yes  Were Treatment Team discharge recommendations reviewed with patient/caregiver?: Yes  Did patient/caregiver verbalize understanding of patient care needs?: Yes  Were patient/caregiver advised of the risks associated with not following Treatment Team discharge recommendations?: Yes    Contacts  Patient Contacts: Gardenia Jones  Relationship to Patient[de-identified] Family  Contact Method: Phone  Phone Number: [phone]  Reason/Outcome: Continuity of Care,Discharge Planning      Other Referral/Resources/Interventions Provided:  Referral Comments: PT/OT ordered at this time - awaiting recommendations - [place] [id number] [place] of [place] and would like a referral to [facility]       Additional Comments: daughter states that her mother has received 2 COVID vaccines (moderna) - she has NOT received the booster at this time                  CM reviewed d/c planning process including the following: identifying help at home, patient preference for d/c planning needs, Discharge Lounge, [facility] Meds to Bed program, availability of treatment team to discuss questions or concerns patient and/or family may have regarding understanding medications and recognizing signs and symptoms once discharged  CM also encouraged patient to follow up with all recommended appointments after discharge  Patient advised of importance for patient and family to participate in managing patients medical well being  Patient/caregiver received discharge checklist  Content reviewed  Patient/caregiver encouraged to participate in discharge plan of care prior to discharge home

## 2022-05-03 NOTE — OCCUPATIONAL THERAPY NOTE
Occupational Therapy Evaluation     Patient Name: Claudia Wu  VJFWD'Y Date: 5/3/2022  Problem List  Principal Problem:    Bilateral lower extremity edema  Active Problems:    Chronic diastolic (congestive) heart failure ([facility] )    Localization-related epilepsy, intractable (HCC)    Morbid obesity ([facility] )    Aspiration pneumonia ([facility] )    EDUARDO (acute kidney injury) ([facility] )    Past Medical History  Past Medical History:   Diagnosis Date    CHF (congestive heart failure) ([facility] )     Liver cirrhosis ([facility] )     Seizures ([facility] )      Past Surgical History  Past Surgical History:   Procedure Laterality Date    HYSTERECTOMY      IR PICC PLACEMENT SINGLE LUMEN  4/29/2022    LEG SURGERY  05/2016    Pt family cannot recall the specific surgery or which leg it was preformed on              05/03/22 1437   OT Last Visit   OT Visit Date 05/03/22   Note Type   Note type Evaluation   Restrictions/Precautions   Weight Bearing Precautions Per Order Yes   RLE Weight Bearing Per Order WBAT  (per podiatry note)   LLE Weight Bearing Per Order WBAT  (per podiatry note)   Other Precautions Cognitive; Chair Alarm; Bed Alarm; Fall Risk;Pain;Multiple lines   Pain Assessment   Pain Assessment Tool FLACC   Pain Location/Orientation Orientation: Bilateral;Location: Leg   Pain Rating: FLACC (Rest) - Face 0   Pain Rating: FLACC (Rest) - Legs 0   Pain Rating: FLACC (Rest) - Activity 0   Pain Rating: FLACC (Rest) - Cry 1   Pain Rating: FLACC (Rest) - Consolability 1   Score: FLACC (Rest) 2   Pain Rating: FLACC (Activity) - Face 2   Pain Rating: FLACC (Activity) - Legs 0   Pain Rating: FLACC (Activity) - Activity 1   Pain Rating: FLACC (Activity) - Cry 2   Pain Rating: FLACC (Activity) - Consolability 2   Score: FLACC (Activity) 7   Home Living   Type of Home House   Home Layout One level   Bathroom Shower/Tub Tub/shower unit   Bathroom Toilet Raised   Bathroom Equipment Grab bars in shower;Commode; Shower chair   Bathroom Accessibility Accessible via walker   [address] [facility]; Wheelchair-manual   Additional Comments Pt resides with daughter in [place] with 1 DAVIS  Prior Function   Level of [place] Needs assistance with ADLs and functional mobility; Needs assistance with IADLs   Lives With Daughter   Receives Help From Family   ADL Assistance Needs assistance  (Daughter assists with bathing)   IADLs Needs assistance   Falls in the last 6 months 0   Vocational Retired   Comments PTA, Per chart review- Pt requires assistance with ADLs from daughter (bathing) and requires assistance with IADLs  Pt completes functional mobility with RW, however, has been utilizing a WC due to B/L LE pain  Pt with supportive daughter who assists with transportation  Pt poor historian and all information retrieved via chart review  Lifestyle   Autonomy A with ADLs and functional mobility with RW   Reciprocal Relationships supportive daughter   Service to Others Retired   Intrinsic Gratification Enjoys watching TV   Psychosocial   Psychosocial (WDL) X   Patient Behaviors/Mood Labile; Fearful;Irritable   ADL   Where Assessed Supine, bed   Eating Assistance 4  Minimal Assistance   Grooming Assistance 3  Moderate Assistance   UB Bathing Assistance 2  Maximal Assistance   LB Bathing Assistance 1  Total Assistance   UB Dressing Assistance 2  Maximal Assistance   LB Dressing Assistance 1  Total Assistance   Toileting Assistance  1  Total Assistance  (Rolling R/L in bed)   Functional Assistance 2  Maximal Assistance   Functional Deficit Increased time to complete;Supervision/safety;Setup   Additional Comments Pt incontinent of urine and required linen change  Bed Mobility   Rolling R 2  Maximal assistance   Additional items Assist x 2; Increased time required;Verbal cues;LE management   Rolling L 2  Maximal assistance   Additional items Assist x 2; Increased time required;LE management;Verbal cues   Additional Comments Pt greeted supine in bed and left supine in bed at end of session  All needs met, call bell nearby, and bed alarm engaged  Attempted to sit up on EOB, however, Pt with increased pain/screaming upon movement  Transfers   Sit to Stand Unable to assess   Stand to Sit Unable to assess   Additional Comments Not appropriate at this time  Functional Mobility   Additional Comments Not appropriate at this time  Balance   Static Sitting   (unable to assess)   Activity Tolerance   Activity Tolerance Patient limited by fatigue;Patient limited by pain; Other (Comment)  (cognition)   Medical Staff Made Aware Co-eval with ADILENE Sandoval 2* to Pt's medical complexity and decreased endurance  Nurse Made Aware RN cleared/updated- present for portion of tx   RUE Assessment   RUE Assessment X   RUE Overall AROM   R Shoulder Flexion 2/5  (50* shoulder flexion)   R Wrist Flexion 2+/5   R Mass Grasp 2+/5   LUE Assessment   LUE Assessment X   LUE Overall AROM   L Shoulder Flexion 2/5  (30* flexion)   L Wrist Flexion 2/5   L Mass Grasp 2+/5   Hand Function   Gross Motor Coordination Impaired   Fine Motor Coordination Impaired   Sensation   Light Touch Severe deficits in the LLE; Severe deficits in the RLE  (pain to touch)   Vision-Basic Assessment   Visual History Cataracts   Vision - Complex Assessment   Additional Comments Pt able to open eyes x1 during OT session upon command  Cognition   Overall Cognitive Status Impaired   Arousal/Participation Poorly responsive;Persistent stimuli required   Attention TARAN   Orientation Level Disoriented to person;Disoriented to place; Disoriented to time;Disoriented to situation  (Unable to state name)   Memory Unable to assess   Following Commands Follows one step commands inconsistently   Comments Pt lethargic and unable to follow directions at this time  Pt requires constant cuing and hand over hand to participate in OT session  Pt with eyes closed during session and with screaming/verbal output with movement     Assessment   Limitation Decreased ADL status; Decreased UE ROM; Decreased cognition;Decreased UE strength;Decreased Safe judgement during ADL;Decreased endurance;Decreased sensation;Decreased fine motor control;Decreased self-care trans;Decreased high-level ADLs   Prognosis Fair   Assessment Pt is a 81 yo Female who presented to [facility] on 5/2/2022 with B/L LE edema  Pt initially presented to [address] [place] [address] on 4/25 with aspiration PNA, N/V, encephalopathy, and LE cellulitis  Pt WBAT on B/L LE per podiatry  Pt transferred to [facility] on 5/2/2022 per family's request for transfer of care  Pt  has a past medical history of CHF (congestive heart failure) ([facility] ), Liver cirrhosis ([facility] ), and Seizures ([facility] )  Pt greeted bedside for OT evaluation on 5/3/2022  Pt resides with daughter in [facility] with 1 DAVIS  PTA, Per chart review- Pt requires assistance with ADLs from daughter (bathing) and requires assistance with IADLs  Pt completes functional mobility with RW, however, has been utilizing a WC due to B/L LE pain  Pt with supportive daughter who assists with transportation  Pt poor historian and all information retrieved via chart review  Pt demonstrating the following occupational deficits: max A with UB ADLs, total assist with LB ADLs, and max AX2 with rolling in bed  Limitations that impact functional performance include decreased ADL status, decreased UE ROM, decreased UE strength, decreased safe judgement during ADLs, decreased cognition, decreased endurance, decreased fine motor coordination, decreased self care transfers, decreased high level ADLs and pain  Occupational performance areas to address ADL retraining, functional transfer training, UE strengthening/ROM, endurance training, cognitive reorientation, Pt/caregiver education, equipment evaluation/education, fine motor coordination activities, compensatory technique education, energy conservation and activity engagement   Pt would benefit from continued skilled OT services while in hospital to maximize independence with ADLs  Will continue to follow Pt's progress  Pt would benefit from post acute rehabilitation services upon DC to maximize safety and independence with ADLs and functional tasks of choice  Goals   Patient Goals Unable to express   LTG Time Frame 10-14   Long Term Goal #1 See goals listed below  Plan   Treatment Interventions ADL retraining;Functional transfer training;UE strengthening/ROM; Endurance training;Cognitive reorientation;Patient/family training;Equipment evaluation/education; Compensatory technique education; Fine motor coordination activities; Energy conservation; Activityengagement   Goal Expiration Date 05/17/22   OT Frequency 2-3x/wk   Recommendation   OT Discharge Recommendation Post acute rehabilitation services   OT - OK to Discharge Yes   Additional Comments  The patient's raw score on the AM-PAC Daily Activity inpatient short form is 11, standardized score is 29 04, less than 39 4  Patients at this level are likely to benefit from discharge to post-acute rehabilitation services  Please refer to the recommendation of the Occupational Therapist for safe discharge planning  AM-PAC Daily Activity Inpatient   Lower Body Dressing 1   Bathing 2   Toileting 1   Upper Body Dressing 2   Grooming 2   Eating 3   Daily Activity Raw Score 11   Daily Activity Standardized Score (Calc for Raw Score >=11) 29 04   -[facility] Applied Cognition Inpatient   Following a Speech/Presentation 1   Understanding Ordinary Conversation 2   Taking Medications 1   Remembering Where Things Are Placed or Put Away 1   Remembering List of 4-5 Errands 1   Taking Care of Complicated Tasks 1   Applied Cognition Raw Score 7   Applied Cognition Standardized Score 15 17       Goals (OTR to assess for further functional transfers/mobility):  1  Pt will complete UB ADLs with min A in order to maximize participation with ADLs  2  Pt will complete LB ADLs with min A in order to maximize safety with ADLs     3  Pt will complete hygiene and grooming with I in order to increase participation with ADLs  4  Pt will complete toileting routine (transfer, hygiene, and clothing management) with min A in order to return to prior level of function  5  Pt will complete bed mobility with min A in order to maximize participation with ADLs  6  Pt will increase dynamic sitting balance to F in order to increased participation with seated ADLs  7  Pt will be attentive 100% of the time for ongoing functional/formal cognitive assessment to assist with safe dc planning amado Murray MS, OTR/L

## 2022-05-04 PROBLEM — K74.60 CIRRHOSIS (HCC): Status: ACTIVE | Noted: 2022-05-04

## 2022-05-04 PROBLEM — R65.10 SIRS (SYSTEMIC INFLAMMATORY RESPONSE SYNDROME) (HCC): Status: ACTIVE | Noted: 2022-05-04

## 2022-05-04 LAB
ANION GAP SERPL CALCULATED.3IONS-SCNC: 5 MMOL/L (ref 4–13)
BUN SERPL-MCNC: 9 MG/DL (ref 5–25)
CALCIUM SERPL-MCNC: 9.2 MG/DL (ref 8.3–10.1)
CHLORIDE SERPL-SCNC: 104 MMOL/L (ref 100–108)
CO2 SERPL-SCNC: 31 MMOL/L (ref 21–32)
CREAT SERPL-MCNC: 0.63 MG/DL (ref 0.6–1.3)
ERYTHROCYTE [DISTWIDTH] IN BLOOD BY AUTOMATED COUNT: 15.6 % (ref 11.6–15.1)
GFR SERPL CREATININE-BSD FRML MDRD: 84 ML/MIN/1.73SQ M
GLUCOSE SERPL-MCNC: 94 MG/DL (ref 65–140)
HCT VFR BLD AUTO: 32.6 % (ref 34.8–46.1)
HGB BLD-MCNC: 9.8 G/DL (ref 11.5–15.4)
MAGNESIUM SERPL-MCNC: 1.7 MG/DL (ref 1.6–2.6)
MCH RBC QN AUTO: 28.9 PG (ref 26.8–34.3)
MCHC RBC AUTO-ENTMCNC: 30.1 G/DL (ref 31.4–37.4)
MCV RBC AUTO: 96 FL (ref 82–98)
PLATELET # BLD AUTO: 197 THOUSANDS/UL (ref 149–390)
PMV BLD AUTO: 11.1 FL (ref 8.9–12.7)
POTASSIUM SERPL-SCNC: 4.2 MMOL/L (ref 3.5–5.3)
PROCALCITONIN SERPL-MCNC: 0.27 NG/ML
RBC # BLD AUTO: 3.39 MILLION/UL (ref 3.81–5.12)
SODIUM SERPL-SCNC: 140 MMOL/L (ref 136–145)
WBC # BLD AUTO: 17.25 THOUSAND/UL (ref 4.31–10.16)

## 2022-05-04 PROCEDURE — 85027 COMPLETE CBC AUTOMATED: CPT | Performed by: INTERNAL MEDICINE

## 2022-05-04 PROCEDURE — 94760 N-INVAS EAR/PLS OXIMETRY 1: CPT

## 2022-05-04 PROCEDURE — 83735 ASSAY OF MAGNESIUM: CPT | Performed by: INTERNAL MEDICINE

## 2022-05-04 PROCEDURE — 84145 PROCALCITONIN (PCT): CPT | Performed by: INTERNAL MEDICINE

## 2022-05-04 PROCEDURE — 80048 BASIC METABOLIC PNL TOTAL CA: CPT | Performed by: INTERNAL MEDICINE

## 2022-05-04 PROCEDURE — 99232 SBSQ HOSP IP/OBS MODERATE 35: CPT | Performed by: INTERNAL MEDICINE

## 2022-05-04 PROCEDURE — 92526 ORAL FUNCTION THERAPY: CPT

## 2022-05-04 PROCEDURE — 99222 1ST HOSP IP/OBS MODERATE 55: CPT | Performed by: INTERNAL MEDICINE

## 2022-05-04 PROCEDURE — 99233 SBSQ HOSP IP/OBS HIGH 50: CPT | Performed by: INTERNAL MEDICINE

## 2022-05-04 RX ORDER — FUROSEMIDE 10 MG/ML
40 INJECTION INTRAMUSCULAR; INTRAVENOUS
Status: DISCONTINUED | OUTPATIENT
Start: 2022-05-04 | End: 2022-05-04

## 2022-05-04 RX ORDER — FUROSEMIDE 10 MG/ML
40 INJECTION INTRAMUSCULAR; INTRAVENOUS
Status: COMPLETED | OUTPATIENT
Start: 2022-05-04 | End: 2022-05-06

## 2022-05-04 RX ORDER — ALBUMIN (HUMAN) 12.5 G/50ML
12.5 SOLUTION INTRAVENOUS 2 TIMES DAILY
Status: COMPLETED | OUTPATIENT
Start: 2022-05-04 | End: 2022-05-05

## 2022-05-04 RX ORDER — ACETAMINOPHEN 325 MG/1
975 TABLET ORAL EVERY 8 HOURS SCHEDULED
Status: DISCONTINUED | OUTPATIENT
Start: 2022-05-04 | End: 2022-05-11 | Stop reason: HOSPADM

## 2022-05-04 RX ADMIN — METOPROLOL TARTRATE 25 MG: 25 TABLET, FILM COATED ORAL at 08:31

## 2022-05-04 RX ADMIN — PHENOBARBITAL 32.4 MG: 64.8 TABLET ORAL at 08:37

## 2022-05-04 RX ADMIN — ALBUMIN (HUMAN) 12.5 G: 0.25 INJECTION, SOLUTION INTRAVENOUS at 20:52

## 2022-05-04 RX ADMIN — FUROSEMIDE 40 MG: 10 INJECTION, SOLUTION INTRAMUSCULAR; INTRAVENOUS at 08:31

## 2022-05-04 RX ADMIN — PHENOBARBITAL 32.4 MG: 64.8 TABLET ORAL at 21:01

## 2022-05-04 RX ADMIN — HEPARIN SODIUM 7500 UNITS: 5000 INJECTION INTRAVENOUS; SUBCUTANEOUS at 21:02

## 2022-05-04 RX ADMIN — LEVETIRACETAM 500 MG: 500 TABLET, FILM COATED ORAL at 08:31

## 2022-05-04 RX ADMIN — HEPARIN SODIUM 7500 UNITS: 5000 INJECTION INTRAVENOUS; SUBCUTANEOUS at 05:16

## 2022-05-04 RX ADMIN — ACETAMINOPHEN 975 MG: 325 TABLET, FILM COATED ORAL at 13:24

## 2022-05-04 RX ADMIN — PANTOPRAZOLE SODIUM 20 MG: 20 TABLET, DELAYED RELEASE ORAL at 05:16

## 2022-05-04 RX ADMIN — FUROSEMIDE 40 MG: 10 INJECTION, SOLUTION INTRAMUSCULAR; INTRAVENOUS at 23:24

## 2022-05-04 RX ADMIN — OXYCODONE HYDROCHLORIDE 5 MG: 5 TABLET ORAL at 08:31

## 2022-05-04 RX ADMIN — ATORVASTATIN CALCIUM 80 MG: 80 TABLET, FILM COATED ORAL at 08:31

## 2022-05-04 RX ADMIN — ACETAMINOPHEN 975 MG: 325 TABLET, FILM COATED ORAL at 21:02

## 2022-05-04 RX ADMIN — MIDODRINE HYDROCHLORIDE 5 MG: 5 TABLET ORAL at 06:18

## 2022-05-04 RX ADMIN — HEPARIN SODIUM 7500 UNITS: 5000 INJECTION INTRAVENOUS; SUBCUTANEOUS at 13:24

## 2022-05-04 RX ADMIN — LEVETIRACETAM 500 MG: 500 TABLET, FILM COATED ORAL at 20:52

## 2022-05-04 RX ADMIN — METOPROLOL TARTRATE 25 MG: 25 TABLET, FILM COATED ORAL at 20:52

## 2022-05-04 NOTE — ASSESSMENT & PLAN NOTE
POA at [facility]  · S/p IV hydration at [facility], creatinine has stabilized  · Now off IVF  · Trialing IV lasix  · monitor BMP

## 2022-05-04 NOTE — CASE MANAGEMENT
Case Management Discharge Planning Note    Patient name Tiara Johnson  Location [facility]03/[facility] 868-50 MRN [id number]  : 1940 Date 2022       Current Admission Date: 2022  Current Admission Diagnosis:Bilateral lower extremity edema   Patient Active Problem List    Diagnosis Date Noted    Cirrhosis ([facility] ) 2022    Intractable nausea and vomiting 2022    Aspiration pneumonitis ([facility] ) 2022    EDUARDO (acute kidney injury) ([facility] ) 2022    Morbid obesity ([facility] ) 2021    Ambulatory dysfunction 2021    Bilateral lower extremity edema 2021    Acute respiratory failure with hypoxia ([facility] ) 2021    GERD (gastroesophageal reflux disease) 2020    Essential hypertension 2020    Chronic diastolic (congestive) heart failure ([facility] ) 2020    Nonrheumatic aortic valve stenosis 2020    Hyperlipidemia 2020    Pneumonia due to COVID-19 virus 2020    Localization-related epilepsy, intractable ([facility] ) 02/15/2019    Lower extremity cellulitis 2018    Acute upper respiratory infection 2018    Alteration in skin integrity 2018      LOS (days): 2  Geometric Mean LOS (GMLOS) (days): 4 60  Days to GMLOS:2 8     OBJECTIVE:  Risk of Unplanned Readmission Score: 15         Current admission status: Inpatient   Preferred Pharmacy:   [address] - [address] [facility] [address]  Phone: [phone] Fax: [phone]    Primary Care Provider: Javier Manning DO    Primary Insurance: MEDICARE  Secondary Insurance: [address]    DISCHARGE DETAILS:    Discharge planning discussed with[de-identified] daughter Yonatan Deeds of Choice: Yes  Comments - Freedom of Choice: daughter prefers [address] STR prior to d/c to home  CM contacted family/caregiver?: Yes  Were Treatment Team discharge recommendations reviewed with patient/caregiver?: Yes  Did patient/caregiver verbalize understanding of patient care needs?: Yes  Were patient/caregiver advised of the risks associated with not following Treatment Team discharge recommendations?: Yes    Contacts  Patient Contacts: Wilian Perkins (daughter)  Relationship to Patient[de-identified] Family  Contact Method: Phone  Phone Number: [phone]  Reason/Outcome: Continuity of Care,Discharge Planning,Emergency Contact,Referral              Other Referral/Resources/Interventions Provided:  Referral Comments: PT/OT at this time are recommending STR prior to d/c to home  Spoke with daughter Wilian Perkins who requested referral to [facility] for [address] [place]  Referral sent and currently awaiting determination

## 2022-05-04 NOTE — PROGRESS NOTES
Progress Note - Infectious Disease   Olivia Lund 80 y o  female MRN: [id number]  [facility] Encounter: [id number]      Impression/Recommendations:  1  SIRS versus sepsis, POA  WBC, HR  Afebrile  Consider due to initial aspiration event +/- #2  Initial Flu/RSV/COVID PCR, blood cultures CT C/A/P at [facility] negative  Procalcitonin improved after 7 days IV antibiotics  Persistent WBC may be leukomoid to phlebitis, anemia  Hemodynamically stable  Rec:  · Continue to follow closely off antibiotics  · Follow temperatures and WBC closely  · Supportive care as per the primary service     2  Bilateral leg cellulitis  In setting of #3, 6  Difficult to distinguish infection versus edema at this point, although suspect a large component of erythema and blistering on exam due to volume overload at this point  Status post >7 days broad-spectrum antibiotics  Rec:  · Continue to follow closely off antibiotics  · Continue leg elevation  · Diuresis per SLIM     3  Bilateral heel pressure ulcers  Appear superficial without apparent superinfection  Xrays negative for osteomyelitis  Rec:  · Continue LWC and off-loading per Podiatry     4  Acute encephalopathy  Suspect toxic-metabolic and multifactorial   Consider role of recent high-dose cefepime  Recent ammonia level negative  Exam non-focal   Improved  5   Acute hypoxic respiratory failure  Likely multifactorial due to volume overload, aspiration, atelectasis  No clinical or radiographic evidence for active pneumonia  Rec:  · Follow closely off antibiotics  · Diuresis per SLIM  · Aspiration precautions and modified diet      6  Chronic lower extremity venous stasis      7  Alcoholic cirrhosis  Per chart, PCP notes      8  Chronic CHF        9   MO  With ambulatory dysfunction      10    Seizure disorder      The above impression and plan was discussed in detail with the patient's daughter at the bedside      Antibiotics:  Off antibiotics #1    Subjective:  Patient seen on AM rounds  Daughter at bedside helps provide history  Legs still very swollen and painful  Denies fevers, chills, sweats, nausea, vomiting, or diarrhea  Eating well  More alert today  24 Hour Events:  No documented fevers, chills, sweats, nausea, vomiting, or diarrhea  Objective:  Vitals:  Temp:  [97 8 °F (36 6 °C)-99 °F (37 2 °C)] 98 5 °F (36 9 °C)  HR:  [] 98  Resp:  [20] 20  BP: (116-167)/() 167/103  SpO2:  [91 %-100 %] 98 %  Temp (24hrs), Av 5 °F (36 9 °C), Min:97 8 °F (36 6 °C), Max:99 °F (37 2 °C)  Current: Temperature: 98 5 °F (36 9 °C)    Physical Exam:   General:  No acute distress  Psychiatric:  Awake and alert  Pulmonary:  Normal respiratory excursion without accessory muscle use  Abdomen:  Soft, nontender  Extremities:  Brawny bilateral nonpitting edema with symmetric erythema, blistering with weeping  Skin:  No rashes    Lab Results:  I have personally reviewed pertinent labs  Results from last 7 days   Lab Units 22  0512 22  0450 22  0521 22  0602 22  0602   POTASSIUM mmol/L 4 2 3 3* 3 6   < > 3 8   CHLORIDE mmol/L 104 103 105   < > 105   CO2 mmol/L 31 31 31   < > 29   BUN mg/dL 9 8 8   < > 11   CREATININE mg/dL 0 63 0 55* 0 63   < > 0 64   EGFR ml/min/1 73sq m 84 88 84   < > 83   CALCIUM mg/dL 9 2 9 4 8 6   < > 8 6   AST U/L  --  22  --   --  24   ALT U/L  --  17  --   --  16   ALK PHOS U/L  --  114  --   --  107    < > = values in this interval not displayed  Results from last 7 days   Lab Units 22  0512 22  0449 22  0524   WBC Thousand/uL 17 25* 15 90* 16 91*   HEMOGLOBIN g/dL 9 8* 9 7* 9 4*   PLATELETS Thousands/uL 197 200 187           Imaging Studies:   I have personally reviewed pertinent imaging study reports and images in PACS  EKG, Pathology, and Other Studies:   I have personally reviewed pertinent reports

## 2022-05-04 NOTE — ASSESSMENT & PLAN NOTE
Wt Readings from Last 3 Encounters:   05/04/22 99 1 kg (218 lb 8 oz)   05/02/22 101 kg (222 lb 14 2 oz)   03/14/21 93 kg (205 lb)     · Echo ordered 5/3 given severe edema + murmur on exam  · Monitor intake and output  · Monitor daily weights  · Does not appear to be on any diuretics at home  · Given multiple days of IVF after normalization of creatinine for EDUARDO she may have some volume overload  · Placed on IV lasix daily, continue for now  · Cards following

## 2022-05-04 NOTE — WOUND OSTOMY CARE
Progress Note - Wound   Olivia Gomez 80 y o  female MRN: [id number]  Unit/Bed#: [facility] 903-01 Encounter: [id number]        Assessment:   Patient seen today for wound care follow up  Patient is max assist with turning from side to side  Patient is incontinent of bowel and bladder, currently has purewick in place but does have some leakage  Patient can feed herself, reports poor appetite  Patient repositioned in bed after assessment  1  Lower extremities are being managed by podiatry  2  POA DTI to left buttocks- area to buttocks intact but not blanching, purple discoloration  No openings or drainage present  Hydraguard applied  DTI's have the potential to evolve into full thickness unstageable, stage III, or stage IV wounds  No induration, fluctuance, odor, warmth/temperature differences, redness, or purulence noted to the above noted wounds and skin areas assessed  New dressings applied per orders listed below  Patient tolerated well- no s/s of non-verbal pain or discomfort observed during the encounter  Bedside nurse aware of plan of care  See flow sheets for more detailed assessment findings  Wound care will continue to follow  Plan:   Skin care plans:   1-Hydraguard to bilateral sacrum, buttock, and posterior thighs BID and PRN with latoya-care   2-Elevate heels to offload pressure  3-Ehob cushion in chair when out of bed  4-Moisturize skin daily with skin nourishing cream    5-Turn/reposition q2h for pressure re-distribution on skin  6-Bilateral legs/heels per podiatry order       Wound 04/27/22 Pressure Injury Buttocks Left (Active)   Wound Image   05/04/22 0923   Wound Description Epithelialization; Non-blanchable erythema;Light purple 05/04/22 0923   Pressure Injury Stage DTPI 05/04/22 0923   Latoya-wound Assessment Clean;Dry;Fragile 05/04/22 0923   Wound Length (cm) 3 cm 05/04/22 0923   Wound Width (cm) 2 cm 05/04/22 0923   Wound Depth (cm) 0 cm 05/04/22 0923   Wound Surface Area (cm^2) 6 cm^2 05/04/22 0923   Wound Volume (cm^3) 0 cm^3 05/04/22 0923   Calculated Wound Volume (cm^3) 0 cm^3 05/04/22 0923   Tunneling 0 cm 05/04/22 0923   Tunneling in depth located at 0 05/04/22 0923   Undermining 0 05/04/22 0923   Undermining is depth extending from 0 05/04/22 0923   Wound Site Closure TARAN 05/04/22 0923   Drainage Amount None 05/04/22 0923   Non-staged Wound Description Not applicable 16/26/00 7223   Treatments Cleansed 05/04/22 0923   Dressing Moisture barrier 05/04/22 0923   Wound packed? No 05/04/22 0923   Packing- # removed 0 05/04/22 0923   Packing- # inserted 0 05/04/22 0923   Dressing Changed New 05/04/22 0923   Patient Tolerance Tolerated well 05/04/22 0923   Dressing Status Clean;Dry; Intact 05/04/22 0923         Bailey Titus BSN, RN, [facility] [employer]

## 2022-05-04 NOTE — ASSESSMENT & PLAN NOTE
Transferred from [facility] after she developed worsening edema, erythema and pain of the lower extremities despite IV abx for suspected cellulitis  Of note she arrived to [facility] with EDUARDO and had been on IVF for multiple days prior  · History of CHF and cirrhosis with hypoalbuminemia noted  Also with chronic venous stasis dermatitis so likely there is a component of third spacing causing edema  · Currently on IV cefepime/vancomycin per ID, however abx dc on 5/3 given low suspicion for infection  · CT scan of the bilateral LE were negative for abscess formation  · She was transferred to [facility] dermatology evaluation    · Derm feels chronic venous stasis dermatitis  · Podiatry consulted for wound care  · Encourage compression/elevation   · Cards consulted to assist with IV diuresis--continue  · Lower extremity venous dopplers ordered to check for DVT however unable to tolerate given significant pain--can consider if pain improves  · Adjust pain regimen today

## 2022-05-04 NOTE — SPEECH THERAPY NOTE
Speech/Language Pathology Progress Note    Patient Name: Serenity Cordero  BWWPP'K Date: 5/4/2022     Subjective:  Pt awake, positioned sitting upright in bed for ST      Objective:  Pt seen for dysphagia therapy  Current diet is dysphagia 2 mech soft with thin liquids  She was seen for a late lunch as she stated she was not hungry earlier  Pt was offered bites of ground chicken, chopped green beans, and mashed potatoes  She was dependent for feeding  Bolus retrieval was adequate  Mastication appeared mildly reduced but functional for mech soft food  She had no s/s aspiration with thin liquids via straw, however occasional throat clearing with solids occurred  No overt coughing  Slight fatigue noted with progression of intake  Assessment:  Appetite is reduced, however current diet texture appears appropriate  Plan/Recommendations:  Continue dysphagia 2 mech soft diet with thin liquids  Brief f/u x1-2 prior to d/c

## 2022-05-04 NOTE — CONSULTS
Consultation - Geriatrics   Anjali Bone 80 y o  female MRN: [id number]  Unit/Bed#: [facility] 903-01 Encounter: [id number]      Assessment/Plan  Bilateral lower extremity pain  Multifactorial underlying medical conditions, cellulitis, edema, chronic lower venous statis  CT right lower ext 5/2/22: diffuse RLL sc edema with overlying skin thickening  CT left lower ext 5/2/22: diffuse SC edema in the LLL with overlying skin thickening   Old healed tib/fib fractures, s/p ORIF hardware intact  Recommend geriatric pain protocol  Geriatric pain protocol  Scheduled acetaminophen 975 mg po Q8 (monitor LFT in the setting of cirrhosis)  Oxycodone 2 5 mg po Q 4 prn moderate pain  Oxycodone 5 mg po Q 4 prn severe pain   Monitor for constipation    Encephalopathy  Improved alert and oriented x 2  Continue delirium precautions  Provide frequent redirection, reorientation, distraction techniques  Avoid deliriogenic medications such as tramadol, benzodiazepines, anticholinergics,  Benadryl  Treat pain, See geriatric pain protocol  Monitor for constipation and urinary retention  Encourage early and frequent moblization, OOB  Encourage Hydration/ Nutrition  Implement sleep hygiene, limit night time interuptions, group activities    Frailty  Clinical Frail Scale: 7- severely frail  Completely dependent for personal care  PT/OT, speech on consult  Albumin 2 0, nutrition consult  Continue 24/7 supportive care    Cognitive screening  Hx of poor memory  At risk secondary to hx of CAD, cirrhosis,  shunt  recommend check TSH and Vitamin B12  Continue folate  CT head done 1/19/22: moderate microangiopathic changes, unchanged encephalomalacia and gliosis in the right frontal lobe surrounding right frontal ventricular catheter, unchanged multifocal encephalomalacia, Right fontal [place] hole,  shunt   Continue supportive care    Ambulatory dysfunction  At risk secondary to age, frailty, limited mobility, prior fall  Fall precautions  Uses walker/ wheelchair at baseline  PT/OT  Continue 24/7 supervision    Aspiration Pneumonitis  Aspiration precautions  Modified diet, speech on consulted  Completed abx  ID on consult    Bilateral leg cellulitis  Completed 7 days abx  ID on consult    Advanced Care Planning  Full code    Home medication review  [facility]   Atorvastatin 80 mg po daily  Oyster shell calcium  Carvedilol 2 752 mg po BID  Folic acid 1 mg po daily  Furosemide 40 mg po BID  Meloxicam 15 mg po daily  Phenobarbital 64 8 mg po BID  Levetiracetam 500 mg po BID        History of Present Illness   Physician Requesting Consult: Sancho Miller DO  Reason for Consult / Principal Problem: chronic leg pain  Hx and PE limited by: teafulness  HPI: Bill Kraft is a 80y o  year old female who presents with leg edema, shortness of breath and painful swelling in her legs  She was initially admitted to [facility] with pneumonia and EDUARDO, daughter requested transfer to [facility] secondary to daughter reporting she was not able to get an answer from physicians  She has HF, cirrhosis, GERD, osteoporosis, chronic venous statis dermatitis,  hx of seizures, betsy hole,  shunt  Prior to arrival pt lives at home with her daughter  Her daughter assists with IADLs and ADLs  She uses wheelchair and walker for mobility  Upon exam pt is lying in bed  She is awake, alert, oriented x 2   She is calm and cooperative    Spoke with nursing who reports pt mentation and pain improved with administration of prn oxycodone 5 mg     Inpatient consult to Gerontology  Consult performed by: AUREA Gabriel  Consult ordered by: James Arzola PA-C          Review of Systems   Unable to perform ROS: Mental status change       Historical Information   Past Medical History:   Diagnosis Date    [facility]congestive [facility] failure) [facility] [address] [facility]     Liver cirrhosis [facility] [address] [facility]     Seizures ([facility] [address] )      Past Surgical History:   Procedure Laterality Date    HYSTERECTOMY      IR PICC PLACEMENT SINGLE LUMEN  4/29/2022    LEG SURGERY  05/2016    Pt family cannot recall the specific surgery or which leg it was preformed on  Social History   Social History     Substance and Sexual Activity   Alcohol Use Never     Social History     Substance and Sexual Activity   Drug Use No     Social History     Tobacco Use   Smoking Status Never Smoker   Smokeless Tobacco Never Used         Family History:   Family History   Problem Relation Age of Onset    Lung cancer Mother        Meds/Allergies   Current meds:   Current Facility-Administered Medications   Medication Dose Route Frequency    acetaminophen (TYLENOL) tablet 975 mg  975 mg Oral Q8H [address]    albuterol (PROVENTIL HFA,VENTOLIN HFA) inhaler 2 puff  2 puff Inhalation Q4H PRN    atorvastatin (LIPITOR) tablet 80 mg  80 mg Oral Daily    Diclofenac Sodium (VOLTAREN) 1 % topical gel 2 g  2 g Topical 4x Daily PRN    furosemide (LASIX) injection 40 mg  40 mg Intravenous Daily    heparin (porcine) subcutaneous injection 7,500 Units  7,500 Units Subcutaneous Q8H [address]    HYDROmorphone HCl (DILAUDID) injection 0 2 mg  0 2 mg Intravenous Q6H PRN    levETIRAcetam (KEPPRA) tablet 500 mg  500 mg Oral Q12H [address]    metoprolol tartrate (LOPRESSOR) tablet 25 mg  25 mg Oral Q12H [address]    ondansetron (ZOFRAN) injection 4 mg  4 mg Intravenous Q6H PRN    oxyCODONE (ROXICODONE) IR tablet 2 5 mg  2 5 mg Oral Q4H PRN    oxyCODONE (ROXICODONE) IR tablet 5 mg  5 mg Oral Q4H PRN    pantoprazole (PROTONIX) EC tablet 20 mg  20 mg Oral Early Morning    PHENobarbital tablet 32 4 mg  32 4 mg Oral BID    sodium chloride (PF) 0 9 % injection 3 mL  3 mL Intravenous Q1H PRN          Allergies   Allergen Reactions    Penicillins Other (See Comments)     Pt unaware of reaction       Objective   Vitals: Blood pressure (!) 167/103, pulse 98, temperature 98 5 °F (36 9 °C), resp   rate 20, height 4' 11" (1 499 m), weight 99 1 kg (218 lb 8 oz), SpO2 98 % ,Body mass index is 44 13 kg/m²  Physical Exam  Vitals and nursing note reviewed  Constitutional:       Appearance: She is obese  HENT:      Head: Normocephalic  Nose: No congestion  Mouth/Throat:      Mouth: Mucous membranes are moist    Eyes:      General:         Right eye: No discharge  Left eye: No discharge  Cardiovascular:      Rate and Rhythm: Normal rate and regular rhythm  Pulmonary:      Effort: Pulmonary effort is normal       Breath sounds: Normal breath sounds  Abdominal:      General: Bowel sounds are normal       Palpations: Abdomen is soft  Musculoskeletal:         General: Tenderness present  Cervical back: Normal range of motion  Right lower leg: Edema present  Left lower leg: Edema present  Skin:     General: Skin is warm and dry  Neurological:      Mental Status: She is alert  Comments: Oriented x 2   Psychiatric:         Mood and Affect: Mood normal          Lab Results:   Results from last 7 days   Lab Units 05/04/22  0512   WBC Thousand/uL 17 25*   HEMOGLOBIN g/dL 9 8*   HEMATOCRIT % 32 6*   PLATELETS Thousands/uL 197        Results from last 7 days   Lab Units 05/04/22  0512 05/03/22  0450 05/03/22  0450   POTASSIUM mmol/L 4 2   < > 3 3*   CHLORIDE mmol/L 104   < > 103   CO2 mmol/L 31   < > 31   BUN mg/dL 9   < > 8   CREATININE mg/dL 0 63   < > 0 55*   CALCIUM mg/dL 9 2   < > 9 4   ALK PHOS U/L  --   --  114   ALT U/L  --   --  17   AST U/L  --   --  22    < > = values in this interval not displayed  Imaging Studies: I have personally reviewed pertinent reports  EKG, Pathology, and Other Studies: I have personally reviewed pertinent reports      VTE Prophylaxis: Sequential compression device (Venodyne)     Code Status: Level 1 - Full Code

## 2022-05-04 NOTE — PROGRESS NOTES
Cardiology Team 2 Progress Note - Darnell Carmona 80 y o  female MRN: [id number]    Unit/Bed#: [facility] 903-01 Encounter: [id number]          Subjective:   Patient seen and examined  No significant events overnight  She still has some SOB and is on 2L on O2  No particular complaints when asked  Denies lightheadedness, dizziness, syncope, headache, vision changes, diaphoresis, chest pain, palpitations, nausea, vomiting, abdominal pain       Hospital Course:   Darnell Carmona is a 80y o  year old female with a history of Chronic diastolic congestive HF (recent EF of 88%, grade 1 diastolic dysf, noted moderate AS), HTN, Seizure disorder, cirrhosis, GERD presented for SOB at [facility] and initially admitted to [facility] for aspiration pneumonia and EDUARDO  During that time she received IV antbx (7 days) and IVF  EDUARDO resolved w/ fluids  During her hospital course she developed painful edema and erythema of the lower extremities with weeping vesicles  Her daughter reports that she was not able to get an answer from the physicians there and requested a transfer to a higher level of care and for dermatology evaluation that , felt to be chronic venous stasis     Initially put IV cefepime/vancomycin for suspected cellulitis and stopped today by Id and hard to determine if infection vs edema  Lower extremity venous dopplers ordered to check for DVT however unable to tolerate given significant pain  Recent negative duplex March 2021     Cardiology was consulted for noted moderate aortic stenosis that was unsure if it was new and for assistance and diuresis  Patient's chart showed that patient follow with a cardiologist at [facility] and has had moderate aortic stenosis back in 2021 as well as in 2018 on echocardiogram   Patient had good urine output in response to 20 mg of IV Lasix on day of transfer    Patient was given 40 mg of IV Lasix 05/03 and at about 2 L of urine output as well      05/03: Patient was not a good historian when evaluated but denied chest pain, shortness of breath, and abdominal pain  Patient screamed when LE were touched both in the legs and feet  Vitals: Blood pressure (!) 167/103, pulse 98, temperature 98 5 °F (36 9 °C), resp  rate 20, height 4' 11" (1 499 m), weight 99 1 kg (218 lb 8 oz), SpO2 98 %  , Body mass index is 44 13 kg/m² ,   Orthostatic Blood Pressures      Most Recent Value   Blood Pressure 167/103 filed at 05/04/2022 0802            Intake/Output Summary (Last 24 hours) at 5/4/2022 7498  Last data filed at 5/3/2022 2051  Gross per 24 hour   Intake 220 ml   Output 1150 ml   Net -930 ml         Physical Exam:    GEN: Josh Marshall appears well, alert and oriented x 3, pleasant and cooperative   HEENT:  Normocephalic, atraumatic, anicteric, moist mucous membranes  NECK: No JVD or carotid bruits   HEART: Regular rhythm, Regular rate, normal S1 and decreased S2, crescendo-decrescendo 3/6  Murmur w/o palpable thrill appreciated in aortic auscultation area, clicks, gallops or rubs   LUNGS: Clear to auscultation bilaterally; Rales appreciated bilaterally on exam; respiration nonlabored   ABDOMEN:  Normoactive bowel sounds, soft, no tenderness, no distention  EXTREMITIES: peripheral pulses palpable; 2+ pitting edema bilateral to below knees  NEURO: no gross focal findings; cranial nerves grossly intact   SKIN:  Dry, intact, warm to touch      Current Facility-Administered Medications:     acetaminophen (TYLENOL) tablet 650 mg, 650 mg, Oral, Q6H PRN, Marcus Crenshaw MD    albuterol (PROVENTIL HFA,VENTOLIN HFA) inhaler 2 puff, 2 puff, Inhalation, Q4H PRN, Marcus Crenshaw MD    atorvastatin (LIPITOR) tablet 80 mg, 80 mg, Oral, Daily, Marcus Crenshaw MD, 80 mg at 05/03/22 0949    Diclofenac Sodium (VOLTAREN) 1 % topical gel 2 g, 2 g, Topical, 4x Daily PRN, Marcus Crenshaw MD    furosemide (LASIX) injection 40 mg, 40 mg, Intravenous, Daily, Medhat Camacho,     heparin (porcine) subcutaneous injection 7,500 Units, 7,500 Units, Subcutaneous, Q8H [address], Noni Soulier, MD, 7,500 Units at 05/04/22 0516    HYDROmorphone HCl (DILAUDID) injection 0 2 mg, 0 2 mg, Intravenous, Q6H PRN, Linden Frost PA-C, 0 2 mg at 05/03/22 1435    levETIRAcetam (KEPPRA) tablet 500 mg, 500 mg, Oral, Q12H [address], Noni Soulier, MD, 500 mg at 05/03/22 2045    metoprolol tartrate (LOPRESSOR) tablet 25 mg, 25 mg, Oral, Q12H [address], Noni Soulier, MD, 25 mg at 05/03/22 2045    ondansetron (ZOFRAN) injection 4 mg, 4 mg, Intravenous, Q6H PRN, Noni Soulier, MD    oxyCODONE (ROXICODONE) IR tablet 2 5 mg, 2 5 mg, Oral, Q4H PRN, Noni Soulier, MD    oxyCODONE (ROXICODONE) IR tablet 5 mg, 5 mg, Oral, Q4H PRN, Noni Soulier, MD, 5 mg at 05/03/22 2159    pantoprazole (PROTONIX) EC tablet 20 mg, 20 mg, Oral, Early Morning, Noni Soulier, MD, 20 mg at 05/04/22 0516    PHENobarbital tablet 32 4 mg, 32 4 mg, Oral, BID, Noni Soulier, MD, 32 4 mg at 05/03/22 1757    sodium chloride (PF) 0 9 % injection 3 mL, 3 mL, Intravenous, Q1H PRN, Noni Soulier, MD    Labs & Results:      Results from last 7 days   Lab Units 05/03/22  0450   NT-PRO BNP pg/mL 1,778*     Results from last 7 days   Lab Units 05/04/22  0512 05/03/22  0450 05/02/22  0521   POTASSIUM mmol/L 4 2 3 3* 3 6   CO2 mmol/L 31 31 31   CHLORIDE mmol/L 104 103 105   BUN mg/dL 9 8 8   CREATININE mg/dL 0 63 0 55* 0 63     Results from last 7 days   Lab Units 05/04/22  0512 05/03/22  0449 05/02/22  0524   HEMOGLOBIN g/dL 9 8* 9 7* 9 4*   HEMATOCRIT % 32 6* 31 6* 30 6*   PLATELETS Thousands/uL 197 200 187           Telemetry:   Personally reviewed by Anna Robles, DO: Not on tele    Imaging:  No new brain imaging      VTE Prophylaxis: Heparin        Assessment:  Principal Problem:    Bilateral lower extremity edema  Active Problems:    Chronic diastolic (congestive) heart failure (HCC)    Localization-related epilepsy, intractable (HCC)    Morbid obesity (HCC)    Aspiration pneumonia ([facility] [address] )    EDUARDO (acute kidney injury) ([facility] [address] )        Acute on Chronic diastolic (congestive) heart failure   Impression: Likely in the setting of getting appropriate IVF for treatment of sepsis and due to underlying HF, became volume overloaded since heart was not able to appropriately compensate fluid/volume status  Likely had baseline LE edema prior to coming into the hospital given chronic venous stasis        I/Os = -1150ml (was -850 05/03)  Daily Wts:218 <- 222lbs      Plan   - increased IV lasix 40mg BID daily given edema has not changed much and rales appreciated on exam this AM  - Monitor intake and output  - Monitor daily weights     HTN  - midodrine d/c  - continue home metoprolol      Aortic stenosis  Moderate stenosis consistent with past echos       W/U:   1  Echo February 2019 showed normal EF, mild pulmonary hypertension and mild-moderate aortic stenosis Vmax 2 6 m/sec, mean gradient 19 mmHg  2  TTE 7/13/21 - Normal EF, moderate AS, Vm 3 7 m/sec, mean 34 mmHg, DD1  Plan  - no indication for  Procedural intervention at this time given moderate stenosis    - continue outpt f/u w/ outpt cardiologist  - continue w/ diuresis      Case discussed and reviewed with Dr Chata Briggs and is pending their agreement with the assessment and plan  Thank you for involving us in the care of your patient  Epic/ Allscripts/Care Everywhere records reviewed: Yes    ** Please Note: Fluency DirectDictation voice to text software may have been used in the creation of this document   **

## 2022-05-04 NOTE — PLAN OF CARE
Problem: Prexisting or High Potential for Compromised Skin Integrity  Goal: Skin integrity is maintained or improved  Description: INTERVENTIONS:  - Identify patients at risk for skin breakdown  - Assess and monitor skin integrity  - Assess and monitor nutrition and hydration status  - Monitor labs   - Assess for incontinence   - Turn and reposition patient  - Assist with mobility/ambulation  - Relieve pressure over bony prominences  - Avoid friction and shearing  - Provide appropriate hygiene as needed including keeping skin clean and dry  - Evaluate need for skin moisturizer/barrier cream  - Collaborate with interdisciplinary team   - Patient/family teaching  - Consider wound care consult   Outcome: Progressing     Problem: MOBILITY - ADULT  Goal: Maintain or return to baseline ADL function  Description: INTERVENTIONS:  -  Assess patient's ability to carry out ADLs; assess patient's baseline for ADL function and identify physical deficits which impact ability to perform ADLs (bathing, care of mouth/teeth, toileting, grooming, dressing, etc )  - Assess/evaluate cause of self-care deficits   - Assess range of motion  - Assess patient's mobility; develop plan if impaired  - Assess patient's need for assistive devices and provide as appropriate  - Encourage maximum independence but intervene and supervise when necessary  - Involve family in performance of ADLs  - Assess for home care needs following discharge   - Consider OT consult to assist with ADL evaluation and planning for discharge  - Provide patient education as appropriate  Outcome: Progressing  Goal: Maintains/Returns to pre admission functional level  Description: INTERVENTIONS:  - Perform BMAT or MOVE assessment daily    - Set and communicate daily mobility goal to care team and patient/family/caregiver  - Collaborate with rehabilitation services on mobility goals if consulted  - Perform Range of Motion  times a day    - Reposition patient every hours   - Dangle patient  times a day  - Stand patient  times a day  - Ambulate patient  times a day  - Out of bed to chair  times a day   - Out of bed for meals  times a day  - Out of bed for toileting  - Record patient progress and toleration of activity level   Outcome: Progressing     Problem: Potential for Falls  Goal: Patient will remain free of falls  Description: INTERVENTIONS:  - Educate patient/family on patient safety including physical limitations  - Instruct patient to call for assistance with activity   - Consult OT/PT to assist with strengthening/mobility   - Keep Call bell within reach  - Keep bed low and locked with side rails adjusted as appropriate  - Keep care items and personal belongings within reach  - Initiate and maintain comfort rounds  - Make Fall Risk Sign visible to staff  - Offer Toileting every  Hours, in advance of need  - Initiate/Maintain alarm  - Obtain necessary fall risk management equipment:  - Apply yellow socks and bracelet for high fall risk patients  - Consider moving patient to room near nurses station  Outcome: Progressing     Problem: Nutrition/Hydration-ADULT  Goal: Nutrient/Hydration intake appropriate for improving, restoring or maintaining nutritional needs  Description: Monitor and assess patient's nutrition/hydration status for malnutrition  Collaborate with interdisciplinary team and initiate plan and interventions as ordered  Monitor patient's weight and dietary intake as ordered or per policy  Utilize nutrition screening tool and intervene as necessary  Determine patient's food preferences and provide high-protein, high-caloric foods as appropriate       INTERVENTIONS:  - Monitor oral intake, urinary output, labs, and treatment plans  - Assess nutrition and hydration status and recommend course of action  - Evaluate amount of meals eaten  - Assist patient with eating if necessary   - Allow adequate time for meals  - Recommend/ encourage appropriate diets, oral nutritional supplements, and vitamin/mineral supplements  - Order, calculate, and assess calorie counts as needed  - Recommend, monitor, and adjust tube feedings and TPN/PPN based on assessed needs  - Assess need for intravenous fluids  - Provide specific nutrition/hydration education as appropriate  - Include patient/family/caregiver in decisions related to nutrition  Outcome: Progressing

## 2022-05-04 NOTE — ASSESSMENT & PLAN NOTE
Multifactorial given volume overload, aspiration, atelectasis, morbid obesity  · No PNA on imaging  · Diuresis as above  · Wean oxygen as able

## 2022-05-04 NOTE — ASSESSMENT & PLAN NOTE
POA on admission at [facility]   · Aspiration precautions  · Modified diet, speech consulted  · Wean oxygen as able   · Off abx

## 2022-05-04 NOTE — PROGRESS NOTES
[facility]  Progress Note - Jeremy No 1940, 80 y o  female MRN: [id number]  Unit/Bed#: [facility] 903-01 Encounter: [id number]  Primary Care Provider: Camryn Starkey DO   Date and time admitted to hospital: 5/2/2022  4:17 PM    * Bilateral lower extremity edema  Assessment & Plan  Transferred from [facility] after she developed worsening edema, erythema and pain of the lower extremities despite IV abx for suspected cellulitis  Of note she arrived to [facility] with EDUARDO and had been on IVF for multiple days prior  · History of CHF and cirrhosis with hypoalbuminemia noted  Also with chronic venous stasis dermatitis so likely there is a component of third spacing causing edema  · Currently on IV cefepime/vancomycin per ID, however abx dc on 5/3 given low suspicion for infection  · CT scan of the bilateral LE were negative for abscess formation  · She was transferred to [facility] for a dermatology evaluation    · Derm feels chronic venous stasis dermatitis  · Podiatry consulted for wound care  · Encourage compression/elevation   · Cards consulted to assist with IV diuresis--continue  · Lower extremity venous dopplers ordered to check for DVT however unable to tolerate given significant pain--can consider if pain improves  · Adjust pain regimen today    Acute respiratory failure with hypoxia ([address] )  Assessment & Plan  Multifactorial given volume overload, aspiration, atelectasis, morbid obesity  · No PNA on imaging  · Diuresis as above  · Wean oxygen as able    Cirrhosis ([address] )  Assessment & Plan  Daughter reports hx of cirrhosis however does not follow with any GI specialists  · Monitor     EDUARDO (acute kidney injury) ([address] )  Assessment & Plan  POA at [facility]  · S/p IV hydration at [facility], creatinine has stabilized  · Now off IVF  · Trialing IV lasix  · monitor BMP    Aspiration pneumonitis [facility])  Assessment & Plan  POA on admission at [facility]   · Aspiration precautions  · Modified diet, speech consulted  · Wean oxygen as able   · Off abx    Morbid obesity ([address] )  Assessment & Plan  Supportive care  · Turn Q2h  · Weight loss counseling as an outpatient when medically stable    Localization-related epilepsy, intractable (HCC)  Assessment & Plan  · Continue keppra and phenobarbitol    Chronic diastolic (congestive) heart failure (HCC)  Assessment & Plan  Wt Readings from Last 3 Encounters:   22 99 1 kg (218 lb 8 oz)   22 101 kg (222 lb 14 2 oz)   21 93 kg (205 lb)     · Echo ordered 5/3 given severe edema + murmur on exam  · Monitor intake and output  · Monitor daily weights  · Does not appear to be on any diuretics at home  · Given multiple days of IVF after normalization of creatinine for EDUARDO she may have some volume overload  · Placed on IV lasix daily, continue for now  · Cards following             VTE Pharmacologic Prophylaxis:   Moderate Risk (Score 3-4) - Pharmacological DVT Prophylaxis Ordered: heparin  Patient Centered Rounds: I performed bedside rounds with nursing staff today  Discussions with Specialists or Other Care Team Provider: appreciate specialists    Education and Discussions with Family / Patient: Updated  (daughter) via phone  Time Spent for Care: 30 minutes  More than 50% of total time spent on counseling and coordination of care as described above  Current Length of Stay: 2 day(s)  Current Patient Status: Inpatient   Certification Statement: The patient will continue to require additional inpatient hospital stay due to IV diuresis, weaning oxygen   Discharge Plan: Anticipate discharge in >72 hrs to rehab facility  Code Status: Level 1 - Full Code    Subjective:   Crying in pain today with lower legs  Otherwise no complaints       Objective:     Vitals:   Temp (24hrs), Av 5 °F (36 9 °C), Min:97 8 °F (36 6 °C), Max:99 °F (37 2 °C)    Temp:  [97 8 °F (36 6 °C)-99 °F (37 2 °C)] 98 5 °F (36 9 °C)  HR: [] 98  Resp:  [20] 20  BP: (116-167)/() 167/103  SpO2:  [91 %-100 %] 98 %  Body mass index is 44 13 kg/m²  Input and Output Summary (last 24 hours): Intake/Output Summary (Last 24 hours) at 5/4/2022 0920  Last data filed at 5/3/2022 2051  Gross per 24 hour   Intake 220 ml   Output 1150 ml   Net -930 ml       Physical Exam:   Physical Exam  Vitals and nursing note reviewed  Constitutional:       General: She is not in acute distress  Appearance: She is obese  Comments: On nasal cannula    Cardiovascular:      Rate and Rhythm: Normal rate and regular rhythm  Pulmonary:      Effort: No respiratory distress  Abdominal:      General: There is no distension  Tenderness: There is no abdominal tenderness  Musculoskeletal:      Right lower leg: Edema present  Left lower leg: Edema present  Skin:     Comments: Weeping blisters    Psychiatric:         Mood and Affect: Mood normal           Additional Data:     Labs:  Results from last 7 days   Lab Units 05/04/22  0512 05/03/22 0449 05/03/22  0449   WBC Thousand/uL 17 25*   < > 15 90*   HEMOGLOBIN g/dL 9 8*   < > 9 7*   HEMATOCRIT % 32 6*   < > 31 6*   PLATELETS Thousands/uL 197   < > 200   BANDS PCT %  --   --  1   LYMPHO PCT %  --   --  10*   MONO PCT %  --   --  5   EOS PCT %  --   --  1    < > = values in this interval not displayed  Results from last 7 days   Lab Units 05/04/22  0512 05/03/22  0450 05/03/22  0450   SODIUM mmol/L 140   < > 139   POTASSIUM mmol/L 4 2   < > 3 3*   CHLORIDE mmol/L 104   < > 103   CO2 mmol/L 31   < > 31   BUN mg/dL 9   < > 8   CREATININE mg/dL 0 63   < > 0 55*   ANION GAP mmol/L 5   < > 5   CALCIUM mg/dL 9 2   < > 9 4   ALBUMIN g/dL  --   --  2 0*   TOTAL BILIRUBIN mg/dL  --   --  0 34   ALK PHOS U/L  --   --  114   ALT U/L  --   --  17   AST U/L  --   --  22   GLUCOSE RANDOM mg/dL 94   < > 81    < > = values in this interval not displayed                   Results from last 7 days   Lab Units 05/04/22  0512 05/03/22  0450 05/01/22  0908   LACTIC ACID mmol/L  --   --  0 6   PROCALCITONIN ng/ml 0 27* 0 83*  --        Lines/Drains:  Invasive Devices  Report    Peripherally Inserted Central Catheter Line            PICC Line 04/29/22 Right Brachial 4 days          Drain            External Urinary Catheter 1 day                Central Line:  Goal for removal: Will discontinue when peripheral access obtained  Imaging: No pertinent imaging reviewed  Recent Cultures (last 7 days):         Last 24 Hours Medication List:   Current Facility-Administered Medications   Medication Dose Route Frequency Provider Last Rate    acetaminophen  975 mg Oral Q8H [facility] Che Ruiz PA-C      albuterol  2 puff Inhalation Q4H PRN Ashley Dobbs MD      atorvastatin  80 mg Oral Daily Ashley Dobbs MD      Diclofenac Sodium  2 g Topical 4x Daily PRN Ashley Dobbs MD      furosemide  40 mg Intravenous Daily Medhat Camacho DO      heparin (porcine)  7,500 Units Subcutaneous [facility] Ashley Dobbs MD      HYDROmorphone  0 2 mg Intravenous Q6H PRN Emanuel Stanton PA-C      levETIRAcetam  500 mg Oral Q12H [facility] Ashley Dobbs MD      metoprolol tartrate  25 mg Oral Q12H [facility] Ashley Dobbs MD      ondansetron  4 mg Intravenous Q6H PRN Ashley Dobbs MD      oxyCODONE  2 5 mg Oral Q4H PRN Ashley Dobbs MD      oxyCODONE  5 mg Oral Q4H PRN Ashley Dobbs MD      pantoprazole  20 mg Oral Early Morning Ashley Dobbs MD      PHENobarbital  32 4 mg Oral BID Ashley Dobbs MD      sodium chloride (PF)  3 mL Intravenous Q1H PRN Ashley Dobbs MD          Today, Patient Was Seen By: Emanuel Stanton PA-C    **Please Note: This note may have been constructed using a voice recognition system  **

## 2022-05-05 PROBLEM — N17.9 AKI (ACUTE KIDNEY INJURY) (HCC): Status: RESOLVED | Noted: 2022-04-25 | Resolved: 2022-05-05

## 2022-05-05 PROBLEM — I35.0 AORTIC STENOSIS: Chronic | Status: ACTIVE | Noted: 2022-05-05

## 2022-05-05 LAB
ALBUMIN SERPL BCP-MCNC: 2.3 G/DL (ref 3.5–5)
ALP SERPL-CCNC: 93 U/L (ref 46–116)
ALT SERPL W P-5'-P-CCNC: 20 U/L (ref 12–78)
ANION GAP SERPL CALCULATED.3IONS-SCNC: 1 MMOL/L (ref 4–13)
AST SERPL W P-5'-P-CCNC: 24 U/L (ref 5–45)
BASOPHILS # BLD MANUAL: 0 THOUSAND/UL (ref 0–0.1)
BASOPHILS NFR MAR MANUAL: 0 % (ref 0–1)
BILIRUB SERPL-MCNC: 0.67 MG/DL (ref 0.2–1)
BUN SERPL-MCNC: 8 MG/DL (ref 5–25)
CALCIUM ALBUM COR SERPL-MCNC: 10.7 MG/DL (ref 8.3–10.1)
CALCIUM SERPL-MCNC: 9.3 MG/DL (ref 8.3–10.1)
CHLORIDE SERPL-SCNC: 98 MMOL/L (ref 100–108)
CO2 SERPL-SCNC: 38 MMOL/L (ref 21–32)
CREAT SERPL-MCNC: 0.69 MG/DL (ref 0.6–1.3)
EOSINOPHIL # BLD MANUAL: 0.15 THOUSAND/UL (ref 0–0.4)
EOSINOPHIL NFR BLD MANUAL: 1 % (ref 0–6)
ERYTHROCYTE [DISTWIDTH] IN BLOOD BY AUTOMATED COUNT: 15.1 % (ref 11.6–15.1)
GFR SERPL CREATININE-BSD FRML MDRD: 81 ML/MIN/1.73SQ M
GLUCOSE SERPL-MCNC: 107 MG/DL (ref 65–140)
HCT VFR BLD AUTO: 30.6 % (ref 34.8–46.1)
HGB BLD-MCNC: 9.3 G/DL (ref 11.5–15.4)
LYMPHOCYTES # BLD AUTO: 15 % (ref 14–44)
LYMPHOCYTES # BLD AUTO: 2.21 THOUSAND/UL (ref 0.6–4.47)
MCH RBC QN AUTO: 28.6 PG (ref 26.8–34.3)
MCHC RBC AUTO-ENTMCNC: 30.4 G/DL (ref 31.4–37.4)
MCV RBC AUTO: 94 FL (ref 82–98)
MONOCYTES # BLD AUTO: 0.88 THOUSAND/UL (ref 0–1.22)
MONOCYTES NFR BLD: 6 % (ref 4–12)
NEUTROPHILS # BLD MANUAL: 11.5 THOUSAND/UL (ref 1.85–7.62)
NEUTS BAND NFR BLD MANUAL: 8 % (ref 0–8)
NEUTS SEG NFR BLD AUTO: 70 % (ref 43–75)
PLATELET # BLD AUTO: 223 THOUSANDS/UL (ref 149–390)
PLATELET BLD QL SMEAR: ADEQUATE
PMV BLD AUTO: 10.3 FL (ref 8.9–12.7)
POTASSIUM SERPL-SCNC: 3.8 MMOL/L (ref 3.5–5.3)
PROT SERPL-MCNC: 7.4 G/DL (ref 6.4–8.2)
RBC # BLD AUTO: 3.25 MILLION/UL (ref 3.81–5.12)
RBC MORPH BLD: NORMAL
SODIUM SERPL-SCNC: 137 MMOL/L (ref 136–145)
WBC # BLD AUTO: 14.74 THOUSAND/UL (ref 4.31–10.16)

## 2022-05-05 PROCEDURE — 99232 SBSQ HOSP IP/OBS MODERATE 35: CPT | Performed by: NURSE PRACTITIONER

## 2022-05-05 PROCEDURE — 97535 SELF CARE MNGMENT TRAINING: CPT

## 2022-05-05 PROCEDURE — 85007 BL SMEAR W/DIFF WBC COUNT: CPT | Performed by: INTERNAL MEDICINE

## 2022-05-05 PROCEDURE — 99232 SBSQ HOSP IP/OBS MODERATE 35: CPT | Performed by: INTERNAL MEDICINE

## 2022-05-05 PROCEDURE — 97530 THERAPEUTIC ACTIVITIES: CPT

## 2022-05-05 PROCEDURE — 85027 COMPLETE CBC AUTOMATED: CPT | Performed by: INTERNAL MEDICINE

## 2022-05-05 PROCEDURE — 80053 COMPREHEN METABOLIC PANEL: CPT | Performed by: PHYSICIAN ASSISTANT

## 2022-05-05 PROCEDURE — 99233 SBSQ HOSP IP/OBS HIGH 50: CPT | Performed by: INTERNAL MEDICINE

## 2022-05-05 RX ORDER — POTASSIUM CHLORIDE 20 MEQ/1
40 TABLET, EXTENDED RELEASE ORAL ONCE
Status: COMPLETED | OUTPATIENT
Start: 2022-05-05 | End: 2022-05-05

## 2022-05-05 RX ORDER — AMOXICILLIN 250 MG
1 CAPSULE ORAL 2 TIMES DAILY
Status: DISCONTINUED | OUTPATIENT
Start: 2022-05-05 | End: 2022-05-11 | Stop reason: HOSPADM

## 2022-05-05 RX ORDER — POLYETHYLENE GLYCOL 3350 17 G/17G
17 POWDER, FOR SOLUTION ORAL DAILY PRN
Status: DISCONTINUED | OUTPATIENT
Start: 2022-05-05 | End: 2022-05-09

## 2022-05-05 RX ADMIN — ALBUMIN (HUMAN) 12.5 G: 0.25 INJECTION, SOLUTION INTRAVENOUS at 08:53

## 2022-05-05 RX ADMIN — ACETAMINOPHEN 975 MG: 325 TABLET, FILM COATED ORAL at 05:04

## 2022-05-05 RX ADMIN — ALBUMIN (HUMAN) 12.5 G: 0.25 INJECTION, SOLUTION INTRAVENOUS at 22:45

## 2022-05-05 RX ADMIN — LEVETIRACETAM 500 MG: 500 TABLET, FILM COATED ORAL at 08:53

## 2022-05-05 RX ADMIN — METOPROLOL TARTRATE 25 MG: 25 TABLET, FILM COATED ORAL at 08:53

## 2022-05-05 RX ADMIN — HEPARIN SODIUM 7500 UNITS: 5000 INJECTION INTRAVENOUS; SUBCUTANEOUS at 22:45

## 2022-05-05 RX ADMIN — LEVETIRACETAM 500 MG: 500 TABLET, FILM COATED ORAL at 22:44

## 2022-05-05 RX ADMIN — HYDROMORPHONE HYDROCHLORIDE 0.2 MG: 0.2 INJECTION, SOLUTION INTRAMUSCULAR; INTRAVENOUS; SUBCUTANEOUS at 15:31

## 2022-05-05 RX ADMIN — SENNOSIDES AND DOCUSATE SODIUM 1 TABLET: 50; 8.6 TABLET ORAL at 17:43

## 2022-05-05 RX ADMIN — ATORVASTATIN CALCIUM 80 MG: 80 TABLET, FILM COATED ORAL at 08:53

## 2022-05-05 RX ADMIN — POTASSIUM CHLORIDE 40 MEQ: 1500 TABLET, EXTENDED RELEASE ORAL at 08:53

## 2022-05-05 RX ADMIN — FUROSEMIDE 40 MG: 10 INJECTION, SOLUTION INTRAMUSCULAR; INTRAVENOUS at 15:32

## 2022-05-05 RX ADMIN — PHENOBARBITAL 32.4 MG: 64.8 TABLET ORAL at 08:52

## 2022-05-05 RX ADMIN — SENNOSIDES AND DOCUSATE SODIUM 1 TABLET: 50; 8.6 TABLET ORAL at 11:27

## 2022-05-05 RX ADMIN — FUROSEMIDE 40 MG: 10 INJECTION, SOLUTION INTRAMUSCULAR; INTRAVENOUS at 08:53

## 2022-05-05 RX ADMIN — HEPARIN SODIUM 7500 UNITS: 5000 INJECTION INTRAVENOUS; SUBCUTANEOUS at 05:04

## 2022-05-05 RX ADMIN — PHENOBARBITAL 32.4 MG: 64.8 TABLET ORAL at 17:43

## 2022-05-05 RX ADMIN — HEPARIN SODIUM 7500 UNITS: 5000 INJECTION INTRAVENOUS; SUBCUTANEOUS at 15:32

## 2022-05-05 RX ADMIN — ACETAMINOPHEN 975 MG: 325 TABLET, FILM COATED ORAL at 22:45

## 2022-05-05 RX ADMIN — PANTOPRAZOLE SODIUM 20 MG: 20 TABLET, DELAYED RELEASE ORAL at 05:05

## 2022-05-05 RX ADMIN — ACETAMINOPHEN 975 MG: 325 TABLET, FILM COATED ORAL at 15:32

## 2022-05-05 RX ADMIN — METOPROLOL TARTRATE 25 MG: 25 TABLET, FILM COATED ORAL at 22:44

## 2022-05-05 NOTE — PLAN OF CARE
Problem: Prexisting or High Potential for Compromised Skin Integrity  Goal: Skin integrity is maintained or improved  Description: INTERVENTIONS:  - Identify patients at risk for skin breakdown  - Assess and monitor skin integrity  - Assess and monitor nutrition and hydration status  - Monitor labs   - Assess for incontinence   - Turn and reposition patient  - Assist with mobility/ambulation  - Relieve pressure over bony prominences  - Avoid friction and shearing  - Provide appropriate hygiene as needed including keeping skin clean and dry  - Evaluate need for skin moisturizer/barrier cream  - Collaborate with interdisciplinary team   - Patient/family teaching  - Consider wound care consult   Outcome: Progressing  Note: Edema and blisters remained to bilateral lower extremities with weeping mostly from the left  Sensitive to touch, off load with pillows  Problem: MOBILITY - ADULT  Goal: Maintain or return to baseline ADL function  Description: INTERVENTIONS:  -  Assess patient's ability to carry out ADLs; assess patient's baseline for ADL function and identify physical deficits which impact ability to perform ADLs (bathing, care of mouth/teeth, toileting, grooming, dressing, etc )  - Assess/evaluate cause of self-care deficits   - Assess range of motion  - Assess patient's mobility; develop plan if impaired  - Assess patient's need for assistive devices and provide as appropriate  - Encourage maximum independence but intervene and supervise when necessary  - Involve family in performance of ADLs  - Assess for home care needs following discharge   - Consider OT consult to assist with ADL evaluation and planning for discharge  - Provide patient education as appropriate  Outcome: Progressing  Goal: Maintains/Returns to pre admission functional level  Description: INTERVENTIONS:  - Perform BMAT or MOVE assessment daily    - Set and communicate daily mobility goal to care team and patient/family/caregiver     - Collaborate with rehabilitation services on mobility goals if consulted  - Perform Range of Motion 3 times a day  - Reposition patient every 2 hours  - Dangle patient  times a day  - Stand patient times a day  - Ambulate patient times a day  - Out of bed to chair times a day   - Out of bed for meals  times a day  - Out of bed for toileting  - Record patient progress and toleration of activity level   Outcome: Progressing     Problem: Potential for Falls  Goal: Patient will remain free of falls  Description: INTERVENTIONS:  - Educate patient/family on patient safety including physical limitations  - Instruct patient to call for assistance with activity   - Consult OT/PT to assist with strengthening/mobility   - Keep Call bell within reach  - Keep bed low and locked with side rails adjusted as appropriate  - Keep care items and personal belongings within reach  - Initiate and maintain comfort rounds  - Make Fall Risk Sign visible to staff  - Offer Toileting every Hours, in advance of need  - Initiate/Maintain bed alarm  - Obtain necessary fall risk management equipment:   - Apply yellow socks and bracelet for high fall risk patients  - Consider moving patient to room near nurses station  Outcome: Progressing     Problem: Nutrition/Hydration-ADULT  Goal: Nutrient/Hydration intake appropriate for improving, restoring or maintaining nutritional needs  Description: Monitor and assess patient's nutrition/hydration status for malnutrition  Collaborate with interdisciplinary team and initiate plan and interventions as ordered  Monitor patient's weight and dietary intake as ordered or per policy  Utilize nutrition screening tool and intervene as necessary  Determine patient's food preferences and provide high-protein, high-caloric foods as appropriate       INTERVENTIONS:  - Monitor oral intake, urinary output, labs, and treatment plans  - Assess nutrition and hydration status and recommend course of action  - Evaluate amount of meals eaten  - Assist patient with eating if necessary   - Allow adequate time for meals  - Recommend/ encourage appropriate diets, oral nutritional supplements, and vitamin/mineral supplements  - Order, calculate, and assess calorie counts as needed  - Recommend, monitor, and adjust tube feedings and TPN/PPN based on assessed needs  - Assess need for intravenous fluids  - Provide specific nutrition/hydration education as appropriate  - Include patient/family/caregiver in decisions related to nutrition  Outcome: Progressing

## 2022-05-05 NOTE — PROGRESS NOTES
Progress Note - Cardiology   Olivia Duran 80 y o  female MRN: [id number]  Unit/Bed#: [facility] 903-01 Encounter: [id number]    Assessment:  Principal Problem:    Bilateral lower extremity edema  Active Problems:    Chronic diastolic (congestive) heart failure (HCC)    Localization-related epilepsy, intractable (HCC)    Acute respiratory failure with hypoxia (HCC)    Morbid obesity (HCC)    Aspiration pneumonitis (HCC)    Cirrhosis (HCC)    SIRS (systemic inflammatory response syndrome) (HCC)    Aortic stenosis    Aortic stenosis, moderate  Chronic diastolic congestive heart failure  Encephalopathic  Lymphedema  Alcoholic cirrhosis  Sirs  Morbid obesity    Plan:    Would continue the IV lasix currently, monitor the BMP  AS is moderate  Outpatient follow up with her regular cardiologist  Not sure if this progressed if she would be a good candidate for intervention  Very painful legs, would have a hard time doing compression stockings or lymphedema pump treatment  Wean O2 as tolerated with diuresis  Subjective/Objective     Subjective:  Patient denies any complaints to me, although history remains somewhat limited and short  She did have good output with the IV lasix, but not able to be accurately recorded due to incontinence        Objective:    Vitals: /94   Pulse 95   Temp 98 3 °F (36 8 °C)   Resp 18   Ht 4' 11" (1 499 m)   Wt 95 8 kg (211 lb 3 2 oz)   SpO2 98%   BMI 42 66 kg/m²   Vitals:    05/04/22 0600 05/05/22 0600   Weight: 99 1 kg (218 lb 8 oz) 95 8 kg (211 lb 3 2 oz)     Orthostatic Blood Pressures      Most Recent Value   Blood Pressure 157/94 filed at 05/05/2022 3807   Patient Position - Orthostatic VS Lying filed at 05/04/2022 1933            Intake/Output Summary (Last 24 hours) at 5/5/2022 0916  Last data filed at 5/5/2022 0501  Gross per 24 hour   Intake 60 ml   Output 1450 ml   Net -1390 ml     Physical Exam:   General appearance: alert and in no acute distress  Head: Normocephalic, without obvious abnormality, atraumatic  Neck: no carotid bruit, no JVD and supple, symmetrical, trachea midline  Lungs: clear to auscultation bilaterally  Normal air entry  Normal effort    Heart: S1, S2 regular  + CHAU  Abdomen: soft, non-tender; bowel sounds normal; no masses,  no organomegaly  Extremities: chronic LE changes, appears lymphedema  Skin: Skin color, texture, turgor normal  No rashes or lesions  Neurologic: unable to assess    Medications:    Current Facility-Administered Medications:     acetaminophen (TYLENOL) tablet 975 mg, 975 mg, Oral, Q8H ARACELI, Che Ruiz PA-C, 975 mg at 05/05/22 0504    albumin human (FLEXBUMIN) 25 % injection 12 5 g, 12 5 g, Intravenous, BID, Desiree Burgess PA-C, 12 5 g at 05/05/22 0853    albuterol (PROVENTIL HFA,VENTOLIN HFA) inhaler 2 puff, 2 puff, Inhalation, Q4H PRN, Perfecto Salas MD    atorvastatin (LIPITOR) tablet 80 mg, 80 mg, Oral, Daily, Perfecto Salas MD, 80 mg at 05/05/22 0853    Diclofenac Sodium (VOLTAREN) 1 % topical gel 2 g, 2 g, Topical, 4x Daily PRN, Perfecto Salas MD    furosemide (LASIX) injection 40 mg, 40 mg, Intravenous, BID (diuretic), Desiree Burgess PA-C, 40 mg at 05/05/22 0853    heparin (porcine) subcutaneous injection 7,500 Units, 7,500 Units, Subcutaneous, Q8H [address], Perfecto Salas MD, 7,500 Units at 05/05/22 0504    HYDROmorphone HCl (DILAUDID) injection 0 2 mg, 0 2 mg, Intravenous, Q6H PRN, Augusto Olguin PA-C, 0 2 mg at 05/03/22 1435    levETIRAcetam (KEPPRA) tablet 500 mg, 500 mg, Oral, Q12H [address], Perfecto Salas MD, 500 mg at 05/05/22 0853    metoprolol tartrate (LOPRESSOR) tablet 25 mg, 25 mg, Oral, Q12H [address], Perfecto Salas MD, 25 mg at 05/05/22 0853    ondansetron (ZOFRAN) injection 4 mg, 4 mg, Intravenous, Q6H PRN, Perfecto Salas MD    oxyCODONE (ROXICODONE) IR tablet 2 5 mg, 2 5 mg, Oral, Q4H PRN, Perfecto Salas MD    oxyCODONE (ROXICODONE) IR tablet 5 mg, 5 mg, Oral, Q4H PRN, Perfecto Salas MD, 5 mg at 05/04/22 0831    pantoprazole (PROTONIX) EC tablet 20 mg, 20 mg, Oral, Early Morning, Lily Marlow MD, 20 mg at 05/05/22 0505    PHENobarbital tablet 32 4 mg, 32 4 mg, Oral, BID, Lily Marlow MD, 32 4 mg at 05/05/22 0852    sodium chloride (PF) 0 9 % injection 3 mL, 3 mL, Intravenous, Q1H PRN, Lily Marlow MD    Lab Results:      Results from last 7 days   Lab Units 05/05/22  0847 05/04/22  0512 05/03/22  0449   WBC Thousand/uL 14 74* 17 25* 15 90*   HEMOGLOBIN g/dL 9 3* 9 8* 9 7*   HEMATOCRIT % 30 6* 32 6* 31 6*   PLATELETS Thousands/uL 223 197 200         Results from last 7 days   Lab Units 05/05/22  0634 05/04/22  0512 05/03/22  0450 05/02/22  0521 05/01/22  0602   SODIUM mmol/L 137 140 139   < > 140   POTASSIUM mmol/L 3 8 4 2 3 3*   < > 3 8   CHLORIDE mmol/L 98* 104 103   < > 105   CO2 mmol/L 38* 31 31   < > 29   BUN mg/dL 8 9 8   < > 11   CREATININE mg/dL 0 69 0 63 0 55*   < > 0 64   CALCIUM mg/dL 9 3 9 2 9 4   < > 8 6   ALK PHOS U/L 93  --  114  --  107   ALT U/L 20  --  17  --  16   AST U/L 24  --  22  --  24    < > = values in this interval not displayed  Results from last 7 days   Lab Units 05/04/22 0512 05/03/22  0450   MAGNESIUM mg/dL 1 7 1 6       Echo:    Left Ventricle: Left ventricular cavity size is normal  Wall thickness is normal  The left ventricular ejection fraction is 70%  Systolic function is hyperdynamic  Wall motion cannot be accurately assessed  Diastolic function is mildly abnormal, consistent with grade I (abnormal) relaxation    Right Ventricle: Right ventricular cavity size is normal  Systolic function is normal     Aortic Valve: The aortic valve is trileaflet  The leaflets are moderately thickened  The leaflets are moderately calcified  There is probably moderately reduced mobility  There is moderate stenosis based on a limited assessment of hemodynamic data, the accuracy of which is limited by increased LVOT velocities   The aortic valve peak velocity is 3 78 m/s (increased due to a combination of aortic stenosis and LVOT obstruction)  The aortic valve peak gradient is 58 mmHg  The aortic valve mean gradient is 37 mmHg  The DVI is 0 39  There is also a dynamic LVOT gradient upto 36 mm of Hg  The valve was very poorly visualized and LVOT diameter could not be measured    Mitral Valve: There is moderate annular calcification  There is mild regurgitation    Study Details: Study quality was poor      If clinically indicated, SKYE could provide more information about the severity of aortic stenosis

## 2022-05-05 NOTE — PLAN OF CARE
Problem: OCCUPATIONAL THERAPY ADULT  Goal: Performs self-care activities at highest level of function for planned discharge setting  See evaluation for individualized goals  Description: Treatment Interventions: ADL retraining,Functional transfer training,UE strengthening/ROM,Endurance training,Cognitive reorientation,Patient/family training,Equipment evaluation/education,Compensatory technique education,Fine motor coordination activities,Energy conservation,Activityengagement          See flowsheet documentation for full assessment, interventions and recommendations  Outcome: Progressing  Note: Limitation: Decreased ADL status,Decreased UE ROM,Decreased cognition,Decreased UE strength,Decreased Safe judgement during ADL,Decreased endurance,Decreased sensation,Decreased fine motor control,Decreased self-care trans,Decreased high-level ADLs  Prognosis: Fair  Assessment: Patient participated in Skilled OT session this date with interventions consisting of ADL re training with the use of correct body mechnaics, Energy Conservation techniques, deep breathing technique, safety awareness and fall prevention techniques,  therapeutic activities to: increase activity tolerance and increase OOB/ sitting tolerance   Upon arrival patient was found supine in bed  Pt demonstrated the following tasks: DEP x 2 sup <> sit  Pt with very limited EOB sitting tolerance 2* B/L LE pain  Pt performed ADLs with MIN A for washing face, MAX A for UB ADLs, and DEP for LB ADLs  Patient continues to be functioning below baseline level, occupational performance remains limited secondary to factors listed above and increased risk for falls and injury  From OT standpoint, recommendation at time of d/c would be STR  Patient to benefit from continued Occupational Therapy treatment while in the hospital to address deficits as defined above and maximize level of functional independence with ADLs and functional mobility   Pt was left in bed with alarm on after session with all current needs met  The patient's raw score on the AM-PAC Daily Activity inpatient short form is 12, standardized score is 30 6, less than 39 4  Patients at this level are likely to benefit from discharge to post-acute rehabilitation services  Please refer to the recommendation of the Occupational Therapist for safe discharge planning       OT Discharge Recommendation: Post acute rehabilitation services  OT - OK to Discharge: Yes (to rehab when medically stable )

## 2022-05-05 NOTE — NURSING NOTE
Patient received IV lasix 40 mg, had large amount of unmeasured urine saturating the linen and pads

## 2022-05-05 NOTE — PLAN OF CARE
Problem: Prexisting or High Potential for Compromised Skin Integrity  Goal: Skin integrity is maintained or improved  Description: INTERVENTIONS:  - Identify patients at risk for skin breakdown  - Assess and monitor skin integrity  - Assess and monitor nutrition and hydration status  - Monitor labs   - Assess for incontinence   - Turn and reposition patient  - Assist with mobility/ambulation  - Relieve pressure over bony prominences  - Avoid friction and shearing  - Provide appropriate hygiene as needed including keeping skin clean and dry  - Evaluate need for skin moisturizer/barrier cream  - Collaborate with interdisciplinary team   - Patient/family teaching  - Consider wound care consult   Outcome: Progressing     Problem: MOBILITY - ADULT  Goal: Maintain or return to baseline ADL function  Description: INTERVENTIONS:  -  Assess patient's ability to carry out ADLs; assess patient's baseline for ADL function and identify physical deficits which impact ability to perform ADLs (bathing, care of mouth/teeth, toileting, grooming, dressing, etc )  - Assess/evaluate cause of self-care deficits   - Assess range of motion  - Assess patient's mobility; develop plan if impaired  - Assess patient's need for assistive devices and provide as appropriate  - Encourage maximum independence but intervene and supervise when necessary  - Involve family in performance of ADLs  - Assess for home care needs following discharge   - Consider OT consult to assist with ADL evaluation and planning for discharge  - Provide patient education as appropriate  Outcome: Progressing  Goal: Maintains/Returns to pre admission functional level  Description: INTERVENTIONS:  - Perform BMAT or MOVE assessment daily    - Set and communicate daily mobility goal to care team and patient/family/caregiver  - Collaborate with rehabilitation services on mobility goals if consulted  - Perform Range of Motion  times a day    - Reposition patient every hours   - Dangle patient  times a day  - Stand patient  times a day  - Ambulate patient  times a day  - Out of bed to chair  times a day   - Out of bed for meals  times a day  - Out of bed for toileting  - Record patient progress and toleration of activity level   Outcome: Progressing     Problem: Potential for Falls  Goal: Patient will remain free of falls  Description: INTERVENTIONS:  - Educate patient/family on patient safety including physical limitations  - Instruct patient to call for assistance with activity   - Consult OT/PT to assist with strengthening/mobility   - Keep Call bell within reach  - Keep bed low and locked with side rails adjusted as appropriate  - Keep care items and personal belongings within reach  - Initiate and maintain comfort rounds  - Make Fall Risk Sign visible to staff  - Offer Toileting every  Hours, in advance of need  - Initiate/Maintain alarm  - Obtain necessary fall risk management equipment  - Apply yellow socks and bracelet for high fall risk patients  - Consider moving patient to room near nurses station  Outcome: Progressing     Problem: Nutrition/Hydration-ADULT  Goal: Nutrient/Hydration intake appropriate for improving, restoring or maintaining nutritional needs  Description: Monitor and assess patient's nutrition/hydration status for malnutrition  Collaborate with interdisciplinary team and initiate plan and interventions as ordered  Monitor patient's weight and dietary intake as ordered or per policy  Utilize nutrition screening tool and intervene as necessary  Determine patient's food preferences and provide high-protein, high-caloric foods as appropriate       INTERVENTIONS:  - Monitor oral intake, urinary output, labs, and treatment plans  - Assess nutrition and hydration status and recommend course of action  - Evaluate amount of meals eaten  - Assist patient with eating if necessary   - Allow adequate time for meals  - Recommend/ encourage appropriate diets, oral nutritional supplements, and vitamin/mineral supplements  - Order, calculate, and assess calorie counts as needed  - Recommend, monitor, and adjust tube feedings and TPN/PPN based on assessed needs  - Assess need for intravenous fluids  - Provide specific nutrition/hydration education as appropriate  - Include patient/family/caregiver in decisions related to nutrition  Outcome: Progressing

## 2022-05-05 NOTE — ASSESSMENT & PLAN NOTE
POA at [facility]  · S/p IV hydration at [place], creatinine has stabilized  · Now off IVF  · Trialing IV lasix  · monitor BMP

## 2022-05-05 NOTE — PHYSICAL THERAPY NOTE
PHYSICAL THERAPY NOTE          Patient Name: Mingo Bond  [id number] Date: 5/5/2022 05/05/22 1104   PT Last Visit   PT Visit Date 05/05/22   Note Type   Note Type Treatment   Pain Assessment   Pain Assessment Tool FLACC   Pain Location/Orientation Orientation: Bilateral;Location: Leg   Hospital Pain Intervention(s) Ambulation/increased activity; Emotional support;Repositioned   Pain Rating: FLACC (Rest) - Face 0   Pain Rating: FLACC (Rest) - Legs 0   Pain Rating: FLACC (Rest) - Activity 0   Pain Rating: FLACC (Rest) - Cry 0   Pain Rating: FLACC (Rest) - Consolability 0   Score: FLACC (Rest) 0   Pain Rating: FLACC (Activity) - Face 1   Pain Rating: FLACC (Activity) - Legs 1   Pain Rating: FLACC (Activity) - Activity 1   Pain Rating: FLACC (Activity) - Cry 1   Pain Rating: FLACC (Activity) - Consolability 1   Score: FLACC (Activity) 5   Restrictions/Precautions   Weight Bearing Precautions Per Order Yes   RLE Weight Bearing Per Order WBAT   LLE Weight Bearing Per Order WBAT   Other Precautions Cognitive; Chair Alarm; Bed Alarm;WBS;Multiple lines;O2;Fall Risk;Pain   General   Chart Reviewed Yes   Response to Previous Treatment Patient unable to report, no changes reported from family or staff   Family/Caregiver Present No   Cognition   Overall Cognitive Status Impaired   Arousal/Participation Responsive   Attention Attends with cues to redirect   Orientation Level Oriented to person;Disoriented to place; Disoriented to time;Disoriented to situation   Memory Decreased recall of precautions;Decreased recall of recent events;Decreased short term memory   Following Commands Follows one step commands inconsistently   Comments pt with increased fatigue/ bilateral LE pain limiting participation  Pt initally very responsive and conversive, more drowsiness noted as session progresses  EMotional support required throughout sesssion   Requires increased cuing throughout session    Bed Mobility   Supine to Sit 1  Dependent   Additional items Assist x 2; Increased time required;Verbal cues;LE management  (+ trunk management )   Sit to Supine 1  Dependent   Additional items Assist x 2; Increased time required;Verbal cues;LE management  (+ trunk management )   Additional Comments Pt supine in bed upon arrival  Pt returned to supine in bed with all needs wihtin reach    Transfers   Sit to Stand Unable to assess   Stand to Sit Unable to assess   Additional Comments not appropriate to progress 2* poor sitting tolerance/ balance + b/l LE pain   Balance   Static Sitting Poor   Dynamic Sitting Poor -   Endurance Deficit   Endurance Deficit Yes   Endurance Deficit Description pain, fatigue, cognition, deconditioning    Activity Tolerance   Activity Tolerance Patient limited by fatigue;Patient limited by pain  (+ cognition )   Medical Staff Made Aware co-treatment with OT performed this date 2* increased medical complexity and muliple co-morbidities    Nurse Made Aware RN cleared pt to participate in therapy session   (updated post therapy session )   Exercises   Balance training  sitting EOB ~2 min with mod- max x1 to maintain upright posture  Pt unsafe on side of bed with anterior vs posterior leaning  Pt with increased pain in b/l LEs returned to supine in bed with ax2    Assessment   Prognosis Guarded   Problem List Decreased strength;Decreased range of motion;Decreased endurance; Impaired balance;Decreased mobility; Decreased cognition;Decreased safety awareness;Decreased skin integrity;Pain   Assessment Pt seen for PT treatment session this date  Therapy session focused on bed mobility, sitting balance/ tolerance, repositioning/ education in order to improve overall mobility and independence  Pt requires assist of 2 for all mobility performed this date   Pt with increased time required to complete all tasks, continues to be limited by increased b/l LE pain and edema  Pt agreeable to trial sitting EOB, however only able to tolerate for ~2 min with mod/ max A required to sit EOB, pt yelling on EOB 2* pain requiring ax2 to return to supine  Pt repositioned with LEs on pillow to prevent pressure on heels  Pt making slow progress toward goals  Pt was left supine in bed at the end of PT session with all needs in reach  Pt would benefit from continued PT services while in hospital to address remaining limitations  PT to continue to follow pt and recommends post-acute rehab services  The patient's AM-PAC Basic Mobility Inpatient Short Form Raw Score is 6  A Raw score of less than or equal to 16 suggests the patient may benefit from discharge to post-acute rehabilitation services  Please also refer to the recommendation of the Physical Therapist for safe discharge planning  Barriers to Discharge Inaccessible home environment;Decreased caregiver support   Goals   Patient Goals none stated    STG Expiration Date 05/17/22   PT Treatment Day 1   Plan   Treatment/Interventions LE strengthening/ROM; Endurance training;Patient/family training;Bed mobility;Spoke to nursing;Spoke to case management;OT   Progress Slow progress, decreased activity tolerance   PT Frequency 2-3x/wk   Recommendation   PT Discharge Recommendation Post acute rehabilitation services   AM-PAC Basic Mobility Inpatient   Turning in Bed Without Bedrails 1   Lying on Back to Sitting on Edge of Flat Bed 1   Moving Bed to Chair 1   Standing Up From Chair 1   Walk in Room 1   Climb 3-5 Stairs 1   Basic Mobility Inpatient Raw Score 6   Turning Head Towards Sound 3   Follow Simple Instructions 2   Low Function Basic Mobility Raw Score 11   Low Function Basic Mobility Standardized Score 16 55   Highest Level Of Mobility   JH-HLM Goal 2: Bed activities/Dependent transfer   JH-HLM Highest Level of Mobility 3: Sit at edge of bed   JH-HLM Goal Achieved Yes   Education   Education Provided Mobility training   Patient Reinforcement needed   End of Consult   Patient Position at End of Consult Supine;Bed/Chair alarm activated; All needs within reach     Nicole Beltran, PT, DPT

## 2022-05-05 NOTE — ASSESSMENT & PLAN NOTE
Wt Readings from Last 3 Encounters:   05/04/22 99 1 kg (218 lb 8 oz)   05/02/22 101 kg (222 lb 14 2 oz)   03/14/21 93 kg (205 lb)     Echo ordered 5/3 given severe edema + murmur on exam  · Monitor intake and output  · Monitor daily weights  · Does not appear to be on any diuretics at home  · Given multiple days of IVF after normalization of creatinine for EDUARDO she may have some volume overload  · Placed on IV lasix BID, continue for now  · Cards following

## 2022-05-05 NOTE — ASSESSMENT & PLAN NOTE
POA on admission at [facility]   · Aspiration precautions  · Modified diet, speech consulted  · Wean oxygen as able   · Off abx

## 2022-05-05 NOTE — ASSESSMENT & PLAN NOTE
Hypotension, tachycardia, tachypnea  · From CHF exacerbation, treatment as above  · No etiology for infection suspected at this time, not on antibiotics per ID  · IV albumin ordered overnight (5/4)   · Of note this patient was on midodrine TID for the entirety of her hospitalization at [facility] until it was dc on 5/4 After the am dose after cards recs

## 2022-05-05 NOTE — PROGRESS NOTES
Progress Note - Sam Rodgers 80 y o  female MRN: [id number]    Unit/Bed#: [facility] 903-01 Encounter: [id number]      Assessment/Plan:  Bilateral lower extremity pain  Multifactorial underlying medical conditions, cellulitis, edema, chronic lower venous statis  CT right lower ext 5/2/22: diffuse RLL sc edema with overlying skin thickening  CT left lower ext 5/2/22: diffuse SC edema in the LLL with overlying skin thickening   Old healed tib/fib fractures, s/p ORIF hardware intact  Recommend geriatric pain protocol  Geriatric pain protocol  Scheduled acetaminophen 975 mg po Q8 (monitor LFT in the setting of cirrhosis)  Oxycodone 2 5 mg po Q 4 prn moderate pain  Oxycodone 5 mg po Q 4 prn severe pain   Monitor for constipation- last recorded BM 4/25/22, started on scheduled senna s and prn miralax     Encephalopathy  Improved alert and oriented x 2  Continue delirium precautions  Provide frequent redirection, reorientation, distraction techniques  Avoid deliriogenic medications such as tramadol, benzodiazepines, anticholinergics,  Benadryl  Treat pain, See geriatric pain protocol  Monitor for constipation and urinary retention  Encourage early and frequent moblization, OOB  Encourage Hydration/ Nutrition  Implement sleep hygiene, limit night time interuptions, group activities     Frailty  Clinical Frail Scale: 7- severely frail  Completely dependent for personal care  PT/OT, speech on consult  Albumin 2 0, nutrition consult  Continue 24/7 supportive care     Cognitive screening  Hx of poor memory  At risk secondary to hx of CAD, cirrhosis,  shunt  recommend check TSH and Vitamin B12  Continue folate  CT head done 1/19/22: moderate microangiopathic changes, unchanged encephalomalacia and gliosis in the right frontal lobe surrounding right frontal ventricular catheter, unchanged multifocal encephalomalacia, Right fontal Cesar hole,  shunt   Continue supportive care     Ambulatory dysfunction  At risk secondary to age, frailty, limited mobility, prior fall  Fall precautions  Uses walker/ wheelchair at baseline  PT/OT  Continue 24/7 supervision     Aspiration Pneumonitis  Aspiration precautions  Modified diet, speech on consulted  Completed abx  ID on consult     Bilateral leg cellulitis  Completed 7 days abx  ID on consult    Subjective:   Upon exam pt is lying in bed  She is alert to name, talkative, alert , cooperative with care  Last recorded BM 4/25/22    Objective:     Vitals: Blood pressure 157/94, pulse 95, temperature 98 3 °F (36 8 °C), resp  rate 18, height 4' 11" (1 499 m), weight 95 8 kg (211 lb 3 2 oz), SpO2 98 %  ,Body mass index is 42 66 kg/m²  Intake/Output Summary (Last 24 hours) at 5/5/2022 1007  Last data filed at 5/5/2022 0501  Gross per 24 hour   Intake 60 ml   Output 1450 ml   Net -1390 ml       Physical Exam:   General : NAD  HEENT : MMM   Heart : Normal rate, no murmur rub or gallop  Lungs : CTA no wheezes, rales or rhonchi  Abdomen : Soft, NT/ND, BS auscultated in all 4 quads  Ext :  BLE edema, thickened skin folds, ulcerations  Skin : Pink, warm, dry, age appropriate turgor and mobility  Neuro : Nonfocal  Psych : Alert and O x 1      Invasive Devices  Report    Peripherally Inserted Central Catheter Line            PICC Line 04/29/22 Right Brachial 5 days          Drain            External Urinary Catheter 2 days                Lab, Imaging and other studies: I have personally reviewed pertinent reports

## 2022-05-05 NOTE — CASE MANAGEMENT
Case Management Discharge Planning Note    Patient name Dominic English  [facility] [id number]  : 1940 Date 2022       Current Admission Date: 2022  Current Admission Diagnosis:Bilateral lower extremity edema   Patient Active Problem List    Diagnosis Date Noted    Aortic stenosis 2022    Cirrhosis ([facility] ) 2022    [facility] syndrome) [facility] 2022    Aspiration pneumonitis ([facility] ) 2022    Morbid obesity ([facility] ) 2021    Bilateral lower extremity edema 2021    Acute respiratory failure with hypoxia ([facility] ) 2021    Chronic diastolic (congestive) heart failure ([facility] ) 2020    Hyperlipidemia 2020    Localization-related epilepsy, intractable ([place] [address] ) 02/15/2019      LOS (days): 3  Geometric Mean LOS (GMLOS) (days): 5 40  Days to GMLOS:2 7     OBJECTIVE:  Risk of Unplanned Readmission Score: 16         Current admission status: Inpatient   Preferred Pharmacy:   [address] [facility], [facility] [place] [id number]  Phone: [phone] Fax: [phone]    Primary Care Provider: Miguel Boateng DO    Primary Insurance: MEDICARE  Secondary [facility] [id number] DETAILS:         Other Referral/Resources/Interventions Provided:  Referral Comments: [facility] has responded and they confirm that they are able to accept the patient when medically cleared for d/c pending bed availability at that time  Treatment Team Recommendation: Short Term Rehab  Discharge Destination Plan[de-identified] Short Term Rehab              Additional Comments: patient is not medically cleared for d/c today  Patient continues with IV diuresis which was increased yesterday to BID  Patient remains on oxygen at this time as well  UPDATE: NURSING REQUESTED THAT  GIVE THE DAUGHTER PO A CALL STATING THAT SHE REQUESTED AN UPDATE  LYNDSEY CALLED AND SPOKE WITH HER  SHE STATED THAT SHE UNDERSTANDS HER MOM IS DOING WELL AND THAT SHE PLANS ON COMING IN TOMORROW MORNING TO SEE THE PATIENT  OFFERS NO QUESTIONS AS THIS TIME AND STATES SHE JUST WANTED TO SAY THANK YOU TO THE STAFF

## 2022-05-05 NOTE — ASSESSMENT & PLAN NOTE
Transferred from [facility] after she developed worsening edema, erythema and pain of the lower extremities despite IV abx for suspected cellulitis  Of note she arrived to [facility] with EDUARDO and had been on IVF for multiple days prior  · Hx of CHF and cirrhosis with hypoalbuminemia noted   Also with chronic venous stasis dermatitis so likely there is a component of third spacing causing edema  · Was on broad spectrum abx initially, however abx dc on 5/3 given low suspicion for infection  · CT scan of the bilateral LE were negative for abscess formation  · She was transferred to [facility] for dermatology evaluation  · Derm feels chronic venous stasis dermatitis  · Podiatry consulted for wound care  · Encourage compression/elevation   · Cards consulted to assist with IV diuresis--continue, diuretics increased to BID on 5/4, albumin added  · Lower extremity venous dopplers ordered to check for DVT however unable to tolerate given significant pain--can consider if pain improves though unlikely to be cause   · Continue pain control  · PT/OT recommending STR once stable

## 2022-05-05 NOTE — ASSESSMENT & PLAN NOTE
Multifactorial given volume overload, aspiration, atelectasis, morbid obesity  · No PNA on imaging, off abx per ID  · Diuresis as above  · Wean oxygen as able

## 2022-05-05 NOTE — PROGRESS NOTES
Progress Note - Infectious Disease   Olivia Hammond 80 y o  female MRN: [id number]  Unit/Bed#: [facility] 903-01 Encounter: [id number]      Impression/Recommendations:  1   SIRS versus sepsis, POA   WBC, HR   Afebrile   Consider due to initial aspiration event +/- #2   Initial Flu/RSV/COVID PCR, blood cultures CT C/A/[facility] at [facility] negative   Procalcitonin improved after 7 days IV antibiotics   Persistent WBC may be leukomoid to phlebitis, anemia   Hemodynamically stable  Rec:  · Continue to follow closely off antibiotics  · Follow temperatures and WBC closely  · Supportive care as per the primary service     2   Bilateral leg cellulitis   In setting of #3, 6  Suspect residual erythema, blistering on exam due to volume overload at this point   Status post >7 days broad-spectrum antibiotics  Rec:  · Continue to follow closely off antibiotics  · Continue leg elevation  · Diuresis per SLIM     3   Bilateral heel pressure ulcers   Appear superficial without apparent superinfection   Xrays negative for osteomyelitis  Rec:  · Continue LWC and off-loading per Podiatry     4   Acute encephalopathy   Suspect toxic-metabolic and multifactorial   Consider role of recent high-dose cefepime   Recent ammonia level negative   Exam non-focal   Improved      5   Acute hypoxic respiratory failure   Likely multifactorial due to volume overload, aspiration, atelectasis   No clinical or radiographic evidence for active pneumonia  Rec:  · Follow closely off antibiotics  · Diuresis per SLIM  · Aspiration precautions and modified diet      6   Chronic lower extremity venous stasis      7   Alcoholic cirrhosis   Per chart, PCP notes      8   Chronic CHF        9   MO   With ambulatory dysfunction      10   Seizure disorder  The patient is stable from an ID standpoint      Antibiotics:  Off antibiotics #2    Subjective:  Patient seen on AM rounds  Sleeping and provides limited ROS  24 Hour Events:  Low-grade temp overnight  WBC improved  Low BPs  Getting albumin with diuretics now  Objective:  Vitals:  Temp:  [98 3 °F (36 8 °C)-100 °F (37 8 °C)] 98 3 °F (36 8 °C)  HR:  [] 95  Resp:  [18-22] 18  BP: ()/() 157/94  SpO2:  [96 %-99 %] 98 %  Temp (24hrs), Av °F (37 2 °C), Min:98 3 °F (36 8 °C), Max:100 °F (37 8 °C)  Current: Temperature: 98 3 °F (36 8 °C)    Physical Exam:   General:  No acute distress  Psychiatric:  Sleeping  Pulmonary:  Normal respiratory excursion without accessory muscle use  Abdomen:  Soft, nontender  Extremities:  Brawny nonpitting edema with symmetric bilateral erythema, chronic hyperpigmentation  Skin:  No rashes    Lab Results:  I have personally reviewed pertinent labs  Results from last 7 days   Lab Units 22  0634 22  0512 22  0450 22  0521 22  0602   POTASSIUM mmol/L 3 8 4 2 3 3*   < > 3 8   CHLORIDE mmol/L 98* 104 103   < > 105   CO2 mmol/L 38* 31 31   < > 29   BUN mg/dL 8 9 8   < > 11   CREATININE mg/dL 0 69 0 63 0 55*   < > 0 64   EGFR ml/min/1 73sq m 81 84 88   < > 83   CALCIUM mg/dL 9 3 9 2 9 4   < > 8 6   AST U/L 24  --  22  --  24   ALT U/L 20  --  17  --  16   ALK PHOS U/L 93  --  114  --  107    < > = values in this interval not displayed  Results from last 7 days   Lab Units 22  0847 22  0512 22  0449   WBC Thousand/uL 14 74* 17 25* 15 90*   HEMOGLOBIN g/dL 9 3* 9 8* 9 7*   PLATELETS Thousands/uL 223 197 200           Imaging Studies:   I have personally reviewed pertinent imaging study reports and images in PACS  EKG, Pathology, and Other Studies:   I have personally reviewed pertinent reports

## 2022-05-05 NOTE — PLAN OF CARE
Problem: PHYSICAL THERAPY ADULT  Goal: Performs mobility at highest level of function for planned discharge setting  See evaluation for individualized goals  Description: Treatment/Interventions: Functional transfer training,LE strengthening/ROM,Therapeutic exercise,Endurance training,Cognitive reorientation,Patient/family training,Equipment eval/education,Bed mobility          See flowsheet documentation for full assessment, interventions and recommendations  Outcome: Progressing  Note: Prognosis: Guarded  Problem List: Decreased strength,Decreased range of motion,Decreased endurance,Impaired balance,Decreased mobility,Decreased cognition,Decreased safety awareness,Decreased skin integrity,Pain  Assessment: Pt seen for PT treatment session this date  Therapy session focused on bed mobility, sitting balance/ tolerance, repositioning/ education in order to improve overall mobility and independence  Pt requires assist of 2 for all mobility performed this date  Pt with increased time required to complete all tasks, continues to be limited by increased b/l LE pain and edema  Pt agreeable to trial sitting EOB, however only able to tolerate for ~2 min with mod/ max A required to sit EOB, pt yelling on EOB 2* pain requiring ax2 to return to supine  Pt repositioned with LEs on pillow to prevent pressure on heels  Pt making slow progress toward goals  Pt was left supine in bed at the end of PT session with all needs in reach  Pt would benefit from continued PT services while in hospital to address remaining limitations  PT to continue to follow pt and recommends post-acute rehab services  The patient's AM-PAC Basic Mobility Inpatient Short Form Raw Score is 6  A Raw score of less than or equal to 16 suggests the patient may benefit from discharge to post-acute rehabilitation services  Please also refer to the recommendation of the Physical Therapist for safe discharge planning    Barriers to Discharge: Inaccessible home environment,Decreased caregiver support        PT Discharge Recommendation: Post acute rehabilitation services          See flowsheet documentation for full assessment

## 2022-05-05 NOTE — PROGRESS NOTES
[address] [facility]  Progress Note - Stewart Ma 1940, 80 y o  female MRN: [id number]  Unit/Bed#: [facility] 903-01 Encounter: [id number]  Primary Care Provider: Medhat Caballero DO   Date and time admitted to hospital: 5/2/2022  4:17 PM    * Bilateral lower extremity edema  Assessment & Plan  Transferred from [facility] after she developed worsening edema, erythema and pain of the lower extremities despite IV abx for suspected cellulitis  Of note she arrived to [facility] with EDUARDO and had been on IVF for multiple days prior  · Hx of CHF and cirrhosis with hypoalbuminemia noted   Also with chronic venous stasis dermatitis so likely there is a component of third spacing causing edema  · Was on broad spectrum abx initially, however abx dc on 5/3 given low suspicion for infection  · CT scan of the bilateral LE were negative for abscess formation  · She was transferred to [address] [place] [address] for dermatology evaluation  · Derm feels chronic venous stasis dermatitis  · Podiatry consulted for wound care  · Encourage compression/elevation   · Cards consulted to assist with IV diuresis--continue, diuretics increased to BID on 5/4, albumin added  · Lower extremity venous dopplers ordered to check for DVT however unable to tolerate given significant pain--can consider if pain improves though unlikely to be cause   · Continue pain control  · PT/OT recommending STR once stable     SIRS (systemic inflammatory response syndrome) (HCC)  Assessment & Plan  Hypotension, tachycardia, tachypnea  · From CHF exacerbation, treatment as above  · No etiology for infection suspected at this time, not on antibiotics per ID  · IV albumin ordered overnight (5/4)   · Of note this patient was on midodrine TID for the entirety of her hospitalization at [facility] until it was dc on 5/4 After the am dose after cards recs    Acute respiratory failure with hypoxia ([address] )  Assessment & Plan  Multifactorial given volume overload, aspiration, atelectasis, morbid obesity  · No PNA on imaging, off abx per ID  · Diuresis as above  · Wean oxygen as able    Aortic stenosis  Assessment & Plan  Chronic per cards  · Getting diuresed  · Outpatient f/u    Cirrhosis [facility])  Assessment & Plan  Daughter reports hx of cirrhosis however does not follow with any GI specialists  · Monitor     Aspiration pneumonitis [facility])  Assessment & Plan  POA on admission at [facility]   · Aspiration precautions  · Modified diet, speech consulted  · Wean oxygen as able   · Off abx    Morbid obesity ([address] )  Assessment & Plan  Supportive care  · Turn Q2h  · Weight loss counseling as an outpatient when medically stable    Localization-related epilepsy, intractable ([facility] [address] )  Assessment & Plan  · Continue keppra and phenobarbitol    Chronic diastolic (congestive) heart failure (HCC)  Assessment & Plan  Wt Readings from Last 3 Encounters:   05/04/22 99 1 kg (218 lb 8 oz)   05/02/22 101 kg (222 lb 14 2 oz)   03/14/21 93 kg (205 lb)     Echo ordered 5/3 given severe edema + murmur on exam  · Monitor intake and output  · Monitor daily weights  · Does not appear to be on any diuretics at home  · Given multiple days of IVF after normalization of creatinine for EDUARDO she may have some volume overload  · Placed on IV lasix BID, continue for now  · Cards following         EDUARDO (acute kidney injury) (HCC)-resolved as of 5/5/2022  Assessment & Plan  POA at [facility]  · S/p IV hydration at [place], creatinine has stabilized  · Now off IVF  · Trialing IV lasix  · monitor BMP        VTE Pharmacologic Prophylaxis:   Moderate Risk (Score 3-4) - Pharmacological DVT Prophylaxis Ordered: heparin  Patient Centered Rounds: I performed bedside rounds with nursing staff today  Discussions with Specialists or Other Care Team Provider: appreciate specialists    Education and Discussions with Family / Patient: Attempted to update  (daughter) via phone  Left voicemail        Time Spent for Care: 30 minutes  More than 50% of total time spent on counseling and coordination of care as described above  Current Length of Stay: 3 day(s)  Current Patient Status: Inpatient   Certification Statement: The patient will continue to require additional inpatient hospital stay due to ongoing IV diuresis   Discharge Plan: Anticipate discharge in >72 hrs to rehab facility  Code Status: Level 1 - Full Code    Subjective:   Doing much better today  Has much less pain, more talkative and awake  Does not appear to have much pain when touching b/l LE  Denies SOB  Objective:     Vitals:   Temp (24hrs), Av °F (37 2 °C), Min:98 3 °F (36 8 °C), Max:100 °F (37 8 °C)    Temp:  [98 3 °F (36 8 °C)-100 °F (37 8 °C)] 98 3 °F (36 8 °C)  HR:  [] 95  Resp:  [18-22] 18  BP: ()/() 157/94  SpO2:  [96 %-99 %] 98 %  Body mass index is 42 66 kg/m²  Input and Output Summary (last 24 hours): Intake/Output Summary (Last 24 hours) at 2022 0811  Last data filed at 2022 0501  Gross per 24 hour   Intake 60 ml   Output 1450 ml   Net -1390 ml       Physical Exam:   Physical Exam  Vitals and nursing note reviewed  Constitutional:       General: She is not in acute distress  Appearance: She is obese  Comments: On 2L   Cardiovascular:      Rate and Rhythm: Normal rate and regular rhythm  Pulmonary:      Effort: No respiratory distress  Abdominal:      General: There is no distension  Tenderness: There is no abdominal tenderness  Musculoskeletal:      Right lower leg: Edema present  Left lower leg: Edema present  Skin:     Comments: Some venous stasis changes b/l LE, less weeping blisters    Neurological:      Mental Status: She is oriented to person, place, and time     Psychiatric:         Mood and Affect: Mood normal           Additional Data:     Labs:  Results from last 7 days   Lab Units 22  0512 22  04422  044   WBC Thousand/uL 17 25*   < > 15 90* HEMOGLOBIN g/dL 9 8*   < > 9 7*   HEMATOCRIT % 32 6*   < > 31 6*   PLATELETS Thousands/uL 197   < > 200   BANDS PCT %  --   --  1   LYMPHO PCT %  --   --  10*   MONO PCT %  --   --  5   EOS PCT %  --   --  1    < > = values in this interval not displayed  Results from last 7 days   Lab Units 05/05/22  0634   SODIUM mmol/L 137   POTASSIUM mmol/L 3 8   CHLORIDE mmol/L 98*   CO2 mmol/L 38*   BUN mg/dL 8   CREATININE mg/dL 0 69   ANION GAP mmol/L 1*   CALCIUM mg/dL 9 3   ALBUMIN g/dL 2 3*   TOTAL BILIRUBIN mg/dL 0 67   ALK PHOS U/L 93   ALT U/L 20   AST U/L 24   GLUCOSE RANDOM mg/dL 107                 Results from last 7 days   Lab Units 05/04/22  0512 05/03/22  0450 05/01/22  0908   LACTIC ACID mmol/L  --   --  0 6   PROCALCITONIN ng/ml 0 27* 0 83*  --        Lines/Drains:  Invasive Devices  Report    Peripherally Inserted Central Catheter Line            PICC Line 04/29/22 Right Brachial 5 days          Drain            External Urinary Catheter 2 days                Central Line:  Goal for removal: Will discontinue when peripheral access obtained  Imaging: No pertinent imaging reviewed      Recent Cultures (last 7 days):         Last 24 Hours Medication List:   Current Facility-Administered Medications   Medication Dose Route Frequency Provider Last Rate    acetaminophen  975 mg Oral Q8H [address] [place] PA-C      albumin human  12 5 g Intravenous BID Desiree Burgess PA-C      albuterol  2 puff Inhalation Q4H PRN Lucille Pacheco MD      atorvastatin  80 mg Oral Daily Lucille Pacheco MD      Diclofenac Sodium  2 g Topical 4x Daily PRN Lucille Pacheco MD      furosemide  40 mg Intravenous BID (diuretic) Desiree Burgess PA-C      heparin (porcine)  7,500 Units Subcutaneous [facility] Lucille Pacheco MD      HYDROmorphone  0 2 mg Intravenous Q6H PRN Jeri Crowder PA-C      levETIRAcetam  500 mg Oral Q12H [address], MD      metoprolol tartrate  25 mg Oral Q12H [address] ondansetron  4 mg Intravenous Q6H PRN Lucille Pahceco MD      oxyCODONE  2 5 mg Oral Q4H PRN Lucille Pacheco MD      oxyCODONE  5 mg Oral Q4H PRN Lucille Pacheco MD      pantoprazole  20 mg Oral Early Morning Lucille Pacheco MD      PHENobarbital  32 4 mg Oral BID Lucille Pacheco MD      potassium chloride  40 mEq Oral Once Jeri Crowder PA-C      sodium chloride (PF)  3 mL Intravenous Q1H PRN Lucille Pacheco MD          Today, Patient Was Seen By: Jeri Crowder PA-C    **Please Note: This note may have been constructed using a voice recognition system  **

## 2022-05-05 NOTE — SEPSIS NOTE
Sepsis Note   Stewart Ma 80 y o  female MRN: [id number]  Unit/Bed#: [facility] 903-01 Encounter: [id number]       qSOFA     Row Name 05/04/22 1933 05/04/22 19:27:07 05/04/22 17:28:59 05/04/22 15:05:04 05/04/22 11:43:45    Altered mental status GCS < 15 -- -- -- -- --    Respiratory Rate > / =22 1 -- -- 0 --    Systolic BP < / =648 -- 1 0 1 0    Q Sofa Score 2 -- 0 1 --    Row Name 05/04/22 08:02:44 05/04/22 03:25:26 05/04/22 03:25:08 05/03/22 20:37:19 05/03/22 14:44:10    Altered mental status GCS < 15 -- -- -- -- --    Respiratory Rate > / =22 -- -- -- 0 --    Systolic BP < / =750 0 0 0 0 0    Q Sofa Score -- -- -- 0 --    Row Name 05/03/22 1030 05/03/22 06:59:36 05/03/22 03:42:43 05/02/22 21:44:48 05/02/22 21:44:29    Altered mental status GCS < 15 -- -- -- -- --    Respiratory Rate > / =22 -- 0 -- -- 0    Systolic BP < / =343 0 0 0 0 --    Q Sofa Score 0 0 -- 0 0                    Epic sepsis alert 5/4/22 7:39 pm    VS  T 99 1 F    RR 22  BP 97/65  SpO2 98 %    Her Acute on chonic respiratory failure is improving only on 1 5L O2 at [facility]  There is leukocytosis (17) on today CBC up from yesterday (15)  Procalcitonin is elevated (0 27) trended from 0 83 yesterday   There is question of bl le cellulitis for which patient has received > 7 days IV Abx Rocephin, azithromycin, cefepime  Multiple chronic wounds without infection or osteo  ID has been consulted and following  The last dose of Cefepime was 0041 5/4 and has been dc  ID plans to follow closely off of Abx  Suspect current SIRS presentation is from Acute CHF  · Hypotension, tachycardia, tachypnea  · Trend cbc daily  · There was hypoalbuminemia on CMP from 5/1 will replete IV albumin and recheck CMP tomorrow am  · If BP improves tonight with albumin can give iv lasix dosing as scheduled  · Of note this patient was on midodrine tid for the entirety of her hospitalization at [facility] until it was dc by Cards on 5/4 After the am dose   No notes about its initiation  This is not a home med  · Cards consult from 5/4 reviewed

## 2022-05-05 NOTE — OCCUPATIONAL THERAPY NOTE
Occupational Therapy Progress Note     Patient Name: Darnell Carmona  KGYNT'U Date: 5/5/2022  Problem List  Principal Problem:    Bilateral lower extremity edema  Active Problems:    Chronic diastolic (congestive) heart failure (HCC)    Localization-related epilepsy, intractable (HCC)    Acute respiratory failure with hypoxia (HCC)    Morbid obesity ([facility] [address] [address] )    Aspiration pneumonitis (HCC)    Cirrhosis (HCC)    SIRS (systemic inflammatory response syndrome) ([facility] )    Aortic stenosis          05/05/22 1106   OT Last Visit   OT Visit Date 05/05/22   Note Type   Note Type Treatment   Restrictions/Precautions   Weight Bearing Precautions Per Order Yes   RLE Weight Bearing Per Order WBAT   LLE Weight Bearing Per Order WBAT   Other Precautions Cognitive; Chair Alarm; Bed Alarm;Multiple lines; Fall Risk;Pain;O2   Lifestyle   Autonomy A with ADLs and functional mobility with RW   Reciprocal Relationships supportive daughter   Service to Others Retired   Intrinsic Gratification Enjoys watching TV   Pain Assessment   Pain Assessment Tool FLACC   Pain Location/Orientation Orientation: Bilateral;Location: Leg   Hospital Pain Intervention(s) Emotional support;Repositioned;Elevated   Pain Rating: FLACC (Rest) - Face 1   Pain Rating: FLACC (Rest) - Legs 0   Pain Rating: FLACC (Rest) - Activity 0   Pain Rating: FLACC (Rest) - Cry 1   Pain Rating: FLACC (Rest) - Consolability 1   Score: FLACC (Rest) 3   Pain Rating: FLACC (Activity) - Face 1   Pain Rating: FLACC (Activity) - Legs 1   Pain Rating: FLACC (Activity) - Activity 1   Pain Rating: FLACC (Activity) - Cry 1   Pain Rating: FLACC (Activity) - Consolability 1   Score: FLACC (Activity) 5   ADL   Where Assessed Supine, bed   Grooming Assistance 4  Minimal Assistance   Grooming Deficit Wash/dry face   UB Bathing Assistance 2  Maximal Assistance   UB Bathing Deficit Right arm;Left arm; Chest;Abdomen;Perineal area   LB Bathing Assistance 1  Total Assistance   LB Bathing Deficit Perineal area;Right upper leg;Left upper leg   LB Bathing Comments Deferred distal LEs 2* edema/wounds   UB Dressing Assistance 2  Maximal Assistance   UB Dressing Deficit Thread RUE; Thread LUE   Bed Mobility   Supine to Sit 1  Dependent   Additional items Assist x 2;HOB elevated; Bedrails; Increased time required;LE management   Sit to Supine 1  Dependent   Additional items Assist x 2;HOB elevated; Bedrails; Increased time required;LE management   Transfers   Sit to Stand Unable to assess   Stand to Sit Unable to assess   Cognition   Overall Cognitive Status Impaired   Arousal/Participation Responsive   Attention Attends with cues to redirect   Orientation Level Oriented to person;Disoriented to place; Disoriented to time;Disoriented to situation   Memory Decreased short term memory;Decreased recall of recent events;Decreased recall of precautions   Following Commands Follows one step commands inconsistently   Comments Pt very limited by pain, pt requires frequent redirection and repetition of cues   Activity Tolerance   Activity Tolerance Patient limited by fatigue;Patient limited by pain   Medical Staff Made Aware PT Jerry Bello, MYRNA Ferreira, TERESA Janie    Assessment   Assessment Patient participated in Skilled OT session this date with interventions consisting of ADL re training with the use of correct body mechnaics, Energy Conservation techniques, deep breathing technique, safety awareness and fall prevention techniques,  therapeutic activities to: increase activity tolerance and increase OOB/ sitting tolerance   Upon arrival patient was found supine in bed  Pt demonstrated the following tasks: DEP x 2 sup <> sit  Pt with very limited EOB sitting tolerance 2* B/L LE pain  Pt performed ADLs with MIN A for washing face, MAX A for UB ADLs, and DEP for LB ADLs  Patient continues to be functioning below baseline level, occupational performance remains limited secondary to factors listed above and increased risk for falls and injury  From OT standpoint, recommendation at time of d/c would be STR  Patient to benefit from continued Occupational Therapy treatment while in the hospital to address deficits as defined above and maximize level of functional independence with ADLs and functional mobility  Pt was left in bed with alarm on after session with all current needs met  The patient's raw score on the AM-PAC Daily Activity inpatient short form is 12, standardized score is 30 6, less than 39 4  Patients at this level are likely to benefit from discharge to post-acute rehabilitation services  Please refer to the recommendation of the Occupational Therapist for safe discharge planning  Plan   Treatment Interventions ADL retraining;Functional transfer training;UE strengthening/ROM; Endurance training;Cognitive reorientation;Patient/family training;Equipment evaluation/education; Compensatory technique education;Continued evaluation; Energy conservation; Activityengagement   Goal Expiration Date 05/17/22   OT Treatment Day 1   OT Frequency 2-3x/wk   Recommendation   OT Discharge Recommendation Post acute rehabilitation services   OT - OK to Discharge Yes  (to rehab when medically stable )   AM-PAC Daily Activity Inpatient   Lower Body Dressing 1   Bathing 2   Toileting 1   Upper Body Dressing 2   Grooming 3   Eating 3   Daily Activity Raw Score 12   Daily Activity Standardized Score (Calc for Raw Score >=11) 30 6   AM-PAC Applied Cognition Inpatient   Following a Speech/Presentation 1   Understanding Ordinary Conversation 2   Taking Medications 2   Remembering Where Things Are Placed or Put Away 1   Remembering List of 4-5 Errands 1   Taking Care of Complicated Tasks 1   Applied Cognition Raw Score 8   Applied Cognition Standardized Score 19 32       Enedelia Dougherty, MS, OTR/L

## 2022-05-06 LAB
ANION GAP SERPL CALCULATED.3IONS-SCNC: -2 MMOL/L (ref 4–13)
BASOPHILS # BLD AUTO: 0.04 THOUSANDS/ΜL (ref 0–0.1)
BASOPHILS NFR BLD AUTO: 0 % (ref 0–1)
BUN SERPL-MCNC: 11 MG/DL (ref 5–25)
CALCIUM SERPL-MCNC: 9.3 MG/DL (ref 8.3–10.1)
CHLORIDE SERPL-SCNC: 96 MMOL/L (ref 100–108)
CO2 SERPL-SCNC: 42 MMOL/L (ref 21–32)
CREAT SERPL-MCNC: 0.63 MG/DL (ref 0.6–1.3)
EOSINOPHIL # BLD AUTO: 0.11 THOUSAND/ΜL (ref 0–0.61)
EOSINOPHIL NFR BLD AUTO: 1 % (ref 0–6)
ERYTHROCYTE [DISTWIDTH] IN BLOOD BY AUTOMATED COUNT: 15.3 % (ref 11.6–15.1)
GFR SERPL CREATININE-BSD FRML MDRD: 84 ML/MIN/1.73SQ M
GLUCOSE SERPL-MCNC: 83 MG/DL (ref 65–140)
HCT VFR BLD AUTO: 30.4 % (ref 34.8–46.1)
HGB BLD-MCNC: 9.3 G/DL (ref 11.5–15.4)
IMM GRANULOCYTES # BLD AUTO: 0.26 THOUSAND/UL (ref 0–0.2)
IMM GRANULOCYTES NFR BLD AUTO: 2 % (ref 0–2)
LYMPHOCYTES # BLD AUTO: 1.73 THOUSANDS/ΜL (ref 0.6–4.47)
LYMPHOCYTES NFR BLD AUTO: 13 % (ref 14–44)
MCH RBC QN AUTO: 28.9 PG (ref 26.8–34.3)
MCHC RBC AUTO-ENTMCNC: 30.6 G/DL (ref 31.4–37.4)
MCV RBC AUTO: 94 FL (ref 82–98)
MONOCYTES # BLD AUTO: 1.45 THOUSAND/ΜL (ref 0.17–1.22)
MONOCYTES NFR BLD AUTO: 11 % (ref 4–12)
NEUTROPHILS # BLD AUTO: 9.28 THOUSANDS/ΜL (ref 1.85–7.62)
NEUTS SEG NFR BLD AUTO: 73 % (ref 43–75)
NRBC BLD AUTO-RTO: 0 /100 WBCS
PLATELET # BLD AUTO: 275 THOUSANDS/UL (ref 149–390)
PMV BLD AUTO: 10.8 FL (ref 8.9–12.7)
POTASSIUM SERPL-SCNC: 3.8 MMOL/L (ref 3.5–5.3)
RBC # BLD AUTO: 3.22 MILLION/UL (ref 3.81–5.12)
SODIUM SERPL-SCNC: 136 MMOL/L (ref 136–145)
WBC # BLD AUTO: 12.87 THOUSAND/UL (ref 4.31–10.16)

## 2022-05-06 PROCEDURE — 99233 SBSQ HOSP IP/OBS HIGH 50: CPT | Performed by: INTERNAL MEDICINE

## 2022-05-06 PROCEDURE — 85025 COMPLETE CBC W/AUTO DIFF WBC: CPT | Performed by: INTERNAL MEDICINE

## 2022-05-06 PROCEDURE — 80048 BASIC METABOLIC PNL TOTAL CA: CPT | Performed by: INTERNAL MEDICINE

## 2022-05-06 PROCEDURE — 99232 SBSQ HOSP IP/OBS MODERATE 35: CPT | Performed by: INTERNAL MEDICINE

## 2022-05-06 RX ORDER — FUROSEMIDE 40 MG/1
40 TABLET ORAL DAILY
Status: DISCONTINUED | OUTPATIENT
Start: 2022-05-07 | End: 2022-05-09

## 2022-05-06 RX ADMIN — ATORVASTATIN CALCIUM 80 MG: 80 TABLET, FILM COATED ORAL at 09:04

## 2022-05-06 RX ADMIN — PHENOBARBITAL 32.4 MG: 64.8 TABLET ORAL at 17:26

## 2022-05-06 RX ADMIN — FUROSEMIDE 40 MG: 10 INJECTION, SOLUTION INTRAMUSCULAR; INTRAVENOUS at 17:26

## 2022-05-06 RX ADMIN — HYDROMORPHONE HYDROCHLORIDE 0.2 MG: 0.2 INJECTION, SOLUTION INTRAMUSCULAR; INTRAVENOUS; SUBCUTANEOUS at 07:22

## 2022-05-06 RX ADMIN — LEVETIRACETAM 500 MG: 500 TABLET, FILM COATED ORAL at 20:42

## 2022-05-06 RX ADMIN — HEPARIN SODIUM 7500 UNITS: 5000 INJECTION INTRAVENOUS; SUBCUTANEOUS at 07:19

## 2022-05-06 RX ADMIN — LEVETIRACETAM 500 MG: 500 TABLET, FILM COATED ORAL at 09:05

## 2022-05-06 RX ADMIN — HEPARIN SODIUM 7500 UNITS: 5000 INJECTION INTRAVENOUS; SUBCUTANEOUS at 13:38

## 2022-05-06 RX ADMIN — PHENOBARBITAL 32.4 MG: 64.8 TABLET ORAL at 09:04

## 2022-05-06 RX ADMIN — SENNOSIDES AND DOCUSATE SODIUM 1 TABLET: 50; 8.6 TABLET ORAL at 09:05

## 2022-05-06 RX ADMIN — HEPARIN SODIUM 7500 UNITS: 5000 INJECTION INTRAVENOUS; SUBCUTANEOUS at 20:55

## 2022-05-06 RX ADMIN — ACETAMINOPHEN 975 MG: 325 TABLET, FILM COATED ORAL at 13:38

## 2022-05-06 RX ADMIN — PANTOPRAZOLE SODIUM 20 MG: 20 TABLET, DELAYED RELEASE ORAL at 07:19

## 2022-05-06 RX ADMIN — ACETAMINOPHEN 975 MG: 325 TABLET, FILM COATED ORAL at 20:41

## 2022-05-06 RX ADMIN — OXYCODONE HYDROCHLORIDE 5 MG: 5 TABLET ORAL at 09:04

## 2022-05-06 RX ADMIN — METOPROLOL TARTRATE 25 MG: 25 TABLET, FILM COATED ORAL at 09:05

## 2022-05-06 RX ADMIN — FUROSEMIDE 40 MG: 10 INJECTION, SOLUTION INTRAMUSCULAR; INTRAVENOUS at 09:05

## 2022-05-06 RX ADMIN — ACETAMINOPHEN 975 MG: 325 TABLET, FILM COATED ORAL at 07:19

## 2022-05-06 RX ADMIN — METOPROLOL TARTRATE 25 MG: 25 TABLET, FILM COATED ORAL at 20:57

## 2022-05-06 RX ADMIN — SENNOSIDES AND DOCUSATE SODIUM 1 TABLET: 50; 8.6 TABLET ORAL at 17:26

## 2022-05-06 RX ADMIN — OXYCODONE HYDROCHLORIDE 5 MG: 5 TABLET ORAL at 20:54

## 2022-05-06 NOTE — PLAN OF CARE
Problem: Prexisting or High Potential for Compromised Skin Integrity  Goal: Skin integrity is maintained or improved  Description: INTERVENTIONS:  - Identify patients at risk for skin breakdown  - Assess and monitor skin integrity  - Assess and monitor nutrition and hydration status  - Monitor labs   - Assess for incontinence   - Turn and reposition patient  - Assist with mobility/ambulation  - Relieve pressure over bony prominences  - Avoid friction and shearing  - Provide appropriate hygiene as needed including keeping skin clean and dry  - Evaluate need for skin moisturizer/barrier cream  - Collaborate with interdisciplinary team   - Patient/family teaching  - Consider wound care consult   Outcome: Progressing     Problem: MOBILITY - ADULT  Goal: Maintain or return to baseline ADL function  Description: INTERVENTIONS:  -  Assess patient's ability to carry out ADLs; assess patient's baseline for ADL function and identify physical deficits which impact ability to perform ADLs (bathing, care of mouth/teeth, toileting, grooming, dressing, etc )  - Assess/evaluate cause of self-care deficits   - Assess range of motion  - Assess patient's mobility; develop plan if impaired  - Assess patient's need for assistive devices and provide as appropriate  - Encourage maximum independence but intervene and supervise when necessary  - Involve family in performance of ADLs  - Assess for home care needs following discharge   - Consider OT consult to assist with ADL evaluation and planning for discharge  - Provide patient education as appropriate  Outcome: Progressing  Goal: Maintains/Returns to pre admission functional level  Description: INTERVENTIONS:  - Perform BMAT or MOVE assessment daily    - Set and communicate daily mobility goal to care team and patient/family/caregiver  - Collaborate with rehabilitation services on mobility goals if consulted  - Perform Range of Motion 3 times a day    - Reposition patient every 2 hours   - Out of bed to chair 3 times a day   - Out of bed for meals 3 times a day  - Out of bed for toileting  - Record patient progress and toleration of activity level   Outcome: Progressing     Problem: Potential for Falls  Goal: Patient will remain free of falls  Description: INTERVENTIONS:  - Educate patient/family on patient safety including physical limitations  - Instruct patient to call for assistance with activity   - Consult OT/PT to assist with strengthening/mobility   - Keep Call bell within reach  - Keep bed low and locked with side rails adjusted as appropriate  - Keep care items and personal belongings within reach  - Initiate and maintain comfort rounds  - Make Fall Risk Sign visible to staff  - Offer Toileting every 2 Hours, in advance of need  - Initiate/Maintain bed alarm  - Obtain necessary fall risk management equipment  - Apply yellow socks and bracelet for high fall risk patients  - Consider moving patient to room near nurses station  Outcome: Progressing     Problem: Nutrition/Hydration-ADULT  Goal: Nutrient/Hydration intake appropriate for improving, restoring or maintaining nutritional needs  Description: Monitor and assess patient's nutrition/hydration status for malnutrition  Collaborate with interdisciplinary team and initiate plan and interventions as ordered  Monitor patient's weight and dietary intake as ordered or per policy  Utilize nutrition screening tool and intervene as necessary  Determine patient's food preferences and provide high-protein, high-caloric foods as appropriate       INTERVENTIONS:  - Monitor oral intake, urinary output, labs, and treatment plans  - Assess nutrition and hydration status and recommend course of action  - Evaluate amount of meals eaten  - Assist patient with eating if necessary   - Allow adequate time for meals  - Recommend/ encourage appropriate diets, oral nutritional supplements, and vitamin/mineral supplements  - Order, calculate, and assess calorie counts as needed  - Recommend, monitor, and adjust tube feedings and TPN/PPN based on assessed needs  - Assess need for intravenous fluids  - Provide specific nutrition/hydration education as appropriate  - Include patient/family/caregiver in decisions related to nutrition  Outcome: Progressing

## 2022-05-06 NOTE — PROGRESS NOTES
Progress Note - Infectious Disease   Rafaela Carilyn Favre 80 y o  female MRN: [id number]  Unit/Bed#: [facility] 903-01 Encounter: [id number]      Impression/Recommendations:  1   SIRS versus sepsis, POA   WBC, HR   Afebrile   Consider due to initial aspiration event +/- #2   Initial Flu/RSV/COVID PCR, blood cultures CT C/A/P at [facility] negative   Procalcitonin improved after 7 days IV antibiotics   Persistent WBC may be leukomoid to phlebitis, anemia, now improving  Low-grade temps likely atelectasis   Hemodynamically stable  Rec:  · Continue to follow closely off antibiotics  · Follow temperatures and WBC closely  · Supportive care as per the primary service     2   Bilateral leg cellulitis   In setting of #3, 6   Suspect residual erythema, blistering on exam due to volume overload at this point   Status post >7 days broad-spectrum antibiotics  Rec:  · Continue to follow closely off antibiotics  · Continue leg elevation  · Diuresis per SLIM     3   Bilateral heel pressure ulcers   Appear superficial without apparent superinfection   Xrays negative for osteomyelitis  Rec:  · Continue LWC and off-loading per Podiatry     4   Acute encephalopathy   Suspect toxic-metabolic and multifactorial   Consider role of recent high-dose cefepime   Recent ammonia level negative   Exam non-focal   Improved      5   Acute hypoxic respiratory failure   Likely multifactorial due to volume overload, aspiration, atelectasis   No clinical or radiographic evidence for active pneumonia      6   Chronic lower extremity venous stasis      7   Alcoholic cirrhosis   Per chart, PCP notes      8   Chronic CHF        9   MO   With ambulatory dysfunction      10   Seizure disorder      The patient is stable from an ID standpoint  I will reassess the patient on Monday 5/9  Please call in the interim with new questions      Antibiotics:  Off antibiotics #3    Subjective:  Patient seen on AM rounds  Legs feel better    Denies fevers, chills, sweats, nausea, vomiting, or diarrhea  24 Hour Events:  No documented fevers, chills, sweats, nausea, vomiting, or diarrhea  Objective:  Vitals:  Temp:  [98 5 °F (36 9 °C)-100 °F (37 8 °C)] 98 7 °F (37 1 °C)  HR:  [103-118] 103  Resp:  [22] 22  BP: (157-163)/() 163/98  SpO2:  [95 %-98 %] 98 %  Temp (24hrs), Av 3 °F (37 4 °C), Min:98 5 °F (36 9 °C), Max:100 °F (37 8 °C)  Current: Temperature: 98 7 °F (37 1 °C)    Physical Exam:   General:  No acute distress  Psychiatric:  Awake and alert  Pulmonary:  Normal respiratory excursion without accessory muscle use  Abdomen:  Soft, nontender  Extremities:  Chronic brawny edema, legs wrapped with gauze, improved erythema  Skin:  No rashes    Lab Results:  I have personally reviewed pertinent labs  Results from last 7 days   Lab Units 22  0706 22  0799 22  0512 22  0450 22  0450 22  0521 22  0602   POTASSIUM mmol/L 3 8 3 8 4 2   < > 3 3*   < > 3 8   CHLORIDE mmol/L 96* 98* 104   < > 103   < > 105   CO2 mmol/L 42* 38* 31   < > 31   < > 29   BUN mg/dL 11 8 9   < > 8   < > 11   CREATININE mg/dL 0 63 0 69 0 63   < > 0 55*   < > 0 64   EGFR ml/min/1 73sq m 84 81 84   < > 88   < > 83   CALCIUM mg/dL 9 3 9 3 9 2   < > 9 4   < > 8 6   AST U/L  --  24  --   --  22  --  24   ALT U/L  --  20  --   --  17  --  16   ALK PHOS U/L  --  93  --   --  114  --  107    < > = values in this interval not displayed  Results from last 7 days   Lab Units 22  0706 22  0847 22  0512   WBC Thousand/uL 12 87* 14 74* 17 25*   HEMOGLOBIN g/dL 9 3* 9 3* 9 8*   PLATELETS Thousands/uL 275 223 197           Imaging Studies:   I have personally reviewed pertinent imaging study reports and images in PACS  EKG, Pathology, and Other Studies:   I have personally reviewed pertinent reports

## 2022-05-06 NOTE — PROGRESS NOTES
Progress Note - Cardiology   Olivia Alicea [place] 80 y o  female MRN: [id number]  [facility] Encounter: [id number]    Assessment:  Principal Problem:    Bilateral lower extremity edema  Active Problems:    Chronic diastolic (congestive) heart failure (HCC)    Localization-related epilepsy, intractable (HCC)    Acute respiratory failure with hypoxia (HCC)    Morbid obesity (HCC)    Aspiration pneumonitis (HCC)    Cirrhosis (HCC)    SIRS (systemic inflammatory response syndrome) (HCC)    Aortic stenosis    Aortic stenosis, moderate  Chronic diastolic congestive heart failure  Encephalopathic  Lymphedema  Alcoholic cirrhosis  Sirs  Morbid obesity    Plan:    IV lasix today, change to PO diuretic tomorrow  AS is moderate  Outpatient follow up with her regular cardiologist  Not sure if this progressed if she would be a good candidate for intervention  Very painful legs, would have a hard time doing compression stockings or lymphedema pump treatment  Wean O2 as tolerated with diuresis  Subjective/Objective     Subjective:  Family at bedside  Patient without complaints    Still with net negative volume status      Objective:    Vitals: /98   Pulse 103   Temp 98 7 °F (37 1 °C)   Resp 22   Ht 4' 11" (1 499 m)   Wt 94 9 kg (209 lb 3 2 oz)   SpO2 98%   BMI 42 25 kg/m²   Vitals:    05/05/22 0600 05/06/22 0600   Weight: 95 8 kg (211 lb 3 2 oz) 94 9 kg (209 lb 3 2 oz)     Orthostatic Blood Pressures      Most Recent Value   Blood Pressure 163/98 filed at 05/06/2022 0719   Patient Position - Orthostatic VS Lying filed at 05/04/2022 1933            Intake/Output Summary (Last 24 hours) at 5/6/2022 1141  Last data filed at 5/6/2022 2386  Gross per 24 hour   Intake 120 ml   Output 2200 ml   Net -2080 ml     Physical Exam:  GEN: [place] in bed   HEENT: pupils equal, round, and reactive to light; extraocular muscles intact  NECK: supple, no carotid bruits   HEART: Regular + CHAU LUNGS: clear to auscultation bilaterally; no wheezes, rales, or rhonchi   ABDOMEN: normal bowel sounds, soft, no tenderness, no distention  EXTREMITIES: LE wrapped  NEURO: no focal findings   SKIN: normal without suspicious lesions on exposed skin    Medications:    Current Facility-Administered Medications:     acetaminophen (TYLENOL) tablet 975 mg, 975 mg, Oral, Q8H [facility], Che Ruiz PA-C, 975 mg at 05/06/22 0719    albuterol (PROVENTIL HFA,VENTOLIN HFA) inhaler 2 puff, 2 puff, Inhalation, Q4H PRN, Austyn Romero MD    atorvastatin (LIPITOR) tablet 80 mg, 80 mg, Oral, Daily, Austyn Romero MD, 80 mg at 05/06/22 7036    Diclofenac Sodium (VOLTAREN) 1 % topical gel 2 g, 2 g, Topical, 4x Daily PRN, Austyn Romero MD    furosemide (LASIX) injection 40 mg, 40 mg, Intravenous, BID (diuretic), Desiree Burgess PA-C, 40 mg at 05/06/22 0905    heparin (porcine) subcutaneous injection 7,500 Units, 7,500 Units, Subcutaneous, Q8H [facility], Austyn Romero MD, 7,500 Units at 05/06/22 0719    HYDROmorphone HCl (DILAUDID) injection 0 2 mg, 0 2 mg, Intravenous, Q6H PRN, Donato Everett PA-C, 0 2 mg at 05/06/22 7895    levETIRAcetam (KEPPRA) tablet 500 mg, 500 mg, Oral, Q12H [facility], Austyn Romero MD, 500 mg at 05/06/22 0905    metoprolol tartrate (LOPRESSOR) tablet 25 mg, 25 mg, Oral, Q12H [facility], Austyn Romero MD, 25 mg at 05/06/22 0905    ondansetron (ZOFRAN) injection 4 mg, 4 mg, Intravenous, Q6H PRN, Austyn Romero MD    oxyCODONE (ROXICODONE) IR tablet 2 5 mg, 2 5 mg, Oral, Q4H PRN, Austyn Romero MD    oxyCODONE (ROXICODONE) IR tablet 5 mg, 5 mg, Oral, Q4H PRN, Austyn Romero MD, 5 mg at 05/06/22 0904    pantoprazole (PROTONIX) EC tablet 20 mg, 20 mg, Oral, Early Morning, Austyn Romero MD, 20 mg at 05/06/22 0719    PHENobarbital tablet 32 4 mg, 32 4 mg, Oral, BID, Austyn Romero MD, 32 4 mg at 05/06/22 0904    polyethylene glycol (MIRALAX) packet 17 g, 17 g, Oral, Daily PRN, Tuesday AUREA Cruz-docusate sodium (SENOKOT S) 8 6-50 mg per tablet 1 tablet, 1 tablet, Oral, BID, Tuesday M AUREA Bland, 1 tablet at 05/06/22 0905    sodium chloride (PF) 0 9 % injection 3 mL, 3 mL, Intravenous, Q1H PRN, Noni Soulier, MD    Lab Results:      Results from last 7 days   Lab Units 05/06/22  0706 05/05/22  0847 05/04/22  0512   WBC Thousand/uL 12 87* 14 74* 17 25*   HEMOGLOBIN g/dL 9 3* 9 3* 9 8*   HEMATOCRIT % 30 4* 30 6* 32 6*   PLATELETS Thousands/uL 275 223 197         Results from last 7 days   Lab Units 05/06/22  0706 05/05/22  0634 05/04/22  0512 05/03/22  0450 05/03/22  0450 05/02/22  0521 05/01/22  0602   SODIUM mmol/L 136 137 140   < > 139   < > 140   POTASSIUM mmol/L 3 8 3 8 4 2   < > 3 3*   < > 3 8   CHLORIDE mmol/L 96* 98* 104   < > 103   < > 105   CO2 mmol/L 42* 38* 31   < > 31   < > 29   BUN mg/dL 11 8 9   < > 8   < > 11   CREATININE mg/dL 0 63 0 69 0 63   < > 0 55*   < > 0 64   CALCIUM mg/dL 9 3 9 3 9 2   < > 9 4   < > 8 6   ALK PHOS U/L  --  93  --   --  114  --  107   ALT U/L  --  20  --   --  17  --  16   AST U/L  --  24  --   --  22  --  24    < > = values in this interval not displayed  Results from last 7 days   Lab Units 05/04/22  0512 05/03/22  0450   MAGNESIUM mg/dL 1 7 1 6       Echo:    Left Ventricle: Left ventricular cavity size is normal  Wall thickness is normal  The left ventricular ejection fraction is 70%  Systolic function is hyperdynamic  Wall motion cannot be accurately assessed  Diastolic function is mildly abnormal, consistent with grade I (abnormal) relaxation    Right Ventricle: Right ventricular cavity size is normal  Systolic function is normal     Aortic Valve: The aortic valve is trileaflet  The leaflets are moderately thickened  The leaflets are moderately calcified  There is probably moderately reduced mobility  There is moderate stenosis based on a limited assessment of hemodynamic data, the accuracy of which is limited by increased LVOT velocities   The aortic valve peak velocity is 3 78 m/s (increased due to a combination of aortic stenosis and LVOT obstruction)  The aortic valve peak gradient is 58 mmHg  The aortic valve mean gradient is 37 mmHg  The DVI is 0 39  There is also a dynamic LVOT gradient upto 36 mm of Hg  The valve was very poorly visualized and LVOT diameter could not be measured    Mitral Valve: There is moderate annular calcification  There is mild regurgitation    Study Details: Study quality was poor      If clinically indicated, SKYE could provide more information about the severity of aortic stenosis

## 2022-05-06 NOTE — PLAN OF CARE
Problem: Prexisting or High Potential for Compromised Skin Integrity  Goal: Skin integrity is maintained or improved  Description: INTERVENTIONS:  - Identify patients at risk for skin breakdown  - Assess and monitor skin integrity  - Assess and monitor nutrition and hydration status  - Monitor labs   - Assess for incontinence   - Turn and reposition patient  - Assist with mobility/ambulation  - Relieve pressure over bony prominences  - Avoid friction and shearing  - Provide appropriate hygiene as needed including keeping skin clean and dry  - Evaluate need for skin moisturizer/barrier cream  - Collaborate with interdisciplinary team   - Patient/family teaching  - Consider wound care consult   Outcome: Progressing     Problem: MOBILITY - ADULT  Goal: Maintain or return to baseline ADL function  Description: INTERVENTIONS:  -  Assess patient's ability to carry out ADLs; assess patient's baseline for ADL function and identify physical deficits which impact ability to perform ADLs (bathing, care of mouth/teeth, toileting, grooming, dressing, etc )  - Assess/evaluate cause of self-care deficits   - Assess range of motion  - Assess patient's mobility; develop plan if impaired  - Assess patient's need for assistive devices and provide as appropriate  - Encourage maximum independence but intervene and supervise when necessary  - Involve family in performance of ADLs  - Assess for home care needs following discharge   - Consider OT consult to assist with ADL evaluation and planning for discharge  - Provide patient education as appropriate  Outcome: Progressing  Goal: Maintains/Returns to pre admission functional level  Description: INTERVENTIONS:  - Perform BMAT or MOVE assessment daily    - Set and communicate daily mobility goal to care team and patient/family/caregiver     - Collaborate with rehabilitation services on mobility goals if consulted  - Out of bed for toileting  - Record patient progress and toleration of activity level   Outcome: Progressing

## 2022-05-06 NOTE — ASSESSMENT & PLAN NOTE
Transferred from [facility] after she developed worsening edema, erythema and pain of the lower extremities despite IV abx for suspected cellulitis  Of note she arrived to [facility] with EDUARDO and had been on IVF for multiple days prior  · Hx of CHF and cirrhosis with hypoalbuminemia noted   Also with chronic venous stasis dermatitis   · Was on broad spectrum abx initially, however abx dc on 5/3 given low suspicion for infection  · CT scan of the bilateral LE were negative for abscess formation  · She was transferred to [facility] dermatology evaluation  · Derm feels chronic venous stasis dermatitis  · Podiatry consulted for wound care  · Encourage compression/elevation   · Cards consulted to assist with IV diuresis--continue, diuretics increased to BID on 5/4  · Lower extremity venous dopplers ordered to check for DVT however unable to tolerate given significant pain-  · Continue pain control  · PT/OT recommending STR once stable   · Negative fluid balance on IV diuretics

## 2022-05-06 NOTE — PROGRESS NOTES
[address] [facility]  Progress Note - Zach Rodgers 1940, 80 y o  female MRN: [id number]  Unit/Bed#: [facility] 903-01 Encounter: [id number]  Primary Care Provider: Mirian Olson DO   Date and time admitted to hospital: 5/2/2022  4:17 PM    * Bilateral lower extremity edema  Assessment & Plan  Transferred from [facility] after she developed worsening edema, erythema and pain of the lower extremities despite IV abx for suspected cellulitis  Of note she arrived to [facility] with EDUARDO and had been on IVF for multiple days prior  · Hx of CHF and cirrhosis with hypoalbuminemia noted   Also with chronic venous stasis dermatitis   · Was on broad spectrum abx initially, however abx dc on 5/3 given low suspicion for infection  · CT scan of the bilateral LE were negative for abscess formation  · She was transferred to [facility] dermatology evaluation  · Derm feels chronic venous stasis dermatitis  · Podiatry consulted for wound care  · Encourage compression/elevation   · Cards consulted to assist with IV diuresis--continue, diuretics increased to BID on 5/4  · Lower extremity venous dopplers ordered to check for DVT however unable to tolerate given significant pain-  · Continue pain control  · PT/OT recommending STR once stable   · Negative fluid balance on IV diuretics    SIRS (systemic inflammatory response syndrome) (HCC)  Assessment & Plan  Hypotension, tachycardia, tachypnea  · From CHF exacerbation, treatment as above  · No etiology for infection suspected at this time, not on antibiotics per ID  · IV albumin ordered overnight (5/4)   · Of note this patient was on midodrine TID for the entirety of her hospitalization at [facility] until it was dc on 5/4 After the am dose after cards recs    Aortic stenosis  Assessment & Plan  Moderate aortic stenosis,  Chronic per cards  · Getting diuresed  · Outpatient f/u    Acute respiratory failure with hypoxia [facility])  Assessment & Plan  Multifactorial given volume overload, aspiration, atelectasis, morbid obesity  · No PNA on imaging, off abx per ID  · Diuresis as above  · Wean oxygen as able    Chronic diastolic (congestive) heart failure (HCC)  Assessment & Plan  Wt Readings from Last 3 Encounters:   05/06/22 94 9 kg (209 lb 3 2 oz)   05/02/22 101 kg (222 lb 14 2 oz)   03/14/21 93 kg (205 lb)     Echo ordered 5/3 given severe edema + murmur on exam  · Monitor intake and output  · Monitor daily weights  · Does not appear to be on any diuretics at home  · Given multiple days of IVF after normalization of creatinine for EDUARDO she may have some volume overload  · Placed on IV lasix BID, continue for now  · Cards following         Cirrhosis [facility])  Assessment & Plan  Daughter reports hx of cirrhosis however does not follow with any GI specialists  · Monitor     [facility] ([facility] )  Assessment & Plan  Supportive care  · Turn Q2h  · Weight loss counseling as an outpatient when medically stable    Localization-related epilepsy, intractable ([facility] )  Assessment & Plan  · Continue keppra and phenobarbitol      VTE Pharmacologic Prophylaxis:   High Risk (Score >/= 5) - Pharmacological DVT Prophylaxis Ordered: heparin  Sequential Compression Devices Ordered  Patient Centered Rounds: I performed bedside rounds with nursing staff today  Discussions with Specialists or Other Care Team Provider:     Education and Discussions with Family / Patient: Updated  (daughter) via phone  Time Spent for Care: 45 minutes  More than 50% of total time spent on counseling and coordination of care as described above  Current Length of Stay: 4 day(s)  Current Patient Status: Inpatient   Certification Statement: The patient will continue to require additional inpatient hospital stay due to Above  Discharge Plan: Anticipate discharge in 48 hrs to rehab facility      Code Status: Level 1 - Full Code    Subjective:   Patient seen and examined  Comfortable in bed  Denied pain or shortness of breath  Eating breakfast    Objective:     Vitals:   Temp (24hrs), Av 3 °F (37 4 °C), Min:98 5 °F (36 9 °C), Max:100 °F (37 8 °C)    Temp:  [98 5 °F (36 9 °C)-100 °F (37 8 °C)] 98 7 °F (37 1 °C)  HR:  [103-118] 103  Resp:  [22] 22  BP: (157-163)/() 163/98  SpO2:  [95 %-98 %] 98 %  Body mass index is 42 25 kg/m²  Input and Output Summary (last 24 hours): Intake/Output Summary (Last 24 hours) at 2022 0956  Last data filed at 2022 0600  Gross per 24 hour   Intake --   Output 2200 ml   Net -2200 ml       Physical Exam:   Physical Exam   Patient is awake alert in no acute distress  Obese chronically ill-appearing  Lung with decreased breath sounds bilateral  Heart positive S1-S2 no murmur  Abdomen soft nontender  Chronic bilateral lower extremity venous stasis edema    Additional Data:     Labs:  Results from last 7 days   Lab Units 22  0706 22  0847 22  0847   WBC Thousand/uL 12 87*   < > 14 74*   HEMOGLOBIN g/dL 9 3*   < > 9 3*   HEMATOCRIT % 30 4*   < > 30 6*   PLATELETS Thousands/uL 275   < > 223   BANDS PCT %  --   --  8   NEUTROS PCT % 73  --   --    LYMPHS PCT % 13*  --   --    LYMPHO PCT %  --   --  15   MONOS PCT % 11  --   --    MONO PCT %  --   --  6   EOS PCT % 1  --  1    < > = values in this interval not displayed  Results from last 7 days   Lab Units 22  0706 22  0634 22  0634   SODIUM mmol/L 136   < > 137   POTASSIUM mmol/L 3 8   < > 3 8   CHLORIDE mmol/L 96*   < > 98*   CO2 mmol/L 42*   < > 38*   BUN mg/dL 11   < > 8   CREATININE mg/dL 0 63   < > 0 69   ANION GAP mmol/L -2*   < > 1*   CALCIUM mg/dL 9 3   < > 9 3   ALBUMIN g/dL  --   --  2 3*   TOTAL BILIRUBIN mg/dL  --   --  0 67   ALK PHOS U/L  --   --  93   ALT U/L  --   --  20   AST U/L  --   --  24   GLUCOSE RANDOM mg/dL 83   < > 107    < > = values in this interval not displayed                   Results from last 7 days   Lab Units 22  0512 22  0452 05/01/22  0908   LACTIC ACID mmol/L  --   --  0 6   PROCALCITONIN ng/ml 0 27* 0 83*  --        Lines/Drains:  Invasive Devices  Report    Peripherally Inserted Central Catheter Line            PICC Line 04/29/22 Right Brachial 6 days          Drain            External Urinary Catheter 3 days                Central Line:  Goal for removal: Will discontinue when peripheral access obtained  Imaging: No pertinent imaging reviewed  Recent Cultures (last 7 days):         Last 24 Hours Medication List:   Current Facility-Administered Medications   Medication Dose Route Frequency Provider Last Rate    acetaminophen  975 mg Oral Q8H [address] Che Ruiz PA-C      albuterol  2 puff Inhalation Q4H PRN Anastasia Basurto MD      atorvastatin  80 mg Oral Daily Anastasia Basurto MD      Diclofenac Sodium  2 g Topical 4x Daily PRN Anastasia Basurto MD      furosemide  40 mg Intravenous BID (diuretic) Desiree Burgess PA-C      heparin (porcine)  7,500 Units Subcutaneous On license of [facility] Anastasia Basurto MD      HYDROmorphone  0 2 mg Intravenous Q6H PRN Eugenio Shannon PA-C      levETIRAcetam  500 mg Oral Q12H [address] Anastasia Basurto MD      metoprolol tartrate  25 mg Oral Q12H [address] Anastasia Basurto MD      ondansetron  4 mg Intravenous Q6H PRN Anastasia Basurto MD      oxyCODONE  2 5 mg Oral Q4H PRN Anastasia Basurto MD      oxyCODONE  5 mg Oral Q4H PRN Anastasia Basurto MD      pantoprazole  20 mg Oral Early Morning Anastasia Basurto MD      PHENobarbital  32 4 mg Oral BID Anastasia Basurto MD      polyethylene glycol  17 g Oral Daily PRN Tuesday M AUREA Bland      senna-docusate sodium  1 tablet Oral BID Tuesday M AUREA Bland      sodium chloride (PF)  3 mL Intravenous Q1H PRN Anastasia Basurto MD          Today, Patient Was Seen By: Kp Graves DO    **Please Note: This note may have been constructed using a voice recognition system  **

## 2022-05-06 NOTE — CASE MANAGEMENT
Case Management Discharge Planning Note    Patient name Portland Needs  Location [facility] 903/[facility] 903-01 MRN [id number]  : 1940 Date 2022       Current Admission Date: 2022  Current Admission Diagnosis:Bilateral lower extremity edema   Patient Active Problem List    Diagnosis Date Noted    Aortic stenosis 2022    Cirrhosis ([facility] ) 2022    SIRS (systemic inflammatory response syndrome) ([facility] ) 2022    Aspiration pneumonitis ([facility] ) 2022    Morbid obesity ([facility] ) 2021    Bilateral lower extremity edema 2021    Acute [facility] failure [facility] 2021    Chronic diastolic (congestive) heart failure [facility] ) 2020    Hyperlipidemia 2020    Localization-related epilepsy, intractable ([facility] ) 02/15/2019      LOS (days): 4  Geometric Mean LOS (GMLOS) (days): 5 40  Days to GMLOS:1 6     OBJECTIVE:  Risk of Unplanned Readmission Score: 15         Current admission status: Inpatient   [facility]erred [facility]acy:   [facility] [address]  Phone: [phone] Fax: [phone]    Primary Care Provider: Zaira Barth DO    Primary Insurance: MEDICARE  Secondary Insurance: [address]:    Discharge planning discussed with[de-identified] Katiuska Teague (daughter)  Freedom of Choice: Yes     CM contacted family/caregiver?: Yes  Were Treatment Team discharge recommendations reviewed with patient/caregiver?: Yes  Did patient/caregiver verbalize understanding of patient care needs?: Yes  Were patient/caregiver advised of the risks associated with not following Treatment Team discharge recommendations?: Yes    Contacts  Patient Contacts: Katiuska Teague (daughter)  Relationship to Patient[de-identified] Family  Contact Method:  In Person  Reason/Outcome: Continuity of Care,Discharge Planning,Emergency Contact,Referral              Other Referral/Resources/Interventions Provided:  Referral Comments: patient continues to be recommended for STR for time of discharge - [facility] able to accept - they were updated that the patient is likely medically cleared for d/c on Sunday at this time  Treatment Team Recommendation: Short Term Rehab  Discharge Destination Plan[de-identified] Short Term Rehab        IMM Given (Date):: 05/06/22  IMM Given to[de-identified] Family  Family notified[de-identified] Tremaine Costa (daughter)  Additional Comments: patient is not medically cleared for d/c today  Continues with IV diuresis  Requested COVID test from MD today

## 2022-05-06 NOTE — ASSESSMENT & PLAN NOTE
Hypotension, tachycardia, tachypnea  · From CHF exacerbation, treatment as above  · No etiology for infection suspected at this time, not on antibiotics per ID  · IV albumin ordered overnight (5/4)   · Of note this patient was on midodrine TID for the entirety of her hospitalization at [facility] until it was dc on 5/4 After the am dose after cards recs

## 2022-05-06 NOTE — ASSESSMENT & PLAN NOTE
Wt Readings from Last 3 Encounters:   05/06/22 94 9 kg (209 lb 3 2 oz)   05/02/22 101 kg (222 lb 14 2 oz)   03/14/21 93 kg (205 lb)     Echo ordered 5/3 given severe edema + murmur on exam  · Monitor intake and output  · Monitor daily weights  · Does not appear to be on any diuretics at home  · Given multiple days of IVF after normalization of creatinine for EDUARDO she may have some volume overload  · Placed on IV lasix BID, continue for now  · Cards following

## 2022-05-07 LAB
FLUAV RNA RESP QL NAA+PROBE: NEGATIVE
FLUBV RNA RESP QL NAA+PROBE: NEGATIVE
RSV RNA RESP QL NAA+PROBE: NEGATIVE
SARS-COV-2 RNA RESP QL NAA+PROBE: NEGATIVE

## 2022-05-07 PROCEDURE — 0241U HB NFCT DS VIR RESP RNA 4 TRGT: CPT | Performed by: INTERNAL MEDICINE

## 2022-05-07 PROCEDURE — 99233 SBSQ HOSP IP/OBS HIGH 50: CPT | Performed by: INTERNAL MEDICINE

## 2022-05-07 PROCEDURE — 92526 ORAL FUNCTION THERAPY: CPT

## 2022-05-07 RX ADMIN — SENNOSIDES AND DOCUSATE SODIUM 1 TABLET: 50; 8.6 TABLET ORAL at 17:28

## 2022-05-07 RX ADMIN — METOPROLOL TARTRATE 25 MG: 25 TABLET, FILM COATED ORAL at 09:17

## 2022-05-07 RX ADMIN — ATORVASTATIN CALCIUM 80 MG: 80 TABLET, FILM COATED ORAL at 09:17

## 2022-05-07 RX ADMIN — LEVETIRACETAM 500 MG: 500 TABLET, FILM COATED ORAL at 21:26

## 2022-05-07 RX ADMIN — PHENOBARBITAL 32.4 MG: 64.8 TABLET ORAL at 17:28

## 2022-05-07 RX ADMIN — METOPROLOL TARTRATE 37.5 MG: 25 TABLET, FILM COATED ORAL at 21:25

## 2022-05-07 RX ADMIN — ONDANSETRON 4 MG: 2 INJECTION INTRAMUSCULAR; INTRAVENOUS at 12:34

## 2022-05-07 RX ADMIN — OXYCODONE HYDROCHLORIDE 5 MG: 5 TABLET ORAL at 10:48

## 2022-05-07 RX ADMIN — FUROSEMIDE 40 MG: 40 TABLET ORAL at 09:17

## 2022-05-07 RX ADMIN — HEPARIN SODIUM 7500 UNITS: 5000 INJECTION INTRAVENOUS; SUBCUTANEOUS at 21:26

## 2022-05-07 RX ADMIN — LEVETIRACETAM 500 MG: 500 TABLET, FILM COATED ORAL at 09:17

## 2022-05-07 RX ADMIN — PHENOBARBITAL 32.4 MG: 64.8 TABLET ORAL at 09:17

## 2022-05-07 RX ADMIN — ACETAMINOPHEN 975 MG: 325 TABLET, FILM COATED ORAL at 06:41

## 2022-05-07 RX ADMIN — ACETAMINOPHEN 975 MG: 325 TABLET, FILM COATED ORAL at 21:25

## 2022-05-07 RX ADMIN — HEPARIN SODIUM 7500 UNITS: 5000 INJECTION INTRAVENOUS; SUBCUTANEOUS at 13:37

## 2022-05-07 RX ADMIN — OXYCODONE HYDROCHLORIDE 5 MG: 5 TABLET ORAL at 01:29

## 2022-05-07 RX ADMIN — SENNOSIDES AND DOCUSATE SODIUM 1 TABLET: 50; 8.6 TABLET ORAL at 09:17

## 2022-05-07 RX ADMIN — HEPARIN SODIUM 7500 UNITS: 5000 INJECTION INTRAVENOUS; SUBCUTANEOUS at 06:41

## 2022-05-07 NOTE — ASSESSMENT & PLAN NOTE
Hypotension, tachycardia, tachypnea  · From CHF exacerbation, treatment as above  · No etiology for infection suspected at this time, not on antibiotics per ID  · IV albumin ordered overnight (5/4)   · Of note this patient was on midodrine TID for the entirety of her hospitalization at THE [facility] until it was dc on 5/4 After the am dose after cards recs

## 2022-05-07 NOTE — SPEECH THERAPY NOTE
Speech/Language Pathology Progress Note    Patient Name: Sam Rodegrs  UDKQA'H Date: 5/7/2022     Subjective:  Pt awake and alert, positioned sitting upright in bed  Objective:  Pt seen for dysphagia therapy  Current diet is dysphagia 2 mech soft with thin liquids  Pt took meds for RN crushed in apple sauce without difficulty  Pt stated she likes her own food but does not like the food here  She was offered buttered/jelly toast as a trial diet upgrade  Mastication was slow, as pt is edentulous  She did appear to become sleepy during mastication but insisted "I'm not sleeping!" Mastication was mildly reduced with oral residual after swallow  Residual cleared with liquid wash  No s/s aspiration occurred with materials offered today  Assessment:  Pt does not like the food (mech soft diet) however question mastication efficiency and meal time endurance for texture upgrade  Plan/Recommendations:  Continue dysphagia 2 mech soft diet and thin liquids  Consider diet upgrade/texture advancement trial during a meal as able

## 2022-05-07 NOTE — ASSESSMENT & PLAN NOTE
Transferred from [facility] after she developed worsening edema, erythema and pain of the lower extremities despite IV abx for suspected cellulitis  Of note she arrived to [facility] with EDUARDO and had been on IVF for multiple days prior  · Hx of CHF and cirrhosis with hypoalbuminemia noted   Also with chronic venous stasis dermatitis   · Was on broad spectrum abx initially, however abx dc on 5/3 given low suspicion for infection  · CT scan of the bilateral LE were negative for abscess formation  · She was transferred to [facility] for dermatology evaluation  · Derm feels chronic venous stasis dermatitis  · Podiatry consulted for wound care  · Encourage compression/elevation   · Cards consulted to assist with IV diuresis--continue, diuretics increased to BID on 5/4  · Lower extremity venous dopplers ordered to check for DVT however unable to tolerate given significant pain-  · Continue pain control  · PT/OT recommending STR once stable   · Negative fluid balance on IV diuretics

## 2022-05-07 NOTE — CASE MANAGEMENT
Case Management Discharge Planning Note    Patient name Merari Hartman  Location [facility] 903/[facility] 903-01 MRN [id number]  : 1940 Date 2022       Current Admission Date: 2022  Current Admission Diagnosis:Bilateral lower extremity edema   Patient Active Problem List    Diagnosis Date Noted    Aortic stenosis 2022    [facility]osis [facility] ) 2022    [facility] 20[facility]    Aspiration pneumonitis [facility] ) 2022    Morbid obesity [facility] ) 2021    Bilateral lower extremity edema 2021    Acute respiratory failure with hypoxia [facility] ) 2021    Chronic diastolic (congestive) heart failure [facility] ) 2020    Hyperlipidemia 2020    Localization-related epilepsy, intractable [facility] ) 02/15/2019      LOS (days): 5  Geometric Mean LOS (GMLOS) (days): 5 40  Days to GMLOS:0 6     OBJECTIVE:  Risk of Unplanned Readmission Score: 15         Current admission status: Inpatient   Preferred Pharmacy:   [address] [facility], [facility] [facility] [address]  Phone: [phone] Fax: [phone]    Primary Care Provider: Jason Orantes DO    Primary Insurance: MEDICARE  Secondary Insurance: [address] [place], [place] [address] DETAILS:    Other Referral/Resources/Interventions Provided:  Referral Comments:  [facility] able to accept Monday per representativeLesley  [phone]    Additional Comments: COVID test completed  for dc, neg results reported to [facility]

## 2022-05-07 NOTE — PROGRESS NOTES
Progress Note - Cardiology   Olivia Rutherford 80 y o  female MRN: [id number]  Unit/Bed#: [facility] 903-01 Encounter: [id number]    Assessment:  Principal Problem:    Bilateral lower extremity edema  Active Problems:    Chronic diastolic (congestive) heart failure (HCC)    Localization-related epilepsy, intractable (HCC)    Acute respiratory failure with hypoxia (HCC)    Morbid obesity (HCC)    Aspiration pneumonitis (HCC)    Cirrhosis (HCC)    SIRS (systemic inflammatory response syndrome) (HCC)    Aortic stenosis    Aortic stenosis, moderate  Chronic diastolic congestive heart failure  Encephalopathic  Lymphedema  Alcoholic cirrhosis  Sirs  Morbid obesity    Plan:    Changed to PO lasix today  AS is moderate  Outpatient follow up with her regular cardiologist  Not sure if this progressed if she would be a good candidate for intervention  Very painful legs, would have a hard time doing compression stockings or lymphedema pump treatment  Wean O2 as tolerated with diuresis  Will sign off, please call with questions or changes  Subjective/Objective     Subjective:  Denies significant complaints, no events last 24 hours  Objective:    Vitals: /82   Pulse (!) 117   Temp 98 6 °F (37 °C)   Resp 20   Ht 4' 11" (1 499 m)   Wt 94 9 kg (209 lb 3 2 oz)   SpO2 91%   BMI 42 25 kg/m²   Vitals:    05/05/22 0600 05/06/22 0600   Weight: 95 8 kg (211 lb 3 2 oz) 94 9 kg (209 lb 3 2 oz)     Orthostatic Blood Pressures      Most Recent Value   Blood Pressure 124/82 filed at 05/07/2022 9163   Patient Position - Orthostatic VS Lying filed at 05/04/2022 1933            Intake/Output Summary (Last 24 hours) at 5/7/2022 1128  Last data filed at 5/7/2022 1001  Gross per 24 hour   Intake 220 ml   Output 1400 ml   Net -1180 ml     Physical Exam:  GEN: Leocadia Brazil in bed, chronically ill appearing    HEENT: pupils equal, round, and reactive to light; extraocular muscles intact  NECK: supple, no carotid bruits   HEART: regular rhythm, normal S1 and S2, no murmurs, clicks, gallops or rubs   LUNGS: clear to auscultation bilaterally; no wheezes, rales, or rhonchi   ABDOMEN: normal bowel sounds, soft, no tenderness, no distention  EXTREMITIES: wrapped, ACE bandages  Edema  Chronic skin changes    NEURO: unable to assess  SKIN: chronic changes    Medications:    Current Facility-Administered Medications:     acetaminophen (TYLENOL) tablet 975 mg, 975 mg, Oral, Q8H [address], Che Ruiz PA-C, 975 mg at 05/07/22 0641    albuterol (PROVENTIL HFA,VENTOLIN HFA) inhaler 2 puff, 2 puff, Inhalation, Q4H PRN, Marcus Crenshaw MD    atorvastatin (LIPITOR) tablet 80 mg, 80 mg, Oral, Daily, Marcus Crenshaw MD, 80 mg at 05/07/22 7069    Diclofenac Sodium (VOLTAREN) 1 % topical gel 2 g, 2 g, Topical, 4x Daily PRN, Marcus Crenshaw MD    furosemide (LASIX) tablet 40 mg, 40 mg, Oral, Daily, Erlinda Tolliver MD, 40 mg at 05/07/22 0917    heparin (porcine) subcutaneous injection 7,500 Units, 7,500 Units, Subcutaneous, Q8H [address], Marcus Crenshaw MD, 7,500 Units at 05/07/22 0641    HYDROmorphone HCl (DILAUDID) injection 0 2 mg, 0 2 mg, Intravenous, Q6H PRN, Lucille Cadena PA-C, 0 2 mg at 05/06/22 8803    levETIRAcetam (KEPPRA) tablet 500 mg, 500 mg, Oral, Q12H [address], Marcus Crenshaw MD, 500 mg at 05/07/22 0917    metoprolol tartrate (LOPRESSOR) tablet 25 mg, 25 mg, Oral, Q12H [address], Marcus Crenshaw MD, 25 mg at 05/07/22 0917    ondansetron (ZOFRAN) injection 4 mg, 4 mg, Intravenous, Q6H PRN, Marcus Crenshaw MD    oxyCODONE (ROXICODONE) IR tablet 2 5 mg, 2 5 mg, Oral, Q4H PRN, Marcus Crenshaw MD    oxyCODONE (ROXICODONE) IR tablet 5 mg, 5 mg, Oral, Q4H PRN, Marcus Crenshaw MD, 5 mg at 05/07/22 1048    pantoprazole (PROTONIX) EC tablet 20 mg, 20 mg, Oral, Early Morning, Marcus Crenshaw MD, 20 mg at 05/06/22 0719    PHENobarbital tablet 32 4 mg, 32 4 mg, Oral, BID, Marcus Crenshaw MD, 32 4 mg at 05/07/22 0917    polyethylene glycol (MIRALAX) packet 17 g, 17 g, Oral, Daily PRN, Tuesday M AUREA Bland    senna-docusate sodium (SENOKOT S) 8 6-50 mg per tablet 1 tablet, 1 tablet, Oral, BID, Tuesday M AUREA Bladn, 1 tablet at 05/07/22 9443    sodium chloride (PF) 0 9 % injection 3 mL, 3 mL, Intravenous, Q1H PRN, Roselynn Angelucci, MD    Lab Results:      Results from last 7 days   Lab Units 05/06/22  0706 05/05/22  0847 05/04/22  0512   WBC Thousand/uL 12 87* 14 74* 17 25*   HEMOGLOBIN g/dL 9 3* 9 3* 9 8*   HEMATOCRIT % 30 4* 30 6* 32 6*   PLATELETS Thousands/uL 275 223 197         Results from last 7 days   Lab Units 05/06/22  0706 05/05/22  0634 05/04/22  0512 05/03/22  0450 05/03/22  0450 05/02/22  0521 05/01/22  0602   SODIUM mmol/L 136 137 140   < > 139   < > 140   POTASSIUM mmol/L 3 8 3 8 4 2   < > 3 3*   < > 3 8   CHLORIDE mmol/L 96* 98* 104   < > 103   < > 105   CO2 mmol/L 42* 38* 31   < > 31   < > 29   BUN mg/dL 11 8 9   < > 8   < > 11   CREATININE mg/dL 0 63 0 69 0 63   < > 0 55*   < > 0 64   CALCIUM mg/dL 9 3 9 3 9 2   < > 9 4   < > 8 6   ALK PHOS U/L  --  93  --   --  114  --  107   ALT U/L  --  20  --   --  17  --  16   AST U/L  --  24  --   --  22  --  24    < > = values in this interval not displayed  Results from last 7 days   Lab Units 05/04/22  0512 05/03/22  0450   MAGNESIUM mg/dL 1 7 1 6       Echo:    Left Ventricle: Left ventricular cavity size is normal  Wall thickness is normal  The left ventricular ejection fraction is 70%  Systolic function is hyperdynamic  Wall motion cannot be accurately assessed  Diastolic function is mildly abnormal, consistent with grade I (abnormal) relaxation    Right Ventricle: Right ventricular cavity size is normal  Systolic function is normal     Aortic Valve: The aortic valve is trileaflet  The leaflets are moderately thickened  The leaflets are moderately calcified  There is probably moderately reduced mobility   There is moderate stenosis based on a limited assessment of hemodynamic data, the accuracy of which is limited by increased LVOT velocities  The aortic valve peak velocity is 3 78 m/s (increased due to a combination of aortic stenosis and LVOT obstruction)  The aortic valve peak gradient is 58 mmHg  The aortic valve mean gradient is 37 mmHg  The DVI is 0 39  There is also a dynamic LVOT gradient upto 36 mm of Hg  The valve was very poorly visualized and LVOT diameter could not be measured    Mitral Valve: There is moderate annular calcification  There is mild regurgitation    Study Details: Study quality was poor      If clinically indicated, SKYE could provide more information about the severity of aortic stenosis

## 2022-05-08 PROCEDURE — 94760 N-INVAS EAR/PLS OXIMETRY 1: CPT

## 2022-05-08 PROCEDURE — 94640 AIRWAY INHALATION TREATMENT: CPT

## 2022-05-08 PROCEDURE — 99233 SBSQ HOSP IP/OBS HIGH 50: CPT | Performed by: INTERNAL MEDICINE

## 2022-05-08 RX ORDER — FUROSEMIDE 10 MG/ML
40 INJECTION INTRAMUSCULAR; INTRAVENOUS ONCE
Status: COMPLETED | OUTPATIENT
Start: 2022-05-08 | End: 2022-05-08

## 2022-05-08 RX ORDER — IPRATROPIUM BROMIDE AND ALBUTEROL SULFATE 2.5; .5 MG/3ML; MG/3ML
3 SOLUTION RESPIRATORY (INHALATION)
Status: DISCONTINUED | OUTPATIENT
Start: 2022-05-08 | End: 2022-05-08

## 2022-05-08 RX ORDER — LEVALBUTEROL INHALATION SOLUTION 1.25 MG/3ML
1.25 SOLUTION RESPIRATORY (INHALATION)
Status: DISCONTINUED | OUTPATIENT
Start: 2022-05-08 | End: 2022-05-11

## 2022-05-08 RX ADMIN — FUROSEMIDE 40 MG: 10 INJECTION, SOLUTION INTRAMUSCULAR; INTRAVENOUS at 17:11

## 2022-05-08 RX ADMIN — METOPROLOL TARTRATE 37.5 MG: 25 TABLET, FILM COATED ORAL at 07:47

## 2022-05-08 RX ADMIN — ALBUTEROL SULFATE 2 PUFF: 90 AEROSOL, METERED RESPIRATORY (INHALATION) at 08:01

## 2022-05-08 RX ADMIN — SENNOSIDES AND DOCUSATE SODIUM 1 TABLET: 50; 8.6 TABLET ORAL at 07:47

## 2022-05-08 RX ADMIN — ACETAMINOPHEN 975 MG: 325 TABLET, FILM COATED ORAL at 13:00

## 2022-05-08 RX ADMIN — HEPARIN SODIUM 7500 UNITS: 5000 INJECTION INTRAVENOUS; SUBCUTANEOUS at 05:57

## 2022-05-08 RX ADMIN — LEVALBUTEROL HYDROCHLORIDE 1.25 MG: 1.25 SOLUTION RESPIRATORY (INHALATION) at 19:13

## 2022-05-08 RX ADMIN — LEVETIRACETAM 500 MG: 500 TABLET, FILM COATED ORAL at 21:41

## 2022-05-08 RX ADMIN — PHENOBARBITAL 32.4 MG: 64.8 TABLET ORAL at 07:47

## 2022-05-08 RX ADMIN — ACETAMINOPHEN 975 MG: 325 TABLET, FILM COATED ORAL at 05:54

## 2022-05-08 RX ADMIN — HYDROMORPHONE HYDROCHLORIDE 0.2 MG: 0.2 INJECTION, SOLUTION INTRAMUSCULAR; INTRAVENOUS; SUBCUTANEOUS at 03:14

## 2022-05-08 RX ADMIN — ONDANSETRON 4 MG: 2 INJECTION INTRAMUSCULAR; INTRAVENOUS at 02:41

## 2022-05-08 RX ADMIN — OXYCODONE HYDROCHLORIDE 5 MG: 5 TABLET ORAL at 12:57

## 2022-05-08 RX ADMIN — METOPROLOL TARTRATE 37.5 MG: 25 TABLET, FILM COATED ORAL at 21:42

## 2022-05-08 RX ADMIN — FUROSEMIDE 40 MG: 40 TABLET ORAL at 07:47

## 2022-05-08 RX ADMIN — OXYCODONE HYDROCHLORIDE 5 MG: 5 TABLET ORAL at 17:11

## 2022-05-08 RX ADMIN — SENNOSIDES AND DOCUSATE SODIUM 1 TABLET: 50; 8.6 TABLET ORAL at 17:11

## 2022-05-08 RX ADMIN — LEVETIRACETAM 500 MG: 500 TABLET, FILM COATED ORAL at 07:47

## 2022-05-08 RX ADMIN — ACETAMINOPHEN 975 MG: 325 TABLET, FILM COATED ORAL at 21:40

## 2022-05-08 RX ADMIN — HEPARIN SODIUM 7500 UNITS: 5000 INJECTION INTRAVENOUS; SUBCUTANEOUS at 13:00

## 2022-05-08 RX ADMIN — ATORVASTATIN CALCIUM 80 MG: 80 TABLET, FILM COATED ORAL at 07:47

## 2022-05-08 RX ADMIN — OXYCODONE HYDROCHLORIDE 5 MG: 5 TABLET ORAL at 21:41

## 2022-05-08 RX ADMIN — PANTOPRAZOLE SODIUM 20 MG: 20 TABLET, DELAYED RELEASE ORAL at 05:57

## 2022-05-08 RX ADMIN — LEVALBUTEROL HYDROCHLORIDE 1.25 MG: 1.25 SOLUTION RESPIRATORY (INHALATION) at 14:29

## 2022-05-08 RX ADMIN — HEPARIN SODIUM 7500 UNITS: 5000 INJECTION INTRAVENOUS; SUBCUTANEOUS at 21:40

## 2022-05-08 RX ADMIN — PHENOBARBITAL 32.4 MG: 64.8 TABLET ORAL at 17:11

## 2022-05-08 NOTE — ASSESSMENT & PLAN NOTE
Multifactorial given volume overload, aspiration, atelectasis, morbid obesity  · No PNA on imaging, off abx per ID  · Patient with wheezing and mild short of breath  · Continue with bronchodilator, diuresis  · Repeat chest x-ray tomorrow  · Wean oxygen as able

## 2022-05-08 NOTE — PLAN OF CARE
Problem: Potential for Falls  Goal: Patient will remain free of falls  Description: INTERVENTIONS:  - Educate patient/family on patient safety including physical limitations  - Instruct patient to call for assistance with activity   - Consult OT/PT to assist with strengthening/mobility   - Keep Call bell within reach  - Keep bed low and locked with side rails adjusted as appropriate  - Keep care items and personal belongings within reach  - Initiate and maintain comfort rounds  - Make Fall Risk Sign visible to staff    Problem: Nutrition/Hydration-ADULT  Goal: Nutrient/Hydration intake appropriate for improving, restoring or maintaining nutritional needs  Description: Monitor and assess patient's nutrition/hydration status for malnutrition  Collaborate with interdisciplinary team and initiate plan and interventions as ordered  Monitor patient's weight and dietary intake as ordered or per policy  Utilize nutrition screening tool and intervene as necessary  Determine patient's food preferences and provide high-protein, high-caloric foods as appropriate       INTERVENTIONS:  - Monitor oral intake, urinary output, labs, and treatment plans  - Assess nutrition and hydration status and recommend course of action  - Evaluate amount of meals eaten  - Assist patient with eating if necessary   - Allow adequate time for meals  - Recommend/ encourage appropriate diets, oral nutritional supplements, and vitamin/mineral supplements  - Order, calculate, and assess calorie counts as needed  - Recommend, monitor, and adjust tube feedings and TPN/PPN based on assessed needs  - Assess need for intravenous fluids  - Provide specific nutrition/hydration education as appropriate  - Include patient/family/caregiver in decisions related to nutrition  Outcome: Progressing     - Apply yellow socks and bracelet for high fall risk patients  - Consider moving patient to room near nurses station  Outcome: Progressing

## 2022-05-08 NOTE — CASE MANAGEMENT
Case Management Discharge Planning Note    Patient name Tiara Johnson  Location [facility] 903/[facility] 903-01 MRN [id number]  : 1940 Date 2022       Current Admission Date: 2022  Current Admission Diagnosis:Bilateral lower extremity edema   Patient Active Problem List    Diagnosis Date Noted    Aortic stenosis 2022    [facility]    SIRS (systemic inflammatory response syndrome) [facility] ) 2022    Aspiration pneumonitis ([facility] ) 2022    Morbid obesity ([facility] ) 2021    Bilateral lower extremity edema 2021    Acute respiratory failure with hypoxia [facility] ) 2021    Chronic diastolic (congestive) heart failure ([facility] ) 2020    Hyperlipidemia 2020    Localization-related epilepsy, intractable ([place] [address] ) 02/15/2019      LOS (days): 6  Geometric Mean LOS (GMLOS) (days): 5 40  Days to GMLOS:-0 6     OBJECTIVE:  Risk of Unplanned Readmission Score: 15         Current admission status: Inpatient   [facility]acy:   [facility] [address] [facility], [address]  Phone: [phone] Fax: [phone]    Primary Care Provider: Javier Manning DO    Primary Insurance: MEDICARE  Secondary Insurance: [address] [place], [place] [address]Third [address] DETAILS:    Other Referral/Resources/Interventions Provided:  Interventions: Short Term Rehab,Transportation  Referral Comments: [facility] accepted for Monday, requested early transport  Cm requested BLS transport via [facility] [employer] for 11am  Cm to f/u to confirm pick-up time      Treatment Team Recommendation: Short Term Rehab  Discharge Destination Plan[de-identified] Short Term Rehab

## 2022-05-08 NOTE — ASSESSMENT & PLAN NOTE
Transferred from [facility] after she developed worsening edema, erythema and pain of the lower extremities despite IV abx for suspected cellulitis  Of note she arrived to [facility] with EDUARDO and had been on IVF for multiple days prior  · Hx of CHF and cirrhosis with hypoalbuminemia noted   Also with chronic venous stasis dermatitis   · Was on broad spectrum abx initially, however abx dc on 5/3 given low suspicion for infection  · CT scan of the bilateral LE were negative for abscess formation  · She was [facility] evaluation  · Derm feels chronic venous stasis dermatitis  · Podiatry consulted for wound care  · Encourage compression/elevation   · Cards consulted to assist with IV diuresis--continue, diuretics increased to BID on 5/4  · Lower extremity venous dopplers ordered to check for DVT however unable to tolerate given significant pain-  · Continue pain control  · PT/OT recommending STR once stable   · Negative fluid balance on IV diuretics

## 2022-05-08 NOTE — ASSESSMENT & PLAN NOTE
Wt Readings from Last 3 Encounters:   05/08/22 94 1 kg (207 lb 7 3 oz)   05/02/22 101 kg (222 lb 14 2 oz)   03/14/21 93 kg (205 lb)     Cardiac echo with EF 70% and moderate aortic stenosis   · Does not appear to be on any diuretics at home  · Given multiple days of IVF after normalization of creatinine for EDUARDO she may have some volume overload  · Placed on IV lasix BID, now on oral Lasix daily  · Cardiology signed off   · Increased beta-blocker to 37 5 b i d   Due to tachycardia  · Monitor volume status

## 2022-05-08 NOTE — ASSESSMENT & PLAN NOTE
Hypotension, tachycardia, tachypnea  · From CHF exacerbation, treatment as above  · No etiology for infection suspected at this time, not on antibiotics per ID  · IV albumin ordered overnight (5/4)   · Of note this patient was on midodrine TID for the entirety of her hospitalization at [facility] until it was dc on 5/4 After the am dose after cards recs

## 2022-05-08 NOTE — ASSESSMENT & PLAN NOTE
· Continue keppra and phenobarbitol Opioid Pregnancy And Lactation Text: These medications can lead to premature delivery and should be avoided during pregnancy. These medications are also present in breast milk in small amounts.

## 2022-05-08 NOTE — PROGRESS NOTES
[facility]  Progress Note - Kath Ours 1940, 80 y o  female MRN: [id number]  Unit/Bed#: [facility] 903-01 Encounter: [id number]  Primary Care Provider: Didi Musa DO   Date and time admitted to hospital: 5/2/2022  4:17 PM    * Bilateral lower extremity edema  Assessment & Plan  Transferred from [facility] after she developed worsening edema, erythema and pain of the lower extremities despite IV abx for suspected cellulitis  Of note she arrived to [facility] with EDUARDO and had been on IVF for multiple days prior  · Hx of CHF and cirrhosis with hypoalbuminemia noted   Also with chronic venous stasis dermatitis   · Was on broad spectrum abx initially, however abx dc on 5/3 given low suspicion for infection  · CT scan of the bilateral LE were negative for abscess formation  · She was transferred to [facility] for dermatology evaluation  · Derm feels chronic venous stasis dermatitis  · Podiatry consulted for wound care  · Encourage compression/elevation   · Cards consulted to assist with IV diuresis--continue, diuretics increased to BID on 5/4  · Lower extremity venous dopplers ordered to check for DVT however unable to tolerate given significant pain-  · Continue pain control  · PT/OT recommending STR once stable   · Negative fluid balance on IV diuretics    SIRS (systemic inflammatory response syndrome) (HCC)  Assessment & Plan  Hypotension, tachycardia, tachypnea  · From CHF exacerbation, treatment as above  · No etiology for infection suspected at this time, not on antibiotics per ID  · IV albumin ordered overnight (5/4)   · Of note this patient was on midodrine TID for the entirety of her hospitalization at [facility] until it was dc on 5/4 After the am dose after cards recs    Aortic stenosis  Assessment & Plan  Moderate aortic stenosis  · Outpatient f/u    Acute respiratory failure with hypoxia [facility])  Assessment & Plan  Multifactorial given volume overload, aspiration, atelectasis, morbid obesity  · No PNA on imaging, off abx per ID  · Patient with wheezing and mild short of breath  · Continue with bronchodilator, diuresis  · Repeat chest x-ray tomorrow  · Wean oxygen as able    Chronic diastolic (congestive) heart failure (HCC)  Assessment & Plan  Wt Readings from Last 3 Encounters:   05/08/22 94 1 kg (207 lb 7 3 oz)   05/02/22 101 kg (222 lb 14 2 oz)   03/14/21 93 kg (205 lb)     Cardiac echo with EF 70% and moderate aortic stenosis   · Does not appear to be on any diuretics at home  · Given multiple days of IVF after normalization of creatinine for EDUARDO she may have some volume overload  · Placed on IV lasix BID, now on oral Lasix daily  · Cardiology signed off   · Increased beta-blocker to 37 5 b i d  Due to tachycardia  · Monitor volume status        Cirrhosis (HCC)  Assessment & Plan  Daughter reports hx of cirrhosis however does not follow with any GI specialists  · Monitor     Morbid obesity ([facility] [address] )  Assessment & Plan  Supportive care  · Turn Q2h  · Weight loss counseling as an outpatient when medically stable    Localization-related epilepsy, intractable ([facility] [address] )  Assessment & Plan  · Continue keppra and phenobarbitol      VTE Pharmacologic Prophylaxis:   High Risk (Score >/= 5) - Pharmacological DVT Prophylaxis Ordered: heparin  Sequential Compression Devices Ordered  Patient Centered Rounds: I performed bedside rounds with nursing staff today  Discussions with Specialists or Other Care Team Provider:     Time Spent for Care: 45 minutes  More than 50% of total time spent on counseling and coordination of care as described above  Current Length of Stay: 6 day(s)  Current Patient Status: Inpatient   Certification Statement: The patient will continue to require additional inpatient hospital stay due to Above  Discharge Plan: Anticipate discharge tomorrow to rehab facility      Code Status: Level 1 - Full Code    Subjective:   Patient seen examined  Comfortable sleeping in bed  Nursing reporting wheezing and  mild short of breath  No appetite today    Objective:     Vitals:   Temp (24hrs), Av 1 °F (37 3 °C), Min:98 5 °F (36 9 °C), Max:99 8 °F (37 7 °C)    Temp:  [98 5 °F (36 9 °C)-99 8 °F (37 7 °C)] 98 5 °F (36 9 °C)  HR:  [] 111  Resp:  [20] 20  BP: (153-172)/(94-96) 153/94  SpO2:  [93 %-97 %] 97 %  Body mass index is 41 9 kg/m²  Input and Output Summary (last 24 hours): Intake/Output Summary (Last 24 hours) at 2022 1000  Last data filed at 2022 0500  Gross per 24 hour   Intake 660 ml   Output 500 ml   Net 160 ml       Physical Exam:   Physical Exam   Patient is comfortable sleeping in bed, no acute distress  Chronically ill-appearing  Lung with bilateral expiratory wheezing  Heart positive A2-Q7 with systolic murmur  Abdomen soft nontender  Chronic bilateral lower extremity venous stasis edema    Additional Data:     Labs:  Results from last 7 days   Lab Units 22  0706 22  0847 22  0847   WBC Thousand/uL 12 87*   < > 14 74*   HEMOGLOBIN g/dL 9 3*   < > 9 3*   HEMATOCRIT % 30 4*   < > 30 6*   PLATELETS Thousands/uL 275   < > 223   BANDS PCT %  --   --  8   NEUTROS PCT % 73  --   --    LYMPHS PCT % 13*  --   --    LYMPHO PCT %  --   --  15   MONOS PCT % 11  --   --    MONO PCT %  --   --  6   EOS PCT % 1  --  1    < > = values in this interval not displayed  Results from last 7 days   Lab Units 22  0706 22  0634 22  0634   SODIUM mmol/L 136   < > 137   POTASSIUM mmol/L 3 8   < > 3 8   CHLORIDE mmol/L 96*   < > 98*   CO2 mmol/L 42*   < > 38*   BUN mg/dL 11   < > 8   CREATININE mg/dL 0 63   < > 0 69   ANION GAP mmol/L -2*   < > 1*   CALCIUM mg/dL 9 3   < > 9 3   ALBUMIN g/dL  --   --  2 3*   TOTAL BILIRUBIN mg/dL  --   --  0 67   ALK PHOS U/L  --   --  93   ALT U/L  --   --  20   AST U/L  --   --  24   GLUCOSE RANDOM mg/dL 83   < > 107    < > = values in this interval not displayed                   Results from last 7 days   Lab Units 05/04/22  0512 05/03/22  0450   PROCALCITONIN ng/ml 0 27* 0 83*       Lines/Drains:  Invasive Devices  Report    Peripherally Inserted Central Catheter Line            PICC Line 04/29/22 Right Brachial 8 days          Drain            External Urinary Catheter 5 days                Central Line:  Goal for removal: Will discontinue when peripheral access obtained  Imaging: No pertinent imaging reviewed  Recent Cultures (last 7 days):         Last 24 Hours Medication List:   Current Facility-Administered Medications   Medication Dose Route Frequency Provider Last Rate    acetaminophen  975 mg Oral Q8H [address] Che Ruiz PA-C      albuterol  2 puff Inhalation Q4H PRN Rhiannon Elizondo MD      atorvastatin  80 mg Oral Daily Rhiannon Elizondo MD      Diclofenac Sodium  2 g Topical 4x Daily PRN Rhiannon Elizondo MD      furosemide  40 mg Intravenous Once David Banuelos DO      furosemide  40 mg Oral Daily Emily Martinez MD      heparin (porcine)  7,500 Units Subcutaneous [facility] Rhiannon Elizondo MD      HYDROmorphone  0 2 mg Intravenous Q6H PRN Sadi Kan PA-C      ipratropium-albuterol  3 mL Nebulization Q6H David Banuelos DO      levETIRAcetam  500 mg Oral Q12H Jonathan Henry MD      metoprolol tartrate  37 5 mg Oral Q12H [address] David Banuelos DO      ondansetron  4 mg Intravenous Q6H PRN Rhiannon Elizondo MD      oxyCODONE  2 5 mg Oral Q4H PRN Rhiannon Elizondo MD      oxyCODONE  5 mg Oral Q4H PRN Rhiannon Elizondo MD      pantoprazole  20 mg Oral Early Morning Rhiannon Elizondo MD      PHENobarbital  32 4 mg Oral BID Rhiannon Elizondo MD      polyethylene glycol  17 g Oral Daily PRN Tuesday M AUREA Bland      senna-docusate sodium  1 tablet Oral BID Tuesday M AUREA Bland      sodium chloride (PF)  3 mL Intravenous Q1H PRN Rhiannon Elizondo MD          Today, Patient Was Seen By: David Banuelos DO    **Please Note: This note may have been constructed using a voice recognition system  **

## 2022-05-09 ENCOUNTER — APPOINTMENT (INPATIENT)
Dept: RADIOLOGY | Facility: HOSPITAL | Age: 82
DRG: 177 | End: 2022-05-09
Payer: MEDICARE

## 2022-05-09 PROBLEM — N17.9 AKI (ACUTE KIDNEY INJURY) (HCC): Status: ACTIVE | Noted: 2022-05-09

## 2022-05-09 LAB
ANION GAP SERPL CALCULATED.3IONS-SCNC: 4 MMOL/L (ref 4–13)
BACTERIA UR QL AUTO: ABNORMAL /HPF
BASOPHILS # BLD AUTO: 0.06 THOUSANDS/ΜL (ref 0–0.1)
BASOPHILS NFR BLD AUTO: 0 % (ref 0–1)
BILIRUB UR QL STRIP: NEGATIVE
BUDDING YEAST: PRESENT
BUN SERPL-MCNC: 33 MG/DL (ref 5–25)
CALCIUM SERPL-MCNC: 9.4 MG/DL (ref 8.3–10.1)
CAOX CRY URNS QL MICRO: ABNORMAL /HPF
CHLORIDE SERPL-SCNC: 91 MMOL/L (ref 100–108)
CLARITY UR: ABNORMAL
CO2 SERPL-SCNC: 38 MMOL/L (ref 21–32)
COLOR UR: YELLOW
CREAT SERPL-MCNC: 1.4 MG/DL (ref 0.6–1.3)
EOSINOPHIL # BLD AUTO: 0.06 THOUSAND/ΜL (ref 0–0.61)
EOSINOPHIL NFR BLD AUTO: 0 % (ref 0–6)
ERYTHROCYTE [DISTWIDTH] IN BLOOD BY AUTOMATED COUNT: 15.3 % (ref 11.6–15.1)
GFR SERPL CREATININE-BSD FRML MDRD: 35 ML/MIN/1.73SQ M
GLUCOSE SERPL-MCNC: 111 MG/DL (ref 65–140)
GLUCOSE UR STRIP-MCNC: NEGATIVE MG/DL
HCT VFR BLD AUTO: 29.6 % (ref 34.8–46.1)
HGB BLD-MCNC: 9.1 G/DL (ref 11.5–15.4)
HGB UR QL STRIP.AUTO: ABNORMAL
HYALINE CASTS #/AREA URNS LPF: ABNORMAL /LPF
IMM GRANULOCYTES # BLD AUTO: 0.13 THOUSAND/UL (ref 0–0.2)
IMM GRANULOCYTES NFR BLD AUTO: 1 % (ref 0–2)
KETONES UR STRIP-MCNC: NEGATIVE MG/DL
LACTATE SERPL-SCNC: 1.6 MMOL/L (ref 0.5–2)
LEUKOCYTE ESTERASE UR QL STRIP: ABNORMAL
LYMPHOCYTES # BLD AUTO: 1.62 THOUSANDS/ΜL (ref 0.6–4.47)
LYMPHOCYTES NFR BLD AUTO: 9 % (ref 14–44)
MAGNESIUM SERPL-MCNC: 1.9 MG/DL (ref 1.6–2.6)
MCH RBC QN AUTO: 29.4 PG (ref 26.8–34.3)
MCHC RBC AUTO-ENTMCNC: 30.7 G/DL (ref 31.4–37.4)
MCV RBC AUTO: 96 FL (ref 82–98)
MONOCYTES # BLD AUTO: 2.18 THOUSAND/ΜL (ref 0.17–1.22)
MONOCYTES NFR BLD AUTO: 13 % (ref 4–12)
NEUTROPHILS # BLD AUTO: 13.46 THOUSANDS/ΜL (ref 1.85–7.62)
NEUTS SEG NFR BLD AUTO: 77 % (ref 43–75)
NITRITE UR QL STRIP: NEGATIVE
NON-SQ EPI CELLS URNS QL MICRO: ABNORMAL /HPF
NRBC BLD AUTO-RTO: 0 /100 WBCS
PH UR STRIP.AUTO: 5 [PH]
PLATELET # BLD AUTO: 532 THOUSANDS/UL (ref 149–390)
PMV BLD AUTO: 10.4 FL (ref 8.9–12.7)
POTASSIUM SERPL-SCNC: 4.2 MMOL/L (ref 3.5–5.3)
PROCALCITONIN SERPL-MCNC: 1.02 NG/ML
PROT UR STRIP-MCNC: ABNORMAL MG/DL
RBC # BLD AUTO: 3.1 MILLION/UL (ref 3.81–5.12)
RBC #/AREA URNS AUTO: ABNORMAL /HPF
SODIUM SERPL-SCNC: 133 MMOL/L (ref 136–145)
SP GR UR STRIP.AUTO: 1.02 (ref 1–1.03)
UROBILINOGEN UR STRIP-ACNC: <2 MG/DL
WBC # BLD AUTO: 17.51 THOUSAND/UL (ref 4.31–10.16)
WBC #/AREA URNS AUTO: ABNORMAL /HPF

## 2022-05-09 PROCEDURE — 85025 COMPLETE CBC W/AUTO DIFF WBC: CPT | Performed by: INTERNAL MEDICINE

## 2022-05-09 PROCEDURE — G1004 CDSM NDSC: HCPCS

## 2022-05-09 PROCEDURE — 87040 BLOOD CULTURE FOR BACTERIA: CPT | Performed by: INTERNAL MEDICINE

## 2022-05-09 PROCEDURE — 83605 ASSAY OF LACTIC ACID: CPT | Performed by: INTERNAL MEDICINE

## 2022-05-09 PROCEDURE — 84145 PROCALCITONIN (PCT): CPT | Performed by: INTERNAL MEDICINE

## 2022-05-09 PROCEDURE — 99233 SBSQ HOSP IP/OBS HIGH 50: CPT | Performed by: INTERNAL MEDICINE

## 2022-05-09 PROCEDURE — 81001 URINALYSIS AUTO W/SCOPE: CPT | Performed by: INTERNAL MEDICINE

## 2022-05-09 PROCEDURE — 83735 ASSAY OF MAGNESIUM: CPT | Performed by: INTERNAL MEDICINE

## 2022-05-09 PROCEDURE — 71250 CT THORAX DX C-: CPT

## 2022-05-09 PROCEDURE — 74176 CT ABD & PELVIS W/O CONTRAST: CPT

## 2022-05-09 PROCEDURE — 94760 N-INVAS EAR/PLS OXIMETRY 1: CPT

## 2022-05-09 PROCEDURE — 80048 BASIC METABOLIC PNL TOTAL CA: CPT | Performed by: INTERNAL MEDICINE

## 2022-05-09 PROCEDURE — 94640 AIRWAY INHALATION TREATMENT: CPT

## 2022-05-09 PROCEDURE — 71045 X-RAY EXAM CHEST 1 VIEW: CPT

## 2022-05-09 PROCEDURE — 99232 SBSQ HOSP IP/OBS MODERATE 35: CPT | Performed by: INTERNAL MEDICINE

## 2022-05-09 RX ORDER — POLYETHYLENE GLYCOL 3350 17 G/17G
17 POWDER, FOR SOLUTION ORAL DAILY
Status: DISCONTINUED | OUTPATIENT
Start: 2022-05-09 | End: 2022-05-11 | Stop reason: HOSPADM

## 2022-05-09 RX ORDER — SODIUM PHOSPHATE, DIBASIC AND SODIUM PHOSPHATE, MONOBASIC 7; 19 G/133ML; G/133ML
1 ENEMA RECTAL ONCE
Status: COMPLETED | OUTPATIENT
Start: 2022-05-09 | End: 2022-05-09

## 2022-05-09 RX ORDER — POLYETHYLENE GLYCOL 3350 17 G/17G
17 POWDER, FOR SOLUTION ORAL DAILY
Status: DISCONTINUED | OUTPATIENT
Start: 2022-05-10 | End: 2022-05-09

## 2022-05-09 RX ORDER — BISACODYL 10 MG
10 SUPPOSITORY, RECTAL RECTAL DAILY
Status: DISCONTINUED | OUTPATIENT
Start: 2022-05-10 | End: 2022-05-11 | Stop reason: HOSPADM

## 2022-05-09 RX ADMIN — PHENOBARBITAL 32.4 MG: 64.8 TABLET ORAL at 17:34

## 2022-05-09 RX ADMIN — LEVALBUTEROL HYDROCHLORIDE 1.25 MG: 1.25 SOLUTION RESPIRATORY (INHALATION) at 07:49

## 2022-05-09 RX ADMIN — LEVALBUTEROL HYDROCHLORIDE 1.25 MG: 1.25 SOLUTION RESPIRATORY (INHALATION) at 13:32

## 2022-05-09 RX ADMIN — PANTOPRAZOLE SODIUM 20 MG: 20 TABLET, DELAYED RELEASE ORAL at 05:42

## 2022-05-09 RX ADMIN — PHENOBARBITAL 32.4 MG: 64.8 TABLET ORAL at 13:00

## 2022-05-09 RX ADMIN — FUROSEMIDE 40 MG: 40 TABLET ORAL at 08:20

## 2022-05-09 RX ADMIN — METOPROLOL TARTRATE 37.5 MG: 25 TABLET, FILM COATED ORAL at 21:58

## 2022-05-09 RX ADMIN — HEPARIN SODIUM 7500 UNITS: 5000 INJECTION INTRAVENOUS; SUBCUTANEOUS at 13:03

## 2022-05-09 RX ADMIN — ACETAMINOPHEN 975 MG: 325 TABLET, FILM COATED ORAL at 14:28

## 2022-05-09 RX ADMIN — SODIUM PHOSPHATE 1 ENEMA: 7; 19 ENEMA RECTAL at 17:31

## 2022-05-09 RX ADMIN — SENNOSIDES AND DOCUSATE SODIUM 1 TABLET: 50; 8.6 TABLET ORAL at 17:31

## 2022-05-09 RX ADMIN — LEVETIRACETAM 500 MG: 500 TABLET, FILM COATED ORAL at 21:58

## 2022-05-09 RX ADMIN — METOPROLOL TARTRATE 37.5 MG: 25 TABLET, FILM COATED ORAL at 08:23

## 2022-05-09 RX ADMIN — OXYCODONE HYDROCHLORIDE 5 MG: 5 TABLET ORAL at 21:58

## 2022-05-09 RX ADMIN — OXYCODONE HYDROCHLORIDE 5 MG: 5 TABLET ORAL at 05:58

## 2022-05-09 RX ADMIN — SENNOSIDES AND DOCUSATE SODIUM 1 TABLET: 50; 8.6 TABLET ORAL at 08:21

## 2022-05-09 RX ADMIN — ACETAMINOPHEN 975 MG: 325 TABLET, FILM COATED ORAL at 21:58

## 2022-05-09 RX ADMIN — LEVALBUTEROL HYDROCHLORIDE 1.25 MG: 1.25 SOLUTION RESPIRATORY (INHALATION) at 20:41

## 2022-05-09 RX ADMIN — ATORVASTATIN CALCIUM 80 MG: 80 TABLET, FILM COATED ORAL at 08:20

## 2022-05-09 RX ADMIN — POLYETHYLENE GLYCOL 3350 17 G: 17 POWDER, FOR SOLUTION ORAL at 17:34

## 2022-05-09 RX ADMIN — ACETAMINOPHEN 975 MG: 325 TABLET, FILM COATED ORAL at 05:42

## 2022-05-09 RX ADMIN — LEVETIRACETAM 500 MG: 500 TABLET, FILM COATED ORAL at 08:20

## 2022-05-09 RX ADMIN — HEPARIN SODIUM 7500 UNITS: 5000 INJECTION INTRAVENOUS; SUBCUTANEOUS at 22:05

## 2022-05-09 RX ADMIN — HEPARIN SODIUM 7500 UNITS: 5000 INJECTION INTRAVENOUS; SUBCUTANEOUS at 05:42

## 2022-05-09 NOTE — ASSESSMENT & PLAN NOTE
Patient with worsening BUN and creatinine today  Hyponatremia  Negative fluid balance after IV diuresis  Underlying history of cirrhosis, CHF  Rule out prerenal  Check UA  Hold further diuresis  Monitor closely  Bladder scan

## 2022-05-09 NOTE — PROGRESS NOTES
Progress Note - Infectious Disease   Olivia Hammond 80 y o  female MRN: [id number]  [facility] Encounter: [id number]      Impression/Recommendations:  1   SIRS versus sepsis, POA   WBC, HR   Afebrile   Consider due to initial aspiration event +/- #2   Initial Flu/RSV/COVID PCR, blood cultures CT C/A/P at [facility] negative   Procalcitonin improved after 7 days IV antibiotics   New fever, WBC today unclear etiology  Cellulitis appears improved  Consider UTI, bacteremia, occult intraabdominal process, pneumonia  Rec:  · Continue to follow closely off antibiotics  · Follow temperatures  · Check lactic acid, UA, blood cultures CT C/A/P  · Recheck CBC in AM  · If clinically worsens or lactate elevated, start vancomycin, cefepime  · Supportive care as per the primary service     2   Bilateral leg cellulitis   In setting of #3, 6   Suspect residual erythema, blistering on exam due to volume overload at this point   Improved status post >7 days broad-spectrum antibiotics  Rec:  · Continue leg elevation  · Diuresis per SLIM     3   Bilateral heel pressure ulcers   Appear superficial without apparent superinfection   Xrays negative for osteomyelitis  Rec:  · Continue LWC and off-loading per Podiatry     4   Acute encephalopathy   Suspect toxic-metabolic and multifactorial   Consider role of recent high-dose cefepime   Recent ammonia level negative   Exam non-focal   Improved      5   Acute hypoxic respiratory failure   Likely multifactorial due to volume overload, aspiration, atelectasis   No initial clinical or radiographic evidence for active pneumonia      6   Chronic lower extremity venous stasis      7   Alcoholic cirrhosis   Per chart, PCP notes      8   Chronic CHF        9   MO   With ambulatory dysfunction      10   Seizure disorder      The above plan was discussed in detail with Dr William Luther      Antibiotics:  Off antibiotics #6    Subjective:  Patient seen on AM rounds    Doesn't feel well today but cannot specify what is bothering her  24 Hour Events:  New fever and worsening WBC, rising Cr       Objective:  Vitals:  Temp:  [99 °F (37 2 °C)-100 9 °F (38 3 °C)] 100 9 °F (38 3 °C)  HR:  [107-118] 111  Resp:  [18-20] 20  BP: (136-147)/(86-96) 137/96  SpO2:  [95 %-100 %] 98 %  Temp (24hrs), Av °F (37 8 °C), Min:99 °F (37 2 °C), Max:100 9 °F (38 3 °C)  Current: Temperature: (!) 100 9 °F (38 3 °C)    Physical Exam:   General:  No acute distress  Eyes:  Normal lids and conjunctivae  ENT:  Normal external ears and nose  Neck:  Neck symmetric with midline trachea  Pulmonary:  Normal respiratory effort without accessory muscle use  Cardiovascular:  Regular rate and rhythm; improving nonpitting leg edema, gauze wraps to calves no cellulitis of feet or knees/thighs  Gastrointestinal:  No tenderness or distention; slightly firm  Musculoskeletal:  No digital clubbing or cyanosis  Skin:  No visible rashes; No palpable nodules  Neurologic:  Sensation grossly intact to light touch  Psychiatric:  Alert and oriented; Normal mood    Lab Results:  I have personally reviewed pertinent labs  Results from last 7 days   Lab Units 2257 22  0706 22  0634 22  0512 22  0450   POTASSIUM mmol/L 4 2 3 8 3 8   < > 3 3*   CHLORIDE mmol/L 91* 96* 98*   < > 103   CO2 mmol/L 38* 42* 38*   < > 31   BUN mg/dL 33* 11 8   < > 8   CREATININE mg/dL 1 40* 0 63 0 69   < > 0 55*   EGFR ml/min/1 73sq m 35 84 81   < > 88   CALCIUM mg/dL 9 4 9 3 9 3   < > 9 4   AST U/L  --   --  24  --  22   ALT U/L  --   --  20  --  17   ALK PHOS U/L  --   --  93  --  114    < > = values in this interval not displayed  Results from last 7 days   Lab Units 22  0557 22  0706 22  0847   WBC Thousand/uL 17 51* 12 87* 14 74*   HEMOGLOBIN g/dL 9 1* 9 3* 9 3*   PLATELETS Thousands/uL 532* 275 223           Imaging Studies:   I have personally reviewed pertinent imaging study reports and images in PACS    CXR reviewed personally     EKG, Pathology, and Other Studies:   I have personally reviewed pertinent reports

## 2022-05-09 NOTE — ASSESSMENT & PLAN NOTE
Hypotension, tachycardia, tachypnea  · From CHF exacerbation, treatment as above  · No etiology for infection suspected , not on antibiotics per ID  · IV albumin ordered overnight (5/4)   · Of note this patient was on midodrine TID for the entirety of her hospitalization at [facility] until it was dc on 5/4 After the am dose after cards recs  · Now patient with fever and worsening leukocytosis  · Discussed with ID, follow on CT chest abdomen pelvis, UA and blood culture  · Observe off antibiotic

## 2022-05-09 NOTE — ASSESSMENT & PLAN NOTE
Wt Readings from Last 3 Encounters:   05/08/22 94 1 kg (207 lb 7 3 oz)   05/02/22 101 kg (222 lb 14 2 oz)   03/14/21 93 kg (205 lb)     Cardiac echo with EF 70% and moderate aortic stenosis   · Does not appear to be on any diuretics at home  · Given multiple days of IVF after normalization of creatinine for EDUARDO she may have some volume overload  · Placed on IV lasix BID, now on oral Lasix daily  · Cardiology signed off   · Increased beta-blocker to 37 5 b i d   Due to tachycardia  · Monitor volume status  · Hold Lasix today due to worsening BUN and creatinine  · Monitor

## 2022-05-09 NOTE — ASSESSMENT & PLAN NOTE
POA on admission at [facility]   · Aspiration precautions  · Modified diet, speech consulted  · Wean oxygen as able   · Off abx  · Due to fever and worsening leukocytosis,   Follow on chest CT

## 2022-05-09 NOTE — CASE MANAGEMENT
Case Management Discharge Planning Note    Patient name Dipika Sanabria  [facility] [id number]  : 1940 Date 2022       Current Admission Date: 2022  Current Admission Diagnosis:Bilateral lower extremity edema   Patient Active Problem List    Diagnosis Date Noted    Aortic stenosis 2022    Cirrhosis ([facility] [address] ) 2022    SIRS (systemic inflammatory response syndrome) ([facility] [address] ) 2022    Aspiration pneumonitis ([facility] [address] ) 2022    Morbid obesity ([facility] [address] ) 2021    Bilateral lower extremity edema 2021    [facility] 2021    Chronic diastolic (congestive) heart failure [facility] 2020    Hyperlipidemia 2020    Localization-related epilepsy, intractable ([facility] ) 02/15/2019      LOS (days): 7  Geometric Mean LOS (GMLOS) (days): 5 40  Days to GMLOS:-1 3     OBJECTIVE:  Risk of Unplanned Readmission Score: 19         Current admission status: Inpatient   Preferred Pharmacy:   [facility] [address] [place] [facility], [address]  Phone: [phone] Fax: [phone]    Primary Care Provider: Rodrigue Kemp DO    Primary Insurance: MEDICARE  Secondary Insurance: [facility] DETAILS:         Other Referral/Resources/Interventions Provided:  Referral Comments: Transport cancelled this am and Ninf[facility] updated that the patient is no longer medically cleared for discharge today  - spoke with Treva Ramirez           Additional Comments: Spoke with attending - patient with low grade temp today and has chest xray pending  Patient is no longer medically cleared for discharge today  UPDATE: SPOKE WITH DAUGHTER PO TO UPDATE HER THAT HER MOTHER IS NOT GOING TO BE DISCHARGING TODAY

## 2022-05-09 NOTE — ASSESSMENT & PLAN NOTE
Transferred from [facility] after she developed worsening edema, erythema and pain of the lower extremities despite IV abx for suspected cellulitis  Of note she arrived to [facility] with EDUARDO and had been on IVF for multiple days prior  · Hx of CHF and cirrhosis with hypoalbuminemia noted   Also with chronic venous stasis dermatitis   · Was on broad spectrum abx initially, however abx dc on 5/3 given low suspicion for infection  · CT scan of the bilateral LE were negative for abscess formation  · She was transferred to [facility]ology evaluation  · Derm feels chronic venous stasis dermatitis  · Podiatry consulted for wound care  · Encourage compression/elevation   · Cards consulted to assist with IV diuresis-- diuretics increased to BID on 5/4  · Lower extremity venous dopplers ordered to check for DVT however unable to tolerate given significant pain-  · Continue pain control  · PT/OT recommending STR once stable   · Negative fluid balance o

## 2022-05-09 NOTE — PROGRESS NOTES
[address] [facility]  Progress Note - Kenzie Atilio 1940, 80 y o  female MRN: [id number]  Unit/Bed#: [facility] 903-01 Encounter: [id number]  Primary Care Provider: Devon Sepulveda DO   Date and time admitted to hospital: 5/2/2022  4:17 PM    * Bilateral lower extremity edema  Assessment & Plan  Transferred from [facility] after she developed worsening edema, erythema and pain of the lower extremities despite IV abx for suspected cellulitis  Of note she arrived to [facility] with EDUARDO and had been on IVF for multiple days prior  · Hx of CHF and cirrhosis with hypoalbuminemia noted   Also with chronic venous stasis dermatitis   · Was on broad spectrum abx initially, however abx dc on 5/3 given low suspicion for infection  · CT scan of the bilateral LE were negative for abscess formation  · She was transferred to [facility] for dermatology evaluation  · Derm feels chronic venous stasis dermatitis  · Podiatry consulted for wound care  · Encourage compression/elevation   · Cards consulted to assist with IV diuresis-- diuretics increased to BID on 5/4  · Lower extremity venous dopplers ordered to check for DVT however unable to tolerate given significant pain-  · Continue pain control  · PT/OT recommending STR once stable   · Negative fluid balance o    SIRS (systemic inflammatory response syndrome) (HCC)  Assessment & Plan  Hypotension, tachycardia, tachypnea  · From CHF exacerbation, treatment as above  · No etiology for infection suspected , not on antibiotics per ID  · IV albumin ordered overnight (5/4)   · Of note this patient was on midodrine TID for the entirety of [facility] until it was dc on 5/4 After the am dose after cards recs  · Now patient with fever and worsening leukocytosis  · Discussed with ID, follow on CT chest abdomen pelvis, UA and blood culture  · Observe off antibiotic    Acute respiratory failure with hypoxia [facility])  Assessment & Plan  Multifactorial given volume overload, aspiration, atelectasis, morbid obesity  · No PNA on imaging, off abx per ID  · Patient with wheezing and mild short of breath  · Continue with bronchodilator  · Negative repeat chest x-ray  · Wean oxygen as able    Chronic diastolic (congestive) heart failure (HCC)  Assessment & Plan  Wt Readings from Last 3 Encounters:   05/08/22 94 1 kg (207 lb 7 3 oz)   05/02/22 101 kg (222 lb 14 2 oz)   03/14/21 93 kg (205 lb)     Cardiac echo with EF 70% and moderate aortic stenosis   · Does not appear to be on any diuretics at home  · Given multiple days of IVF after normalization of creatinine for EDUARDO she may have some volume overload  · Placed on IV lasix BID, now on oral Lasix daily  · Cardiology signed off   · Increased beta-blocker to 37 5 b i d   Due to tachycardia  · Monitor volume status  · Hold Lasix today due to worsening BUN and creatinine  · Monitor        EDUARDO (acute kidney injury) [facility])  Assessment & Plan  Patient with worsening BUN and creatinine today  Hyponatremia  Negative fluid balance after IV diuresis  Underlying history of cirrhosis, CHF  Rule out prerenal  Check UA  Hold further diuresis  Monitor closely  Bladder scan    Aortic stenosis  Assessment & Plan  Moderate aortic stenosis  · Outpatient f/u    Cirrhosis ([facility] [address] )  Assessment & Plan  Daughter reports hx of cirrhosis however does not follow with any GI specialists  · Monitor     Aspiration pneumonitis ([facility] [address] )  Assessment & Plan  POA on admission at [facility]   · Aspiration precautions  · Modified diet, speech consulted  · Wean oxygen as able   · Off abx  · Due to fever and worsening leukocytosis,   Follow on chest CT    Morbid obesity (HCC)  Assessment & Plan  Supportive care  · Turn Q2h  · Weight loss counseling as an outpatient when medically stable    Localization-related epilepsy, intractable (HCC)  Assessment & Plan  · Continue keppra and phenobarbitol      VTE Pharmacologic Prophylaxis:   High Risk (Score >/= 5) - Pharmacological DVT Prophylaxis Ordered: heparin  Sequential Compression Devices Ordered  Patient Centered Rounds: I performed bedside rounds with nursing staff today  Discussions with Specialists or Other Care Team Provider:  Infectious disease    Education and Discussions with Family / Patient: Updated  (daughter) via phone  Time Spent for Care: 45 minutes  More than 50% of total time spent on counseling and coordination of care as described above  Current Length of Stay: 7 day(s)  Current Patient Status: Inpatient   Certification Statement: The patient will continue to require additional inpatient hospital stay due to Management of fever and leukocytosis  Discharge Plan: Anticipate discharge in >72 hrs to rehab facility  Code Status: Level 1 - Full Code    Subjective:   Patient seen examined  Comfortable in bed  Febrile with worsening leukocytosis  Worsening BUN and creatinine today  Patient still with mild short of breath      Objective:     Vitals:   Temp (24hrs), Av °F (37 8 °C), Min:99 °F (37 2 °C), Max:100 9 °F (38 3 °C)    Temp:  [99 °F (37 2 °C)-100 9 °F (38 3 °C)] 100 9 °F (38 3 °C)  HR:  [107-118] 111  Resp:  [18-20] 18  BP: (136-147)/(86-96) 137/96  SpO2:  [95 %-100 %] 97 %  Body mass index is 41 9 kg/m²  Input and Output Summary (last 24 hours):      Intake/Output Summary (Last 24 hours) at 2022 1238  Last data filed at 2022 0616  Gross per 24 hour   Intake 50 ml   Output 550 ml   Net -500 ml       Physical Exam:   Physical Exam   Patient is awake comfortable in bed, in no acute distress  Chronically ill-appearing  Lung with mild bilateral expiratory wheezing  Heart positive J1-V1 with systolic murmur  Abdomen soft nontender  Chronic bilateral lower extremity venous stasis edema    Additional Data:     Labs:  Results from last 7 days   Lab Units 22  0557 22  0706 22  0847   WBC Thousand/uL 17 51*   < > 14 74*   HEMOGLOBIN g/dL 9 1*   < > 9 3* HEMATOCRIT % 29 6*   < > 30 6*   PLATELETS Thousands/uL 532*   < > 223   BANDS PCT %  --   --  8   NEUTROS PCT % 77*   < >  --    LYMPHS PCT % 9*   < >  --    LYMPHO PCT %  --   --  15   MONOS PCT % 13*   < >  --    MONO PCT %  --   --  6   EOS PCT % 0   < > 1    < > = values in this interval not displayed  Results from last 7 days   Lab Units 05/09/22  0557 05/06/22  0706 05/05/22  0634   SODIUM mmol/L 133*   < > 137   POTASSIUM mmol/L 4 2   < > 3 8   CHLORIDE mmol/L 91*   < > 98*   CO2 mmol/L 38*   < > 38*   BUN mg/dL 33*   < > 8   CREATININE mg/dL 1 40*   < > 0 69   ANION GAP mmol/L 4   < > 1*   CALCIUM mg/dL 9 4   < > 9 3   ALBUMIN g/dL  --   --  2 3*   TOTAL BILIRUBIN mg/dL  --   --  0 67   ALK PHOS U/L  --   --  93   ALT U/L  --   --  20   AST U/L  --   --  24   GLUCOSE RANDOM mg/dL 111   < > 107    < > = values in this interval not displayed  Results from last 7 days   Lab Units 05/04/22  0512 05/03/22  0450   PROCALCITONIN ng/ml 0 27* 0 83*       Lines/Drains:  Invasive Devices  Report    Peripherally Inserted Central Catheter Line            PICC Line 04/29/22 Right Brachial 10 days          Drain            External Urinary Catheter 6 days                Central Line:  Goal for removal: Will discontinue when peripheral access obtained                Imaging:  Reviewed    Recent Cultures (last 7 days):         Last 24 Hours Medication List:   Current Facility-Administered Medications   Medication Dose Route Frequency Provider Last Rate    acetaminophen  975 mg Oral Q8H [facility] Che Ruiz PA-C      albuterol  2 puff Inhalation Q4H PRN Ashley Dobbs MD      atorvastatin  80 mg Oral Daily Ashley Dobbs MD      barium  900 mL Oral Once in imaging Liliana Deleon MD      Diclofenac Sodium  2 g Topical 4x Daily PRN Ashley Dobbs MD      heparin (porcine)  7,500 Units Subcutaneous [facility] Ashley Dobbs MD      HYDROmorphone  0 2 mg Intravenous Q6H PRN Emanuel Stanton PA-C      levalbuterol  1 25 mg Nebulization TID Kathy Izaguirre DO      levETIRAcetam  500 mg Oral Q12H Massie Ganser, MD      metoprolol tartrate  37 5 mg Oral Q12H [address] Kathy Izaguirre DO      ondansetron  4 mg Intravenous Q6H PRN Darren Forrest MD      oxyCODONE  2 5 mg Oral Q4H PRN Darren Forrest MD      oxyCODONE  5 mg Oral Q4H PRN Darren Forrest MD      pantoprazole  20 mg Oral Early Morning Darren Forrest MD      PHENobarbital  32 4 mg Oral BID Darren Forrest MD      polyethylene glycol  17 g Oral Daily PRN Tuesday M AUREA Bland      senna-docusate sodium  1 tablet Oral BID Tuesday M AUREA Bland      sodium chloride (PF)  3 mL Intravenous Q1H PRN Darren Forrest MD          Today, Patient Was Seen By: Kathy Izaguirre DO    **Please Note: This note may have been constructed using a voice recognition system  **

## 2022-05-09 NOTE — ASSESSMENT & PLAN NOTE
Multifactorial given volume overload, aspiration, atelectasis, morbid obesity  · No PNA on imaging, off abx per ID  · Patient with wheezing and mild short of breath  · Continue with bronchodilator  · Negative repeat chest x-ray  · Wean oxygen as able

## 2022-05-10 ENCOUNTER — APPOINTMENT (INPATIENT)
Dept: RADIOLOGY | Facility: HOSPITAL | Age: 82
DRG: 177 | End: 2022-05-10
Payer: MEDICARE

## 2022-05-10 PROBLEM — E87.3 METABOLIC ALKALOSIS: Status: ACTIVE | Noted: 2022-05-10

## 2022-05-10 PROBLEM — E87.1 HYPONATREMIA: Status: ACTIVE | Noted: 2022-05-10

## 2022-05-10 PROBLEM — E87.20 METABOLIC ACIDOSIS: Status: ACTIVE | Noted: 2022-05-10

## 2022-05-10 PROBLEM — K56.41 FECAL IMPACTION (HCC): Status: ACTIVE | Noted: 2022-05-10

## 2022-05-10 PROBLEM — E87.2 METABOLIC ACIDOSIS: Status: ACTIVE | Noted: 2022-05-10

## 2022-05-10 LAB
ANION GAP SERPL CALCULATED.3IONS-SCNC: 3 MMOL/L (ref 4–13)
BASOPHILS # BLD AUTO: 0.05 THOUSANDS/ΜL (ref 0–0.1)
BASOPHILS NFR BLD AUTO: 0 % (ref 0–1)
BUN SERPL-MCNC: 40 MG/DL (ref 5–25)
CALCIUM SERPL-MCNC: 9.4 MG/DL (ref 8.3–10.1)
CHLORIDE SERPL-SCNC: 91 MMOL/L (ref 100–108)
CO2 SERPL-SCNC: 39 MMOL/L (ref 21–32)
CREAT SERPL-MCNC: 1.47 MG/DL (ref 0.6–1.3)
CREAT UR-MCNC: 139 MG/DL
EOSINOPHIL # BLD AUTO: 0.09 THOUSAND/ΜL (ref 0–0.61)
EOSINOPHIL NFR BLD AUTO: 1 % (ref 0–6)
ERYTHROCYTE [DISTWIDTH] IN BLOOD BY AUTOMATED COUNT: 15.5 % (ref 11.6–15.1)
FLUAV RNA RESP QL NAA+PROBE: NEGATIVE
FLUBV RNA RESP QL NAA+PROBE: NEGATIVE
GFR SERPL CREATININE-BSD FRML MDRD: 33 ML/MIN/1.73SQ M
GLUCOSE SERPL-MCNC: 130 MG/DL (ref 65–140)
HCT VFR BLD AUTO: 29.4 % (ref 34.8–46.1)
HGB BLD-MCNC: 9.1 G/DL (ref 11.5–15.4)
IMM GRANULOCYTES # BLD AUTO: 0.11 THOUSAND/UL (ref 0–0.2)
IMM GRANULOCYTES NFR BLD AUTO: 1 % (ref 0–2)
LACTATE SERPL-SCNC: 1.3 MMOL/L (ref 0.5–2)
LYMPHOCYTES # BLD AUTO: 1.35 THOUSANDS/ΜL (ref 0.6–4.47)
LYMPHOCYTES NFR BLD AUTO: 8 % (ref 14–44)
MCH RBC QN AUTO: 29.4 PG (ref 26.8–34.3)
MCHC RBC AUTO-ENTMCNC: 31 G/DL (ref 31.4–37.4)
MCV RBC AUTO: 95 FL (ref 82–98)
MONOCYTES # BLD AUTO: 2.26 THOUSAND/ΜL (ref 0.17–1.22)
MONOCYTES NFR BLD AUTO: 14 % (ref 4–12)
NEUTROPHILS # BLD AUTO: 12.67 THOUSANDS/ΜL (ref 1.85–7.62)
NEUTS SEG NFR BLD AUTO: 76 % (ref 43–75)
NRBC BLD AUTO-RTO: 0 /100 WBCS
OSMOLALITY UR: 440 MMOL/KG
PLATELET # BLD AUTO: 590 THOUSANDS/UL (ref 149–390)
PMV BLD AUTO: 9.9 FL (ref 8.9–12.7)
POTASSIUM SERPL-SCNC: 4.2 MMOL/L (ref 3.5–5.3)
PROCALCITONIN SERPL-MCNC: 0.84 NG/ML
RBC # BLD AUTO: 3.1 MILLION/UL (ref 3.81–5.12)
RSV RNA RESP QL NAA+PROBE: NEGATIVE
SARS-COV-2 RNA RESP QL NAA+PROBE: NEGATIVE
SODIUM 24H UR-SCNC: 35 MOL/L
SODIUM SERPL-SCNC: 133 MMOL/L (ref 136–145)
UUN 24H UR-MCNC: 468 MG/DL
WBC # BLD AUTO: 16.53 THOUSAND/UL (ref 4.31–10.16)

## 2022-05-10 PROCEDURE — 85025 COMPLETE CBC W/AUTO DIFF WBC: CPT | Performed by: INTERNAL MEDICINE

## 2022-05-10 PROCEDURE — 83605 ASSAY OF LACTIC ACID: CPT | Performed by: STUDENT IN AN ORGANIZED HEALTH CARE EDUCATION/TRAINING PROGRAM

## 2022-05-10 PROCEDURE — 94760 N-INVAS EAR/PLS OXIMETRY 1: CPT

## 2022-05-10 PROCEDURE — 84540 ASSAY OF URINE/UREA-N: CPT | Performed by: STUDENT IN AN ORGANIZED HEALTH CARE EDUCATION/TRAINING PROGRAM

## 2022-05-10 PROCEDURE — 0HBRXZZ EXCISION OF TOE NAIL, EXTERNAL APPROACH: ICD-10-PCS | Performed by: PODIATRIST

## 2022-05-10 PROCEDURE — 99233 SBSQ HOSP IP/OBS HIGH 50: CPT | Performed by: INTERNAL MEDICINE

## 2022-05-10 PROCEDURE — 99232 SBSQ HOSP IP/OBS MODERATE 35: CPT | Performed by: INTERNAL MEDICINE

## 2022-05-10 PROCEDURE — 71045 X-RAY EXAM CHEST 1 VIEW: CPT

## 2022-05-10 PROCEDURE — 94640 AIRWAY INHALATION TREATMENT: CPT

## 2022-05-10 PROCEDURE — 84300 ASSAY OF URINE SODIUM: CPT | Performed by: STUDENT IN AN ORGANIZED HEALTH CARE EDUCATION/TRAINING PROGRAM

## 2022-05-10 PROCEDURE — 84145 PROCALCITONIN (PCT): CPT | Performed by: INTERNAL MEDICINE

## 2022-05-10 PROCEDURE — 99223 1ST HOSP IP/OBS HIGH 75: CPT | Performed by: STUDENT IN AN ORGANIZED HEALTH CARE EDUCATION/TRAINING PROGRAM

## 2022-05-10 PROCEDURE — 80048 BASIC METABOLIC PNL TOTAL CA: CPT | Performed by: INTERNAL MEDICINE

## 2022-05-10 PROCEDURE — 83935 ASSAY OF URINE OSMOLALITY: CPT | Performed by: STUDENT IN AN ORGANIZED HEALTH CARE EDUCATION/TRAINING PROGRAM

## 2022-05-10 PROCEDURE — 0241U HB NFCT DS VIR RESP RNA 4 TRGT: CPT | Performed by: INTERNAL MEDICINE

## 2022-05-10 PROCEDURE — 82570 ASSAY OF URINE CREATININE: CPT | Performed by: STUDENT IN AN ORGANIZED HEALTH CARE EDUCATION/TRAINING PROGRAM

## 2022-05-10 RX ORDER — SODIUM CHLORIDE, SODIUM GLUCONATE, SODIUM ACETATE, POTASSIUM CHLORIDE, MAGNESIUM CHLORIDE, SODIUM PHOSPHATE, DIBASIC, AND POTASSIUM PHOSPHATE .53; .5; .37; .037; .03; .012; .00082 G/100ML; G/100ML; G/100ML; G/100ML; G/100ML; G/100ML; G/100ML
75 INJECTION, SOLUTION INTRAVENOUS CONTINUOUS
Status: DISPENSED | OUTPATIENT
Start: 2022-05-10 | End: 2022-05-11

## 2022-05-10 RX ORDER — SODIUM CHLORIDE, SODIUM GLUCONATE, SODIUM ACETATE, POTASSIUM CHLORIDE, MAGNESIUM CHLORIDE, SODIUM PHOSPHATE, DIBASIC, AND POTASSIUM PHOSPHATE .53; .5; .37; .037; .03; .012; .00082 G/100ML; G/100ML; G/100ML; G/100ML; G/100ML; G/100ML; G/100ML
75 INJECTION, SOLUTION INTRAVENOUS CONTINUOUS
Status: DISCONTINUED | OUTPATIENT
Start: 2022-05-10 | End: 2022-05-10

## 2022-05-10 RX ORDER — MINERAL OIL 100 G/100G
1 OIL RECTAL ONCE
Status: COMPLETED | OUTPATIENT
Start: 2022-05-10 | End: 2022-05-10

## 2022-05-10 RX ORDER — SODIUM CHLORIDE FOR INHALATION 0.9 %
3 VIAL, NEBULIZER (ML) INHALATION
Status: DISCONTINUED | OUTPATIENT
Start: 2022-05-10 | End: 2022-05-10

## 2022-05-10 RX ADMIN — HEPARIN SODIUM 7500 UNITS: 5000 INJECTION INTRAVENOUS; SUBCUTANEOUS at 13:01

## 2022-05-10 RX ADMIN — HEPARIN SODIUM 7500 UNITS: 5000 INJECTION INTRAVENOUS; SUBCUTANEOUS at 21:12

## 2022-05-10 RX ADMIN — LEVALBUTEROL HYDROCHLORIDE 1.25 MG: 1.25 SOLUTION RESPIRATORY (INHALATION) at 09:20

## 2022-05-10 RX ADMIN — PHENOBARBITAL 32.4 MG: 64.8 TABLET ORAL at 17:01

## 2022-05-10 RX ADMIN — METOPROLOL TARTRATE 37.5 MG: 25 TABLET, FILM COATED ORAL at 09:05

## 2022-05-10 RX ADMIN — ACETAMINOPHEN 975 MG: 325 TABLET, FILM COATED ORAL at 21:12

## 2022-05-10 RX ADMIN — HEPARIN SODIUM 7500 UNITS: 5000 INJECTION INTRAVENOUS; SUBCUTANEOUS at 04:58

## 2022-05-10 RX ADMIN — PHENOBARBITAL 32.4 MG: 64.8 TABLET ORAL at 09:23

## 2022-05-10 RX ADMIN — OXYCODONE HYDROCHLORIDE 5 MG: 5 TABLET ORAL at 05:01

## 2022-05-10 RX ADMIN — SENNOSIDES AND DOCUSATE SODIUM 1 TABLET: 50; 8.6 TABLET ORAL at 09:06

## 2022-05-10 RX ADMIN — LEVETIRACETAM 500 MG: 500 TABLET, FILM COATED ORAL at 09:05

## 2022-05-10 RX ADMIN — SODIUM CHLORIDE, SODIUM GLUCONATE, SODIUM ACETATE, POTASSIUM CHLORIDE, MAGNESIUM CHLORIDE, SODIUM PHOSPHATE, DIBASIC, AND POTASSIUM PHOSPHATE 75 ML/HR: .53; .5; .37; .037; .03; .012; .00082 INJECTION, SOLUTION INTRAVENOUS at 13:01

## 2022-05-10 RX ADMIN — ACETAMINOPHEN 975 MG: 325 TABLET, FILM COATED ORAL at 05:01

## 2022-05-10 RX ADMIN — LEVETIRACETAM 500 MG: 500 TABLET, FILM COATED ORAL at 21:12

## 2022-05-10 RX ADMIN — METOPROLOL TARTRATE 37.5 MG: 25 TABLET, FILM COATED ORAL at 21:12

## 2022-05-10 RX ADMIN — POLYETHYLENE GLYCOL 3350 17 G: 17 POWDER, FOR SOLUTION ORAL at 09:06

## 2022-05-10 RX ADMIN — MINERAL OIL 1 ENEMA: 118 ENEMA RECTAL at 16:01

## 2022-05-10 RX ADMIN — ATORVASTATIN CALCIUM 80 MG: 80 TABLET, FILM COATED ORAL at 09:06

## 2022-05-10 RX ADMIN — SODIUM CHLORIDE 500 ML: 0.9 INJECTION, SOLUTION INTRAVENOUS at 04:54

## 2022-05-10 RX ADMIN — LEVALBUTEROL HYDROCHLORIDE 1.25 MG: 1.25 SOLUTION RESPIRATORY (INHALATION) at 13:39

## 2022-05-10 RX ADMIN — SENNOSIDES AND DOCUSATE SODIUM 1 TABLET: 50; 8.6 TABLET ORAL at 17:00

## 2022-05-10 RX ADMIN — OXYCODONE HYDROCHLORIDE 5 MG: 5 TABLET ORAL at 21:12

## 2022-05-10 RX ADMIN — PANTOPRAZOLE SODIUM 20 MG: 20 TABLET, DELAYED RELEASE ORAL at 05:02

## 2022-05-10 RX ADMIN — LEVALBUTEROL HYDROCHLORIDE 1.25 MG: 1.25 SOLUTION RESPIRATORY (INHALATION) at 19:57

## 2022-05-10 RX ADMIN — BISACODYL 10 MG: 10 SUPPOSITORY RECTAL at 09:06

## 2022-05-10 NOTE — CASE MANAGEMENT
Case Management Discharge Planning Note    Patient name Zach Rodgers  Location [facility] 903/[facility] 903-01 MRN [id number]  : 1940 Date 5/10/2022       Current Admission Date: 2022  Current Admission Diagnosis:Bilateral lower extremity edema   Patient Active Problem List    Diagnosis Date Noted    [facility]O [facility]acute kidney [facility]) ([email or web address] 2022    Aortic stenosis 2022    Cirrhosis ([email or web address] ) 2022    SIRS (systemic inflammatory response syndrome) ([email or web address] ) 2022    Aspiration pneumonitis ([email or web address] ) 2022    Morbid obesity ([email or web address] 2021    Bilateral lower extremity edema 2021    Acute respiratory failure with hypoxia ([email or web address] 2021    Chronic diastolic (congestive) heart failure ([email or web address] 2020    Hyperlipidemia 2020    Localization-related epilepsy, intractable ([place] [address] ) 02/15/2019      LOS (days): 8  Geometric Mean LOS (GMLOS) (days): 5 40  Days to GMLOS:-2 4     OBJECTIVE:  Risk of Unplanned Readmission Score: 19         Current admission status: Inpatient   Preferred Pharmacy:   [facility] [address] [facility], [address] [place] [address] [address] [facility]  Phone: [phone] Fax: [phone]    Primary Care Provider: Mirian Olson DO    Primary Insurance: MEDICARE  [facility], [phone]    Questioning if DC today  TCF ml Aldana, inquiring if DC today    Spoke with Dr Betzy Ortega, possible DC tomorrow, will need COVID test    Nabeel notified

## 2022-05-10 NOTE — PROGRESS NOTES
Patient disimpacted for moderate amount of soft brown stool  Now able to pass flatus  Will likely need to be repeated

## 2022-05-10 NOTE — ASSESSMENT & PLAN NOTE
- Initial Hyponatremia with Na 133 likely secondary to volume depletion now improved with IVF   Most recent Na 136  - Follow up BMP in the morning

## 2022-05-10 NOTE — ASSESSMENT & PLAN NOTE
- CTAP concerning for fecal impaction   Last known BM was 4/25   - Continue aggressive bowel regimen  - Bowel regimen per primary team

## 2022-05-10 NOTE — PROGRESS NOTES
Progress Note - Infectious Disease   Olivia Lewis 80 y o  female MRN: [id number]  [facility] Encounter: [id number]      Impression/Recommendations:  1   SIRS versus sepsis, POA   WBC, HR   Afebrile   Consider due to initial aspiration event +/- #2   Initial Flu/RSV/COVID PCR, blood cultures CT C/A/P at [facility] negative   Procalcitonin improved after 7 days IV antibiotics   New fever, WBC today likely due to fecal impaction, LB ileus, recent vomiting  Cellulitis appears improved  UA, lactic acid, blood cultures, CT chest negative  Rec:  · Continue to follow closely off antibiotics  · Follow temperatures  · Recheck CBC in AM  · Management of fecal impaction as below  · Supportive care as per the primary service     2   Fecal impaction  With large bowel ileus versus obstruction  No BM documented since 4/25  Rec:  · Bowel regimen  · May need disimpaction - discussed with bedside RN    3  Bilateral leg cellulitis   In setting of #3, 6   Suspect residual erythema, blistering on exam due to volume overload at this point   Improved status post >7 days broad-spectrum antibiotics  Rec:  · Continue leg elevation  · Diuresis per SLIM     4   Bilateral heel pressure ulcers   Appear superficial without apparent superinfection   Xrays negative for osteomyelitis    Rec:  · Continue LWC and off-loading per Podiatry     5   Acute encephalopathy   Suspect toxic-metabolic and multifactorial   Consider role of recent high-dose cefepime   Recent ammonia level negative   Exam non-focal   Improved      6   Acute hypoxic respiratory failure   Likely multifactorial due to volume overload, aspiration, atelectasis   No initial clinical or radiographic evidence for active pneumonia      7   Chronic lower extremity venous stasis      8   Alcoholic cirrhosis   Per chart, PCP notes      9   Chronic CHF        10   MO   With ambulatory dysfunction      11   Seizure disorder      Antibiotics:  Off antibiotics #7    Subjective:  Patient seen on AM rounds  Offers limited ROS  No complaints  24 Hour Events:  No documented fevers, chills, sweats, nausea, vomiting, or diarrhea  CT showed fecal impaction  Got enema and suppository but minimal stool output  Objective:  Vitals:  Temp:  [98 8 °F (37 1 °C)-99 9 °F (37 7 °C)] 99 4 °F (37 4 °C)  HR:  [110-120] 110  Resp:  [18-22] 22  BP: (117-139)/(76-93) 117/76  SpO2:  [96 %-98 %] 97 %  Temp (24hrs), Av 5 °F (37 5 °C), Min:98 8 °F (37 1 °C), Max:99 9 °F (37 7 °C)  Current: Temperature: 99 4 °F (37 4 °C)    Physical Exam:   General:  No acute distress  Psychiatric:  Awake and alert  Pulmonary:  Normal respiratory excursion without accessory muscle use  Abdomen:  Firm, nontender  Extremities:  ACE wraps intact bilaterally, no cellulitis  Skin:  No rashes    Lab Results:  I have personally reviewed pertinent labs  Results from last 7 days   Lab Units 05/10/22  0432 05/09/22  0557 22  0706 22  0634 22  0634   POTASSIUM mmol/L 4 2 4 2 3 8   < > 3 8   CHLORIDE mmol/L 91* 91* 96*   < > 98*   CO2 mmol/L 39* 38* 42*   < > 38*   BUN mg/dL 40* 33* 11   < > 8   CREATININE mg/dL 1 47* 1 40* 0 63   < > 0 69   EGFR ml/min/1 73sq m 33 35 84   < > 81   CALCIUM mg/dL 9 4 9 4 9 3   < > 9 3   AST U/L  --   --   --   --  24   ALT U/L  --   --   --   --  20   ALK PHOS U/L  --   --   --   --  93    < > = values in this interval not displayed  Results from last 7 days   Lab Units 05/10/22  0432 05/09/22  0557 22  0706   WBC Thousand/uL 16 53* 17 51* 12 87*   HEMOGLOBIN g/dL 9 1* 9 1* 9 3*   PLATELETS Thousands/uL 590* 532* 275     Results from last 7 days   Lab Units 22  1254 22  1204   BLOOD CULTURE  Received in Microbiology Lab  Culture in Progress  Received in Microbiology Lab  Culture in Progress  Imaging Studies:   I have personally reviewed pertinent imaging study reports and images in PACS      EKG, Pathology, and Other Studies:   I have personally reviewed pertinent reports

## 2022-05-10 NOTE — ASSESSMENT & PLAN NOTE
Suspected Metabolic acidosis likely contraction alkalosis from dehydration  Can also be mixed picture as patient is obese     - Will order VBG  - Start gentle hydration with plasmalyte

## 2022-05-10 NOTE — PROGRESS NOTES
[facility] [facility]  Progress Note - [place] Child 1940, 80 y o  female MRN: [id number]  Unit/Bed#: [facility] 903-01 Encounter: [id number]  Primary Care Provider: Emery Andrade DO   Date and time admitted to hospital: 5/2/2022  4:17 PM      * Bilateral lower extremity edema  Assessment & Plan  Transferred from [facility] after she developed worsening edema, erythema and pain of the lower extremities despite IV abx for suspected cellulitis  Of note she arrived to [facility] with EDUARDO and had been on IVF for multiple days prior  · Hx of CHF and cirrhosis with hypoalbuminemia noted  Also with chronic venous stasis dermatitis   · Was on broad spectrum abx initially, however abx dc on 5/3 given low suspicion for infection  · CT scan of the bilateral LE were negative for abscess formation  · She was transferred to [facility] for dermatology evaluation - dermatology attributes presentation to chronic venous stasis dermatitis  ? Appreciate podiatry input regarding wound care  ?  Encourage compression/elevation   · Appreciate cardiology input regarding diuretic regimen  · Lower extremity venous dopplers ordered to check for DVT however unable to tolerate given significant pain  · Optimize pain control  · PT/OT recommending STR once medically stabilized     SIRS (systemic inflammatory response syndrome)  Assessment & Plan  Evolving with hypotension coupled with intermittent fevers, tachycardia, and leukocytosis - possibly secondary to primary issue vs an aspiration pneumonitis event earlier in hospital course  · Continue to observe off antibiotics per Infectious Disease  · IV albumin ordered overnight (5/4)   · Of note this patient was on midodrine TID for the entirety of her hospitalization [facility] until it was dc on 5/4 After the am dose after cards recs  · CT of chest/abdomen/pelvis noting fecal impaction and bibasilar airspace opacities on 5/9 - follow-up CXR today unremarkable for acute cardiopulmonary disease  · Optimize bowel regimen     Acute respiratory failure with hypoxia   Assessment & Plan  Multifactorial given volume overload, aspiration, atelectasis, morbid obesity  · No PNA on imaging, off abx per ID  · Patient with wheezing and mild short of breath  · Continue with bronchodilator  · CXR today negative for acute cardiopulmonary disease  · Wean oxygen as able - currently on 2 L via nasal cannula     Chronic diastolic CHF  Assessment & Plan  Cardiac echo with EF 70% and moderate aortic stenosis   · Does not appear to be on any diuretics at home  · Given multiple days of IVF after normalization of creatinine for EDUARDO she may have some volume overload  · Due to worsening kidney injury, diuretics (Lasix) discontinued for the time being  · Continue beta-blockade w/ Lopressor     EDUARDO (acute kidney injury)   Assessment & Plan  Creatinine trend:  0 63 -> 1 4 -> 1 47 today  Diuretic regimen discontinued currently - decreased oral intake also likely contributory  Underlying history of cirrhosis and CHF  Monitor renal function and output - limit/avoid nephrotoxins if possible - avoid hypotension as possible  Appreciate nephrology input     Aortic stenosis  Assessment & Plan  Moderate aortic stenosis  · Outpatient follow-up     Cirrhosis   Assessment & Plan  Daughter reports hx of cirrhosis however does not follow with any GI specialists  · Holding diuresis (Lasix)     Aspiration pneumonitis   Assessment & Plan  POA on admission at [facility] prior to transfer to the [facility]   · Aspiration precautions  · Appreciate speech therapy input -> continue mechanical soft diet w/ thin liquids  · Currently off antibiotics  · Maintain oxygenation and wean down as tolerated  · Repeat/updated CXR negative for acute cardiopulmonary disease     Morbid obesity   Assessment & Plan  Supportive care - lifestyle/diet modifications  · BMI 42 39     Epilepsy  Assessment & Plan  · Continue Keppra/phenobarbital    Hyponatremia  Assessment & Plan  · In the setting of CHF  · Currently stable @ 133    Anemia of chronic disease  Assessment & Plan  · H/H stable    Thrombocytosis  Assessment & Plan  · Likely reactive due to acute medical issue(s)  · Monitor platelet count      DVT Prophylaxis:  Heparin SC      Patient Centered Rounds:  I have performed bedside rounds and discussed plan of care with nursing today  Discussions with Specialists or Other Care Team Provider:  see above assessments if applicable    Education and Discussions with Family / Patient:  Daughter aware of pending skilled rehab facility acceptance/discharge in the next 24-48 hours  Time Spent for Care:  32 minutes  More than 50% of total time spent on counseling and coordination of care as described above  Current Length of Stay: 8 day(s)    Current Patient Status: Inpatient   Certification Statement:  Patient will continue to require additional hospital stay due to assessments as noted above  Code Status: Level 1 - Full Code        Subjective:     Seen/examined earlier in the day  Remains weak/fatigued  Due to altered mentation, patient not a reliable historian  Objective:     Vitals:   Temp (24hrs), Av 3 °F (37 4 °C), Min:98 5 °F (36 9 °C), Max:99 9 °F (37 7 °C)    Temp:  [98 5 °F (36 9 °C)-99 9 °F (37 7 °C)] 98 5 °F (36 9 °C)  HR:  [109-120] 109  Resp:  [18-22] 18  BP: (117-139)/(76-93) 133/83  SpO2:  [95 %-98 %] 95 %  Body mass index is 42 39 kg/m²  Input and Output Summary (last 24 hours):        Intake/Output Summary (Last 24 hours) at 5/10/2022 1525  Last data filed at 5/10/2022 1416  Gross per 24 hour   Intake 93 75 ml   Output 80 ml   Net 13 75 ml       Physical Exam:     GENERAL:  Obese - weak/fatigued  HEAD:  Normocephalic - atraumatic  EYES: PERRL - EOMI   MOUTH:  Mucosa moist  NECK:  Supple - full range of motion  CARDIAC:  Tachycardic - S1/S2 positive  PULMONARY: Diminished bilateral breath sounds with decreased respiratory effort due to body habitus   ABDOMEN:  Soft - nontender/nondistended - active bowel sounds  MUSCULOSKELETAL:  Motor strength/range of motion quite deconditioned  NEUROLOGIC:  Lethargic  SKIN:  Chronic wrinkles/blemishes - bilateral distal LE edema w/ venous stasis changes dressed/wrapped   PSYCHIATRIC:  Mood/affect flat      Additional Data:     Labs & Recent Cultures:    Results from last 7 days   Lab Units 05/10/22  0432 05/06/22  0706 05/05/22  0847   WBC Thousand/uL 16 53*   < > 14 74*   HEMOGLOBIN g/dL 9 1*   < > 9 3*   HEMATOCRIT % 29 4*   < > 30 6*   PLATELETS Thousands/uL 590*   < > 223   BANDS PCT %  --   --  8   NEUTROS PCT % 76*   < >  --    LYMPHS PCT % 8*   < >  --    LYMPHO PCT %  --   --  15   MONOS PCT % 14*   < >  --    MONO PCT %  --   --  6   EOS PCT % 1   < > 1    < > = values in this interval not displayed  Results from last 7 days   Lab Units 05/10/22  0432 05/06/22  0706 05/05/22  0634   POTASSIUM mmol/L 4 2   < > 3 8   CHLORIDE mmol/L 91*   < > 98*   CO2 mmol/L 39*   < > 38*   BUN mg/dL 40*   < > 8   CREATININE mg/dL 1 47*   < > 0 69   CALCIUM mg/dL 9 4   < > 9 3   ALK PHOS U/L  --   --  93   ALT U/L  --   --  20   AST U/L  --   --  24    < > = values in this interval not displayed  Results from last 7 days   Lab Units 05/10/22  1026 05/10/22  0545 05/09/22  1255 05/09/22  1203 05/04/22  0512   LACTIC ACID mmol/L  --  1 3 1 6  --   --    PROCALCITONIN ng/ml 0 84*  --   --  1 02* 0 27*         Results from last 7 days   Lab Units 05/09/22  1254 05/09/22  1204   BLOOD CULTURE  Received in Microbiology Lab  Culture in Progress  Received in Microbiology Lab  Culture in Progress           Last 24 Hours Medication List:   Current Facility-Administered Medications   Medication Dose Route Frequency Provider Last Rate    acetaminophen  975 mg Oral Q8H [address] hCe Ruiz PA-C      albuterol  2 puff Inhalation Q4H PRN MD Redd Turner atorvastatin  80 mg Oral Daily Hazel Lanes, MD      bisacodyl  10 mg Rectal Daily Ulus Kang, DO      Diclofenac Sodium  2 g Topical 4x Daily PRN Hazel Lanes, MD      heparin (porcine)  7,500 Units Subcutaneous [facility] Hazel Lanes, MD      HYDROmorphone  0 2 mg Intravenous Q6H PRN Rose Metcalf PA-C      levalbuterol  1 25 mg Nebulization TID Ulus Kang, DO      levETIRAcetam  500 mg Oral Q12H Rick Friedman MD      metoprolol tartrate  37 5 mg Oral Q12H [address] Ulus Kang, DO      mineral oil  1 enema Rectal Once Berto Castellon MD      multi-electrolyte  75 mL/hr Intravenous Continuous Erick Ba MD 75 mL/hr (05/10/22 1301)    ondansetron  4 mg Intravenous Q6H PRN Hazel Lanes, MD      oxyCODONE  2 5 mg Oral Q4H PRN Hazel Lanes, MD      oxyCODONE  5 mg Oral Q4H PRN Hazel Lanes, MD      pantoprazole  20 mg Oral Early Morning Hazel Lanes, MD      PHENobarbital  32 4 mg Oral BID Hazel Lanes, MD      polyethylene glycol  17 g Oral Daily Ulus Kang,       senna-docusate sodium  1 tablet Oral BID Tuesday M AUREA Bland      sodium chloride (PF)  3 mL Intravenous Q1H PRN Hazel Lanes, MD                      ** Please Note: This note is constructed using a voice recognition dictation system  An occasional wrong word/phrase or sound-a-like substitution may have been picked up by dictation device due to the inherent limitations of voice recognition software  Read the chart carefully and recognize, using reasonable context, where substitutions may have occurred  **

## 2022-05-10 NOTE — ASSESSMENT & PLAN NOTE
-Baseline creatinine 0 9  She initially presented to [facility] with and EDUARDO creatinine 2 1 which improved to 0 63 with IV fluids   -Patient became volume overload after aggressive IV fluid resuscitation and was initially started on Lasix 40 mg b i d  And then transitioned to 40 mg daily per cardiology recs    - Patient now with worsening renal function with most recent creatinine 1 47  - UA significant for 1+ protein, small blood,leukocytes, occasional calcium oxalate crystal, +budding yeast, CTAP negative for hydronephrosis but concerning for fecal impaction  -FENa 0 1%, Acute kidney injury secondary to volume depletion from aggressive diuresis and poor po intake    Plan  - Will discontinue IVF   - Continue to hold diuretics  - Will order urine creatinine,urine urea and urine sodium to calculate FENA  - Strict I&O/daily weight  - Avoid nephrotoxic agents

## 2022-05-10 NOTE — PROGRESS NOTES
Podiatry - Progress Note  Patient: Merari Hartman 80 y o  female   MRN: [id number]  PCP: Jason Orantes DO  Unit/Bed#: [facility] 903-01 Encounter: [id number]  Date Of Visit: 05/10/22    ASSESSMENT:    Merari Hartman is a 80 y o  female with:    1  Right heel wound-POA - resolved  2  Bilateral LE edema with skin blistering-POA  3  Bilateral LE venous stasis dermatitis      PLAN:    · Right heel wound is resolved  B/L leg edema is improving with compression and elevation of legs in bed  · Continue local wound care: adaptic, maxorb, DSD to left leg with Allevyn heel pad to left heel and ACE compression to LLE  Appreciate nursing assistance with dressing changes  · Mycotic nails x10 debrided of thickness and length with large nail nippers without incident  · Elevation and offloading on green foam wedges or pillows when non-ambulatory  · Rest of care per primary team      Weightbearing status: WBAT    SUBJECTIVE:     The patient was seen, evaluated, and assessed at bedside today  The patient was awake  She expressed pain during dressing changes and with lifting of the legs  No acute events overnight    Patient denies N/V/F/chills/SOB/CP  OBJECTIVE:     Vitals:   /76   Pulse (!) 110   Temp 99 4 °F (37 4 °C)   Resp 22   Ht 4' 11" (1 499 m)   Wt 95 2 kg (209 lb 14 4 oz)   SpO2 96%   BMI 42 39 kg/m²     Temp (24hrs), Av 5 °F (37 5 °C), Min:98 8 °F (37 1 °C), Max:99 9 °F (37 7 °C)      Physical Exam:     General: Alert, cooperative and no distress  Lungs: Non labored breathing  Abdomen: Soft, non-tender  Lower extremity exam:  Cardiovascular status at baseline  Neurological status at baseline  Musculoskeletal status at baseline  Patient expresses pain with palpation and manipulation of B/L LE at baseline  B/L leg edema and venous stasis changes  Superficial blistering noted to left leg medially and laterally   There is no erythema, no increased temperature, no fluctuance, no crepitus, no purulence from the B/L LE  Toenails x10 are elongated, thickened, discolored and dystrophic and pressing on the neighboring toes  Clinical Images 05/10/22:    Left heel      Left leg lateral      Left leg medial    Additional Data:     Labs:    Results from last 7 days   Lab Units 05/10/22  0432   WBC Thousand/uL 16 53*   HEMOGLOBIN g/dL 9 1*   HEMATOCRIT % 29 4*   PLATELETS Thousands/uL 590*   NEUTROS PCT % 76*   LYMPHS PCT % 8*   MONOS PCT % 14*   EOS PCT % 1     Results from last 7 days   Lab Units 05/10/22  0432 05/06/22  0706 05/05/22  0634   POTASSIUM mmol/L 4 2   < > 3 8   CHLORIDE mmol/L 91*   < > 98*   CO2 mmol/L 39*   < > 38*   BUN mg/dL 40*   < > 8   CREATININE mg/dL 1 47*   < > 0 69   CALCIUM mg/dL 9 4   < > 9 3   ALK PHOS U/L  --   --  93   ALT U/L  --   --  20   AST U/L  --   --  24    < > = values in this interval not displayed  * I Have Reviewed All Lab Data Listed Above  Recent Cultures (last 7 days):     Results from last 7 days   Lab Units 05/09/22  1254 05/09/22  1204   BLOOD CULTURE  Received in Microbiology Lab  Culture in Progress  Received in Microbiology Lab  Culture in Progress  Imaging: I have personally reviewed pertinent films in PACS  EKG, Pathology, and Other Studies: I have personally reviewed pertinent reports  ** Please Note: Portions of the record may have been created with voice recognition software  Occasional wrong word or "sound a like" substitutions may have occurred due to the inherent limitations of voice recognition software  Read the chart carefully and recognize, using context, where substitutions have occurred   **

## 2022-05-10 NOTE — WOUND OSTOMY CARE
Progress Note - Wound   Olivia Lund 80 y o  female MRN: [id number]  Unit/Bed#: [facility] 903-01 Encounter: [id number]        Assessment:   Patient seen today for wound care follow up  Patient is max assist with turning from side to side  Patient incontinent of bowel and bladder, purewick in place  Patient needs assistance with meals  1  POA DTI to left buttock is resolved at this time, hydraguard applied for protection  2  Right buttock area- new partial thickness area noted, mostly like from edema and friction from rubbing other buttock  Continuing hydraguard  No induration, fluctuance, odor, warmth/temperature differences, redness, or purulence noted to the above noted wounds and skin areas assessed  New dressings applied per orders listed below  Patient tolerated well- no s/s of non-verbal pain or discomfort observed during the encounter  Bedside nurse aware of plan of care  See flow sheets for more detailed assessment findings  Wound care will continue to follow until healed or discharged  Skin care plans:   1-Hydraguard to bilateral sacrum, buttock, and posterior thighs BID and PRN with latoya-care   2-Elevate heels to offload pressure  3-Ehob cushion in chair when out of bed  4-Moisturize skin daily with skin nourishing cream    5-Turn/reposition q2h for pressure re-distribution on skin  6-Bilateral legs/heels per podiatry order     Wound 04/27/22 Pressure Injury Buttocks Left (Active)   Wound Image   05/10/22 0907   Wound Description Epithelialization 05/10/22 0907   Pressure Injury Stage DTPI 05/04/22 0923   Latoya-wound Assessment Clean;Dry; Intact 05/10/22 0907   Wound Length (cm) 0 cm 05/10/22 0907   Wound Width (cm) 0 cm 05/10/22 0907   Wound Depth (cm) 0 cm 05/10/22 0907   Wound Surface Area (cm^2) 0 cm^2 05/10/22 0907   Wound Volume (cm^3) 0 cm^3 05/10/22 0907   Calculated Wound Volume (cm^3) 0 cm^3 05/10/22 0907   Tunneling 0 cm 05/10/22 0907   Tunneling in depth located at 0 05/10/22 0907   Undermining 0 05/10/22 0907   Undermining is depth extending from 0 05/10/22 0907   Wound Site Closure TARAN 05/10/22 0907   Drainage Amount None 05/10/22 0907   Non-staged Wound Description Not applicable 66/52/32 5048   Treatments Cleansed 05/10/22 0907   Dressing Moisture barrier 05/10/22 0907   Wound packed? No 05/10/22 0907   Packing- # removed 0 05/10/22 0907   Packing- # inserted 0 05/10/22 0907   Dressing Changed New 05/10/22 0907   Patient Tolerance Tolerated well 05/10/22 0907   Dressing Status Clean;Dry; Intact 05/10/22 0907       Wound 05/10/22 Buttocks Right (Active)   Wound Image   05/10/22 0913   Wound Description Epithelialization;Pink;Fragile 05/10/22 0913   Laura-wound Assessment Clean;Dry; Intact 05/10/22 0913   Wound Length (cm) 5 cm 05/10/22 0913   Wound Width (cm) 0 5 cm 05/10/22 0913   Wound Depth (cm) 0 1 cm 05/10/22 0913   Wound Surface Area (cm^2) 2 5 cm^2 05/10/22 0913   Wound Volume (cm^3) 0 25 cm^3 05/10/22 0913   Calculated Wound Volume (cm^3) 0 25 cm^3 05/10/22 0913   Tunneling 0 cm 05/10/22 0913   Tunneling in depth located at 0 05/10/22 0913   Undermining 0 05/10/22 0913   Undermining is depth extending from 0 05/10/22 0913   Wound Site Closure TARAN 05/10/22 0913   Drainage Amount None 05/10/22 0913   Non-staged Wound Description Partial thickness 05/10/22 0913   Treatments Cleansed 05/10/22 0913   Dressing Moisture barrier 05/10/22 0913   Wound packed? No 05/10/22 0913   Packing- # removed 0 05/10/22 0913   Packing- # inserted 0 05/10/22 0913   Dressing Changed New 05/10/22 0913   Patient Tolerance Tolerated well 05/10/22 0913   Dressing Status Dry;Clean; Intact 05/10/22 0913       Bailey ROTHN, RN, [facility]

## 2022-05-10 NOTE — PLAN OF CARE
Problem: Prexisting or High Potential for Compromised Skin Integrity  Goal: Skin integrity is maintained or improved  Description: INTERVENTIONS:  - Identify patients at risk for skin breakdown  - Assess and monitor skin integrity  - Assess and monitor nutrition and hydration status  - Monitor labs   - Assess for incontinence   - Turn and reposition patient  - Assist with mobility/ambulation  - Relieve pressure over bony prominences  - Avoid friction and shearing  - Provide appropriate hygiene as needed including keeping skin clean and dry  - Evaluate need for skin moisturizer/barrier cream  - Collaborate with interdisciplinary team   - Patient/family teaching  - Consider wound care consult   Outcome: Progressing     Problem: MOBILITY - ADULT  Goal: Maintain or return to baseline ADL function  Description: INTERVENTIONS:  -  Assess patient's ability to carry out ADLs; assess patient's baseline for ADL function and identify physical deficits which impact ability to perform ADLs (bathing, care of mouth/teeth, toileting, grooming, dressing, etc )  - Assess/evaluate cause of self-care deficits   - Assess range of motion  - Assess patient's mobility; develop plan if impaired  - Assess patient's need for assistive devices and provide as appropriate  - Encourage maximum independence but intervene and supervise when necessary  - Involve family in performance of ADLs  - Assess for home care needs following discharge   - Consider OT consult to assist with ADL evaluation and planning for discharge  - Provide patient education as appropriate  Outcome: Progressing  Goal: Maintains/Returns to pre admission functional level  Description: INTERVENTIONS:  - Perform BMAT or MOVE assessment daily    - Set and communicate daily mobility goal to care team and patient/family/caregiver  - Collaborate with rehabilitation services on mobility goals if consulted  - Perform Range of Motion 3 times a day    - Reposition patient every 3 hours   - Dangle patient 3 times a day  - Stand patient 3 times a day  - Ambulate patient 3 times a day  - Out of bed to chair 3 times a day   - Out of bed for meals 3 times a day  - Out of bed for toileting  - Record patient progress and toleration of activity level   Outcome: Progressing     Problem: Potential for Falls  Goal: Patient will remain free of falls  Description: INTERVENTIONS:  - Educate patient/family on patient safety including physical limitations  - Instruct patient to call for assistance with activity   - Consult OT/PT to assist with strengthening/mobility   - Keep Call bell within reach  - Keep bed low and locked with side rails adjusted as appropriate  - Keep care items and personal belongings within reach  - Initiate and maintain comfort rounds  - Make Fall Risk Sign visible to staff  - Offer Toileting every 2 Hours, in advance of need  - Initiate/Maintain bed alarm  - Obtain necessary fall risk management equipment:  - Apply yellow socks and bracelet for high fall risk patients  - Consider moving patient to room near nurses station  Outcome: Progressing     Problem: Nutrition/Hydration-ADULT  Goal: Nutrient/Hydration intake appropriate for improving, restoring or maintaining nutritional needs  Description: Monitor and assess patient's nutrition/hydration status for malnutrition  Collaborate with interdisciplinary team and initiate plan and interventions as ordered  Monitor patient's weight and dietary intake as ordered or per policy  Utilize nutrition screening tool and intervene as necessary  Determine patient's food preferences and provide high-protein, high-caloric foods as appropriate       INTERVENTIONS:  - Monitor oral intake, urinary output, labs, and treatment plans  - Assess nutrition and hydration status and recommend course of action  - Evaluate amount of meals eaten  - Assist patient with eating if necessary   - Allow adequate time for meals  - Recommend/ encourage appropriate diets, oral nutritional supplements, and vitamin/mineral supplements  - Order, calculate, and assess calorie counts as needed  - Recommend, monitor, and adjust tube feedings and TPN/PPN based on assessed needs  - Assess need for intravenous fluids  - Provide specific nutrition/hydration education as appropriate  - Include patient/family/caregiver in decisions related to nutrition  Outcome: Progressing

## 2022-05-11 ENCOUNTER — APPOINTMENT (INPATIENT)
Dept: RADIOLOGY | Facility: HOSPITAL | Age: 82
DRG: 177 | End: 2022-05-11
Attending: INTERNAL MEDICINE
Payer: MEDICARE

## 2022-05-11 VITALS
HEART RATE: 104 BPM | TEMPERATURE: 97.8 F | WEIGHT: 209.44 LBS | HEIGHT: 59 IN | BODY MASS INDEX: 42.22 KG/M2 | SYSTOLIC BLOOD PRESSURE: 124 MMHG | OXYGEN SATURATION: 98 % | DIASTOLIC BLOOD PRESSURE: 75 MMHG | RESPIRATION RATE: 16 BRPM

## 2022-05-11 LAB
ANION GAP SERPL CALCULATED.3IONS-SCNC: 1 MMOL/L (ref 4–13)
BASE EX.OXY STD BLDV CALC-SCNC: 92.6 % (ref 60–80)
BASE EXCESS BLDV CALC-SCNC: 16.6 MMOL/L
BASOPHILS # BLD AUTO: 0.06 THOUSANDS/ΜL (ref 0–0.1)
BASOPHILS NFR BLD AUTO: 1 % (ref 0–1)
BUN SERPL-MCNC: 32 MG/DL (ref 5–25)
CALCIUM SERPL-MCNC: 9.2 MG/DL (ref 8.3–10.1)
CHLORIDE SERPL-SCNC: 95 MMOL/L (ref 100–108)
CO2 SERPL-SCNC: 40 MMOL/L (ref 21–32)
CREAT SERPL-MCNC: 0.81 MG/DL (ref 0.6–1.3)
EOSINOPHIL # BLD AUTO: 0.16 THOUSAND/ΜL (ref 0–0.61)
EOSINOPHIL NFR BLD AUTO: 1 % (ref 0–6)
ERYTHROCYTE [DISTWIDTH] IN BLOOD BY AUTOMATED COUNT: 15.7 % (ref 11.6–15.1)
GFR SERPL CREATININE-BSD FRML MDRD: 68 ML/MIN/1.73SQ M
GLUCOSE SERPL-MCNC: 96 MG/DL (ref 65–140)
HCO3 BLDV-SCNC: 44.6 MMOL/L (ref 24–30)
HCT VFR BLD AUTO: 27 % (ref 34.8–46.1)
HGB BLD-MCNC: 8.1 G/DL (ref 11.5–15.4)
IMM GRANULOCYTES # BLD AUTO: 0.1 THOUSAND/UL (ref 0–0.2)
IMM GRANULOCYTES NFR BLD AUTO: 1 % (ref 0–2)
LYMPHOCYTES # BLD AUTO: 1.36 THOUSANDS/ΜL (ref 0.6–4.47)
LYMPHOCYTES NFR BLD AUTO: 11 % (ref 14–44)
MAGNESIUM SERPL-MCNC: 2.2 MG/DL (ref 1.6–2.6)
MCH RBC QN AUTO: 29.2 PG (ref 26.8–34.3)
MCHC RBC AUTO-ENTMCNC: 30 G/DL (ref 31.4–37.4)
MCV RBC AUTO: 98 FL (ref 82–98)
MONOCYTES # BLD AUTO: 1.59 THOUSAND/ΜL (ref 0.17–1.22)
MONOCYTES NFR BLD AUTO: 13 % (ref 4–12)
NEUTROPHILS # BLD AUTO: 9.22 THOUSANDS/ΜL (ref 1.85–7.62)
NEUTS SEG NFR BLD AUTO: 73 % (ref 43–75)
NRBC BLD AUTO-RTO: 0 /100 WBCS
O2 CT BLDV-SCNC: 12.2 ML/DL
OSMOLALITY UR/SERPL-RTO: 303 MMOL/KG (ref 282–298)
PCO2 BLDV: 79.6 MM HG (ref 42–50)
PH BLDV: 7.37 [PH] (ref 7.3–7.4)
PHOSPHATE SERPL-MCNC: 2.9 MG/DL (ref 2.3–4.1)
PLATELET # BLD AUTO: 572 THOUSANDS/UL (ref 149–390)
PMV BLD AUTO: 10.5 FL (ref 8.9–12.7)
PO2 BLDV: 76.5 MM HG (ref 35–45)
POTASSIUM SERPL-SCNC: 3.8 MMOL/L (ref 3.5–5.3)
RBC # BLD AUTO: 2.77 MILLION/UL (ref 3.81–5.12)
SODIUM SERPL-SCNC: 136 MMOL/L (ref 136–145)
WBC # BLD AUTO: 12.49 THOUSAND/UL (ref 4.31–10.16)

## 2022-05-11 PROCEDURE — 85025 COMPLETE CBC W/AUTO DIFF WBC: CPT | Performed by: INTERNAL MEDICINE

## 2022-05-11 PROCEDURE — 99232 SBSQ HOSP IP/OBS MODERATE 35: CPT | Performed by: INTERNAL MEDICINE

## 2022-05-11 PROCEDURE — 83735 ASSAY OF MAGNESIUM: CPT | Performed by: INTERNAL MEDICINE

## 2022-05-11 PROCEDURE — 80048 BASIC METABOLIC PNL TOTAL CA: CPT | Performed by: INTERNAL MEDICINE

## 2022-05-11 PROCEDURE — 99239 HOSP IP/OBS DSCHRG MGMT >30: CPT | Performed by: INTERNAL MEDICINE

## 2022-05-11 PROCEDURE — 94760 N-INVAS EAR/PLS OXIMETRY 1: CPT

## 2022-05-11 PROCEDURE — 74018 RADEX ABDOMEN 1 VIEW: CPT

## 2022-05-11 PROCEDURE — 82805 BLOOD GASES W/O2 SATURATION: CPT | Performed by: STUDENT IN AN ORGANIZED HEALTH CARE EDUCATION/TRAINING PROGRAM

## 2022-05-11 PROCEDURE — 83930 ASSAY OF BLOOD OSMOLALITY: CPT | Performed by: STUDENT IN AN ORGANIZED HEALTH CARE EDUCATION/TRAINING PROGRAM

## 2022-05-11 PROCEDURE — 84100 ASSAY OF PHOSPHORUS: CPT | Performed by: INTERNAL MEDICINE

## 2022-05-11 PROCEDURE — 94640 AIRWAY INHALATION TREATMENT: CPT

## 2022-05-11 PROCEDURE — 99232 SBSQ HOSP IP/OBS MODERATE 35: CPT | Performed by: STUDENT IN AN ORGANIZED HEALTH CARE EDUCATION/TRAINING PROGRAM

## 2022-05-11 RX ORDER — METOPROLOL TARTRATE 37.5 MG/1
37.5 TABLET, FILM COATED ORAL EVERY 12 HOURS SCHEDULED
Refills: 0
Start: 2022-05-11

## 2022-05-11 RX ORDER — AMOXICILLIN 250 MG
1 CAPSULE ORAL 2 TIMES DAILY
Refills: 0
Start: 2022-05-11

## 2022-05-11 RX ORDER — POLYETHYLENE GLYCOL 3350 17 G/17G
17 POWDER, FOR SOLUTION ORAL DAILY
Refills: 0
Start: 2022-05-12

## 2022-05-11 RX ORDER — FUROSEMIDE 20 MG/1
20 TABLET ORAL DAILY
Refills: 0
Start: 2022-05-12

## 2022-05-11 RX ORDER — PHENOBARBITAL 32.4 MG/1
32.4 TABLET ORAL 2 TIMES DAILY
Qty: 20 TABLET | Refills: 0
Start: 2022-05-11 | End: 2022-05-21

## 2022-05-11 RX ORDER — BISACODYL 10 MG
10 SUPPOSITORY, RECTAL RECTAL DAILY
Qty: 12 SUPPOSITORY | Refills: 0 | Status: SHIPPED | OUTPATIENT
Start: 2022-05-12

## 2022-05-11 RX ORDER — FUROSEMIDE 20 MG/1
20 TABLET ORAL DAILY
Status: DISCONTINUED | OUTPATIENT
Start: 2022-05-12 | End: 2022-05-11 | Stop reason: HOSPADM

## 2022-05-11 RX ADMIN — POLYETHYLENE GLYCOL 3350 17 G: 17 POWDER, FOR SOLUTION ORAL at 10:41

## 2022-05-11 RX ADMIN — ATORVASTATIN CALCIUM 80 MG: 80 TABLET, FILM COATED ORAL at 10:41

## 2022-05-11 RX ADMIN — PANTOPRAZOLE SODIUM 20 MG: 20 TABLET, DELAYED RELEASE ORAL at 05:48

## 2022-05-11 RX ADMIN — LEVETIRACETAM 500 MG: 500 TABLET, FILM COATED ORAL at 10:41

## 2022-05-11 RX ADMIN — METOPROLOL TARTRATE 37.5 MG: 25 TABLET, FILM COATED ORAL at 10:41

## 2022-05-11 RX ADMIN — HEPARIN SODIUM 7500 UNITS: 5000 INJECTION INTRAVENOUS; SUBCUTANEOUS at 05:48

## 2022-05-11 RX ADMIN — ACETAMINOPHEN 975 MG: 325 TABLET, FILM COATED ORAL at 05:48

## 2022-05-11 RX ADMIN — LEVALBUTEROL HYDROCHLORIDE 1.25 MG: 1.25 SOLUTION RESPIRATORY (INHALATION) at 07:08

## 2022-05-11 RX ADMIN — BISACODYL 10 MG: 10 SUPPOSITORY RECTAL at 10:41

## 2022-05-11 RX ADMIN — SENNOSIDES AND DOCUSATE SODIUM 1 TABLET: 50; 8.6 TABLET ORAL at 10:41

## 2022-05-11 RX ADMIN — PHENOBARBITAL 32.4 MG: 64.8 TABLET ORAL at 10:55

## 2022-05-11 NOTE — DISCHARGE SUMMARY
Discharge Summary - [place] [address] Internal Medicine  Patient: Trudi Cronin 80 y o  female   MRN: [id number]  PCP: Vineet Scott DO  Unit/Bed#: [facility] 409-21 Encounter: [id number]            Discharging Physician / Practitioner: Christina Guan MD  PCP: Vineet Scott DO  Admission Date:   Admission Orders (From admission, onward)     Ordered        05/02/22 1623  Inpatient Admission  Once                      Discharge Date: 05/11/22      Reason for Admission:  Lower extremity edema      Discharge Diagnoses:     Principal Problem:    Bilateral lower extremity edema    Active Problems:    Chronic diastolic CHF    Epilepsy    Hyperlipidemia    Acute respiratory failure with hypoxia    Fecal impaction (improved)    Morbid obesity    Aspiration pneumonitis     Cirrhosis     Anemia of chronic disease    Thrombocytosis    Resolved Problems:    EDUARDO (acute kidney injury)     Hyponatremia      Consultations During Hospital Stay:  · Nephrology  · Geriatrics  · Cardiology  · Dermatology  · Infectious disease  · [address] Course:     * Bilateral lower extremity edema  Assessment & Plan  Transferred from [facility] after she developed worsening edema, erythema and pain of the lower extremities despite IV abx for suspected cellulitis  Of note she arrived to [facility] with EDUARDO and had been on IVF for multiple days prior  · Hx of CHF and cirrhosis with hypoalbuminemia noted  Also with chronic venous stasis dermatitis   · Was on broad spectrum abx initially, however abx dc on 5/3 given low suspicion for infection  · CT scan of the bilateral LE were negative for abscess formation  · She was transferred to [facility] for dermatology evaluation - dermatology attributes presentation to chronic venous stasis dermatitis  ? Appreciate podiatry input regarding wound care  ?  Encourage compression/elevation   · Appreciate cardiology input regarding diuretic regimen  · Lower extremity venous dopplers ordered to check for DVT however unable to tolerate given significant pain  · Optimize pain control  · Plan for discharge to skilled rehab today     SIRS (systemic inflammatory response syndrome)  Assessment & Plan  Evolving with hypotension coupled with intermittent fevers, tachycardia, and leukocytosis - possibly secondary to primary issue vs an aspiration pneumonitis event earlier in hospital course  WBC count progressively improved - tachycardia generally improved - now remains afebrile - blood pressures stable  · Continue to observe off antibiotics per Infectious Disease  · IV albumin ordered overnight (5/4)   · Of note this patient was on midodrine TID for the entirety of her hospitalization at [facility] until it was dc on 5/4 After the am dose after cards recs  · CT of chest/abdomen/pelvis noting fecal impaction and bibasilar airspace opacities on 5/9 - follow-up CXR on 5/10 unremarkable for acute cardiopulmonary disease     Fecal impaction  Assessment & Plan  As seen on CT imaging from 05/09 with colonic distention at the rectal level with possibility of obstruction -> optimized bowel regimen and per nursing, patient had a bowel earlier today prior to discharge  Underwent updated imaging today (abdominal XR) -> noted rectal distension as seen on CT previously, however, reported a nonobstructive bowel gas pattern    Acute respiratory failure with hypoxia   Assessment & Plan  Multifactorial given volume overload, aspiration, atelectasis, morbid obesity  · No PNA on imaging, off abx per ID  · Patient with wheezing and mild short of breath  · Continued with pulmonary regimen  · CXR on 5/10 negative for acute cardiopulmonary disease  · Wean oxygen as able - currently on 2 L via nasal cannula     Chronic diastolic CHF  Assessment & Plan  Cardiac echo with EF 70% and moderate aortic stenosis   · Does not appear to be on any diuretics at home  · Given multiple days of IVF after normalization of creatinine for EDUARDO she may have some volume overload  · Due to worsening kidney injury, diuretics (Lasix) discontinued for the time being  · Continue beta-blockade w/ Lopressor     EDUARDO (acute kidney injury)   Assessment & Plan  Creatinine trend:  0 63 -> 1 4 -> 1 47 -> normalized today at 0 81  Diuretic regimen discontinued currently - decreased oral intake also likely contributory  Underlying history of cirrhosis and CHF  Monitor renal function and output - limit/avoid nephrotoxins if possible - avoid hypotension as possible  Appreciate nephrology input     Aortic stenosis  Assessment & Plan  Moderate aortic stenosis  · Outpatient follow-up     Cirrhosis   Assessment & Plan  Daughter reports hx of cirrhosis however does not follow with any GI specialists  · Lasix to resume tomorrow per nephrology     Aspiration pneumonitis   Assessment & Plan  POA on admission at [facility] prior to transfer to the [address] [place] [address]   · Aspiration precautions  · Appreciate speech therapy input -> continue mechanical soft diet w/ thin liquids  · Currently off antibiotics  · Maintain oxygenation and wean down as tolerated  · Repeat/updated CXR negative for acute cardiopulmonary disease     Morbid obesity   Assessment & Plan  Supportive care - lifestyle/diet modifications  · BMI 42 30 currently      Epilepsy  Assessment & Plan  · Continue Keppra/Phenobarbital     Hyponatremia  Assessment & Plan  · In the setting of CHF  · Normalized prior to discharge     Anemia of chronic disease  Assessment & Plan  · H/H stable     Thrombocytosis  Assessment & Plan  · Likely reactive due to acute medical issue(s)      Condition at Discharge: fair       Discharge Day Visit / Exam:     Vitals: Blood Pressure: 124/75 (05/11/22 0750)  Pulse: 104 (05/11/22 0750)  Temperature: 97 8 °F (36 6 °C) (05/11/22 0750)  Temp Source: Axillary (05/10/22 1441)  Respirations: 16 (05/10/22 2206)  Height: 4' 11" (149 9 cm) (05/03/22 1030)  Weight - Scale: 95 kg (209 lb 7 oz) (05/11/22 0549)  SpO2: 98 % (05/11/22 26)      Physical exam - I had a face-to-face encounter with the patient on day of discharge  Discussion with Patient and/or Family:  The patient has been advised to return to the ER immediately if any symptoms recur or worsen  Discharge instructions/Information to Patient and/or Family:   See after visit summary for information provided to patient and/or family  Provisions for Follow-Up Care:  See after visit summary for information related to follow-up care and any pertinent home health orders  Disposition:   Skilled rehab facility      Discharge Medications:  See after visit summary for reconciled discharge medications provided to patient and/or family  Discharge Statement:  I spent 38 minutes discharging the patient  This time was spent on the day of discharge  I had direct contact with the patient on the day of discharge  Greater than 50% of the total time was spent examining patient, answering all patient questions, arranging and discussing plan of care with patient as well as directly providing post-discharge instructions  Additional time then spent on discharge activities  ** Please Note: This note is constructed using a voice recognition dictation system  An occasional wrong word/phrase or sound-a-like substitution may have been picked up by dictation device due to the inherent limitations of voice recognition software  Read the chart carefully and recognize, using reasonable context, where substitutions may have occurred  **

## 2022-05-11 NOTE — PROGRESS NOTES
Progress Note - Infectious Disease   Olivia Brown 80 y o  female MRN: [id number]  [facility] Encounter: [id number]      Impression/Recommendations:  1   SIRS versus sepsis, POA   WBC, HR   Afebrile   Consider due to initial aspiration event +/- #2   Initial Flu/RSV/COVID PCR, blood cultures CT C/A/P at [facility] negative   Procalcitonin improved after 7 days IV antibiotics   Recurrent fever, WBC likely due to fecal impaction, LB ileus, recent vomiting   Cellulitis appears improved   Repeat UA, lactic acid, blood cultures, CT chest negative  Improving now with bowel regimen  Rec:  · Continue to follow closely off antibiotics  · Follow temperatures and WBC closely  · Management of fecal impaction as below  · Supportive care as per the primary service     2   Fecal impaction  With large bowel ileus versus obstruction  No BM documented since 4/25  Status post disimpaction 5/10  Rec:  · Bowel regimen  · Follow stool output closely  · Consider repeat AXR to assess interval improvement in gaseous distension, stool burden     3  Bilateral leg cellulitis   In setting of #3, 6   Suspect residual erythema, blistering on exam due to volume overload at this point   Improved status post >7 days broad-spectrum antibiotics  Rec:  · Continue leg elevation  · Diuresis per SLIM     4   Bilateral heel pressure ulcers   Appear superficial without apparent superinfection   Xrays negative for osteomyelitis  Rec:  · Continue LWC and off-loading per Podiatry     5   Acute encephalopathy   Suspect toxic-metabolic and multifactorial   Consider role of recent high-dose cefepime   Recent ammonia level negative   Exam non-focal   Improved      6   Acute hypoxic respiratory failure   Likely multifactorial due to volume overload, aspiration, atelectasis   No initial clinical or radiographic evidence for active pneumonia      7   Chronic lower extremity venous stasis      8   Alcoholic cirrhosis   Per chart, PCP notes      9   Chronic CHF        10   MO   With ambulatory dysfunction      11   Seizure disorder      The above plan was discussed in detail with Dr Leana Vera  Antibiotics:  Off antibiotics #8    Subjective:  Patient seen on AM rounds  More awake today  Offers no complaints  Tells me she is not hungry but trying to drink some coffee  24 Hour Events:  No documented fevers, chills, sweats, nausea, vomiting, or diarrhea  Disimpacted by nursing  Objective:  Vitals:  Temp:  [97 8 °F (36 6 °C)-98 5 °F (36 9 °C)] 97 8 °F (36 6 °C)  HR:  [] 104  Resp:  [16-18] 16  BP: (124-133)/(75-83) 124/75  SpO2:  [95 %-100 %] 98 %  Temp (24hrs), Av 2 °F (36 8 °C), Min:97 8 °F (36 6 °C), Max:98 5 °F (36 9 °C)  Current: Temperature: 97 8 °F (36 6 °C)    Physical Exam:   General:  No acute distress  Psychiatric:  Awake and alert  Pulmonary:  Normal respiratory excursion without accessory muscle use  Abdomen:  Firm, nontender  Extremities:  No edema  Skin:  No rashes    Lab Results:  I have personally reviewed pertinent labs  Results from last 7 days   Lab Units 2247 05/10/22  0432 05/09/22  0557 22  0706 22  0634   POTASSIUM mmol/L 3 8 4 2 4 2   < > 3 8   CHLORIDE mmol/L 95* 91* 91*   < > 98*   CO2 mmol/L 40* 39* 38*   < > 38*   BUN mg/dL 32* 40* 33*   < > 8   CREATININE mg/dL 0 81 1 47* 1 40*   < > 0 69   EGFR ml/min/1 73sq m 68 33 35   < > 81   CALCIUM mg/dL 9 2 9 4 9 4   < > 9 3   AST U/L  --   --   --   --  24   ALT U/L  --   --   --   --  20   ALK PHOS U/L  --   --   --   --  93    < > = values in this interval not displayed  Results from last 7 days   Lab Units 22  0547 05/10/22  0432 22  0557   WBC Thousand/uL 12 49* 16 53* 17 51*   HEMOGLOBIN g/dL 8 1* 9 1* 9 1*   PLATELETS Thousands/uL 572* 590* 532*     Results from last 7 days   Lab Units 22  1254 22  1204   BLOOD CULTURE  No Growth at 24 hrs  No Growth at 24 hrs         Imaging Studies:   I have personally reviewed pertinent imaging study reports and images in PACS  EKG, Pathology, and Other Studies:   I have personally reviewed pertinent reports

## 2022-05-11 NOTE — PROGRESS NOTES
NEPHROLOGY PROGRESS NOTE   Too Melo 80 y o  female MRN: [id number]  Unit/Bed#: [facility] 903-01 Encounter: [id number]  Reason for Consult: EDUARDO    ASSESSMENT AND PLAN:  81 yo woman with PMH obesity, CHF, cirrhosis  Patient was admitted at [facility] initially for pneumonia, treated with antibiotics vancomycin cefepime, during admission had EDUARDO with creatinine of 2 that improved with IV fluids  During admission patient developed lower extremity edema with lower extremity cellulitis and was transferred to [facility] for further management  Patient was found to have elevated creatinine  Nephrology is consulted for EDUARDO       PLAN:       #Non-Oliguric KDIGO recurrent EDUARDO stage 1 with evidence of kidney recovery     · Etiology:  Secondary to prerenal in the settings of diuresis and poor oral intake    · Baseline creatinine:  0 6 mg/dL   · Current creatinine:   0 8 mg/dL, back to baseline    · Peak creatinine:  1 4   · FeUrea, FeNa consistent with prerenal  · UA:  Microhematuria, nocturia   · Renal imaging :  No hydronephrosis    · Treatment:   · No indication of dialysis, kidney function is recovered    · Maintain MAP:  Over 65 mmHg if possible/avoid hypoperfusion:  Hold parameters on blood pressure medications   · Avoid nephrotoxic agents such as NSAIDs, and IV contrast if possible   Avoid opioids    · Adjust medications to GFR       # hyponatremia (resolved)  · serum sodium 136 mEq per L        #Acid-base Disorder   · Vbg: pH 7 3, pCO2 79 6, serum HCO3 40 millimole per L   · A   · Mixed respiratory acidosis and metabolic alkalosis secondary to probably MADDI and vascular contraction in the settings of  poor fluid intake  · Low anion gap with elevated asthma narrative:  Recommend SPEP immunofixation, free light chain (order for tomorrow)      #Volume status/hypertension:   · Volume:   difficult to assess due to body habitus, appears euvolemic  · Blood pressure:   normotensive /75mmHg, goal< 140/90 · Recommend:   · S/p IV fluid   · Recommend to start Lasix 20 mg p o  Tomorrow      # lower extremity cellulitis   · Off antibiotics for now   · Pending Dermatology evaluation   · Id following       # constipation   · On bowel regimen     Kidney function back to baseline  Nephrology will sign off at this time  Thank you for involving us in this case  Please call us if any question  SUBJECTIVE:  Patient seen and examined at bedside  No chest pain, shortness of breath, nausea, vomiting, abdominal pain or diarrhea  No urinary complaints           OBJECTIVE:  Current Weight: Weight - Scale: 95 kg (209 lb 7 oz)  Vitals:    05/11/22 0750   BP: 124/75   Pulse: 104   Resp:    Temp: 97 8 °F (36 6 °C)   SpO2: 98%       Intake/Output Summary (Last 24 hours) at 5/11/2022 1010  Last data filed at 5/11/2022 0201  Gross per 24 hour   Intake 975 ml   Output 500 ml   Net 475 ml     Wt Readings from Last 3 Encounters:   05/11/22 95 kg (209 lb 7 oz)   05/02/22 101 kg (222 lb 14 2 oz)   03/14/21 93 kg (205 lb)     Temp Readings from Last 3 Encounters:   05/11/22 97 8 °F (36 6 °C)   05/02/22 97 8 °F (36 6 °C) (Oral)   03/20/21 99 2 °F (37 3 °C)     BP Readings from Last 3 Encounters:   05/11/22 124/75   05/02/22 143/81   01/19/22 165/75     Pulse Readings from Last 3 Encounters:   05/11/22 104   05/02/22 104   01/19/22 100        General:  Obese, no acute distress at this time  Skin:  No acute rash  Eyes:  No scleral icterus and noninjected  ENT:  mucous membranes moist  Neck:  no carotid bruits  Chest:  Clear to auscultation percussion, good respiratory effort, no use of accessory respiratory muscles  CVS:  Regular rate and rhythm without a murmur rub   Abdomen:  soft and nontender   Extremities:  Lower extremity edema, bilateral dressing  Neuro:  No gross focality  Psych:  Alert , cooperative       Medications:    Current Facility-Administered Medications:     acetaminophen (TYLENOL) tablet 975 mg, 975 mg, Oral, Q8H [address], Randall Siemens, PA-C, 975 mg at 05/11/22 0548    albuterol (PROVENTIL HFA,VENTOLIN HFA) inhaler 2 puff, 2 puff, Inhalation, Q4H PRN, Fiona Guerrero MD, 2 puff at 05/08/22 0801    atorvastatin (LIPITOR) tablet 80 mg, 80 mg, Oral, Daily, Fiona Guerrero MD, 80 mg at 05/10/22 0906    bisacodyl (DULCOLAX) rectal suppository 10 mg, 10 mg, Rectal, Daily, Lauren Solis DO, 10 mg at 05/10/22 0906    Diclofenac Sodium (VOLTAREN) 1 % topical gel 2 g, 2 g, Topical, 4x Daily PRN, Fiona Guerrero MD    heparin (porcine) subcutaneous injection 7,500 Units, 7,500 Units, Subcutaneous, Q8H [address], Fiona Guerrero MD, 7,500 Units at 05/11/22 0548    HYDROmorphone HCl (DILAUDID) injection 0 2 mg, 0 2 mg, Intravenous, Q6H PRN,  Siemens, PA-C, 0 2 mg at 05/08/22 0314    levETIRAcetam (KEPPRA) tablet 500 mg, 500 mg, Oral, Q12H [address], Fiona Guerrero MD, 500 mg at 05/10/22 2112    metoprolol tartrate (LOPRESSOR) partial tablet 37 5 mg, 37 5 mg, Oral, Q12H [address], Lauren Solis DO, 37 5 mg at 05/10/22 2112    ondansetron (ZOFRAN) injection 4 mg, 4 mg, Intravenous, Q6H PRN, Fiona Guerrero MD, 4 mg at 05/08/22 0241    oxyCODONE (ROXICODONE) IR tablet 2 5 mg, 2 5 mg, Oral, Q4H PRN, Fiona Guerrero MD    oxyCODONE (ROXICODONE) IR tablet 5 mg, 5 mg, Oral, Q4H PRN, Fiona Guerrero MD, 5 mg at 05/10/22 2112    pantoprazole (PROTONIX) EC tablet 20 mg, 20 mg, Oral, Early Morning, Fiona Guerrero MD, 20 mg at 05/11/22 0548    PHENobarbital tablet 32 4 mg, 32 4 mg, Oral, BID, Fiona Guerrero MD, 32 4 mg at 05/10/22 1701    polyethylene glycol (MIRALAX) packet 17 g, 17 g, Oral, Daily, Regregory Solis DO, 17 g at 05/10/22 0906    senna-docusate sodium (SENOKOT S) 8 6-50 mg per tablet 1 tablet, 1 tablet, Oral, BID, Tuesday M AUREA Bland, 1 tablet at 05/10/22 1700    sodium chloride (PF) 0 9 % injection 3 mL, 3 mL, Intravenous, Q1H PRN, Fiona Guerrero MD    Laboratory Results:  Results from last 7 days   Lab Units 05/11/22  0547 05/10/22  2066 05/09/22  0557 05/06/22  0706 05/05/22  0847 05/05/22  0634   WBC Thousand/uL 12 49* 16 53* 17 51* 12 87* 14 74*  --    HEMOGLOBIN g/dL 8 1* 9 1* 9 1* 9 3* 9 3*  --    HEMATOCRIT % 27 0* 29 4* 29 6* 30 4* 30 6*  --    PLATELETS Thousands/uL 572* 590* 532* 275 223  --    SODIUM mmol/L 136 133* 133* 136  --  137   POTASSIUM mmol/L 3 8 4 2 4 2 3 8  --  3 8   CHLORIDE mmol/L 95* 91* 91* 96*  --  98*   CO2 mmol/L 40* 39* 38* 42*  --  38*   BUN mg/dL 32* 40* 33* 11  --  8   CREATININE mg/dL 0 81 1 47* 1 40* 0 63  --  0 69   CALCIUM mg/dL 9 2 9 4 9 4 9 3  --  9 3   MAGNESIUM mg/dL 2 2  --  1 9  --   --   --    PHOSPHORUS mg/dL 2 9  --   --   --   --   --        XR chest portable   Final Result by Brooke Arias MD (05/10 0349)      No acute cardiopulmonary disease  Workstation performed: [id number]         CT chest abdomen pelvis wo contrast   Final Result by Danae Nava MD (05/09 6178)      Airspace opacities at the bases have slightly worsened could represent pneumonia  Marked colonic distention with possible fecal impaction, at the level of the rectum, causing distal obstruction versus ileus  There is no small bowel dilatation  This should be correlated with any recent bowel movement  Multiple compression deformities are seen in the thoracolumbar spine without any bony destructive features  This was discussed with Dr Shaunna Bryant at 4:45 PM         Workstation performed: [id number]         XR chest portable   Final Result by Raza Verma MD (05/09 5314)      No acute cardiopulmonary disease  No significant interval change  Workstation performed: [id number]         XR abdomen 1 view kub    (Results Pending)       Portions of the record may have been created with voice recognition software  Occasional wrong word or "sound a like" substitutions may have occurred due to the inherent limitations of voice recognition software   Read the chart carefully and recognize, using context, where substitutions have occurred

## 2022-05-11 NOTE — CASE MANAGEMENT
Case Management Discharge Planning Note    Patient name [place] Needs  Location [facility] 903/[facility] 903-01 MRN [id number]  : 1940 Date 2022       Current Admission Date: 2022  Current Admission Diagnosis:Bilateral lower extremity edema   Patient Active Problem List    Diagnosis Date Noted    Hyponatremia     Metabolic alkalosis     Fecal impaction ([facility] ) 05/10/2022    EDUARDO (acute kidney injury) ([facility] ) 2022    Aortic stenosis 2022    Cirrhosis ([facility] ) 2022    SIRS (systemic inflammatory response syndrome) ([facility] ) 2022    Aspiration pneumonitis ([facility] ) 2022    Morbid obesity ([facility] ) 2021    Bilateral lower extremity edema 2021    Acute respiratory failure with hypoxia ([facility] ) 2021    Chronic diastolic (congestive) heart failure ([facility] ) 2020    Hyperlipidemia 2020    Localization-related epilepsy, intractable ([facility] ) 02/15/2019      LOS (days): 9  Geometric Mean LOS (GMLOS) (days): 5 40  Days to GMLOS:-3 6     OBJECTIVE:  Risk of Unplanned Readmission Score: 23 38         Current admission status: Inpatient   Preferred Pharmacy:   [address] [place], [place] - [address]  Phone: [phone] Fax: [phone]    Primary Care Provider: Zaira Barth DO    Primary Insurance: MEDICARE  Secondary Insurance: [id number] DETAILS:    Discharge planning discussed with[de-identified] Katiuska Teague (daughter)  Freedom of Choice: Yes  Comments - Freedom of Choice:  [facility] contacted family/caregiver?: Yes  Were Treatment Team discharge recommendations reviewed with patient/caregiver?: Yes  Did patient/caregiver verbalize understanding of patient care needs?: Yes  Were patient/caregiver advised of the risks associated with not following Treatment Team discharge recommendations?: Yes    Contacts  Patient Contacts: Katiuska Teague (daughter)  Relationship to Patient[de-identified] Family  Contact Method: Phone  Phone Number: [phone]  Reason/Outcome: Discharge Planning              Other Referral/Resources/Interventions Provided:  Referral Comments: patient is recommended for STR at time of discharge, [facility] is able to accept today         Treatment Team Recommendation: Short Term Rehab  Discharge Destination Plan[de-identified] Short Term Rehab  Transport at Discharge : \[facility]"" [facility]  Dispatcher Contacted: Yes  Number/Name of Dispatcher: Larry Balbuena and Unit #): [facility]  ETA of Transport (Date): 05/11/22  ETA of Transport (Time): 1600                       Additional Comments: patient is medically cleared for d/c today  Abdominal xray completed - spoke with attending - patient is medically cleared for discharge - daughter updated  Ninfa updated and all d/c paperwork has been faxed over    [address] Name, [address] :  Madiha Nance  Receiving Facility/Agency Phone Number: [phone]  Facility/Agency Fax Number: [phone]

## 2022-05-14 LAB
BACTERIA BLD CULT: NORMAL
BACTERIA BLD CULT: NORMAL

## 2022-06-08 NOTE — PROGRESS NOTES
[address] [facility]  Progress Note - Alisa No 1940, 80 y o  female MRN: [id number]  Unit/Bed#: [facility] 903-01 Encounter: [id number]  Primary Care Provider: Bhavana Car DO   Date and time admitted to hospital: 5/2/2022  4:17 PM    * Bilateral lower extremity edema  Assessment & Plan  Transferred from [facility] after she developed worsening edema, erythema and pain of the lower extremities despite IV abx for suspected cellulitis  Of note she arrived to [facility] with EDUARDO and had been on IVF for multiple days prior  · Hx of CHF and cirrhosis with hypoalbuminemia noted   Also with chronic venous stasis dermatitis   · Was on broad spectrum abx initially, however abx dc on 5/3 given low suspicion for infection  · CT scan of the bilateral LE were negative for abscess formation  · She was transferred to [facility] for dermatology evaluation  · Derm feels chronic venous stasis dermatitis  · Podiatry consulted for wound care  · Encourage compression/elevation   · Cards consulted to assist with IV diuresis--continue, diuretics increased to BID on 5/4  · Lower extremity venous dopplers ordered to check for DVT however unable to tolerate given significant pain-  · Continue pain control  · PT/OT recommending STR once stable   · Negative fluid balance on IV diuretics    SIRS (systemic inflammatory response syndrome) (HCC)  Assessment & Plan  Hypotension, tachycardia, tachypnea  · From CHF exacerbation, treatment as above  · No etiology for infection suspected at this time, not on antibiotics per ID  · IV albumin ordered overnight (5/4)   · Of note this patient was on midodrine TID for the entirety of her hospitalization at [facility] until it was dc on 5/4 After the am dose after cards recs    Aortic stenosis  Assessment & Plan  Moderate aortic stenosis  · Outpatient f/u    Acute respiratory failure with hypoxia [facility])  Assessment & Plan  Multifactorial given volume overload, aspiration, atelectasis, morbid obesity  · No PNA on imaging, off abx per ID  · Diuresis as above  · Wean oxygen as able    Chronic diastolic (congestive) heart failure (HCC)  Assessment & Plan  Wt Readings from Last 3 Encounters:   05/06/22 94 9 kg (209 lb 3 2 oz)   05/02/22 101 kg (222 lb 14 2 oz)   03/14/21 93 kg (205 lb)     Echo ordered 5/3 given severe edema + murmur on exam  · Monitor intake and output  · Monitor daily weights  · Does not appear to be on any diuretics at home  · Given multiple days of IVF after normalization of creatinine for EDUARDO she may have some volume overload  · Placed on IV lasix BID, now on oral Lasix daily  · Cardiology signed off   · Increased beta-blocker to 37 5 b i d  Due to tachycardia        Cirrhosis [facility])  Assessment & Plan  Daughter reports hx of cirrhosis however does not follow with any GI specialists  · Monitor     Morbid obesity ([facility] [address] )  Assessment & Plan  Supportive care  · Turn Q2h  · Weight loss counseling as an outpatient when medically stable    Localization-related epilepsy, intractable ([facility] [address] )  Assessment & Plan  · Continue keppra and phenobarbitol      VTE Pharmacologic Prophylaxis:   High Risk (Score >/= 5) - Pharmacological DVT Prophylaxis Ordered: heparin  Sequential Compression Devices Ordered  Patient Centered Rounds: I performed bedside rounds with nursing staff today  Discussions with Specialists or Other Care Team Provider:     Education and Discussions with Family / Patient: Updated  (daughter) via phone  Time Spent for Care: 45 minutes  More than 50% of total time spent on counseling and coordination of care as described above  Current Length of Stay: 5 day(s)  Current Patient Status: Inpatient   Certification Statement: The patient will continue to require additional inpatient hospital stay due to Above  Discharge Plan: Anticipate discharge in 48 hrs to rehab facility      Code Status: Level 1 - Full Code    Subjective:   Patient seen and examined  Still with mild short of breath  No nausea vomiting  No chest pain  Clinically improving    Objective:     Vitals:   Temp (24hrs), Av 2 °F (37 3 °C), Min:98 6 °F (37 °C), Max:99 9 °F (37 7 °C)    Temp:  [98 6 °F (37 °C)-99 9 °F (37 7 °C)] 98 6 °F (37 °C)  HR:  [100-117] 117  Resp:  [20-22] 20  BP: (120-142)/(71-86) 124/82  SpO2:  [91 %-95 %] 91 %  Body mass index is 42 25 kg/m²  Input and Output Summary (last 24 hours): Intake/Output Summary (Last 24 hours) at 2022 1411  Last data filed at 2022 1255  Gross per 24 hour   Intake 700 ml   Output 800 ml   Net -100 ml       Physical Exam:   Physical Exam   Patient is awake alert in no acute distress  Obese chronically ill-appearing  Lung with decreased breath sounds bilateral  Heart positive B2-F8 with systolic murmur  Abdomen soft nontender  Chronic bilateral lower extremity venous stasis edema  Additional Data:     Labs:  Results from last 7 days   Lab Units 22  0706 22  0847 22  0847   WBC Thousand/uL 12 87*   < > 14 74*   HEMOGLOBIN g/dL 9 3*   < > 9 3*   HEMATOCRIT % 30 4*   < > 30 6*   PLATELETS Thousands/uL 275   < > 223   BANDS PCT %  --   --  8   NEUTROS PCT % 73  --   --    LYMPHS PCT % 13*  --   --    LYMPHO PCT %  --   --  15   MONOS PCT % 11  --   --    MONO PCT %  --   --  6   EOS PCT % 1  --  1    < > = values in this interval not displayed  Results from last 7 days   Lab Units 22  0706 22  0634 22  0634   SODIUM mmol/L 136   < > 137   POTASSIUM mmol/L 3 8   < > 3 8   CHLORIDE mmol/L 96*   < > 98*   CO2 mmol/L 42*   < > 38*   BUN mg/dL 11   < > 8   CREATININE mg/dL 0 63   < > 0 69   ANION GAP mmol/L -2*   < > 1*   CALCIUM mg/dL 9 3   < > 9 3   ALBUMIN g/dL  --   --  2 3*   TOTAL BILIRUBIN mg/dL  --   --  0 67   ALK PHOS U/L  --   --  93   ALT U/L  --   --  20   AST U/L  --   --  24   GLUCOSE RANDOM mg/dL 83   < > 107    < > = values in this interval not displayed                   Results from last 7 days   Lab Units 05/04/22  0512 05/03/22  0450 05/01/22  0908   LACTIC ACID mmol/L  --   --  0 6   PROCALCITONIN ng/ml 0 27* 0 83*  --        Lines/Drains:  Invasive Devices  Report    Peripherally Inserted Central Catheter Line            PICC Line 04/29/22 Right Brachial 8 days          Drain            External Urinary Catheter 4 days                Central Line:  Goal for removal: Will discontinue when peripheral access obtained  Imaging: No pertinent imaging reviewed  Recent Cultures (last 7 days):         Last 24 Hours Medication List:   Current Facility-Administered Medications   Medication Dose Route Frequency Provider Last Rate    acetaminophen  975 mg Oral Q8H [facility] Che Ruiz PA-C      albuterol  2 puff Inhalation Q4H PRN Jennifer Wilson MD      atorvastatin  80 mg Oral Daily Jennifer Wilson MD      Diclofenac Sodium  2 g Topical 4x Daily PRN Jennifer Wilson MD      furosemide  40 mg Oral Daily Royal Samreen MD      heparin (porcine)  7,500 Units Subcutaneous [facility] Jennifer Wilson MD      HYDROmorphone  0 2 mg Intravenous Q6H PRN Janis Amador PA-C      levETIRAcetam  500 mg Oral Q12H [facility] Jennifer Wilson MD      metoprolol tartrate  37 5 mg Oral Q12H [facility] Megan Brush DO      ondansetron  4 mg Intravenous Q6H PRN Jennifer Wilson MD      oxyCODONE  2 5 mg Oral Q4H PRN Jennifer Wilson MD      oxyCODONE  5 mg Oral Q4H PRN Jennifer Wilson MD      pantoprazole  20 mg Oral Early Morning Jennifer Wilson MD      PHENobarbital  32 4 mg Oral BID Jennifer Wilson MD      polyethylene glycol  17 g Oral Daily PRN Tuesday M AUREA Bland      senna-docusate sodium  1 tablet Oral BID Tuesday M AUREA Bland      sodium chloride (PF)  3 mL Intravenous Q1H PRN Jennifer Wilson MD          Today, Patient Was Seen By: Megan Brush DO    **Please Note: This note may have been constructed using a voice recognition system  ** 4 = No assist / stand by assistance

## 2023-12-29 ENCOUNTER — APPOINTMENT (EMERGENCY)
Dept: RADIOLOGY | Facility: HOSPITAL | Age: 83
DRG: 871 | End: 2023-12-29
Payer: MEDICARE

## 2023-12-29 ENCOUNTER — APPOINTMENT (EMERGENCY)
Dept: CT IMAGING | Facility: HOSPITAL | Age: 83
DRG: 871 | End: 2023-12-29
Payer: MEDICARE

## 2023-12-29 ENCOUNTER — HOSPITAL ENCOUNTER (INPATIENT)
Facility: HOSPITAL | Age: 83
LOS: 5 days | Discharge: NURSING, REHAB OR LONG-TERM CARE | DRG: 871 | End: 2024-01-03
Attending: STUDENT IN AN ORGANIZED HEALTH CARE EDUCATION/TRAINING PROGRAM | Admitting: STUDENT IN AN ORGANIZED HEALTH CARE EDUCATION/TRAINING PROGRAM
Payer: MEDICARE

## 2023-12-29 DIAGNOSIS — N39.0 UTI (URINARY TRACT INFECTION): ICD-10-CM

## 2023-12-29 DIAGNOSIS — R41.82 ALTERED MENTAL STATUS: ICD-10-CM

## 2023-12-29 DIAGNOSIS — K21.9 GERD (GASTROESOPHAGEAL REFLUX DISEASE): ICD-10-CM

## 2023-12-29 DIAGNOSIS — R09.02 HYPOXIC: ICD-10-CM

## 2023-12-29 DIAGNOSIS — R60.0 BILATERAL LOWER EXTREMITY EDEMA: ICD-10-CM

## 2023-12-29 DIAGNOSIS — U07.1 COVID-19: Primary | ICD-10-CM

## 2023-12-29 PROBLEM — D64.9 ANEMIA: Status: ACTIVE | Noted: 2023-12-29

## 2023-12-29 PROBLEM — A41.9 SEPSIS (HCC): Status: ACTIVE | Noted: 2022-05-04

## 2023-12-29 PROBLEM — R79.89 ELEVATED TROPONIN: Status: ACTIVE | Noted: 2023-12-29

## 2023-12-29 PROBLEM — I10 HTN (HYPERTENSION): Status: ACTIVE | Noted: 2023-12-29

## 2023-12-29 LAB
2HR DELTA HS TROPONIN: 9 NG/L
4HR DELTA HS TROPONIN: 13 NG/L
ALBUMIN SERPL BCP-MCNC: 3.4 G/DL (ref 3.5–5)
ALP SERPL-CCNC: 84 U/L (ref 34–104)
ALT SERPL W P-5'-P-CCNC: 28 U/L (ref 7–52)
AMMONIA PLAS-SCNC: 66 UMOL/L (ref 18–72)
ANION GAP SERPL CALCULATED.3IONS-SCNC: 7 MMOL/L
AST SERPL W P-5'-P-CCNC: 37 U/L (ref 13–39)
BACTERIA UR QL AUTO: ABNORMAL /HPF
BASE EXCESS BLDA CALC-SCNC: 11 MMOL/L (ref -2–3)
BASE EXCESS BLDA CALC-SCNC: 5.4 MMOL/L
BASOPHILS # BLD AUTO: 0.01 THOUSANDS/ÂΜL (ref 0–0.1)
BASOPHILS NFR BLD AUTO: 0 % (ref 0–1)
BILIRUB SERPL-MCNC: 0.32 MG/DL (ref 0.2–1)
BILIRUB UR QL STRIP: NEGATIVE
BNP SERPL-MCNC: 191 PG/ML (ref 0–100)
BUDDING YEAST: PRESENT
BUN SERPL-MCNC: 13 MG/DL (ref 5–25)
CA-I BLD-SCNC: 1.01 MMOL/L (ref 1.12–1.32)
CALCIUM ALBUM COR SERPL-MCNC: 10 MG/DL (ref 8.3–10.1)
CALCIUM SERPL-MCNC: 9.5 MG/DL (ref 8.4–10.2)
CARDIAC TROPONIN I PNL SERPL HS: 46 NG/L
CARDIAC TROPONIN I PNL SERPL HS: 55 NG/L
CARDIAC TROPONIN I PNL SERPL HS: 59 NG/L
CHLORIDE SERPL-SCNC: 96 MMOL/L (ref 96–108)
CLARITY UR: ABNORMAL
CO2 SERPL-SCNC: 29 MMOL/L (ref 21–32)
COLOR UR: ABNORMAL
CREAT SERPL-MCNC: 0.68 MG/DL (ref 0.6–1.3)
CRP SERPL QL: 62.2 MG/L
D DIMER PPP FEU-MCNC: 1.35 UG/ML FEU
EOSINOPHIL # BLD AUTO: 0.01 THOUSAND/ÂΜL (ref 0–0.61)
EOSINOPHIL NFR BLD AUTO: 0 % (ref 0–6)
ERYTHROCYTE [DISTWIDTH] IN BLOOD BY AUTOMATED COUNT: 15.7 % (ref 11.6–15.1)
FLUAV RNA RESP QL NAA+PROBE: NEGATIVE
FLUBV RNA RESP QL NAA+PROBE: NEGATIVE
GFR SERPL CREATININE-BSD FRML MDRD: 81 ML/MIN/1.73SQ M
GLUCOSE SERPL-MCNC: 106 MG/DL (ref 65–140)
GLUCOSE SERPL-MCNC: 98 MG/DL (ref 65–140)
GLUCOSE UR STRIP-MCNC: NEGATIVE MG/DL
HCO3 BLDA-SCNC: 30.3 MMOL/L (ref 22–28)
HCO3 BLDA-SCNC: 32.6 MMOL/L (ref 24–30)
HCT VFR BLD AUTO: 29 % (ref 34.8–46.1)
HCT VFR BLD CALC: 29 % (ref 34.8–46.1)
HGB BLD-MCNC: 9.5 G/DL (ref 11.5–15.4)
HGB BLDA-MCNC: 9.9 G/DL (ref 11.5–15.4)
HGB UR QL STRIP.AUTO: ABNORMAL
IMM GRANULOCYTES # BLD AUTO: 0.03 THOUSAND/UL (ref 0–0.2)
IMM GRANULOCYTES NFR BLD AUTO: 1 % (ref 0–2)
KETONES UR STRIP-MCNC: NEGATIVE MG/DL
LACTATE SERPL-SCNC: 0.8 MMOL/L (ref 0.5–2)
LEUKOCYTE ESTERASE UR QL STRIP: ABNORMAL
LIPASE SERPL-CCNC: 7 U/L (ref 11–82)
LYMPHOCYTES # BLD AUTO: 0.84 THOUSANDS/ÂΜL (ref 0.6–4.47)
LYMPHOCYTES NFR BLD AUTO: 14 % (ref 14–44)
MCH RBC QN AUTO: 29.6 PG (ref 26.8–34.3)
MCHC RBC AUTO-ENTMCNC: 32.8 G/DL (ref 31.4–37.4)
MCV RBC AUTO: 90 FL (ref 82–98)
MONOCYTES # BLD AUTO: 0.92 THOUSAND/ÂΜL (ref 0.17–1.22)
MONOCYTES NFR BLD AUTO: 15 % (ref 4–12)
NEUTROPHILS # BLD AUTO: 4.2 THOUSANDS/ÂΜL (ref 1.85–7.62)
NEUTS SEG NFR BLD AUTO: 70 % (ref 43–75)
NITRITE UR QL STRIP: POSITIVE
NON-SQ EPI CELLS URNS QL MICRO: ABNORMAL /HPF
NRBC BLD AUTO-RTO: 0 /100 WBCS
O2 CT BLDA-SCNC: 14.6 ML/DL (ref 16–23)
OXYHGB MFR BLDA: 97.3 % (ref 94–97)
PCO2 BLD: 32.5 MM HG (ref 42–50)
PCO2 BLD: 34 MMOL/L (ref 21–32)
PCO2 BLDA: 46.2 MM HG (ref 36–44)
PH BLD: 7.61 [PH] (ref 7.3–7.4)
PH BLDA: 7.43 [PH] (ref 7.35–7.45)
PH UR STRIP.AUTO: 7 [PH]
PLATELET # BLD AUTO: 155 THOUSANDS/UL (ref 149–390)
PMV BLD AUTO: 9.9 FL (ref 8.9–12.7)
PO2 BLD: 144 MM HG (ref 35–45)
PO2 BLDA: 133.5 MM HG (ref 75–129)
POTASSIUM BLD-SCNC: 4.4 MMOL/L (ref 3.5–5.3)
POTASSIUM SERPL-SCNC: 4.6 MMOL/L (ref 3.5–5.3)
PROCALCITONIN SERPL-MCNC: 0.08 NG/ML
PROT SERPL-MCNC: 7.1 G/DL (ref 6.4–8.4)
PROT UR STRIP-MCNC: NEGATIVE MG/DL
RBC # BLD AUTO: 3.21 MILLION/UL (ref 3.81–5.12)
RBC #/AREA URNS AUTO: ABNORMAL /HPF
RSV RNA RESP QL NAA+PROBE: NEGATIVE
SAO2 % BLD FROM PO2: 100 % (ref 60–85)
SARS-COV-2 RNA RESP QL NAA+PROBE: POSITIVE
SODIUM BLD-SCNC: 131 MMOL/L (ref 136–145)
SODIUM SERPL-SCNC: 132 MMOL/L (ref 135–147)
SP GR UR STRIP.AUTO: 1.01 (ref 1–1.03)
SPECIMEN SOURCE: ABNORMAL
SPECIMEN SOURCE: ABNORMAL
UROBILINOGEN UR STRIP-ACNC: <2 MG/DL
WBC # BLD AUTO: 6.01 THOUSAND/UL (ref 4.31–10.16)
WBC #/AREA URNS AUTO: ABNORMAL /HPF

## 2023-12-29 PROCEDURE — 83880 ASSAY OF NATRIURETIC PEPTIDE: CPT | Performed by: STUDENT IN AN ORGANIZED HEALTH CARE EDUCATION/TRAINING PROGRAM

## 2023-12-29 PROCEDURE — 82805 BLOOD GASES W/O2 SATURATION: CPT | Performed by: STUDENT IN AN ORGANIZED HEALTH CARE EDUCATION/TRAINING PROGRAM

## 2023-12-29 PROCEDURE — 85379 FIBRIN DEGRADATION QUANT: CPT | Performed by: STUDENT IN AN ORGANIZED HEALTH CARE EDUCATION/TRAINING PROGRAM

## 2023-12-29 PROCEDURE — 99223 1ST HOSP IP/OBS HIGH 75: CPT | Performed by: STUDENT IN AN ORGANIZED HEALTH CARE EDUCATION/TRAINING PROGRAM

## 2023-12-29 PROCEDURE — 87040 BLOOD CULTURE FOR BACTERIA: CPT | Performed by: STUDENT IN AN ORGANIZED HEALTH CARE EDUCATION/TRAINING PROGRAM

## 2023-12-29 PROCEDURE — 82947 ASSAY GLUCOSE BLOOD QUANT: CPT

## 2023-12-29 PROCEDURE — 94002 VENT MGMT INPAT INIT DAY: CPT

## 2023-12-29 PROCEDURE — 71045 X-RAY EXAM CHEST 1 VIEW: CPT

## 2023-12-29 PROCEDURE — 87077 CULTURE AEROBIC IDENTIFY: CPT | Performed by: STUDENT IN AN ORGANIZED HEALTH CARE EDUCATION/TRAINING PROGRAM

## 2023-12-29 PROCEDURE — 86140 C-REACTIVE PROTEIN: CPT | Performed by: STUDENT IN AN ORGANIZED HEALTH CARE EDUCATION/TRAINING PROGRAM

## 2023-12-29 PROCEDURE — 84295 ASSAY OF SERUM SODIUM: CPT

## 2023-12-29 PROCEDURE — 96375 TX/PRO/DX INJ NEW DRUG ADDON: CPT

## 2023-12-29 PROCEDURE — 94760 N-INVAS EAR/PLS OXIMETRY 1: CPT

## 2023-12-29 PROCEDURE — 82330 ASSAY OF CALCIUM: CPT

## 2023-12-29 PROCEDURE — 85025 COMPLETE CBC W/AUTO DIFF WBC: CPT | Performed by: STUDENT IN AN ORGANIZED HEALTH CARE EDUCATION/TRAINING PROGRAM

## 2023-12-29 PROCEDURE — 74177 CT ABD & PELVIS W/CONTRAST: CPT

## 2023-12-29 PROCEDURE — 0241U HB NFCT DS VIR RESP RNA 4 TRGT: CPT | Performed by: STUDENT IN AN ORGANIZED HEALTH CARE EDUCATION/TRAINING PROGRAM

## 2023-12-29 PROCEDURE — 82803 BLOOD GASES ANY COMBINATION: CPT

## 2023-12-29 PROCEDURE — 81001 URINALYSIS AUTO W/SCOPE: CPT | Performed by: STUDENT IN AN ORGANIZED HEALTH CARE EDUCATION/TRAINING PROGRAM

## 2023-12-29 PROCEDURE — 96361 HYDRATE IV INFUSION ADD-ON: CPT

## 2023-12-29 PROCEDURE — 83605 ASSAY OF LACTIC ACID: CPT | Performed by: STUDENT IN AN ORGANIZED HEALTH CARE EDUCATION/TRAINING PROGRAM

## 2023-12-29 PROCEDURE — 80053 COMPREHEN METABOLIC PANEL: CPT | Performed by: STUDENT IN AN ORGANIZED HEALTH CARE EDUCATION/TRAINING PROGRAM

## 2023-12-29 PROCEDURE — XW033E5 INTRODUCTION OF REMDESIVIR ANTI-INFECTIVE INTO PERIPHERAL VEIN, PERCUTANEOUS APPROACH, NEW TECHNOLOGY GROUP 5: ICD-10-PCS | Performed by: STUDENT IN AN ORGANIZED HEALTH CARE EDUCATION/TRAINING PROGRAM

## 2023-12-29 PROCEDURE — 84145 PROCALCITONIN (PCT): CPT | Performed by: STUDENT IN AN ORGANIZED HEALTH CARE EDUCATION/TRAINING PROGRAM

## 2023-12-29 PROCEDURE — 84132 ASSAY OF SERUM POTASSIUM: CPT

## 2023-12-29 PROCEDURE — 71260 CT THORAX DX C+: CPT

## 2023-12-29 PROCEDURE — 36415 COLL VENOUS BLD VENIPUNCTURE: CPT | Performed by: STUDENT IN AN ORGANIZED HEALTH CARE EDUCATION/TRAINING PROGRAM

## 2023-12-29 PROCEDURE — 70450 CT HEAD/BRAIN W/O DYE: CPT

## 2023-12-29 PROCEDURE — 87186 SC STD MICRODIL/AGAR DIL: CPT | Performed by: STUDENT IN AN ORGANIZED HEALTH CARE EDUCATION/TRAINING PROGRAM

## 2023-12-29 PROCEDURE — G1004 CDSM NDSC: HCPCS

## 2023-12-29 PROCEDURE — 94640 AIRWAY INHALATION TREATMENT: CPT

## 2023-12-29 PROCEDURE — 96365 THER/PROPH/DIAG IV INF INIT: CPT

## 2023-12-29 PROCEDURE — 85014 HEMATOCRIT: CPT

## 2023-12-29 PROCEDURE — 82140 ASSAY OF AMMONIA: CPT | Performed by: STUDENT IN AN ORGANIZED HEALTH CARE EDUCATION/TRAINING PROGRAM

## 2023-12-29 PROCEDURE — 83690 ASSAY OF LIPASE: CPT | Performed by: STUDENT IN AN ORGANIZED HEALTH CARE EDUCATION/TRAINING PROGRAM

## 2023-12-29 PROCEDURE — 93005 ELECTROCARDIOGRAM TRACING: CPT

## 2023-12-29 PROCEDURE — 84484 ASSAY OF TROPONIN QUANT: CPT | Performed by: STUDENT IN AN ORGANIZED HEALTH CARE EDUCATION/TRAINING PROGRAM

## 2023-12-29 PROCEDURE — 99285 EMERGENCY DEPT VISIT HI MDM: CPT

## 2023-12-29 PROCEDURE — 87086 URINE CULTURE/COLONY COUNT: CPT | Performed by: STUDENT IN AN ORGANIZED HEALTH CARE EDUCATION/TRAINING PROGRAM

## 2023-12-29 RX ORDER — ATORVASTATIN CALCIUM 40 MG/1
80 TABLET, FILM COATED ORAL DAILY
Status: DISCONTINUED | OUTPATIENT
Start: 2023-12-30 | End: 2024-01-03 | Stop reason: HOSPADM

## 2023-12-29 RX ORDER — DEXAMETHASONE SODIUM PHOSPHATE 10 MG/ML
6 INJECTION, SOLUTION INTRAMUSCULAR; INTRAVENOUS EVERY 24 HOURS
Status: DISCONTINUED | OUTPATIENT
Start: 2023-12-29 | End: 2024-01-01

## 2023-12-29 RX ORDER — CEFTRIAXONE 1 G/50ML
1000 INJECTION, SOLUTION INTRAVENOUS ONCE
Status: COMPLETED | OUTPATIENT
Start: 2023-12-29 | End: 2023-12-29

## 2023-12-29 RX ORDER — FUROSEMIDE 20 MG/1
20 TABLET ORAL DAILY
Status: DISCONTINUED | OUTPATIENT
Start: 2023-12-30 | End: 2024-01-01

## 2023-12-29 RX ORDER — ACETAMINOPHEN 325 MG/1
650 TABLET ORAL EVERY 6 HOURS PRN
Status: DISCONTINUED | OUTPATIENT
Start: 2023-12-29 | End: 2023-12-29

## 2023-12-29 RX ORDER — FOLIC ACID 1 MG/1
1 TABLET ORAL DAILY
Status: DISCONTINUED | OUTPATIENT
Start: 2023-12-30 | End: 2024-01-03 | Stop reason: HOSPADM

## 2023-12-29 RX ORDER — ACETAMINOPHEN 325 MG/1
650 TABLET ORAL EVERY 6 HOURS PRN
Status: DISCONTINUED | OUTPATIENT
Start: 2023-12-29 | End: 2024-01-03 | Stop reason: HOSPADM

## 2023-12-29 RX ORDER — PANTOPRAZOLE SODIUM 40 MG/1
40 TABLET, DELAYED RELEASE ORAL
Status: DISCONTINUED | OUTPATIENT
Start: 2023-12-30 | End: 2024-01-03 | Stop reason: HOSPADM

## 2023-12-29 RX ORDER — ALBUTEROL SULFATE 90 UG/1
2 AEROSOL, METERED RESPIRATORY (INHALATION) EVERY 4 HOURS PRN
Status: DISCONTINUED | OUTPATIENT
Start: 2023-12-29 | End: 2024-01-03 | Stop reason: HOSPADM

## 2023-12-29 RX ORDER — METOPROLOL TARTRATE 1 MG/ML
5 INJECTION, SOLUTION INTRAVENOUS EVERY 6 HOURS
Status: DISCONTINUED | OUTPATIENT
Start: 2023-12-29 | End: 2024-01-01

## 2023-12-29 RX ORDER — POLYETHYLENE GLYCOL 3350 17 G/17G
17 POWDER, FOR SOLUTION ORAL DAILY
Status: DISCONTINUED | OUTPATIENT
Start: 2023-12-30 | End: 2024-01-03 | Stop reason: HOSPADM

## 2023-12-29 RX ORDER — BISACODYL 10 MG
10 SUPPOSITORY, RECTAL RECTAL DAILY
Status: DISCONTINUED | OUTPATIENT
Start: 2023-12-30 | End: 2024-01-03 | Stop reason: HOSPADM

## 2023-12-29 RX ORDER — LEVETIRACETAM 500 MG/1
500 TABLET ORAL EVERY 12 HOURS SCHEDULED
Status: DISCONTINUED | OUTPATIENT
Start: 2023-12-29 | End: 2023-12-29

## 2023-12-29 RX ORDER — CEFTRIAXONE 1 G/50ML
1000 INJECTION, SOLUTION INTRAVENOUS EVERY 24 HOURS
Status: DISCONTINUED | OUTPATIENT
Start: 2023-12-30 | End: 2024-01-01

## 2023-12-29 RX ORDER — ENOXAPARIN SODIUM 100 MG/ML
40 INJECTION SUBCUTANEOUS DAILY
Status: DISCONTINUED | OUTPATIENT
Start: 2023-12-30 | End: 2024-01-03 | Stop reason: HOSPADM

## 2023-12-29 RX ORDER — ACETAMINOPHEN 10 MG/ML
1000 INJECTION, SOLUTION INTRAVENOUS ONCE
Status: COMPLETED | OUTPATIENT
Start: 2023-12-29 | End: 2023-12-29

## 2023-12-29 RX ORDER — IPRATROPIUM BROMIDE AND ALBUTEROL SULFATE 2.5; .5 MG/3ML; MG/3ML
3 SOLUTION RESPIRATORY (INHALATION) ONCE
Status: COMPLETED | OUTPATIENT
Start: 2023-12-29 | End: 2023-12-29

## 2023-12-29 RX ORDER — PHENOBARBITAL 32.4 MG/1
32.4 TABLET ORAL 2 TIMES DAILY
Status: DISCONTINUED | OUTPATIENT
Start: 2023-12-29 | End: 2024-01-03 | Stop reason: HOSPADM

## 2023-12-29 RX ORDER — AMOXICILLIN 250 MG
1 CAPSULE ORAL 2 TIMES DAILY
Status: DISCONTINUED | OUTPATIENT
Start: 2023-12-29 | End: 2024-01-03 | Stop reason: HOSPADM

## 2023-12-29 RX ADMIN — CEFTRIAXONE 1000 MG: 1 INJECTION, SOLUTION INTRAVENOUS at 14:23

## 2023-12-29 RX ADMIN — ACETAMINOPHEN 1000 MG: 10 INJECTION INTRAVENOUS at 14:45

## 2023-12-29 RX ADMIN — SODIUM CHLORIDE 500 ML: 0.9 INJECTION, SOLUTION INTRAVENOUS at 16:41

## 2023-12-29 RX ADMIN — REMDESIVIR 200 MG: 100 INJECTION, POWDER, LYOPHILIZED, FOR SOLUTION INTRAVENOUS at 22:59

## 2023-12-29 RX ADMIN — METOROPROLOL TARTRATE 5 MG: 5 INJECTION, SOLUTION INTRAVENOUS at 22:08

## 2023-12-29 RX ADMIN — IOHEXOL 100 ML: 350 INJECTION, SOLUTION INTRAVENOUS at 15:43

## 2023-12-29 RX ADMIN — IPRATROPIUM BROMIDE AND ALBUTEROL SULFATE 3 ML: 2.5; .5 SOLUTION RESPIRATORY (INHALATION) at 13:23

## 2023-12-29 NOTE — ASSESSMENT & PLAN NOTE
HS trop 55 - 59  Suspected secondary to nonischemic medical injury due to COVID-19 infection.  Continue to trend troponin.  Continue statin.

## 2023-12-29 NOTE — RESPIRATORY THERAPY NOTE
RT Ventilator Management Note  Olivia Suárez 83 y.o. female MRN: [id number]  Unit/Bed#: FT 04 Encounter: [id number]      Daily Screen    No data found in the last 10 encounters.           Physical Exam:   Assessment Type: During-treatment  General Appearance: Lethargic  Respiratory Pattern: Assisted  Chest Assessment: Chest expansion symmetrical      Resp Comments: placed pt on bipap         12/29/23 1325   Respiratory Assessment   Assessment Type During-treatment   General Appearance Lethargic   Respiratory Pattern Assisted   Chest Assessment Chest expansion symmetrical   Resp Comments placed pt on bipap   Non-Invasive Information   O2 Interface Device Face mask   Non-Invasive Ventilation Mode BiPAP   $ Continous NIV Initial   Non-Invasive Settings   IPAP (cm) 12 cm   EPAP (cm) 6 cm   Rate (Set) 10   FiO2 (%) 40   Pressure Support (cm H2O) 6   Rise Time 2   Non-Invasive Readings   Skin Intervention Skin intact   Total Rate 11   Vt (mL) (Mech) 488   MV (Mech) 5.5   Peak Pressure (Obs) 12   Spontaneous Vt (mL) 485   Leak (lpm) 14

## 2023-12-29 NOTE — ED PROVIDER NOTES
History  Chief Complaint   Patient presents with    Shortness of Breath     Pt came in by EMS from [facility] got report of that the pt had a Fever of 103 degree fever and SOB      HPI    Prior to Admission Medications   Prescriptions Last Dose Informant Patient Reported? Taking?   Diclofenac Sodium (VOLTAREN) 1 %   No No   Sig: Apply 2 g topically as needed in the morning and 2 g as needed at noon and 2 g as needed in the evening and 2 g as needed before bedtime (lower ext pain).   PHENobarbital 32.4 mg tablet   No No   Sig: Take 1 tablet (32.4 mg total) by mouth in the morning and 1 tablet (32.4 mg total) in the evening. Do all this for 10 days.   acetaminophen (TYLENOL) 325 mg tablet   No No   Sig: Take 2 tablets (650 mg total) by mouth every 6 (six) hours as needed for mild pain, headaches or fever   albuterol (PROVENTIL HFA,VENTOLIN HFA) 90 mcg/act inhaler   No No   Sig: Inhale 2 puffs every 4 (four) hours as needed for wheezing   atorvastatin (LIPITOR) 80 mg tablet   Yes No   Sig: Take 80 mg by mouth daily   bisacodyl (DULCOLAX) 10 mg suppository   No No   Sig: Insert 1 suppository (10 mg total) into the rectum in the morning.   cholecalciferol (VITAMIN D3) 1,000 units tablet   Yes No   Sig: Take 2,000 Units by mouth daily   folic acid (FOLVITE) 1 mg tablet   Yes No   Sig: Take by mouth daily   furosemide (LASIX) 20 mg tablet   No No   Sig: Take 1 tablet (20 mg total) by mouth in the morning.   levETIRAcetam (KEPPRA) 500 mg tablet   Yes No   Sig: Take 500 mg by mouth every 12 (twelve) hours   metoprolol tartrate 37.5 MG TABS   No No   Sig: Take 1 tablet (37.5 mg total) by mouth every 12 (twelve) hours   omeprazole (PriLOSEC) 20 mg delayed release capsule   Yes No   Sig: Take 20 mg by mouth daily   polyethylene glycol (MIRALAX) 17 g packet   No No   Sig: Take 17 g by mouth in the morning.   senna-docusate sodium (SENOKOT S) 8.6-50 mg per tablet   No No   Sig: Take 1 tablet by mouth in the morning and 1  tablet in the evening.      Facility-Administered Medications: None       Past Medical History:   Diagnosis Date    CHF (congestive heart failure) (HCC)     Liver cirrhosis (HCC)     Seizures (HCC)        Past Surgical History:   Procedure Laterality Date    HYSTERECTOMY      IR PICC PLACEMENT SINGLE LUMEN  4/29/2022    LEG SURGERY  05/2016    Pt family cannot recall the specific surgery or which leg it was preformed on.       Family History   Problem Relation Age of Onset    Lung cancer Mother      I have reviewed and agree with the history as documented.    E-Cigarette/Vaping     E-Cigarette/Vaping Substances     Social History     Tobacco Use    Smoking status: Never    Smokeless tobacco: Never   Substance Use Topics    Alcohol use: Never    Drug use: No       Review of Systems    Physical Exam  Physical Exam    Vital Signs  ED Triage Vitals   Temperature Pulse Respirations Blood Pressure SpO2   12/29/23 1313 12/29/23 1227 12/29/23 1227 12/29/23 1227 12/29/23 1227   (!) 100.6 °F (38.1 °C) (!) 106 18 136/83 100 %      Temp src Heart Rate Source Patient Position - Orthostatic VS BP Location FiO2 (%)   -- 12/29/23 1227 -- -- --    Monitor         Pain Score       --                  Vitals:    12/29/23 1227   BP: 136/83   Pulse: (!) 106         Visual Acuity      ED Medications  Medications   cefTRIAXone (ROCEPHIN) IVPB (premix in dextrose) 1,000 mg 50 mL (1,000 mg Intravenous New Bag 12/29/23 1423)   acetaminophen (Ofirmev) injection 1,000 mg (has no administration in time range)   ipratropium-albuterol (DUO-NEB) 0.5-2.5 mg/3 mL inhalation solution 3 mL (3 mL Nebulization Given 12/29/23 1323)       Diagnostic Studies  Results Reviewed       Procedure Component Value Units Date/Time    Ammonia [[id number]]  (Normal) Collected: 12/29/23 1348    Lab Status: Final result Specimen: Blood from Arm, Left Updated: 12/29/23 1418     Ammonia 66 umol/L     COVID/FLU/RSV [[id number]]  (Abnormal) Collected: 12/29/23 1301    Lab  Status: Final result Specimen: Nares from Nose Updated: 12/29/23 1416     SARS-CoV-2 Positive     INFLUENZA A PCR Negative     INFLUENZA B PCR Negative     RSV PCR Negative    Narrative:      FOR PEDIATRIC PATIENTS - copy/paste COVID Guidelines URL to browser: [email or web address]    SARS-CoV-2 assay is a Nucleic Acid Amplification assay intended for the  qualitative detection of nucleic acid from SARS-CoV-2 in nasopharyngeal  swabs. Results are for the presumptive identification of SARS-CoV-2 RNA.    Positive results are indicative of infection with SARS-CoV-2, the virus  causing COVID-19, but do not rule out bacterial infection or co-infection  with other viruses. Laboratories within the [place] and its  territories are required to report all positive results to the appropriate  public health authorities. Negative results do not preclude SARS-CoV-2  infection and should not be used as the sole basis for treatment or other  patient management decisions. Negative results must be combined with  clinical observations, patient history, and epidemiological information.  This test has not been FDA cleared or approved.    This test has been authorized by FDA under an Emergency Use Authorization  (EUA). This test is only authorized for the duration of time the  declaration that circumstances exist justifying the authorization of the  emergency use of an in vitro diagnostic tests for detection of SARS-CoV-2  virus and/or diagnosis of COVID-19 infection under section 564(b)(1) of  the Act, 21 U.S.C. 360bbb-3(b)(1), unless the authorization is terminated  or revoked sooner. The test has been validated but independent review by FDA  and CLIA is pending.    Test performed using Sendori: This RT-PCR assay targets N2,  a region unique to SARS-CoV-2. A conserved region in the E-gene was chosen  for pan-Sarbecovirus detection which includes  SARS-CoV-2.    According to CMS-2020-01-R, this platform meets the definition of high-throughput technology.    Urine Microscopic [[id number]]  (Abnormal) Collected: 12/29/23 1344    Lab Status: Final result Specimen: Urine, Straight Cath Updated: 12/29/23 1400     RBC, UA 2-4 /hpf      WBC, UA Innumerable /hpf      Epithelial Cells Occasional /hpf      Bacteria, UA Occasional /hpf      Budding Yeast Present    Urine culture [[id number]] Collected: 12/29/23 1344    Lab Status: In process Specimen: Urine, Straight Cath Updated: 12/29/23 1400    UA w Reflex to Microscopic w Reflex to Culture [[id number]]  (Abnormal) Collected: 12/29/23 1344    Lab Status: Final result Specimen: Urine, Straight Cath Updated: 12/29/23 1357     Color, UA Light Yellow     Clarity, UA Turbid     Specific Gravity, UA 1.013     pH, UA 7.0     Leukocytes, UA Large     Nitrite, UA Positive     Protein, UA Negative mg/dl      Glucose, UA Negative mg/dl      Ketones, UA Negative mg/dl      Urobilinogen, UA <2.0 mg/dl      Bilirubin, UA Negative     Occult Blood, UA Trace    Comprehensive metabolic panel [[id number]]  (Abnormal) Collected: 12/29/23 1302    Lab Status: Final result Specimen: Blood from Arm, Left Updated: 12/29/23 1342     Sodium 132 mmol/L      Potassium 4.6 mmol/L      Chloride 96 mmol/L      CO2 29 mmol/L      ANION GAP 7 mmol/L      BUN 13 mg/dL      Creatinine 0.68 mg/dL      Glucose 98 mg/dL      Calcium 9.5 mg/dL      Corrected Calcium 10.0 mg/dL      AST 37 U/L      ALT 28 U/L      Alkaline Phosphatase 84 U/L      Total Protein 7.1 g/dL      Albumin 3.4 g/dL      Total Bilirubin 0.32 mg/dL      eGFR 81 ml/min/1.73sq m     Narrative:      National Kidney Disease Foundation guidelines for Chronic Kidney Disease (CKD):     Stage 1 with normal or high GFR (GFR > 90 mL/min/1.73 square meters)    Stage 2 Mild CKD (GFR = 60-89 mL/min/1.73 square meters)    Stage 3A Moderate CKD (GFR = 45-59 mL/min/1.73 square meters)    Stage 3B  Moderate CKD (GFR = 30-44 mL/min/1.73 square meters)    Stage 4 Severe CKD (GFR = 15-29 mL/min/1.73 square meters)    Stage 5 End Stage CKD (GFR <15 mL/min/1.73 square meters)  Note: GFR calculation is accurate only with a steady state creatinine    Lipase [[id number]]  (Abnormal) Collected: 12/29/23 1302    Lab Status: Final result Specimen: Blood from Arm, Left Updated: 12/29/23 1342     Lipase 7 u/L     Procalcitonin [[id number]]  (Normal) Collected: 12/29/23 1302    Lab Status: Final result Specimen: Blood from Arm, Left Updated: 12/29/23 1342     Procalcitonin 0.08 ng/ml     HS Troponin I 2hr [[id number]]     Lab Status: No result Specimen: Blood     HS Troponin 0hr (reflex protocol) [[id number]]  (Normal) Collected: 12/29/23 1301    Lab Status: Final result Specimen: Blood from Arm, Left Updated: 12/29/23 1340     hs TnI 0hr 46 ng/L     B-Type Natriuretic Peptide(BNP) [[id number]]  (Abnormal) Collected: 12/29/23 1302    Lab Status: Final result Specimen: Blood from Arm, Left Updated: 12/29/23 1340      pg/mL     Blood gas, arterial [[id number]]  (Abnormal) Collected: 12/29/23 1332    Lab Status: Final result Specimen: Blood, Arterial from Radial, Right Updated: 12/29/23 1339     pH, Arterial 7.435     pCO2, Arterial 46.2 mm Hg      pO2, Arterial 133.5 mm Hg      HCO3, Arterial 30.3 mmol/L      Base Excess, Arterial 5.4 mmol/L      O2 Content, Arterial 14.6 mL/dL      O2 HGB,Arterial  97.3 %      SOURCE Radial, Right    Lactic acid, plasma (w/reflex if result > 2.0) [[id number]]  (Normal) Collected: 12/29/23 1302    Lab Status: Final result Specimen: Blood from Arm, Left Updated: 12/29/23 1334     LACTIC ACID 0.8 mmol/L     Narrative:      Result may be elevated if tourniquet was used during collection.    POCT Blood Gas (CG8+) [[id number]]  (Abnormal) Collected: 12/29/23 1239    Lab Status: Final result Specimen: Venous Updated: 12/29/23 1316     ph, Darryl ISTAT 7.609     pCO2, Darryl i-STAT 32.5 mm HG      pO2, Darryl  i-STAT 144.0 mm HG      BE, i-STAT 11 mmol/L      HCO3, Darryl i-STAT 32.6 mmol/L      CO2, i-STAT 34 mmol/L      O2 Sat, i-STAT 100 %      SODIUM, I-STAT 131 mmol/l      Potassium, i-STAT 4.4 mmol/L      Calcium, Ionized i-STAT 1.01 mmol/L      Hct, i-STAT 29 %      Hgb, i-STAT 9.9 g/dl      Glucose, i-STAT 106 mg/dl      Specimen Type VENOUS    CBC and differential [[id number]]  (Abnormal) Collected: 12/29/23 1301    Lab Status: Final result Specimen: Blood from Arm, Left Updated: 12/29/23 1312     WBC 6.01 Thousand/uL      RBC 3.21 Million/uL      Hemoglobin 9.5 g/dL      Hematocrit 29.0 %      MCV 90 fL      MCH 29.6 pg      MCHC 32.8 g/dL      RDW 15.7 %      MPV 9.9 fL      Platelets 155 Thousands/uL      nRBC 0 /100 WBCs      Neutrophils Relative 70 %      Immat GRANS % 1 %      Lymphocytes Relative 14 %      Monocytes Relative 15 %      Eosinophils Relative 0 %      Basophils Relative 0 %      Neutrophils Absolute 4.20 Thousands/µL      Immature Grans Absolute 0.03 Thousand/uL      Lymphocytes Absolute 0.84 Thousands/µL      Monocytes Absolute 0.92 Thousand/µL      Eosinophils Absolute 0.01 Thousand/µL      Basophils Absolute 0.01 Thousands/µL     Blood culture #2 [[id number]] Collected: 12/29/23 1303    Lab Status: In process Specimen: Blood from Arm, Left Updated: 12/29/23 1307    Blood culture #1 [[id number]] Collected: 12/29/23 1303    Lab Status: In process Specimen: Blood from Arm, Left Updated: 12/29/23 1307                   XR chest 1 view portable    (Results Pending)   CT head without contrast    (Results Pending)   CT chest abdomen pelvis w contrast    (Results Pending)              Procedures  Procedures         ED Course                                             Medical Decision Making  EKG rate 91, NSR, without signs of acute ishemic change    Amount and/or Complexity of Data Reviewed  Labs: ordered.  Radiology: ordered.    Risk  Prescription drug management.  Decision regarding  hospitalization.             Disposition  Final diagnoses:   None     ED Disposition       None          Follow-up Information    None         Patient's Medications   Discharge Prescriptions    No medications on file       No discharge procedures on file.    PDMP Review         Value Time User    PDMP Reviewed  Yes 12/24/2020 11:26 AM Luis Cornell MD            ED Provider  Electronically Signed by

## 2023-12-29 NOTE — ASSESSMENT & PLAN NOTE
Present on admission with history of chronic anemia.  Currently hemoglobin stable around 9-10 range.  Continue to  trend CBC.

## 2023-12-29 NOTE — Clinical Note
Case was discussed with hospitalist and the patient's admission status was agreed to be Admission Status: inpatient status to the service of Dr. Gómez .   Detail Level: Zone General Sunscreen Counseling: I recommended a broad-spectrum sunscreen with a sun protection factor (SPF) of 30 or higher.  I explained that SPF 30 sunscreens block approximately 97 percent of the sun's harmful rays.  Sunscreens should be applied at least 10 minutes prior to expected sun exposure and then every 2 hours after that as long as sun exposure continues. If swimming or exercising, sunscreen should be reapplied every 40-80 minutes after getting wet or sweating (depending on the water resistance of the sunscreen).  One ounce, or the equivalent of a shot glass full of sunscreen, is adequate to protect the skin not covered by a bathing suit. I also recommended wearing a wide-brimmed hat, a lip balm with a sunscreen and sunglasses. Sun protective clothing can be used in lieu of sunscreen but must be worn the entire time you are exposed to the sun's rays. I also recommended avoiding sun during the peak UVB hours from around 10am to 3pm.

## 2023-12-29 NOTE — ASSESSMENT & PLAN NOTE
Wt Readings from Last 3 Encounters:   12/29/23 90.4 kg (199 lb 4.7 oz)   05/11/22 95 kg (209 lb 7 oz)   05/02/22 101 kg (222 lb 14.2 oz)     Present on admission history of diastolic CHF.  Does not appear excessively volume overloaded.  Resume home dose of Lasix 20 mg daily, metoprolol.

## 2023-12-30 LAB
ALBUMIN SERPL BCP-MCNC: 3.6 G/DL (ref 3.5–5)
ALP SERPL-CCNC: 84 U/L (ref 34–104)
ALT SERPL W P-5'-P-CCNC: 35 U/L (ref 7–52)
ANION GAP SERPL CALCULATED.3IONS-SCNC: 8 MMOL/L
AST SERPL W P-5'-P-CCNC: 46 U/L (ref 13–39)
BILIRUB SERPL-MCNC: 0.25 MG/DL (ref 0.2–1)
BUN SERPL-MCNC: 13 MG/DL (ref 5–25)
CALCIUM SERPL-MCNC: 9.9 MG/DL (ref 8.4–10.2)
CHLORIDE SERPL-SCNC: 98 MMOL/L (ref 96–108)
CO2 SERPL-SCNC: 28 MMOL/L (ref 21–32)
CREAT SERPL-MCNC: 0.59 MG/DL (ref 0.6–1.3)
CRP SERPL QL: 123.2 MG/L
D DIMER PPP FEU-MCNC: 1.37 UG/ML FEU
ERYTHROCYTE [DISTWIDTH] IN BLOOD BY AUTOMATED COUNT: 15.7 % (ref 11.6–15.1)
ERYTHROCYTE [SEDIMENTATION RATE] IN BLOOD: 61 MM/HOUR (ref 0–29)
GFR SERPL CREATININE-BSD FRML MDRD: 85 ML/MIN/1.73SQ M
GLUCOSE SERPL-MCNC: 85 MG/DL (ref 65–140)
HCT VFR BLD AUTO: 30.3 % (ref 34.8–46.1)
HGB BLD-MCNC: 9.7 G/DL (ref 11.5–15.4)
MCH RBC QN AUTO: 29.6 PG (ref 26.8–34.3)
MCHC RBC AUTO-ENTMCNC: 32 G/DL (ref 31.4–37.4)
MCV RBC AUTO: 92 FL (ref 82–98)
PLATELET # BLD AUTO: 148 THOUSANDS/UL (ref 149–390)
PMV BLD AUTO: 9.5 FL (ref 8.9–12.7)
POTASSIUM SERPL-SCNC: 3.9 MMOL/L (ref 3.5–5.3)
PROCALCITONIN SERPL-MCNC: 0.08 NG/ML
PROT SERPL-MCNC: 7.3 G/DL (ref 6.4–8.4)
RBC # BLD AUTO: 3.28 MILLION/UL (ref 3.81–5.12)
SODIUM SERPL-SCNC: 134 MMOL/L (ref 135–147)
WBC # BLD AUTO: 5.68 THOUSAND/UL (ref 4.31–10.16)

## 2023-12-30 PROCEDURE — 85027 COMPLETE CBC AUTOMATED: CPT | Performed by: STUDENT IN AN ORGANIZED HEALTH CARE EDUCATION/TRAINING PROGRAM

## 2023-12-30 PROCEDURE — 92610 EVALUATE SWALLOWING FUNCTION: CPT

## 2023-12-30 PROCEDURE — 85379 FIBRIN DEGRADATION QUANT: CPT | Performed by: STUDENT IN AN ORGANIZED HEALTH CARE EDUCATION/TRAINING PROGRAM

## 2023-12-30 PROCEDURE — 86140 C-REACTIVE PROTEIN: CPT | Performed by: STUDENT IN AN ORGANIZED HEALTH CARE EDUCATION/TRAINING PROGRAM

## 2023-12-30 PROCEDURE — 85652 RBC SED RATE AUTOMATED: CPT | Performed by: STUDENT IN AN ORGANIZED HEALTH CARE EDUCATION/TRAINING PROGRAM

## 2023-12-30 PROCEDURE — 80053 COMPREHEN METABOLIC PANEL: CPT | Performed by: STUDENT IN AN ORGANIZED HEALTH CARE EDUCATION/TRAINING PROGRAM

## 2023-12-30 PROCEDURE — 99232 SBSQ HOSP IP/OBS MODERATE 35: CPT | Performed by: NURSE PRACTITIONER

## 2023-12-30 PROCEDURE — 84145 PROCALCITONIN (PCT): CPT | Performed by: STUDENT IN AN ORGANIZED HEALTH CARE EDUCATION/TRAINING PROGRAM

## 2023-12-30 RX ADMIN — METOROPROLOL TARTRATE 5 MG: 5 INJECTION, SOLUTION INTRAVENOUS at 02:44

## 2023-12-30 RX ADMIN — LEVETIRACETAM 500 MG: 100 INJECTION, SOLUTION INTRAVENOUS at 01:14

## 2023-12-30 RX ADMIN — ATORVASTATIN CALCIUM 80 MG: 40 TABLET, FILM COATED ORAL at 10:26

## 2023-12-30 RX ADMIN — LEVETIRACETAM 500 MG: 100 INJECTION, SOLUTION INTRAVENOUS at 21:09

## 2023-12-30 RX ADMIN — METOROPROLOL TARTRATE 5 MG: 5 INJECTION, SOLUTION INTRAVENOUS at 10:19

## 2023-12-30 RX ADMIN — SENNOSIDES, DOCUSATE SODIUM 1 TABLET: 8.6; 5 TABLET ORAL at 17:36

## 2023-12-30 RX ADMIN — METOROPROLOL TARTRATE 5 MG: 5 INJECTION, SOLUTION INTRAVENOUS at 15:48

## 2023-12-30 RX ADMIN — FUROSEMIDE 20 MG: 20 TABLET ORAL at 10:26

## 2023-12-30 RX ADMIN — DEXAMETHASONE SODIUM PHOSPHATE 6 MG: 10 INJECTION, SOLUTION INTRAMUSCULAR; INTRAVENOUS at 00:04

## 2023-12-30 RX ADMIN — FOLIC ACID 1 MG: 1 TABLET ORAL at 10:26

## 2023-12-30 RX ADMIN — REMDESIVIR 100 MG: 100 INJECTION, POWDER, LYOPHILIZED, FOR SOLUTION INTRAVENOUS at 21:09

## 2023-12-30 RX ADMIN — DEXAMETHASONE SODIUM PHOSPHATE 6 MG: 10 INJECTION, SOLUTION INTRAMUSCULAR; INTRAVENOUS at 21:08

## 2023-12-30 RX ADMIN — ENOXAPARIN SODIUM 40 MG: 40 INJECTION SUBCUTANEOUS at 10:26

## 2023-12-30 RX ADMIN — CEFTRIAXONE 1000 MG: 1 INJECTION, SOLUTION INTRAVENOUS at 14:39

## 2023-12-30 RX ADMIN — LEVETIRACETAM 500 MG: 100 INJECTION, SOLUTION INTRAVENOUS at 10:23

## 2023-12-30 RX ADMIN — PHENOBARBITAL 32.4 MG: 32.4 TABLET ORAL at 17:37

## 2023-12-30 RX ADMIN — METOROPROLOL TARTRATE 5 MG: 5 INJECTION, SOLUTION INTRAVENOUS at 21:09

## 2023-12-30 RX ADMIN — PHENOBARBITAL 32.4 MG: 32.4 TABLET ORAL at 10:26

## 2023-12-30 NOTE — SPEECH THERAPY NOTE
Speech-Language Pathology Bedside Swallow Evaluation        Patient Name: Olivia Suárez    Today's Date: 12/30/2023     Problem List  Principal Problem:    Sepsis (HCC)  Active Problems:    Chronic diastolic (congestive) heart failure (HCC)    Localization-related epilepsy, intractable (HCC)    Hyperlipidemia    Anemia    Elevated troponin    UTI (urinary tract infection)    COVID-19    HTN (hypertension)         Past Medical History  Past Medical History:   Diagnosis Date    CHF (congestive heart failure) (HCC)     Liver cirrhosis (HCC)     Seizures (HCC)        Summary    Pt presents with mild oropharyngeal dysphagia for consistencies assessed, pt appears w/ possible aspiration risk w/ thin liquids.      Recommendations:   Diet: puree/level 1 diet and nectar thick liquids   Meds: whole with liquid and whole with puree   Frequent Oral care: 2x/day  Aspiration precautions   Other Recommendations/ considerations: will follow up x 2-3 for diet tolerance, monitor aspiration risk and determine if VBS is needed.        Current Medical Status  Pt is a 83 y.o. female who presented to [facility]  with sepsis. Pt was brought to [facility] emergency room from [place] due to altered mental status, cough, fever.  Tested positive for COVID-19.  UA indicates UTI.  Patient initially required BiPAP secondary transition to nasal cannula.  Patient also had Reynaga catheter placed in emergency room initially due to AMS.  Patient is poor historian.  On my encounter she is awake, alert, pleasantly disoriented.  Cooperative during exam.  Poor historian.     Past medical history:   Please see H&P for details    Special Studies:  CT-chest/abd/ pelvis w/ contrast: 12/29/23 LUNGS: Right basilar density seen, unchanged from the previous study of May 9, 2022 due to atelectasis no acute airspace consolidation  Small 3 to 4 mm nodule seen in the right upper lobe, unchanged    Social/Education/Vocational Hx:  Pt lives in  SNF/ECF    Swallow Information   Current Risks for Dysphagia & Aspiration: known history of dysphagia and AMS  Current Symptoms/Concerns: change in respiratory status; per RN, pt w/ gurgling and coughing  Current Diet: NPO   Baseline Diet: puree/level 1 diet and thin liquids  Takes pills-    Baseline Assessment   Behavior/Cognition: alert  Speech/Language Status: able to follow commands and limited verbal output  Patient Positioning: upright in bed     Swallow Mechanism Exam   Facial: symmetrical  Labial: WFL  Lingual: WFL  Velum: unable to visualize  Mandible: adequate ROM  Dentition: upper dentures  Vocal quality:clear/adequate   Volitional Cough: strong/productive   Respiratory: NC    Consistencies Assessed and Performance   Consistencies Administered: thin liquids, nectar thick, and puree    Oral Stage: pt w/ adequate bolus retrieval from spoon and straw. Pt appeared w/ adequate oral control, occas prolonged oral holding noted. Bolus manipulation of puree was functional.     Pharyngeal Stage: swallow initiation was variable- delayed at times. Throat clearing noted w/ thin liquids as well as delayed cough x1.       Esophageal Concerns: none reported      Results Reviewed with: patient, RN, and AUREA   Dysphagia Goals: pt will tolerate puree with nectar thick liquids without s/s of aspiration x2-3 sessions       Agustina Fournier MA CCC-SLP  Speech Pathologist  [place] license # [id number]  [place] license # [id number]  Available via [employer]

## 2023-12-30 NOTE — PROGRESS NOTES
[facility]  Progress Note  Name: Olivia Suárez I  MRN: [id number]  Unit/Bed#: -01 I Date of Admission: 12/29/2023   Date of Service: 12/30/2023 I Hospital Day: 1    Assessment/Plan   * Sepsis (HCC)  Assessment & Plan  83-year-old female patient with past medical history of CHF, anemia, hyperlipidemia, presented with AMS,fever, shortness of breath, patient was tested positive for COVID-19 in the emergency room.  Initially required BiPAP in the emergency room however subsequently transition to nasal cannula.  Reynaga catheter was placed in emergency room due to altered mental status.      CBC without leukocytosis, hemoglobin 9.5.  UA positive for UTI.  CMP noted with mild hyponatremia of 132.  Mildly elevated troponin likely due to COVID infection.  Chest x-ray noted with chronic right shoulder dislocation, right-sided  shunt catheter.  CT head report noted with chronic changes, negative for acute finding.  CT chest abdomen pelvis reported with bibasilar densities suggestive of atelectasis, negative for any other acute finding.    Presented with fever, tachycardia, tachypnea, AMS secondary to UTI, COVID-19 infection.  Currently O2 saturation 99 to 100% on room air.  Lactic acid normal limit.  Weight-based IV fluid bolus not given due to history of CHF.  ESR 61, .2  D-dimer 1.37  Continued mild treatment pathway for COVID-19-remdesivir   Continue IV ceftriaxone for UTI.  Follow-up with urine culture.    HTN (hypertension)  Assessment & Plan  Uncontrolled.  Not on Anti- HTN agents   Iv hydralazine PRN     COVID-19  Assessment & Plan  Presented from [place] due to altered mental status.  Tested positive for COVID-19.  As per daughter patient has been vaccinated.  Initially required BiPAP subsequently transition to nasal cannula.  Currently over saturation 99 to 100% on room air.  CT chest report noted with atelectasis.  Noted with mildly elevated intermittent  microscopy  Continue IV remdesivir, IV Decadron.    UTI (urinary tract infection)  Assessment & Plan  Likely contributing to altered mental status.  Continue IV ceftriaxone  Follow-up with urine culture.      Elevated troponin  Assessment & Plan  HS trop 55 - 59  Suspected secondary to nonischemic medical injury due to COVID-19 infection.  Continue to trend troponin.  Continue statin.      Anemia  Assessment & Plan  Present on admission with history of chronic anemia.  Currently hemoglobin stable around 9-10 range.  Continue to  trend CBC.      Hyperlipidemia  Assessment & Plan  Resume home dose of Lipitor.    Localization-related epilepsy, intractable (HCC)  Assessment & Plan  Resume home dose of Keppra, phenobarbital.    Chronic diastolic (congestive) heart failure (HCC)  Assessment & Plan  Wt Readings from Last 3 Encounters:   23 90.4 kg (199 lb 4.7 oz)   22 95 kg (209 lb 7 oz)   22 101 kg (222 lb 14.2 oz)     Present on admission history of diastolic CHF.  Does not appear excessively volume overloaded.  Resume home dose of Lasix 20 mg daily, metoprolol.             VTE Pharmacologic Prophylaxis:   Pharmacologic: Enoxaparin (Lovenox)  Mechanical VTE Prophylaxis in Place: Yes    Patient Centered Rounds: I have performed bedside rounds with nursing staff today.  Discussions with Specialists or Other Care Team Provider: Yes  Education and Discussions with Family / Patient:Yes  Time Spent for Care: 15 minutes.  More than 50% of total time spent on counseling and coordination of care as described above.    Current Length of Stay: 1 day(s)  Current Patient Status: Inpatient     Discharge Plan: greater than 48 hours     Code Status: Level 1 - Full Code      Subjective:   Patient laying in bed with complaints of some low back pain.  She is on room air with adequate saturation.  She has some back pain but otherwise feels okay.        Objective:     Vitals:   Temp (24hrs), Av.6 °F (37.6 °C), Min:97.9  °F (36.6 °C), Max:100.6 °F (38.1 °C)    Temp:  [97.9 °F (36.6 °C)-100.6 °F (38.1 °C)] 97.9 °F (36.6 °C)  HR:  [] 62  Resp:  [12-19] 16  BP: (102-183)/(59-98) 164/98  SpO2:  [91 %-100 %] 97 %  Body mass index is 40.25 kg/m².     Input and Output Summary (last 24 hours):     Intake/Output Summary (Last 24 hours) at 12/30/2023 1300  Last data filed at 12/30/2023 1014  Gross per 24 hour   Intake --   Output 1400 ml   Net -1400 ml        Physical Exam:     Physical Exam  Vitals reviewed.   Constitutional:       Appearance: Normal appearance.   HENT:      Head: Normocephalic.   Cardiovascular:      Rate and Rhythm: Normal rate and regular rhythm.   Pulmonary:      Effort: Pulmonary effort is normal.      Breath sounds: Normal breath sounds.   Abdominal:      General: Abdomen is flat. Bowel sounds are normal.      Palpations: Abdomen is soft.   Musculoskeletal:         General: No swelling. Normal range of motion.   Skin:     General: Skin is warm and dry.   Neurological:      Mental Status: She is alert. Mental status is at baseline.      Comments: Pleasantly confused    Psychiatric:         Mood and Affect: Mood normal.         Behavior: Behavior normal.         Additional Data:     Labs:    Results from last 7 days   Lab Units 12/30/23  0830 12/29/23  1301 12/29/23  1239   WBC Thousand/uL 5.68 6.01  --    HEMOGLOBIN g/dL 9.7* 9.5*  --    I STAT HEMOGLOBIN g/dl  --   --  9.9*   HEMATOCRIT % 30.3* 29.0*  --    HEMATOCRIT, ISTAT %  --   --  29*   PLATELETS Thousands/uL 148* 155  --    NEUTROS PCT %  --  70  --      Results from last 7 days   Lab Units 12/30/23  0830 12/29/23  1302 12/29/23  1239   SODIUM mmol/L 134* 132*  --    POTASSIUM mmol/L 3.9 4.6  --    CHLORIDE mmol/L 98 96  --    CO2 mmol/L 28 29  --    CO2, I-STAT mmol/L  --   --  34*   BUN mg/dL 13 13  --    CREATININE mg/dL 0.59* 0.68  --    CALCIUM mg/dL 9.9 9.5  --    TOTAL BILIRUBIN mg/dL 0.25 0.32  --    ALK PHOS U/L 84 84  --    ALT U/L 35 28  --     AST U/L 46* 37  --              Lab Results   Component Value Date/Time    HGBA1C 5.6 04/26/2022 05:37 AM         Results from last 7 days   Lab Units 12/30/23  0822 12/29/23  1302   LACTIC ACID mmol/L  --  0.8   PROCALCITONIN ng/ml 0.08 0.08       * I Have Reviewed All Lab Data Listed Above.  * Additional Pertinent Lab Tests Reviewed: All Labs For Current Hospital Admission Reviewed    Imaging:     CT head without contrast   Final Result by Kay Denny MD (12/29 1617)      Stable CT, no acute intracranial hemorrhage   Ventriculomegaly, stable      Right frontal approach ventricular drainage catheter which terminates on the left side in the region of the basal ganglia, stable and unchanged from the previous study of January 19, 2022      Moderate periventricular and white matter hypodensity due to chronic small vessel ischemic change   Multifocal areas of encephalomalacia, unchanged               Workstation performed: [id number]         CT chest abdomen pelvis w contrast   Final Result by Kay Denny MD (12/29 1646)   No intra-abdominal fluid collection   Bibasilar densities suggest atelectasis. No acute consolidation   Fluid-filled rectum, correlate with the diarrhea   No bowel obstruction         Workstation performed: [id number]         XR chest 1 view portable   Final Result by Dick Velazquez DO (12/29 1723)      Low lung volumes with no acute cardiopulmonary disease.      Chronic right shoulder dislocation.      Right-sided  shunt catheter with apparent discontiguous tubing projecting over the mid chest.            Workstation performed: [id number]           Imaging Reports Reviewed by myself    Cultures:   Blood Culture:   Lab Results   Component Value Date    BLOODCX Received in Microbiology Lab. Culture in Progress. 12/29/2023    BLOODCX Received in Microbiology Lab. Culture in Progress. 12/29/2023    BLOODCX No Growth After 5 Days. 05/09/2022    BLOODCX No Growth After 5 Days.  "05/09/2022    BLOODCX No Growth After 5 Days. 04/25/2022    BLOODCX No Growth After 5 Days. 04/25/2022     Urine Culture:   Lab Results   Component Value Date    URINECX <10,000 cfu/ml 12/30/2018     Sputum Culture: No components found for: \"SPUTUMCX\"  Wound Culture:   Lab Results   Component Value Date    WOUNDCULT 3+ Growth of 03/15/2021       Last 24 Hours Medication List:   Current Facility-Administered Medications   Medication Dose Route Frequency Provider Last Rate    acetaminophen  650 mg Oral Q6H PRN Ronn ALVARADO MD      albuterol  2 puff Inhalation Q4H PRN Ronn Gómez V, MD      atorvastatin  80 mg Oral Daily Ronn ALVARADO MD      bisacodyl  10 mg Rectal Daily Ronn Gómez V, MD      cefTRIAXone  1,000 mg Intravenous Q24H Ronn Gómez V, MD      dexamethasone  6 mg Intravenous Q24H Ronn ALVARADO MD      enoxaparin  40 mg Subcutaneous Daily Ronn ALVARADO MD      folic acid  1 mg Oral Daily Ronn ALVARADO MD      furosemide  20 mg Oral Daily Ronn ALVARADO MD      levETIRAcetam  500 mg Intravenous Q12H [facility] Pranay Rose PA-C 500 mg (12/30/23 1023)    metoprolol  5 mg Intravenous Q6H Pranay Rose PA-C      pantoprazole  40 mg Oral Early Morning Ronn ALVARADO MD      PHENobarbital  32.4 mg Oral BID Ronn ALVARADO MD      polyethylene glycol  17 g Oral Daily Ronn ALVARADO MD      remdesivir  100 mg Intravenous Q24H Ronn ALVARADO MD      senna-docusate sodium  1 tablet Oral BID Ronn ALVARADO MD          Today, Patient Was Seen By: AUREA Lara    ** Please Note: Dragon 360 Dictation voice to text software may have been used in the creation of this document. **  "

## 2023-12-30 NOTE — ASSESSMENT & PLAN NOTE
83-year-old male patient with past medical history of CHF, anemia, hyperlipidemia, presented with AMS,fever, shortness of breath, patient was tested positive for COVID-19 in the emergency room.  Initially required BiPAP in the emergency room however subsequently transition to nasal cannula.  Reynaga catheter was placed in emergency room due to altered mental status.      CBC without leukocytosis, hemoglobin 9.5.  UA positive for UTI.  CMP noted with mild hyponatremia of 132.  Mildly elevated troponin likely due to COVID infection.  Chest x-ray noted with chronic right shoulder dislocation, right-sided  shunt catheter.  CT head report noted with chronic changes, negative for acute finding.  CT chest abdomen pelvis reported with bibasilar densities suggestive of atelectasis, negative for any other acute finding.    Presented with fever, tachycardia, tachypnea, AMS secondary to UTI, COVID-19 infection.  Currently O2 saturation 99 to 100% on room air.  Lactic acid normal limit.  Weight-based IV fluid bolus not given due to history of CHF.  On my encounter patient appears comfortable nondistressed pain  Follow-up with inflammatory markers, D-dimer.  Continued mild treatment pathway for COVID-19.  Continue IV ceftriaxone for UTI.  Follow-up with urine culture.

## 2023-12-30 NOTE — ASSESSMENT & PLAN NOTE
Likely contributing to altered mental status.  Continue IV ceftriaxone for  Follow-up with urine culture.

## 2023-12-30 NOTE — ASSESSMENT & PLAN NOTE
Likely contributing to altered mental status.  Continue IV ceftriaxone  Follow-up with urine culture.

## 2023-12-30 NOTE — ASSESSMENT & PLAN NOTE
83-year-old female patient with past medical history of CHF, anemia, hyperlipidemia, presented with AMS,fever, shortness of breath, patient was tested positive for COVID-19 in the emergency room.  Initially required BiPAP in the emergency room however subsequently transition to nasal cannula.  Reynaga catheter was placed in emergency room due to altered mental status.      CBC without leukocytosis, hemoglobin 9.5.  UA positive for UTI.  CMP noted with mild hyponatremia of 132.  Mildly elevated troponin likely due to COVID infection.  Chest x-ray noted with chronic right shoulder dislocation, right-sided  shunt catheter.  CT head report noted with chronic changes, negative for acute finding.  CT chest abdomen pelvis reported with bibasilar densities suggestive of atelectasis, negative for any other acute finding.    Presented with fever, tachycardia, tachypnea, AMS secondary to UTI, COVID-19 infection.  Currently O2 saturation 99 to 100% on room air.  Lactic acid normal limit.  Weight-based IV fluid bolus not given due to history of CHF.  ESR 61, .2  D-dimer 1.37  Continued mild treatment pathway for COVID-19-remdesivir   Continue IV ceftriaxone for UTI.  Follow-up with urine culture.

## 2023-12-30 NOTE — H&P
[facility]  H&P  Name: Olivia Suárez 83 y.o. female I MRN: [id number]  Unit/Bed#: FT 04 I Date of Admission: 12/29/2023   Date of Service: 12/29/2023 I Hospital Day: 0      Assessment/Plan   * Sepsis (HCC)  Assessment & Plan  83-year-old male patient with past medical history of CHF, anemia, hyperlipidemia, presented with AMS,fever, shortness of breath, patient was tested positive for COVID-19 in the emergency room.  Initially required BiPAP in the emergency room however subsequently transition to nasal cannula.  Reynaga catheter was placed in emergency room due to altered mental status.      CBC without leukocytosis, hemoglobin 9.5.  UA positive for UTI.  CMP noted with mild hyponatremia of 132.  Mildly elevated troponin likely due to COVID infection.  Chest x-ray noted with chronic right shoulder dislocation, right-sided  shunt catheter.  CT head report noted with chronic changes, negative for acute finding.  CT chest abdomen pelvis reported with bibasilar densities suggestive of atelectasis, negative for any other acute finding.    Presented with fever, tachycardia, tachypnea, AMS secondary to UTI, COVID-19 infection.  Currently O2 saturation 99 to 100% on room air.  Lactic acid normal limit.  Weight-based IV fluid bolus not given due to history of CHF.  On my encounter patient appears comfortable nondistressed pain  Follow-up with inflammatory markers, D-dimer.  Continued mild treatment pathway for COVID-19.  Continue IV ceftriaxone for UTI.  Follow-up with urine culture.        COVID-19  Assessment & Plan  Presented from [place] due to altered mental status.  Tested positive for COVID-19.  As per daughter patient has been vaccinated.  Initially required BiPAP subsequently transition to nasal cannula.  Currently over saturation 99 to 100% on room air.  CT chest report noted with atelectasis.  Noted with mildly elevated intermittent microscopy  Will start on IV remdesivir, IV  Decadron.    Elevated troponin  Assessment & Plan  HS trop 55 - 59  Suspected secondary to nonischemic medical injury due to COVID-19 infection.  Continue to trend troponin.  Continue statin.      HTN (hypertension)  Assessment & Plan  Uncontrolled.  Not on Anti- HTN agents   Iv hydralazine PRN     UTI (urinary tract infection)  Assessment & Plan  Likely contributing to altered mental status.  Continue IV ceftriaxone for  Follow-up with urine culture.      Anemia  Assessment & Plan  Present on admission with history of chronic anemia.  Currently hemoglobin stable around 9-10 range.  Continue to  trend CBC.      Hyperlipidemia  Assessment & Plan  Resume home dose of Lipitor.    Localization-related epilepsy, intractable (HCC)  Assessment & Plan  Resume home dose of Keppra, phenobarbital.    Chronic diastolic (congestive) heart failure (HCC)  Assessment & Plan  Wt Readings from Last 3 Encounters:   12/29/23 90.4 kg (199 lb 4.7 oz)   05/11/22 95 kg (209 lb 7 oz)   05/02/22 101 kg (222 lb 14.2 oz)     Present on admission history of diastolic CHF.  Does not appear excessively volume overloaded.  Resume home dose of Lasix 20 mg daily, metoprolol.               VTE Prophylaxis: Heparin   Code Status: Level 1 Full Code: would not want to be intubated but ok with Chest compression as per Daughter.  Discussion with family: Spoke with daughter Jerri over the phone.     Anticipated Length of Stay:  Patient will be admitted on an Inpatient basis with an anticipated length of stay of  > 2 midnights.   Justification for Hospital Stay: Sepsis due to UTI, COVID 19 infection    Total Time for Visit, including Counseling / Coordination of Care: 90 minutes.  Greater than 50% of this total time spent on direct patient counseling and coordination of care.    Chief Complaint:   AMS, sob, cough.     History of Present Illness:    Olivia Suárez is a 83 y.o. female patient past medical history of dementia, aortic stenosis, liver  cirrhosis, epilepsy, anemia, CHF, who was brought to [facility] emergency room from [place] due to altered mental status, cough, fever.  Tested positive for COVID-19.  UA indicates UTI.  Patient initially required BiPAP secondary transition to nasal cannula.  Patient also had Reynaga catheter placed in emergency room initially due to AMS.  Patient is poor historian.  On my encounter she is awake, alert, pleasantly disoriented.  Cooperative during exam.  Poor historian.  Denies chest pain, nausea, vomiting, dyspnea, abdominal pain, diarrhea, and Mucomyst.  No other events reported.    Review of Systems:    Review of Systems   Unable to perform ROS: Dementia       Past Medical and Surgical History:     Past Medical History:   Diagnosis Date    CHF (congestive heart failure) (HCC)     Liver cirrhosis (HCC)     Seizures (HCC)        Past Surgical History:   Procedure Laterality Date    HYSTERECTOMY      IR PICC PLACEMENT SINGLE LUMEN  4/29/2022    LEG SURGERY  05/2016    Pt family cannot recall the specific surgery or which leg it was preformed on.       Meds/Allergies:    Prior to Admission medications    Medication Sig Start Date End Date Taking? Authorizing Provider   acetaminophen (TYLENOL) 325 mg tablet Take 2 tablets (650 mg total) by mouth every 6 (six) hours as needed for mild pain, headaches or fever 3/20/21   AUREA Davila   albuterol (PROVENTIL HFA,VENTOLIN HFA) 90 mcg/act inhaler Inhale 2 puffs every 4 (four) hours as needed for wheezing 12/24/20   Luis Cornell MD   atorvastatin (LIPITOR) 80 mg tablet Take 80 mg by mouth daily    Historical Provider, MD   bisacodyl (DULCOLAX) 10 mg suppository Insert 1 suppository (10 mg total) into the rectum in the morning. 5/12/22   Eugenia Ling MD   cholecalciferol (VITAMIN D3) 1,000 units tablet Take 2,000 Units by mouth daily    Historical Provider, MD   Diclofenac Sodium (VOLTAREN) 1 % Apply 2 g topically as needed in the morning and 2 g as needed  at noon and 2 g as needed in the evening and 2 g as needed before bedtime (lower ext pain). 5/11/22   Eugenia Ling MD   folic acid (FOLVITE) 1 mg tablet Take by mouth daily    Historical Provider, MD   furosemide (LASIX) 20 mg tablet Take 1 tablet (20 mg total) by mouth in the morning. 5/12/22   Eugenia Ling MD   levETIRAcetam (KEPPRA) 500 mg tablet Take 500 mg by mouth every 12 (twelve) hours    Historical Provider, MD   metoprolol tartrate 37.5 MG TABS Take 1 tablet (37.5 mg total) by mouth every 12 (twelve) hours 5/11/22   Eugenia Ling MD   omeprazole (PriLOSEC) 20 mg delayed release capsule Take 20 mg by mouth daily    Historical Provider, MD   PHENobarbital 32.4 mg tablet Take 1 tablet (32.4 mg total) by mouth in the morning and 1 tablet (32.4 mg total) in the evening. Do all this for 10 days. 5/11/22 5/21/22  Eugenia Ling MD   polyethylene glycol (MIRALAX) 17 g packet Take 17 g by mouth in the morning. 5/12/22   Eugenia Ling MD   senna-docusate sodium (SENOKOT S) 8.6-50 mg per tablet Take 1 tablet by mouth in the morning and 1 tablet in the evening. 5/11/22   Eugenia Ling MD     I have reviewed home medications with a medical source (PCP, Pharmacy, other).    Allergies:   Allergies   Allergen Reactions    Penicillins Other (See Comments)     Pt unaware of reaction       Social History:     Marital Status: Single     Substance Use History:   Social History     Substance and Sexual Activity   Alcohol Use Never     Social History     Tobacco Use   Smoking Status Never   Smokeless Tobacco Never     Social History     Substance and Sexual Activity   Drug Use No       Family History:    Family History   Problem Relation Age of Onset    Lung cancer Mother        Physical Exam:     Vitals:   Blood Pressure: (!) 183/80 (12/29/23 1815)  Pulse: 86 (12/29/23 1815)  Temperature: 99.5 °F (37.5 °C) (12/29/23 1815)  Temp Source: Oral (12/29/23 1815)  Respirations: 19 (12/29/23 1815)  Weight - Scale: 90.4 kg (199 lb 4.7  oz) (12/29/23 1227)  SpO2: 98 % (12/29/23 1815)    Physical Exam  Constitutional:       General: She is not in acute distress.     Appearance: She is obese. She is not toxic-appearing or diaphoretic.   HENT:      Head: Normocephalic and atraumatic.   Eyes:      Pupils: Pupils are equal, round, and reactive to light.   Cardiovascular:      Rate and Rhythm: Normal rate.      Pulses: Normal pulses.   Pulmonary:      Effort: Pulmonary effort is normal. No respiratory distress.      Breath sounds: Normal breath sounds. No wheezing.      Comments: O2 saturation 98 to 99% room air.  Abdominal:      General: Bowel sounds are normal. There is no distension.      Palpations: Abdomen is soft.      Tenderness: There is no abdominal tenderness.   Musculoskeletal:      Cervical back: No rigidity.   Neurological:      Mental Status: She is alert. She is disoriented.      Comments: Currently she is awake, alert, oriented to self.  Pleasantly disoriented.  Cooperative during exam.  Poor historian.   Psychiatric:         Behavior: Behavior normal.           Additional Data:     Lab Results: I have personally reviewed pertinent reports.      Results from last 7 days   Lab Units 12/29/23  1301   WBC Thousand/uL 6.01   HEMOGLOBIN g/dL 9.5*   HEMATOCRIT % 29.0*   PLATELETS Thousands/uL 155   NEUTROS PCT % 70   LYMPHS PCT % 14   MONOS PCT % 15*   EOS PCT % 0     Results from last 7 days   Lab Units 12/29/23  1302   SODIUM mmol/L 132*   POTASSIUM mmol/L 4.6   CHLORIDE mmol/L 96   CO2 mmol/L 29   BUN mg/dL 13   CREATININE mg/dL 0.68   ANION GAP mmol/L 7   CALCIUM mg/dL 9.5   ALBUMIN g/dL 3.4*   TOTAL BILIRUBIN mg/dL 0.32   ALK PHOS U/L 84   ALT U/L 28   AST U/L 37   GLUCOSE RANDOM mg/dL 98                 Results from last 7 days   Lab Units 12/29/23  1302   LACTIC ACID mmol/L 0.8   PROCALCITONIN ng/ml 0.08       Imaging: I have personally reviewed pertinent reports.      CT head without contrast   Final Result by Kay Denny MD (12/29  1617)      Stable CT, no acute intracranial hemorrhage   Ventriculomegaly, stable      Right frontal approach ventricular drainage catheter which terminates on the left side in the region of the basal ganglia, stable and unchanged from the previous study of January 19, 2022      Moderate periventricular and white matter hypodensity due to chronic small vessel ischemic change   Multifocal areas of encephalomalacia, unchanged               Workstation performed: [id number]         CT chest abdomen pelvis w contrast   Final Result by Kay Denny MD (12/29 1646)   No intra-abdominal fluid collection   Bibasilar densities suggest atelectasis. No acute consolidation   Fluid-filled rectum, correlate with the diarrhea   No bowel obstruction         Workstation performed: [id number]         XR chest 1 view portable   Final Result by Dick Velazquez DO (12/29 1723)      Low lung volumes with no acute cardiopulmonary disease.      Chronic right shoulder dislocation.      Right-sided  shunt catheter with apparent discontiguous tubing projecting over the mid chest.            Workstation performed: [id number]             EKG, Pathology, and Other Studies Reviewed on Admission:   EKG: Sinus tachycardia      ** Please Note: This note has been constructed using a voice recognition system. **

## 2023-12-30 NOTE — ASSESSMENT & PLAN NOTE
Presented from [place] due to altered mental status.  Tested positive for COVID-19.  As per daughter patient has been vaccinated.  Initially required BiPAP subsequently transition to nasal cannula.  Currently over saturation 99 to 100% on room air.  CT chest report noted with atelectasis.  Noted with mildly elevated intermittent microscopy  Continue IV remdesivir, IV Decadron.

## 2023-12-30 NOTE — ASSESSMENT & PLAN NOTE
Presented from [place] due to altered mental status.  Tested positive for COVID-19.  As per daughter patient has been vaccinated.  Initially required BiPAP subsequently transition to nasal cannula.  Currently over saturation 99 to 100% on room air.  CT chest report noted with atelectasis.  Noted with mildly elevated intermittent microscopy  Will start on IV remdesivir, IV Decadron.

## 2023-12-31 LAB
ANION GAP SERPL CALCULATED.3IONS-SCNC: 5 MMOL/L
ATRIAL RATE: 91 BPM
BACTERIA UR CULT: ABNORMAL
BASOPHILS # BLD AUTO: 0.01 THOUSANDS/ÂΜL (ref 0–0.1)
BASOPHILS NFR BLD AUTO: 0 % (ref 0–1)
BUN SERPL-MCNC: 16 MG/DL (ref 5–25)
CALCIUM SERPL-MCNC: 9.4 MG/DL (ref 8.4–10.2)
CHLORIDE SERPL-SCNC: 98 MMOL/L (ref 96–108)
CO2 SERPL-SCNC: 31 MMOL/L (ref 21–32)
CREAT SERPL-MCNC: 0.6 MG/DL (ref 0.6–1.3)
EOSINOPHIL # BLD AUTO: 0.01 THOUSAND/ÂΜL (ref 0–0.61)
EOSINOPHIL NFR BLD AUTO: 0 % (ref 0–6)
ERYTHROCYTE [DISTWIDTH] IN BLOOD BY AUTOMATED COUNT: 15.8 % (ref 11.6–15.1)
GFR SERPL CREATININE-BSD FRML MDRD: 84 ML/MIN/1.73SQ M
GLUCOSE SERPL-MCNC: 112 MG/DL (ref 65–140)
HCT VFR BLD AUTO: 25.5 % (ref 34.8–46.1)
HGB BLD-MCNC: 8.3 G/DL (ref 11.5–15.4)
IMM GRANULOCYTES # BLD AUTO: 0.01 THOUSAND/UL (ref 0–0.2)
IMM GRANULOCYTES NFR BLD AUTO: 0 % (ref 0–2)
LYMPHOCYTES # BLD AUTO: 0.96 THOUSANDS/ÂΜL (ref 0.6–4.47)
LYMPHOCYTES NFR BLD AUTO: 20 % (ref 14–44)
MCH RBC QN AUTO: 29.6 PG (ref 26.8–34.3)
MCHC RBC AUTO-ENTMCNC: 32.5 G/DL (ref 31.4–37.4)
MCV RBC AUTO: 91 FL (ref 82–98)
MONOCYTES # BLD AUTO: 0.84 THOUSAND/ÂΜL (ref 0.17–1.22)
MONOCYTES NFR BLD AUTO: 18 % (ref 4–12)
NEUTROPHILS # BLD AUTO: 2.98 THOUSANDS/ÂΜL (ref 1.85–7.62)
NEUTS SEG NFR BLD AUTO: 62 % (ref 43–75)
NRBC BLD AUTO-RTO: 0 /100 WBCS
P AXIS: 61 DEGREES
PLATELET # BLD AUTO: 149 THOUSANDS/UL (ref 149–390)
PMV BLD AUTO: 9.9 FL (ref 8.9–12.7)
POTASSIUM SERPL-SCNC: 3.5 MMOL/L (ref 3.5–5.3)
PR INTERVAL: 154 MS
QRS AXIS: -3 DEGREES
QRSD INTERVAL: 76 MS
QT INTERVAL: 352 MS
QTC INTERVAL: 432 MS
RBC # BLD AUTO: 2.8 MILLION/UL (ref 3.81–5.12)
SODIUM SERPL-SCNC: 134 MMOL/L (ref 135–147)
T WAVE AXIS: -32 DEGREES
VENTRICULAR RATE: 91 BPM
WBC # BLD AUTO: 4.81 THOUSAND/UL (ref 4.31–10.16)

## 2023-12-31 PROCEDURE — 80048 BASIC METABOLIC PNL TOTAL CA: CPT | Performed by: NURSE PRACTITIONER

## 2023-12-31 PROCEDURE — 99233 SBSQ HOSP IP/OBS HIGH 50: CPT | Performed by: FAMILY MEDICINE

## 2023-12-31 PROCEDURE — 87493 C DIFF AMPLIFIED PROBE: CPT

## 2023-12-31 PROCEDURE — 85025 COMPLETE CBC W/AUTO DIFF WBC: CPT | Performed by: NURSE PRACTITIONER

## 2023-12-31 RX ADMIN — PHENOBARBITAL 32.4 MG: 32.4 TABLET ORAL at 09:50

## 2023-12-31 RX ADMIN — PHENOBARBITAL 32.4 MG: 32.4 TABLET ORAL at 18:57

## 2023-12-31 RX ADMIN — METOROPROLOL TARTRATE 5 MG: 5 INJECTION, SOLUTION INTRAVENOUS at 16:23

## 2023-12-31 RX ADMIN — METOROPROLOL TARTRATE 5 MG: 5 INJECTION, SOLUTION INTRAVENOUS at 22:35

## 2023-12-31 RX ADMIN — LEVETIRACETAM 500 MG: 100 INJECTION, SOLUTION INTRAVENOUS at 22:32

## 2023-12-31 RX ADMIN — FOLIC ACID 1 MG: 1 TABLET ORAL at 09:50

## 2023-12-31 RX ADMIN — FUROSEMIDE 20 MG: 20 TABLET ORAL at 09:50

## 2023-12-31 RX ADMIN — METOROPROLOL TARTRATE 5 MG: 5 INJECTION, SOLUTION INTRAVENOUS at 09:50

## 2023-12-31 RX ADMIN — LEVETIRACETAM 500 MG: 100 INJECTION, SOLUTION INTRAVENOUS at 10:38

## 2023-12-31 RX ADMIN — PANTOPRAZOLE SODIUM 40 MG: 40 TABLET, DELAYED RELEASE ORAL at 04:41

## 2023-12-31 RX ADMIN — ENOXAPARIN SODIUM 40 MG: 40 INJECTION SUBCUTANEOUS at 09:50

## 2023-12-31 RX ADMIN — CEFTRIAXONE 1000 MG: 1 INJECTION, SOLUTION INTRAVENOUS at 14:39

## 2023-12-31 RX ADMIN — METOROPROLOL TARTRATE 5 MG: 5 INJECTION, SOLUTION INTRAVENOUS at 04:41

## 2023-12-31 RX ADMIN — DEXAMETHASONE SODIUM PHOSPHATE 6 MG: 10 INJECTION, SOLUTION INTRAMUSCULAR; INTRAVENOUS at 22:29

## 2023-12-31 RX ADMIN — POLYETHYLENE GLYCOL 3350 17 G: 17 POWDER, FOR SOLUTION ORAL at 10:34

## 2023-12-31 RX ADMIN — Medication 10 MG: at 09:50

## 2023-12-31 RX ADMIN — REMDESIVIR 100 MG: 100 INJECTION, POWDER, LYOPHILIZED, FOR SOLUTION INTRAVENOUS at 22:54

## 2023-12-31 RX ADMIN — SENNOSIDES, DOCUSATE SODIUM 1 TABLET: 8.6; 5 TABLET ORAL at 09:50

## 2023-12-31 RX ADMIN — ATORVASTATIN CALCIUM 80 MG: 40 TABLET, FILM COATED ORAL at 09:50

## 2023-12-31 NOTE — ASSESSMENT & PLAN NOTE
Wt Readings from Last 3 Encounters:   12/29/23 90.4 kg (199 lb 4.7 oz)   05/11/22 95 kg (209 lb 7 oz)   05/02/22 101 kg (222 lb 14.2 oz)     Present on admission history of diastolic CHF.  No volume overload  Continue home dose of Lasix and beta-blockers

## 2023-12-31 NOTE — ASSESSMENT & PLAN NOTE
HS trop 55 - 59, continue to trend troponin  Suspected secondary to nonischemic medical injury due to COVID-19 infection.  Continue statin.

## 2023-12-31 NOTE — PROGRESS NOTES
"[facility]  Progress Note  Name: Olivia Suárez I  MRN: [id number]  Unit/Bed#: MS 321Mally I Date of Admission: 12/29/2023   Date of Service: 12/31/2023 I Hospital Day: 2    Assessment/Plan   * Sepsis (HCC)  Assessment & Plan  Shortness of breath is improved  Underlying COVID-19 being treated, continue remdesivir  Urine cultures growing Klebsiella pneumonia, continue ceftriaxone  Chest x-ray noted with chronic right shoulder dislocation, right-sided  shunt catheter.  CT head report noted with chronic changes, negative for acute finding.  CT chest abdomen pelvis reported with bibasilar densities suggestive of atelectasis, negative for any other acute finding.  Pro-Zelalem normal      HTN (hypertension)  Assessment & Plan  /72   Pulse 69   Temp 97.6 °F (36.4 °C)   Resp 16   Ht 4' 11\" (1.499 m)   Wt 90.4 kg (199 lb 4.7 oz)   SpO2 100%   BMI 40.25 kg/m²   Stable pressures   Not on Anti- HTN agents   Iv hydralazine PRN     COVID-19  Assessment & Plan  Presented from [place] due to altered mental status which is now resolved  Tested positive for COVID-19.  As per daughter patient has been vaccinated.  Initially required BiPAP subsequently transition to nasal cannula.  Currently over saturation 99 to 100% on room air On 2 L  CT chest report noted with atelectasis.  Noted with mildly elevated intermittent microscopy  Continue IV remdesivir, IV Decadron  D-dimer 1.3, continue Lovenox as ordered      UTI (urinary tract infection)  Assessment & Plan  Klebsiella pneumonia UTI  Continue IV ceftriaxone    Elevated troponin  Assessment & Plan  HS trop 55 - 59, continue to trend troponin  Suspected secondary to nonischemic medical injury due to COVID-19 infection.  Continue statin.      Localization-related epilepsy, intractable (HCC)  Assessment & Plan  Stable  Resume home dose of Keppra, phenobarbital.    Chronic diastolic (congestive) heart failure (HCC)  Assessment & Plan  Wt Readings from " Last 3 Encounters:   23 90.4 kg (199 lb 4.7 oz)   22 95 kg (209 lb 7 oz)   22 101 kg (222 lb 14.2 oz)     Present on admission history of diastolic CHF.  No volume overload  Continue home dose of Lasix and beta-blockers               VTE Pharmacologic Prophylaxis:    Lovenox    Mobility:   Basic Mobility Inpatient Raw Score: 24  JH-HLM Goal: 8: Walk 250 feet or more  JH-HLM Achieved: 1: Laying in bed  HLM Goal NOT achieved. Continue with multidisciplinary rounding and encourage appropriate mobility to improve upon HLM goals.    Patient Centered Rounds: I performed bedside rounds with nursing staff today.   Discussions with Specialists or Other Care Team Provider: Yes, nursing    Education and Discussions with Family / Patient: Updated  (daughter) via phone.    Total Time Spent on Date of Encounter in care of patient: 56 mins. This time was spent on one or more of the following: performing physical exam; counseling and coordination of care; obtaining or reviewing history; documenting in the medical record; reviewing/ordering tests, medications or procedures; communicating with other healthcare professionals and discussing with patient's family/caregivers.    Current Length of Stay: 2 day(s)  Current Patient Status: Inpatient   Certification Statement: The patient will continue to require additional inpatient hospital stay due to ongoing treatment for sepsis/COVID/UTI  Discharge Plan: Anticipate discharge in 48 hrs to rehab facility.    Code Status: Level 1 - Full Code    Subjective:   Seen and examined at bedside  Patient comfortably laying in the bed  Answering all the questions appropriately  Denies any chest pain or shortness of breath or abdominal pain or nausea vomiting diarrhea  Denies any cough at this time    Objective:     Vitals:   Temp (24hrs), Av.9 °F (36.6 °C), Min:97.6 °F (36.4 °C), Max:98.2 °F (36.8 °C)    Temp:  [97.6 °F (36.4 °C)-98.2 °F (36.8 °C)] 97.6 °F (36.4  °C)  HR:  [69-91] 69  BP: (120-157)/(72-91) 120/72  SpO2:  [97 %-100 %] 100 %  Body mass index is 40.25 kg/m².     Input and Output Summary (last 24 hours):     Intake/Output Summary (Last 24 hours) at 12/31/2023 1425  Last data filed at 12/31/2023 1337  Gross per 24 hour   Intake 240 ml   Output 825 ml   Net -585 ml       Physical Exam:   Physical Exam S1-S2 audible, regular, systolic murmur audible in the mitral area  Lungs clear to auscultation, minimal wheezing which is scattered  Alert and answers questions appropriately  Moving all 4 extremities  Generalized weakness noted  Will mucosa moist  Abdomen nontender nondistended bowel sounds audible all 4 quadrants    Additional Data:     Labs:  Results from last 7 days   Lab Units 12/31/23  0559   WBC Thousand/uL 4.81   HEMOGLOBIN g/dL 8.3*   HEMATOCRIT % 25.5*   PLATELETS Thousands/uL 149   NEUTROS PCT % 62   LYMPHS PCT % 20   MONOS PCT % 18*   EOS PCT % 0     Results from last 7 days   Lab Units 12/31/23  0559 12/30/23  0830   SODIUM mmol/L 134* 134*   POTASSIUM mmol/L 3.5 3.9   CHLORIDE mmol/L 98 98   CO2 mmol/L 31 28   BUN mg/dL 16 13   CREATININE mg/dL 0.60 0.59*   ANION GAP mmol/L 5 8   CALCIUM mg/dL 9.4 9.9   ALBUMIN g/dL  --  3.6   TOTAL BILIRUBIN mg/dL  --  0.25   ALK PHOS U/L  --  84   ALT U/L  --  35   AST U/L  --  46*   GLUCOSE RANDOM mg/dL 112 85                 Results from last 7 days   Lab Units 12/30/23  0822 12/29/23  1302   LACTIC ACID mmol/L  --  0.8   PROCALCITONIN ng/ml 0.08 0.08       Lines/Drains:  Invasive Devices       Peripheral Intravenous Line  Duration             Peripheral IV 12/29/23 Left;Ventral (anterior) Forearm 2 days                          Imaging: Reviewed radiology reports from this admission including: abdominal/pelvic CT, head CT    Recent Cultures (last 7 days):   Results from last 7 days   Lab Units 12/29/23  1344 12/29/23  1303   BLOOD CULTURE   --  No Growth at 24 hrs.  No Growth at 24 hrs.   URINE CULTURE  >100,000  cfu/ml Klebsiella pneumoniae*  --        Last 24 Hours Medication List:   Current Facility-Administered Medications   Medication Dose Route Frequency Provider Last Rate    acetaminophen  650 mg Oral Q6H PRN Ronn ALVARADO MD      albuterol  2 puff Inhalation Q4H PRN Ronn ALVARADO MD      atorvastatin  80 mg Oral Daily Ronn ALVARADO MD      bisacodyl  10 mg Rectal Daily Ronn ALVARADO MD      cefTRIAXone  1,000 mg Intravenous Q24H Ronn ALVARADO MD 1,000 mg (12/30/23 1439)    dexamethasone  6 mg Intravenous Q24H Ronn ALVARADO MD      enoxaparin  40 mg Subcutaneous Daily Ronn ALVARADO MD      folic acid  1 mg Oral Daily Ronn ALVARADO MD      furosemide  20 mg Oral Daily Ronn ALVARADO MD      levETIRAcetam  500 mg Intravenous Q12H [facility] Pranay Rose PA-C 500 mg (12/31/23 1038)    metoprolol  5 mg Intravenous Q6H Pranay Rose PA-C      pantoprazole  40 mg Oral Early Morning Ronn ALVARADO MD      PHENobarbital  32.4 mg Oral BID Ronn ALVARADO MD      polyethylene glycol  17 g Oral Daily Ronn ALVARADO MD      remdesivir  100 mg Intravenous Q24H Ronn ALVARADO MD      senna-docusate sodium  1 tablet Oral BID Ronn ALVARADO MD          Today, Patient Was Seen By: Norm Malloy MD    **Please Note: This note may have been constructed using a voice recognition system.**

## 2023-12-31 NOTE — ASSESSMENT & PLAN NOTE
Presented from [place] due to altered mental status which is now resolved  Tested positive for COVID-19.  As per daughter patient has been vaccinated.  Initially required BiPAP subsequently transition to nasal cannula.  Currently over saturation 99 to 100% on room air On 2 L  CT chest report noted with atelectasis.  Noted with mildly elevated intermittent microscopy  Continue IV remdesivir, IV Decadron  D-dimer 1.3, continue Lovenox as ordered

## 2023-12-31 NOTE — ASSESSMENT & PLAN NOTE
Shortness of breath is improved  Underlying COVID-19 being treated, continue remdesivir  Urine cultures growing Klebsiella pneumonia, continue ceftriaxone  Chest x-ray noted with chronic right shoulder dislocation, right-sided  shunt catheter.  CT head report noted with chronic changes, negative for acute finding.  CT chest abdomen pelvis reported with bibasilar densities suggestive of atelectasis, negative for any other acute finding.  Pro-Zelalem normal

## 2023-12-31 NOTE — PROGRESS NOTES
Weaning trial initiated per MD recommendations. Currently patient sleeping. Oxygen saturations fluctuating bw 89 - 90 % on room air while asleep.

## 2023-12-31 NOTE — ASSESSMENT & PLAN NOTE
"/72   Pulse 69   Temp 97.6 °F (36.4 °C)   Resp 16   Ht 4' 11\" (1.499 m)   Wt 90.4 kg (199 lb 4.7 oz)   SpO2 100%   BMI 40.25 kg/m²   Stable pressures   Not on Anti- HTN agents   Iv hydralazine PRN   "

## 2024-01-01 PROBLEM — Z78.9 POOR INTRAVENOUS ACCESS: Status: ACTIVE | Noted: 2024-01-01

## 2024-01-01 LAB
ALBUMIN SERPL BCP-MCNC: 3.1 G/DL (ref 3.5–5)
ALP SERPL-CCNC: 68 U/L (ref 34–104)
ALT SERPL W P-5'-P-CCNC: 80 U/L (ref 7–52)
ANION GAP SERPL CALCULATED.3IONS-SCNC: 6 MMOL/L
AST SERPL W P-5'-P-CCNC: 120 U/L (ref 13–39)
BASOPHILS # BLD AUTO: 0.01 THOUSANDS/ÂΜL (ref 0–0.1)
BASOPHILS NFR BLD AUTO: 0 % (ref 0–1)
BILIRUB SERPL-MCNC: 0.19 MG/DL (ref 0.2–1)
BUN SERPL-MCNC: 21 MG/DL (ref 5–25)
C DIFF TOX GENS STL QL NAA+PROBE: NEGATIVE
CALCIUM ALBUM COR SERPL-MCNC: 9.8 MG/DL (ref 8.3–10.1)
CALCIUM SERPL-MCNC: 9.1 MG/DL (ref 8.4–10.2)
CHLORIDE SERPL-SCNC: 100 MMOL/L (ref 96–108)
CO2 SERPL-SCNC: 31 MMOL/L (ref 21–32)
CREAT SERPL-MCNC: 0.57 MG/DL (ref 0.6–1.3)
EOSINOPHIL # BLD AUTO: 0.01 THOUSAND/ÂΜL (ref 0–0.61)
EOSINOPHIL NFR BLD AUTO: 0 % (ref 0–6)
ERYTHROCYTE [DISTWIDTH] IN BLOOD BY AUTOMATED COUNT: 15.8 % (ref 11.6–15.1)
GFR SERPL CREATININE-BSD FRML MDRD: 86 ML/MIN/1.73SQ M
GLUCOSE SERPL-MCNC: 121 MG/DL (ref 65–140)
HCT VFR BLD AUTO: 24.8 % (ref 34.8–46.1)
HGB BLD-MCNC: 8 G/DL (ref 11.5–15.4)
IMM GRANULOCYTES # BLD AUTO: 0.01 THOUSAND/UL (ref 0–0.2)
IMM GRANULOCYTES NFR BLD AUTO: 0 % (ref 0–2)
LYMPHOCYTES # BLD AUTO: 0.92 THOUSANDS/ÂΜL (ref 0.6–4.47)
LYMPHOCYTES NFR BLD AUTO: 18 % (ref 14–44)
MCH RBC QN AUTO: 29.9 PG (ref 26.8–34.3)
MCHC RBC AUTO-ENTMCNC: 32.3 G/DL (ref 31.4–37.4)
MCV RBC AUTO: 93 FL (ref 82–98)
MONOCYTES # BLD AUTO: 0.66 THOUSAND/ÂΜL (ref 0.17–1.22)
MONOCYTES NFR BLD AUTO: 13 % (ref 4–12)
NEUTROPHILS # BLD AUTO: 3.61 THOUSANDS/ÂΜL (ref 1.85–7.62)
NEUTS SEG NFR BLD AUTO: 69 % (ref 43–75)
NRBC BLD AUTO-RTO: 0 /100 WBCS
PLATELET # BLD AUTO: 160 THOUSANDS/UL (ref 149–390)
PMV BLD AUTO: 10.5 FL (ref 8.9–12.7)
POTASSIUM SERPL-SCNC: 3.6 MMOL/L (ref 3.5–5.3)
PROCALCITONIN SERPL-MCNC: 0.09 NG/ML
PROT SERPL-MCNC: 6.1 G/DL (ref 6.4–8.4)
RBC # BLD AUTO: 2.68 MILLION/UL (ref 3.81–5.12)
SODIUM SERPL-SCNC: 137 MMOL/L (ref 135–147)
WBC # BLD AUTO: 5.22 THOUSAND/UL (ref 4.31–10.16)

## 2024-01-01 PROCEDURE — 99233 SBSQ HOSP IP/OBS HIGH 50: CPT | Performed by: FAMILY MEDICINE

## 2024-01-01 PROCEDURE — 85025 COMPLETE CBC W/AUTO DIFF WBC: CPT | Performed by: FAMILY MEDICINE

## 2024-01-01 PROCEDURE — 80053 COMPREHEN METABOLIC PANEL: CPT | Performed by: FAMILY MEDICINE

## 2024-01-01 PROCEDURE — 84145 PROCALCITONIN (PCT): CPT | Performed by: FAMILY MEDICINE

## 2024-01-01 RX ORDER — LEVETIRACETAM 500 MG/1
500 TABLET ORAL EVERY 12 HOURS SCHEDULED
Status: DISCONTINUED | OUTPATIENT
Start: 2024-01-01 | End: 2024-01-03 | Stop reason: HOSPADM

## 2024-01-01 RX ORDER — FUROSEMIDE 40 MG/1
40 TABLET ORAL DAILY
Status: DISCONTINUED | OUTPATIENT
Start: 2024-01-01 | End: 2024-01-03 | Stop reason: HOSPADM

## 2024-01-01 RX ORDER — FUROSEMIDE 10 MG/ML
40 INJECTION INTRAMUSCULAR; INTRAVENOUS DAILY
Status: DISCONTINUED | OUTPATIENT
Start: 2024-01-01 | End: 2024-01-01

## 2024-01-01 RX ADMIN — FOLIC ACID 1 MG: 1 TABLET ORAL at 09:36

## 2024-01-01 RX ADMIN — PHENOBARBITAL 32.4 MG: 32.4 TABLET ORAL at 18:02

## 2024-01-01 RX ADMIN — METOROPROLOL TARTRATE 5 MG: 5 INJECTION, SOLUTION INTRAVENOUS at 04:23

## 2024-01-01 RX ADMIN — METOPROLOL TARTRATE 37.5 MG: 25 TABLET, FILM COATED ORAL at 16:33

## 2024-01-01 RX ADMIN — PANTOPRAZOLE SODIUM 40 MG: 40 TABLET, DELAYED RELEASE ORAL at 05:55

## 2024-01-01 RX ADMIN — PHENOBARBITAL 32.4 MG: 32.4 TABLET ORAL at 09:36

## 2024-01-01 RX ADMIN — FUROSEMIDE 20 MG: 20 TABLET ORAL at 09:36

## 2024-01-01 RX ADMIN — ENOXAPARIN SODIUM 40 MG: 40 INJECTION SUBCUTANEOUS at 10:00

## 2024-01-01 RX ADMIN — METOROPROLOL TARTRATE 5 MG: 5 INJECTION, SOLUTION INTRAVENOUS at 09:36

## 2024-01-01 RX ADMIN — FUROSEMIDE 40 MG: 40 TABLET ORAL at 13:33

## 2024-01-01 RX ADMIN — LEVETIRACETAM 500 MG: 500 TABLET, FILM COATED ORAL at 22:08

## 2024-01-01 RX ADMIN — ATORVASTATIN CALCIUM 80 MG: 40 TABLET, FILM COATED ORAL at 09:36

## 2024-01-01 RX ADMIN — SENNOSIDES, DOCUSATE SODIUM 1 TABLET: 8.6; 5 TABLET ORAL at 18:03

## 2024-01-01 RX ADMIN — ACETAMINOPHEN 650 MG: 325 TABLET, FILM COATED ORAL at 01:05

## 2024-01-01 NOTE — ASSESSMENT & PLAN NOTE
ICU team unable to find veins even with vein finder as per nursing  This could be from fluid in the superficial issues  Will give 40mg PO lasix and see the response

## 2024-01-01 NOTE — ASSESSMENT & PLAN NOTE
Presented from [place] due to altered mental status which is now resolved  Tested positive for COVID-19.  As per daughter patient has been vaccinated.  Initially required BiPAP subsequently transition to nasal cannula.  Currently on RA   CT chest report noted with atelectasis.  Noted with mildly elevated intermittent microscopy  Continue IV remdesivir, IV Decadron- held due to no IV access  D-dimer 1.3, continue Lovenox as ordered

## 2024-01-01 NOTE — ASSESSMENT & PLAN NOTE
Wt Readings from Last 3 Encounters:   12/31/23 90.4 kg (199 lb 4.7 oz)   05/11/22 95 kg (209 lb 7 oz)   05/02/22 101 kg (222 lb 14.2 oz)     Stable. Present on admission history of diastolic CHF.  No volume overload  Continue home dose of Lasix and beta-blockers

## 2024-01-01 NOTE — ASSESSMENT & PLAN NOTE
Hb at 8 today, cont to monitor daily  Present on admission with history of chronic anemia.  Baseline around 9-9.5  Continue to  trend CBC.

## 2024-01-01 NOTE — ASSESSMENT & PLAN NOTE
Resolved  Shortness of breath is improved  Underlying COVID-19 being treated, continue remdesivir  Urine cultures growing Klebsiella pneumonia, continue ceftriaxone  Chest x-ray noted with chronic right shoulder dislocation, right-sided  shunt catheter.  CT head report noted with chronic changes, negative for acute finding.  CT chest abdomen pelvis reported with bibasilar densities suggestive of atelectasis, negative for any other acute finding.  Pro-Zelalem normal

## 2024-01-01 NOTE — PROGRESS NOTES
"[facility]  Progress Note  Name: Olivia Suárez I  MRN: [id number]  Unit/Bed#: [facility] 321Mally I Date of Admission: 12/29/2023   Date of Service: 1/1/2024 I Hospital Day: 3    Assessment/Plan   * Sepsis (HCC)  Assessment & Plan  Resolved  Shortness of breath is improved  Underlying COVID-19 being treated, continue remdesivir  Urine cultures growing Klebsiella pneumonia, continue ceftriaxone  Chest x-ray noted with chronic right shoulder dislocation, right-sided  shunt catheter.  CT head report noted with chronic changes, negative for acute finding.  CT chest abdomen pelvis reported with bibasilar densities suggestive of atelectasis, negative for any other acute finding.  Pro-Zelalem normal      Poor intravenous access  Assessment & Plan  ICU team unable to find veins even with vein finder as per nursing  This could be from fluid in the superficial issues  Will give 40mg PO lasix and see the response    HTN (hypertension)  Assessment & Plan  /90   Pulse 71   Temp (!) 97 °F (36.1 °C)   Resp 20   Ht 4' 11\" (1.499 m)   Wt 90.4 kg (199 lb 4.7 oz)   SpO2 99%   BMI 40.25 kg/m²   Stable pressures   Not on Anti- HTN agents   Iv hydralazine PRN     COVID-19  Assessment & Plan  Presented from [place] due to altered mental status which is now resolved  Tested positive for COVID-19.  As per daughter patient has been vaccinated.  Initially required BiPAP subsequently transition to nasal cannula.  Currently on RA   CT chest report noted with atelectasis.  Noted with mildly elevated intermittent microscopy  Continue IV remdesivir, IV Decadron- held due to no IV access  D-dimer 1.3, continue Lovenox as ordered      UTI (urinary tract infection)  Assessment & Plan  Klebsiella pneumonia UTI  pt does not have an IV access and attempts have not been successful  Has finished 3 days of IV ceftriaxone which is enough to treat simple UTI    Elevated troponin  Assessment & Plan  HS trop 55 - 59, continue to " trend troponin (ordered yesterday, has not been drawn)  Suspected secondary to nonischemic medical injury due to COVID-19 infection.  Continue statin.      Anemia  Assessment & Plan  Hb at 8 today, cont to monitor daily  Present on admission with history of chronic anemia.  Baseline around 9-9.5  Continue to  trend CBC.      Localization-related epilepsy, intractable (HCC)  Assessment & Plan  Stable  Resume home dose of Keppra, phenobarbital.    Chronic diastolic (congestive) heart failure (HCC)  Assessment & Plan  Wt Readings from Last 3 Encounters:   12/31/23 90.4 kg (199 lb 4.7 oz)   05/11/22 95 kg (209 lb 7 oz)   05/02/22 101 kg (222 lb 14.2 oz)     Stable. Present on admission history of diastolic CHF.  No volume overload  Continue home dose of Lasix and beta-blockers               VTE Pharmacologic Prophylaxis:    lovenox    Mobility:   Basic Mobility Inpatient Raw Score: 24  JH-HLM Goal: 8: Walk 250 feet or more  JH-HLM Achieved: 1: Laying in bed  HLM Goal NOT achieved. Continue with multidisciplinary rounding and encourage appropriate mobility to improve upon HLM goals.    Patient Centered Rounds: I performed bedside rounds with nursing staff today.   Discussions with Specialists or Other Care Team Provider: n/a    Education and Discussions with Family / Patient: Attempted to update  (daughter) via phone. Left voicemail.     Total Time Spent on Date of Encounter in care of patient: 55 mins. This time was spent on one or more of the following: performing physical exam; counseling and coordination of care; obtaining or reviewing history; documenting in the medical record; reviewing/ordering tests, medications or procedures; communicating with other healthcare professionals and discussing with patient's family/caregivers.    Current Length of Stay: 3 day(s)  Current Patient Status: Inpatient   Certification Statement: The patient will continue to require additional inpatient hospital stay due to  continue respiratory status monitoring from COVID   Discharge Plan: Anticipate discharge tomorrow to rehab facility.    Code Status: Level 1 - Full Code    Subjective:   Seen and examined at bedside  Pt likely has some memory disturbance  She is answering all acute questions appropriately  Says she does not have any breathing difficulty today  Concern for sleep apnea, recommend noctural O2     Objective:     Vitals:   Temp (24hrs), Av.3 °F (36.8 °C), Min:97 °F (36.1 °C), Max:100.1 °F (37.8 °C)    Temp:  [97 °F (36.1 °C)-100.1 °F (37.8 °C)] 97 °F (36.1 °C)  HR:  [] 71  Resp:  [20] 20  BP: (118-130)/(56-90) 130/90  SpO2:  [81 %-99 %] 99 %  Body mass index is 40.25 kg/m².     Input and Output Summary (last 24 hours):     Intake/Output Summary (Last 24 hours) at 2024 1327  Last data filed at 2024 0900  Gross per 24 hour   Intake 120 ml   Output 600 ml   Net -480 ml       Physical Exam:   Physical Exam s1,2 (+) R  Lungs CTA  Lymphedema changes in the LE  Abd nt nd bs (+) 4 quads  Pt does not know where she is ,neither the  year, she is able to tell her daughters name   No new focal neuro deficit    Additional Data:     Labs:  Results from last 7 days   Lab Units 24  0558   WBC Thousand/uL 5.22   HEMOGLOBIN g/dL 8.0*   HEMATOCRIT % 24.8*   PLATELETS Thousands/uL 160   NEUTROS PCT % 69   LYMPHS PCT % 18   MONOS PCT % 13*   EOS PCT % 0     Results from last 7 days   Lab Units 24  0558   SODIUM mmol/L 137   POTASSIUM mmol/L 3.6   CHLORIDE mmol/L 100   CO2 mmol/L 31   BUN mg/dL 21   CREATININE mg/dL 0.57*   ANION GAP mmol/L 6   CALCIUM mg/dL 9.1   ALBUMIN g/dL 3.1*   TOTAL BILIRUBIN mg/dL 0.19*   ALK PHOS U/L 68   ALT U/L 80*   AST U/L 120*   GLUCOSE RANDOM mg/dL 121                 Results from last 7 days   Lab Units 24  0558 23  0822 23  1302   LACTIC ACID mmol/L  --   --  0.8   PROCALCITONIN ng/ml 0.09 0.08 0.08       Lines/Drains:  Invasive Devices       Peripheral  Intravenous Line  Duration             Peripheral IV 12/29/23 Left;Ventral (anterior) Forearm 2 days                          Imaging: No pertinent imaging reviewed.    Recent Cultures (last 7 days):   Results from last 7 days   Lab Units 12/31/23  1507 12/29/23  1344 12/29/23  1303   BLOOD CULTURE   --   --  No Growth at 48 hrs.  No Growth at 48 hrs.   URINE CULTURE   --  >100,000 cfu/ml Klebsiella pneumoniae*  --    C DIFF TOXIN B BY PCR  Negative  --   --        Last 24 Hours Medication List:   Current Facility-Administered Medications   Medication Dose Route Frequency Provider Last Rate    acetaminophen  650 mg Oral Q6H PRN Ronn ALVARADO MD      albuterol  2 puff Inhalation Q4H PRN Ronn ALVARADO MD      atorvastatin  80 mg Oral Daily Ronn ALVARADO MD      bisacodyl  10 mg Rectal Daily Ronn ALVARADO MD      dexamethasone  6 mg Intravenous Q24H Ronn ALVARADO MD      enoxaparin  40 mg Subcutaneous Daily Ronn ALVARADO MD      folic acid  1 mg Oral Daily Ronn ALVARADO MD      furosemide  40 mg Oral Daily Norm Malloy MD      levETIRAcetam  500 mg Intravenous Q12H [facility] Pranay Rose PA-C Stopped (01/01/24 1046)    metoprolol  5 mg Intravenous Q6H Pranay Rose PA-C      pantoprazole  40 mg Oral Early Morning Ronn ALVARADO MD      PHENobarbital  32.4 mg Oral BID Ronn ALVARADO MD      polyethylene glycol  17 g Oral Daily Ronn ALVARADO MD      remdesivir  100 mg Intravenous Q24H Ronn ALVARADO  mg (12/31/23 8598)    senna-docusate sodium  1 tablet Oral BID Ronn ALVARADO MD          Today, Patient Was Seen By: Norm Malloy MD    **Please Note: This note may have been constructed using a voice recognition system.**

## 2024-01-01 NOTE — ASSESSMENT & PLAN NOTE
Klebsiella pneumonia UTI  pt does not have an IV access and attempts have not been successful  Has finished 3 days of IV ceftriaxone which is enough to treat simple UTI

## 2024-01-01 NOTE — ASSESSMENT & PLAN NOTE
"/90   Pulse 71   Temp (!) 97 °F (36.1 °C)   Resp 20   Ht 4' 11\" (1.499 m)   Wt 90.4 kg (199 lb 4.7 oz)   SpO2 99%   BMI 40.25 kg/m²   Stable pressures   Not on Anti- HTN agents   Iv hydralazine PRN   "

## 2024-01-01 NOTE — ASSESSMENT & PLAN NOTE
HS trop 55 - 59, continue to trend troponin (ordered yesterday, has not been drawn)  Suspected secondary to nonischemic medical injury due to COVID-19 infection.  Continue statin.

## 2024-01-01 NOTE — PROGRESS NOTES
Patient's IV not working due to swelling. ICU team attempted to obtain labs and replace IV. IV reinsertion attempts unsuccessful. MD made aware. Keppra held at this time. Troponin unable to be obtained at this time.

## 2024-01-02 PROBLEM — G93.41 METABOLIC ENCEPHALOPATHY: Status: ACTIVE | Noted: 2024-01-02

## 2024-01-02 LAB
ANION GAP SERPL CALCULATED.3IONS-SCNC: 9 MMOL/L
BUN SERPL-MCNC: 20 MG/DL (ref 5–25)
CALCIUM SERPL-MCNC: 9.2 MG/DL (ref 8.4–10.2)
CHLORIDE SERPL-SCNC: 98 MMOL/L (ref 96–108)
CO2 SERPL-SCNC: 30 MMOL/L (ref 21–32)
CREAT SERPL-MCNC: 0.58 MG/DL (ref 0.6–1.3)
GFR SERPL CREATININE-BSD FRML MDRD: 85 ML/MIN/1.73SQ M
GLUCOSE SERPL-MCNC: 109 MG/DL (ref 65–140)
POTASSIUM SERPL-SCNC: 3.8 MMOL/L (ref 3.5–5.3)
SODIUM SERPL-SCNC: 137 MMOL/L (ref 135–147)

## 2024-01-02 PROCEDURE — 80048 BASIC METABOLIC PNL TOTAL CA: CPT

## 2024-01-02 PROCEDURE — 99232 SBSQ HOSP IP/OBS MODERATE 35: CPT | Performed by: STUDENT IN AN ORGANIZED HEALTH CARE EDUCATION/TRAINING PROGRAM

## 2024-01-02 RX ADMIN — LEVETIRACETAM 500 MG: 500 TABLET, FILM COATED ORAL at 10:34

## 2024-01-02 RX ADMIN — PHENOBARBITAL 32.4 MG: 32.4 TABLET ORAL at 17:52

## 2024-01-02 RX ADMIN — ACETAMINOPHEN 650 MG: 325 TABLET, FILM COATED ORAL at 10:33

## 2024-01-02 RX ADMIN — POLYETHYLENE GLYCOL 3350 17 G: 17 POWDER, FOR SOLUTION ORAL at 10:35

## 2024-01-02 RX ADMIN — SENNOSIDES, DOCUSATE SODIUM 1 TABLET: 8.6; 5 TABLET ORAL at 10:36

## 2024-01-02 RX ADMIN — ENOXAPARIN SODIUM 40 MG: 40 INJECTION SUBCUTANEOUS at 10:30

## 2024-01-02 RX ADMIN — LEVETIRACETAM 500 MG: 500 TABLET, FILM COATED ORAL at 22:39

## 2024-01-02 RX ADMIN — PANTOPRAZOLE SODIUM 40 MG: 40 TABLET, DELAYED RELEASE ORAL at 05:15

## 2024-01-02 RX ADMIN — FOLIC ACID 1 MG: 1 TABLET ORAL at 10:35

## 2024-01-02 RX ADMIN — SENNOSIDES, DOCUSATE SODIUM 1 TABLET: 8.6; 5 TABLET ORAL at 17:52

## 2024-01-02 RX ADMIN — ATORVASTATIN CALCIUM 80 MG: 40 TABLET, FILM COATED ORAL at 10:36

## 2024-01-02 RX ADMIN — METOPROLOL TARTRATE 37.5 MG: 25 TABLET, FILM COATED ORAL at 22:39

## 2024-01-02 RX ADMIN — Medication 10 MG: at 10:36

## 2024-01-02 RX ADMIN — PHENOBARBITAL 32.4 MG: 32.4 TABLET ORAL at 10:35

## 2024-01-02 RX ADMIN — ACETAMINOPHEN 650 MG: 325 TABLET, FILM COATED ORAL at 17:52

## 2024-01-02 RX ADMIN — SODIUM CHLORIDE 500 ML: 0.9 INJECTION, SOLUTION INTRAVENOUS at 03:06

## 2024-01-02 NOTE — ASSESSMENT & PLAN NOTE
HS trop 55 - 59  Suspected secondary to nonischemic medical injury due to COVID-19 infection.  Continue statin.

## 2024-01-02 NOTE — ASSESSMENT & PLAN NOTE
Presented from [place] due to altered mental status which is now resolved  Tested positive for COVID-19.  As per daughter patient has been vaccinated.  Initially required BiPAP subsequently transition to nasal cannula.  Currently on RA   CT chest report noted with atelectasis.  D-dimer 1.3, continue Lovenox as ordered  No longer requiring supplemental O2, off remdesivir and steroids

## 2024-01-02 NOTE — ASSESSMENT & PLAN NOTE
Resolved  Shortness of breath is improved  Underlying COVID-19 treated  Urine cultures growing Klebsiella pneumonia, completed antibiotics   Chest x-ray noted with chronic right shoulder dislocation, right-sided  shunt catheter.  CT head report noted with chronic changes, negative for acute finding.  CT chest abdomen pelvis reported with bibasilar densities suggestive of atelectasis, negative for any other acute finding.  Pro-Zelalem normal

## 2024-01-02 NOTE — ASSESSMENT & PLAN NOTE
"/68   Pulse 85   Temp 99.5 °F (37.5 °C)   Resp 18   Ht 4' 11\" (1.499 m)   Wt 90.4 kg (199 lb 4.7 oz)   SpO2 95%   BMI 40.25 kg/m²   BP soft overnight, asymptomatic, not given any antihypertensives prior. Improved this morning  "

## 2024-01-02 NOTE — ASSESSMENT & PLAN NOTE
Present on arrival with altered mental status. Likely secondary to sepsis, now resolved after antibiotics/COVID 19 treatments

## 2024-01-02 NOTE — ASSESSMENT & PLAN NOTE
Klebsiella pneumonia UTI  Has finished 3 days of IV ceftriaxone which is enough to treat simple UTI

## 2024-01-02 NOTE — PROGRESS NOTES
"[facility]  Progress Note  Name: Olivia Suárez I  MRN: [id number]  Unit/Bed#: MS [facility]ally I Date of Admission: 12/29/2023   Date of Service: 1/2/2024 I Hospital Day: 4    Assessment/Plan   Metabolic encephalopathy  Assessment & Plan  Present on arrival with altered mental status. Likely secondary to sepsis, now resolved after antibiotics/COVID 19 treatments    Poor intravenous access  Assessment & Plan  IV access was an issue previously, now obtained    HTN (hypertension)  Assessment & Plan  /68   Pulse 85   Temp 99.5 °F (37.5 °C)   Resp 18   Ht 4' 11\" (1.499 m)   Wt 90.4 kg (199 lb 4.7 oz)   SpO2 95%   BMI 40.25 kg/m²   BP soft overnight, asymptomatic, not given any antihypertensives prior. Improved this morning    COVID-19  Assessment & Plan  Presented from [place] due to altered mental status which is now resolved  Tested positive for COVID-19.  As per daughter patient has been vaccinated.  Initially required BiPAP subsequently transition to nasal cannula.  Currently on RA   CT chest report noted with atelectasis.  D-dimer 1.3, continue Lovenox as ordered  No longer requiring supplemental O2, off remdesivir and steroids    UTI (urinary tract infection)  Assessment & Plan  Klebsiella pneumonia UTI  Has finished 3 days of IV ceftriaxone which is enough to treat simple UTI    Elevated troponin  Assessment & Plan  HS trop 55 - 59  Suspected secondary to nonischemic medical injury due to COVID-19 infection.  Continue statin.      Anemia  Assessment & Plan  Hb at 8 today, cont to monitor daily  Present on admission with history of chronic anemia.  Baseline around 9-9.5  Continue to  trend CBC.      Hyperlipidemia  Assessment & Plan  Resume home dose of Lipitor.    Localization-related epilepsy, intractable (HCC)  Assessment & Plan  Stable  Resume home dose of Keppra, phenobarbital.    Chronic diastolic (congestive) heart failure (HCC)  Assessment & Plan  Wt Readings from Last 3 " Encounters:   12/31/23 90.4 kg (199 lb 4.7 oz)   05/11/22 95 kg (209 lb 7 oz)   05/02/22 101 kg (222 lb 14.2 oz)     Stable. Present on admission history of diastolic CHF.  No volume overload  Continue home dose of Lasix and beta-blockers    * Sepsis (HCC)  Assessment & Plan  Resolved  Shortness of breath is improved  Underlying COVID-19 treated  Urine cultures growing Klebsiella pneumonia, completed antibiotics   Chest x-ray noted with chronic right shoulder dislocation, right-sided  shunt catheter.  CT head report noted with chronic changes, negative for acute finding.  CT chest abdomen pelvis reported with bibasilar densities suggestive of atelectasis, negative for any other acute finding.  Pro-Zelalem normal               VTE Pharmacologic Prophylaxis: VTE Score: 9 High Risk (Score >/= 5) - Pharmacological DVT Prophylaxis Ordered: enoxaparin (Lovenox). Sequential Compression Devices Ordered.    Mobility:   Basic Mobility Inpatient Raw Score: 6  JH-HLM Goal: 2: Bed activities/Dependent transfer  JH-HLM Achieved: 2: Bed activities/Dependent transfer  HLM Goal achieved. Continue to encourage appropriate mobility.    Patient Centered Rounds: I performed bedside rounds with nursing staff today.   Discussions with Specialists or Other Care Team Provider: CM    Education and Discussions with Family / Patient: Patient declined call to .     Total Time Spent on Date of Encounter in care of patient: 40 mins. This time was spent on one or more of the following: performing physical exam; counseling and coordination of care; obtaining or reviewing history; documenting in the medical record; reviewing/ordering tests, medications or procedures; communicating with other healthcare professionals and discussing with patient's family/caregivers.    Current Length of Stay: 4 day(s)  Current Patient Status: Inpatient   Certification Statement: The patient will continue to require additional inpatient hospital stay due to  rehab arrangements  Discharge Plan: Anticipate discharge in 24-48 hrs to rehab facility.    Code Status: Level 1 - Full Code    Subjective:   Patient reports that she feels well today. Denies cough, chest pain, dyspnea.    Objective:     Vitals:   Temp (24hrs), Av.8 °F (37.1 °C), Min:98.3 °F (36.8 °C), Max:99.5 °F (37.5 °C)    Temp:  [98.3 °F (36.8 °C)-99.5 °F (37.5 °C)] 99.5 °F (37.5 °C)  HR:  [76-90] 85  Resp:  [17-18] 18  BP: ()/(41-96) 100/68  SpO2:  [92 %-96 %] 95 %  Body mass index is 40.25 kg/m².     Input and Output Summary (last 24 hours):     Intake/Output Summary (Last 24 hours) at 2024 1223  Last data filed at 2024 1050  Gross per 24 hour   Intake 860 ml   Output 400 ml   Net 460 ml       Physical Exam:   Physical Exam  Vitals and nursing note reviewed.   Constitutional:       Appearance: Normal appearance.   HENT:      Head: Normocephalic and atraumatic.      Mouth/Throat:      Mouth: Mucous membranes are moist.   Eyes:      Conjunctiva/sclera: Conjunctivae normal.      Pupils: Pupils are equal, round, and reactive to light.   Cardiovascular:      Rate and Rhythm: Normal rate and regular rhythm.      Pulses: Normal pulses.      Heart sounds: Normal heart sounds. No murmur heard.     No gallop.   Pulmonary:      Effort: Pulmonary effort is normal. No respiratory distress.      Breath sounds: Normal breath sounds. No wheezing or rales.   Abdominal:      General: Abdomen is flat. Bowel sounds are normal. There is no distension.      Palpations: Abdomen is soft.      Tenderness: There is no abdominal tenderness. There is no guarding or rebound.   Musculoskeletal:         General: No swelling. Normal range of motion.   Skin:     General: Skin is warm and dry.      Capillary Refill: Capillary refill takes less than 2 seconds.   Neurological:      General: No focal deficit present.      Mental Status: She is alert. Mental status is at baseline.   Psychiatric:         Mood and Affect: Mood  normal.         Additional Data:     Labs:  Results from last 7 days   Lab Units 01/01/24  0558   WBC Thousand/uL 5.22   HEMOGLOBIN g/dL 8.0*   HEMATOCRIT % 24.8*   PLATELETS Thousands/uL 160   NEUTROS PCT % 69   LYMPHS PCT % 18   MONOS PCT % 13*   EOS PCT % 0     Results from last 7 days   Lab Units 01/02/24  0307 01/01/24  0558   SODIUM mmol/L 137 137   POTASSIUM mmol/L 3.8 3.6   CHLORIDE mmol/L 98 100   CO2 mmol/L 30 31   BUN mg/dL 20 21   CREATININE mg/dL 0.58* 0.57*   ANION GAP mmol/L 9 6   CALCIUM mg/dL 9.2 9.1   ALBUMIN g/dL  --  3.1*   TOTAL BILIRUBIN mg/dL  --  0.19*   ALK PHOS U/L  --  68   ALT U/L  --  80*   AST U/L  --  120*   GLUCOSE RANDOM mg/dL 109 121                 Results from last 7 days   Lab Units 01/01/24  0558 12/30/23  0822 12/29/23  1302   LACTIC ACID mmol/L  --   --  0.8   PROCALCITONIN ng/ml 0.09 0.08 0.08       Lines/Drains:  Invasive Devices       Peripheral Intravenous Line  Duration             Peripheral IV 01/02/24 Dorsal (posterior);Left Forearm <1 day                          Imaging: Reviewed radiology reports from this admission including: CT head    Recent Cultures (last 7 days):   Results from last 7 days   Lab Units 12/31/23  1507 12/29/23  1344 12/29/23  1303   BLOOD CULTURE   --   --  No Growth at 72 hrs.  No Growth at 72 hrs.   URINE CULTURE   --  >100,000 cfu/ml Klebsiella pneumoniae*  --    C DIFF TOXIN B BY PCR  Negative  --   --        Last 24 Hours Medication List:   Current Facility-Administered Medications   Medication Dose Route Frequency Provider Last Rate    acetaminophen  650 mg Oral Q6H PRN Ronn ALVARADO MD      albuterol  2 puff Inhalation Q4H PRN Ronn ALVARADO MD      atorvastatin  80 mg Oral Daily Ronn ALVARADO MD      bisacodyl  10 mg Rectal Daily Ronn ALVARADO MD      enoxaparin  40 mg Subcutaneous Daily Ronn ALVARADO MD      folic acid  1 mg Oral Daily Ronn ALVARADO MD      furosemide  40 mg Oral Daily Norm Malloy MD      levETIRAcetam   500 mg Oral Q12H ARACELI Malloy MD      metoprolol tartrate  37.5 mg Oral Q12H ARACELI Malloy MD      pantoprazole  40 mg Oral Early Morning Ronn ALVARADO MD      PHENobarbital  32.4 mg Oral BID Ronn ALVARADO MD      polyethylene glycol  17 g Oral Daily Ronn ALVARADO MD      senna-docusate sodium  1 tablet Oral BID Ronn ALVARADO MD          Today, Patient Was Seen By: Rigoberto Sandoval MD    **Please Note: This note may have been constructed using a voice recognition system.**

## 2024-01-02 NOTE — CASE MANAGEMENT
Case Management Assessment & Discharge Planning Note    Patient name Olivia Suárez  Location /-01 MRN [id number]  : 1940 Date 2024       Current Admission Date: 2023  Current Admission Diagnosis:Sepsis (HCC)   Patient Active Problem List    Diagnosis Date Noted    Metabolic encephalopathy 2024    Poor intravenous access 2024    Anemia 2023    Elevated troponin 2023    UTI (urinary tract infection) 2023    COVID-19 2023    HTN (hypertension) 2023    Hyponatremia 05/10/2022    Metabolic alkalosis 05/10/2022    Fecal impaction (HCC) 05/10/2022    EDUARDO (acute kidney injury) (HCC) 2022    Aortic stenosis 2022    Cirrhosis (HCC) 2022    Sepsis (HCC) 2022    Aspiration pneumonitis (HCC) 2022    Morbid obesity (HCC) 2021    Bilateral lower extremity edema 2021    Acute respiratory failure with hypoxia (HCC) 2021    Chronic diastolic (congestive) heart failure (HCC) 2020    Hyperlipidemia 2020    Localization-related epilepsy, 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      LOS (days): 4  Geometric Mean LOS (GMLOS) (days): 5.1  Days to GMLOS:1.4     OBJECTIVE:    Risk of Unplanned Readmission Score: 14.54         Current admission status: Inpatient       Preferred Pharmacy:   [id number] - [place] - [address]  [address]  [place] [address]  Phone: [phone] Fax: [phone]    Primary Care Provider: Dima Plascencia DO    Primary Insurance: MEDICARE  Secondary Insurance: [place] [employer] [place] [facility]    ASSESSMENT:  Active Health Care Proxies       SuárezPascualNicola Jerri Health Care Representative - Daughter   Primary Phone: [phone] (Mobile)  Home Phone: [phone]                                Patient Information  Admitted from:: Facility ([facility])  Mental Status: Alert  During Assessment patient was accompanied by: Not accompanied during  assessment  Assessment information provided by:: Daughter  Primary Caregiver: Other (Comment)  Caregiver's Name:: [place] facility staff  Caregiver's Relationship to Patient:: Other (Specify)  Support Systems: Daughter  County of Residence: [place]  What city do you live in?: [place]  Home entry access options. Select all that apply.: Stairs, Elevator  Type of Current Residence: Facility  Upon entering residence, is there a bedroom on the main floor (no further steps)?: Yes  Upon entering residence, is there a bathroom on the main floor (no further steps)?: Yes  Living Arrangements: Other (Comment)  Is patient a ?: No    Activities of Daily Living Prior to Admission  Functional Status: Total dependent  Completes ADLs independently?: No  Level of ADL dependence: Total Dependent  Ambulates independently?: No  Level of ambulatory dependence: Total Dependent  Does patient use assisted devices?: Yes  Assisted Devices (DME) used: Wheelchair  Does patient currently own DME?: No  Does patient have a history of Outpatient Therapy (PT/OT)?: No  Does the patient have a history of Short-Term Rehab?: Yes ([facility])  Does patient have a history of HHC?: No  Does patient currently have HHC?: No         Patient Information Continued  Income Source: Pension/jail  Does patient have prescription coverage?: Yes  Does patient receive dialysis treatments?: No  Does patient have a history of substance abuse?: No  Does patient have a history of Mental Health Diagnosis?: No    PHQ 2/9 Screening   Reviewed PHQ 2/9 Depression Screening Score?: No    Means of Transportation  Means of Transport to Appts:: None      Housing Stability: Low Risk  (1/2/2024)    Housing Stability Vital Sign     Unable to Pay for Housing in the Last Year: No     Number of Places Lived in the Last Year: 1     Unstable Housing in the Last Year: No   Food Insecurity: No Food Insecurity (1/2/2024)    Hunger Vital Sign     Worried About Running Out of  Food in the Last Year: Never true     Ran Out of Food in the Last Year: Never true   Transportation Needs: No Transportation Needs (1/2/2024)    PRAPARE - Transportation     Lack of Transportation (Medical): No     Lack of Transportation (Non-Medical): No   Utilities: Not At Risk (1/2/2024)    [facility] Utilities     Threatened with loss of utilities: No       DISCHARGE DETAILS:    Discharge planning discussed with:: dexter Guthrie  Freedom of Choice: Yes  Comments - Freedom of Choice: FOC maintained in discussion regarding discharge planning. Patient is confused. Called daughter Jerri, introduced self and role, explained role of CM in arranging services such as DME, STR, HHC, and providing community resource information. Discussed current living situation and needs at discharge- Patient is a total dependent care, has lived at [place] and will return there. Referral placed in [facility]  CM contacted family/caregiver?: Yes  Were Treatment Team discharge recommendations reviewed with patient/caregiver?: Yes  Did patient/caregiver verbalize understanding of patient care needs?: N/A- going to facility  Were patient/caregiver advised of the risks associated with not following Treatment Team discharge recommendations?: Yes    Contacts  Patient Contacts: Jerri (daughter)  Relationship to Patient:: Family  Contact Method: Phone  Phone Number: [phone]  Reason/Outcome: Discharge Planning    Requested Home Health Care         Is the patient interested in HHC at discharge?: No    DME Referral Provided  Referral made for DME?: No    Other Referral/Resources/Interventions Provided:  Interventions: SNF  Referral Comments: Referral to [facility] entered into Aidin and confirmed. She is a 15 day bed hold. CM will continue to follow for needs.    Would you like to participate in our [facility] service program?  : No - Declined    Treatment Team Recommendation: SNF  Discharge Destination Plan:: SNF  Transport at Discharge  : [facility], Stretcher van

## 2024-01-03 VITALS
TEMPERATURE: 97.8 F | OXYGEN SATURATION: 99 % | HEART RATE: 86 BPM | DIASTOLIC BLOOD PRESSURE: 87 MMHG | SYSTOLIC BLOOD PRESSURE: 152 MMHG | BODY MASS INDEX: 40.18 KG/M2 | WEIGHT: 199.3 LBS | RESPIRATION RATE: 18 BRPM | HEIGHT: 59 IN

## 2024-01-03 PROBLEM — A41.9 SEPSIS (HCC): Status: RESOLVED | Noted: 2022-05-04 | Resolved: 2024-01-03

## 2024-01-03 LAB
BACTERIA BLD CULT: NORMAL
BACTERIA BLD CULT: NORMAL

## 2024-01-03 PROCEDURE — 99239 HOSP IP/OBS DSCHRG MGMT >30: CPT | Performed by: STUDENT IN AN ORGANIZED HEALTH CARE EDUCATION/TRAINING PROGRAM

## 2024-01-03 PROCEDURE — 92526 ORAL FUNCTION THERAPY: CPT

## 2024-01-03 RX ORDER — FUROSEMIDE 40 MG/1
40 TABLET ORAL DAILY
Start: 2024-01-04

## 2024-01-03 RX ORDER — PANTOPRAZOLE SODIUM 40 MG/1
40 TABLET, DELAYED RELEASE ORAL
Start: 2024-01-04

## 2024-01-03 RX ADMIN — LEVETIRACETAM 500 MG: 500 TABLET, FILM COATED ORAL at 08:52

## 2024-01-03 RX ADMIN — PHENOBARBITAL 32.4 MG: 32.4 TABLET ORAL at 08:52

## 2024-01-03 RX ADMIN — ALBUTEROL SULFATE 2 PUFF: 90 AEROSOL, METERED RESPIRATORY (INHALATION) at 08:53

## 2024-01-03 RX ADMIN — METOPROLOL TARTRATE 37.5 MG: 25 TABLET, FILM COATED ORAL at 08:51

## 2024-01-03 RX ADMIN — POLYETHYLENE GLYCOL 3350 17 G: 17 POWDER, FOR SOLUTION ORAL at 08:52

## 2024-01-03 RX ADMIN — Medication 10 MG: at 08:52

## 2024-01-03 RX ADMIN — ENOXAPARIN SODIUM 40 MG: 40 INJECTION SUBCUTANEOUS at 08:51

## 2024-01-03 RX ADMIN — FOLIC ACID 1 MG: 1 TABLET ORAL at 08:51

## 2024-01-03 RX ADMIN — FUROSEMIDE 40 MG: 40 TABLET ORAL at 08:52

## 2024-01-03 RX ADMIN — SENNOSIDES, DOCUSATE SODIUM 1 TABLET: 8.6; 5 TABLET ORAL at 08:52

## 2024-01-03 RX ADMIN — ATORVASTATIN CALCIUM 80 MG: 40 TABLET, FILM COATED ORAL at 08:51

## 2024-01-03 RX ADMIN — PANTOPRAZOLE SODIUM 40 MG: 40 TABLET, DELAYED RELEASE ORAL at 05:54

## 2024-01-03 NOTE — ASSESSMENT & PLAN NOTE
"/83   Pulse 78   Temp (!) 97.1 °F (36.2 °C)   Resp 18   Ht 4' 11\" (1.499 m)   Wt 90.4 kg (199 lb 4.7 oz)   SpO2 94%   BMI 40.25 kg/m²   BP soft overnight, asymptomatic, not given any antihypertensives prior. Improved this morning  "

## 2024-01-03 NOTE — ASSESSMENT & PLAN NOTE
Wt Readings from Last 3 Encounters:   12/31/23 90.4 kg (199 lb 4.7 oz)   05/11/22 95 kg (209 lb 7 oz)   05/02/22 101 kg (222 lb 14.2 oz)     Stable. Present on admission history of diastolic CHF.  No volume overload  Continue Lasix and beta-blockers

## 2024-01-03 NOTE — PLAN OF CARE
Problem: Prexisting or High Potential for Compromised Skin Integrity  Goal: Skin integrity is maintained or improved  Description: INTERVENTIONS:  - Identify patients at risk for skin breakdown  - Assess and monitor skin integrity  - Assess and monitor nutrition and hydration status  - Monitor labs   - Assess for incontinence   - Turn and reposition patient  - Assist with mobility/ambulation  - Relieve pressure over bony prominences  - Avoid friction and shearing  - Provide appropriate hygiene as needed including keeping skin clean and dry  - Evaluate need for skin moisturizer/barrier cream  - Collaborate with interdisciplinary team   - Patient/family teaching  - Consider wound care consult   Outcome: Progressing     Problem: Nutrition/Hydration-ADULT  Goal: Nutrient/Hydration intake appropriate for improving, restoring or maintaining nutritional needs  Description: Monitor and assess patient's nutrition/hydration status for malnutrition. Collaborate with interdisciplinary team and initiate plan and interventions as ordered.  Monitor patient's weight and dietary intake as ordered or per policy. Utilize nutrition screening tool and intervene as necessary. Determine patient's food preferences and provide high-protein, high-caloric foods as appropriate.     INTERVENTIONS:  - Monitor oral intake, urinary output, labs, and treatment plans  - Assess nutrition and hydration status and recommend course of action  - Evaluate amount of meals eaten  - Assist patient with eating if necessary   - Allow adequate time for meals  - Recommend/ encourage appropriate diets, oral nutritional supplements, and vitamin/mineral supplements  - Order, calculate, and assess calorie counts as needed  - Recommend, monitor, and adjust tube feedings and TPN/PPN based on assessed needs  - Assess need for intravenous fluids  - Provide specific nutrition/hydration education as appropriate  - Include patient/family/caregiver in decisions related to  nutrition  Outcome: Progressing     Problem: PAIN - ADULT  Goal: Verbalizes/displays adequate comfort level or baseline comfort level  Description: Interventions:  - Encourage patient to monitor pain and request assistance  - Assess pain using appropriate pain scale  - Administer analgesics based on type and severity of pain and evaluate response  - Implement non-pharmacological measures as appropriate and evaluate response  - Consider cultural and social influences on pain and pain management  - Notify physician/advanced practitioner if interventions unsuccessful or patient reports new pain  Outcome: Progressing     Problem: INFECTION - ADULT  Goal: Absence or prevention of progression during hospitalization  Description: INTERVENTIONS:  - Assess and monitor for signs and symptoms of infection  - Monitor lab/diagnostic results  - Monitor all insertion sites, i.e. indwelling lines, tubes, and drains  - Monitor endotracheal if appropriate and nasal secretions for changes in amount and color  - [place] appropriate cooling/warming therapies per order  - Administer medications as ordered  - Instruct and encourage patient and family to use good hand hygiene technique  - Identify and instruct in appropriate isolation precautions for identified infection/condition  Outcome: Progressing  Goal: Absence of fever/infection during neutropenic period  Description: INTERVENTIONS:  - Monitor WBC    Outcome: Progressing     Problem: SAFETY ADULT  Goal: Patient will remain free of falls  Description: INTERVENTIONS:  - Educate patient/family on patient safety including physical limitations  - Instruct patient to call for assistance with activity   - Consult OT/PT to assist with strengthening/mobility   - Keep Call bell within reach  - Keep bed low and locked with side rails adjusted as appropriate  - Keep care items and personal belongings within reach  - Initiate and maintain comfort rounds  - Make Fall Risk Sign visible to  staff  - Apply yellow socks and bracelet for high fall risk patients  - Consider moving patient to room near nurses station  Outcome: Progressing  Goal: Maintain or return to baseline ADL function  Description: INTERVENTIONS:  -  Assess patient's ability to carry out ADLs; assess patient's baseline for ADL function and identify physical deficits which impact ability to perform ADLs (bathing, care of mouth/teeth, toileting, grooming, dressing, etc.)  - Assess/evaluate cause of self-care deficits   - Assess range of motion  - Assess patient's mobility; develop plan if impaired  - Assess patient's need for assistive devices and provide as appropriate  - Encourage maximum independence but intervene and supervise when necessary  - Involve family in performance of ADLs  - Assess for home care needs following discharge   - Consider OT consult to assist with ADL evaluation and planning for discharge  - Provide patient education as appropriate  Outcome: Progressing  Goal: Maintains/Returns to pre admission functional level  Description: INTERVENTIONS:  - Perform AM-PAC 6 Click Basic Mobility/ Daily Activity assessment daily.  - Set and communicate daily mobility goal to care team and patient/family/caregiver.   - Collaborate with rehabilitation services on mobility goals if consulted  - Out of bed for toileting  - Record patient progress and toleration of activity level   Outcome: Progressing     Problem: DISCHARGE PLANNING  Goal: Discharge to home or other facility with appropriate resources  Description: INTERVENTIONS:  - Identify barriers to discharge w/patient and caregiver  - Arrange for needed discharge resources and transportation as appropriate  - Identify discharge learning needs (meds, wound care, etc.)  - Arrange for interpretive services to assist at discharge as needed  - Refer to Case Management Department for coordinating discharge planning if the patient needs post-hospital services based on  physician/advanced practitioner order or complex needs related to functional status, cognitive ability, or social support system  Outcome: Progressing     Problem: Knowledge Deficit  Goal: Patient/family/caregiver demonstrates understanding of disease process, treatment plan, medications, and discharge instructions  Description: Complete learning assessment and assess knowledge base.  Interventions:  - Provide teaching at level of understanding  - Provide teaching via preferred learning methods  Outcome: Progressing

## 2024-01-03 NOTE — PLAN OF CARE
Problem: Prexisting or High Potential for Compromised Skin Integrity  Goal: Skin integrity is maintained or improved  Description: INTERVENTIONS:  - Identify patients at risk for skin breakdown  - Assess and monitor skin integrity  - Assess and monitor nutrition and hydration status  - Monitor labs   - Assess for incontinence   - Turn and reposition patient  - Assist with mobility/ambulation  - Relieve pressure over bony prominences  - Avoid friction and shearing  - Provide appropriate hygiene as needed including keeping skin clean and dry  - Evaluate need for skin moisturizer/barrier cream  - Collaborate with interdisciplinary team   - Patient/family teaching  - Consider wound care consult   Outcome: Progressing     Problem: Nutrition/Hydration-ADULT  Goal: Nutrient/Hydration intake appropriate for improving, restoring or maintaining nutritional needs  Description: Monitor and assess patient's nutrition/hydration status for malnutrition. Collaborate with interdisciplinary team and initiate plan and interventions as ordered.  Monitor patient's weight and dietary intake as ordered or per policy. Utilize nutrition screening tool and intervene as necessary. Determine patient's food preferences and provide high-protein, high-caloric foods as appropriate.     INTERVENTIONS:  - Monitor oral intake, urinary output, labs, and treatment plans  - Assess nutrition and hydration status and recommend course of action  - Evaluate amount of meals eaten  - Assist patient with eating if necessary   - Allow adequate time for meals  - Recommend/ encourage appropriate diets, oral nutritional supplements, and vitamin/mineral supplements  - Order, calculate, and assess calorie counts as needed  - Recommend, monitor, and adjust tube feedings and TPN/PPN based on assessed needs  - Assess need for intravenous fluids  - Provide specific nutrition/hydration education as appropriate  - Include patient/family/caregiver in decisions related to  nutrition  Outcome: Progressing     Problem: PAIN - ADULT  Goal: Verbalizes/displays adequate comfort level or baseline comfort level  Description: Interventions:  - Encourage patient to monitor pain and request assistance  - Assess pain using appropriate pain scale  - Administer analgesics based on type and severity of pain and evaluate response  - Implement non-pharmacological measures as appropriate and evaluate response  - Consider cultural and social influences on pain and pain management  - Notify physician/advanced practitioner if interventions unsuccessful or patient reports new pain  Outcome: Progressing     Problem: INFECTION - ADULT  Goal: Absence or prevention of progression during hospitalization  Description: INTERVENTIONS:  - Assess and monitor for signs and symptoms of infection  - Monitor lab/diagnostic results  - Monitor all insertion sites, i.e. indwelling lines, tubes, and drains  - Monitor endotracheal if appropriate and nasal secretions for changes in amount and color  - [place] appropriate cooling/warming therapies per order  - Administer medications as ordered  - Instruct and encourage patient and family to use good hand hygiene technique  - Identify and instruct in appropriate isolation precautions for identified infection/condition  Outcome: Progressing

## 2024-01-03 NOTE — CASE MANAGEMENT
Case Management Discharge Planning Note    Patient name Olivia Suárez  Location /-01 MRN [id number]  : 1940 Date 1/3/2024       Current Admission Date: 2023  Current Admission Diagnosis:Sepsis (HCC)   Patient Active Problem List    Diagnosis Date Noted    Metabolic encephalopathy 2024    Poor intravenous access 2024    Anemia 2023    Elevated troponin 2023    UTI (urinary tract infection) 2023    COVID-19 2023    HTN (hypertension) 2023    Hyponatremia 05/10/2022    Metabolic alkalosis 05/10/2022    Fecal impaction (HCC) 05/10/2022    EDUARDO (acute kidney injury) (HCC) 2022    Aortic stenosis 2022    Cirrhosis (HCC) 2022    Sepsis (HCC) 2022    Aspiration pneumonitis (HCC) 2022    Morbid obesity (HCC) 2021    Bilateral lower extremity edema 2021    Acute respiratory failure with hypoxia (HCC) 2021    Chronic diastolic (congestive) heart failure (HCC) 2020    Hyperlipidemia 2020    Localization-related epilepsy, 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      LOS (days): 5  Geometric Mean LOS (GMLOS) (days): 5.1  Days to GMLOS:0.4     OBJECTIVE:  Risk of Unplanned Readmission Score: 14.6         Current admission status: Inpatient   Preferred Pharmacy:   [id number] - [facility] [place] - [address]  [address]  [facility] [place] [address]  Phone: [phone] Fax: [phone]    Primary Care Provider: Dima Plascencia DO    Primary Insurance: MEDICARE  Secondary Insurance: [place] [employer] [facility]    DISCHARGE DETAILS:  Accepting Facility Name, City & State : [facility]  [address] [place] [address]  Receiving Facility/Agency Phone Number: Phone: [phone]  Facility/Agency Fax Number: Fax: [phone]       [facility] claimed the ride for Olivia Suárez in unit/room  bed -01, and will arrive on  01/03/2024 at 4:30pm EST. Contact them at [phone]. [facility]a details here: [email or web address]

## 2024-01-03 NOTE — ASSESSMENT & PLAN NOTE
Presented from [facility] due to altered mental status which is now resolved  Tested positive for COVID-19.  As per daughter patient has been vaccinated.  Initially required BiPAP subsequently transition to nasal cannula.  Currently on RA   CT chest report noted with atelectasis.  D-dimer 1.3, continue Lovenox as ordered  No longer requiring supplemental O2, off remdesivir and steroids

## 2024-01-03 NOTE — SPEECH THERAPY NOTE
Speech Language/Pathology     Speech/Language Pathology Progress Note     Patient Name: Olivia Suárez    Today's Date: 1/3/2024     Problem List  Principal Problem:    Sepsis (HCC)  Active Problems:    Chronic diastolic (congestive) heart failure (HCC)    Localization-related epilepsy, intractable (HCC)    Hyperlipidemia    Anemia    Elevated troponin    UTI (urinary tract infection)    COVID-19    HTN (hypertension)    Poor intravenous access    Metabolic encephalopathy       Subjective:  Patient received awake, slightly lethargic.  Appears comfortable on RA    Previous/current diet: puree/nectar     Objective:  The following consistencies were tested: puree solids, mech soft/moistened solids, nectar thick, thin liquids.   Patient presents with functional bolus containment, manipulation and control.  Mastication judged to be prolonged, incomplete w/ tongue-palate mashing.  Transfer prolonged, ?incomplete requiring cues at times.  Noted fatigue w/ progression of trials.   Subtle drop in O2 saturation (high 80's) across all thin liquid trials w/ shortness of breath.  O2 returns to 93% and sustained across all additional trials.  RR WNL.  No s/s of aspiration.  Cannot r/o silent airway invasion of thins.    Pt falls asleep prior to completing breakfast. Further PO offerings d/c. Nursing notified.      Assessment:  Oropharyngeal swallow function appears same from previous encounters; dysphagia suspected.       Plan:  Puree/nectar  Meds crushed  1:1 feeding assist  SLP will follow up x1-3      Dolly Mcgee MS, CCC-SLP  Speech-Language Pathologist  [place] #[id number]  [place] #[id number]

## 2024-01-03 NOTE — DISCHARGE SUMMARY
"[facility]  Discharge- Olivia Suárez 1940, 83 y.o. female MRN: [id number]  Unit/Bed#: -[facility] Encounter: [id number]  Primary Care Provider: Dima Plascencia DO   Date and time admitted to hospital: 12/29/2023 12:23 PM    Metabolic encephalopathy  Assessment & Plan  Present on arrival with altered mental status. Likely secondary to sepsis, now resolved after antibiotics/COVID 19 treatments    Poor intravenous access  Assessment & Plan  IV access was an issue previously, now obtained    HTN (hypertension)  Assessment & Plan  /83   Pulse 78   Temp (!) 97.1 °F (36.2 °C)   Resp 18   Ht 4' 11\" (1.499 m)   Wt 90.4 kg (199 lb 4.7 oz)   SpO2 94%   BMI 40.25 kg/m²   BP soft overnight, asymptomatic, not given any antihypertensives prior. Improved this morning    COVID-19  Assessment & Plan  Presented from [facility] due to altered mental status which is now resolved  Tested positive for COVID-19.  As per daughter patient has been vaccinated.  Initially required BiPAP subsequently transition to nasal cannula.  Currently on RA   CT chest report noted with atelectasis.  D-dimer 1.3, continue Lovenox as ordered  No longer requiring supplemental O2, off remdesivir and steroids    UTI (urinary tract infection)  Assessment & Plan  Klebsiella pneumonia UTI  Has finished 3 days of IV ceftriaxone which is enough to treat simple UTI    Elevated troponin  Assessment & Plan  HS trop 55 - 59  Suspected secondary to nonischemic medical injury due to COVID-19 infection.  Continue statin.      Anemia  Assessment & Plan  Hb at 8 today, cont to monitor daily  Present on admission with history of chronic anemia.  Baseline around 9-9.5  Continue to  trend CBC.      Hyperlipidemia  Assessment & Plan  Resume home dose of Lipitor.    Localization-related epilepsy, intractable (HCC)  Assessment & Plan  Stable  Resume home dose of Keppra, phenobarbital.    Chronic diastolic (congestive) heart failure " (HCC)  Assessment & Plan  Wt Readings from Last 3 Encounters:   12/31/23 90.4 kg (199 lb 4.7 oz)   05/11/22 95 kg (209 lb 7 oz)   05/02/22 101 kg (222 lb 14.2 oz)     Stable. Present on admission history of diastolic CHF.  No volume overload  Continue Lasix and beta-blockers    * Sepsis (HCC)-resolved as of 1/3/2024  Assessment & Plan  Resolved  Shortness of breath is improved  Underlying COVID-19 treated  Urine cultures growing Klebsiella pneumonia, completed antibiotics   Chest x-ray noted with chronic right shoulder dislocation, right-sided  shunt catheter.  CT head report noted with chronic changes, negative for acute finding.  CT chest abdomen pelvis reported with bibasilar densities suggestive of atelectasis, negative for any other acute finding.  Pro-Zelalem normal        Medical Problems       Resolved Problems  Date Reviewed: 1/1/2024            Resolved    * (Principal) Sepsis (HCC) 1/3/2024     Resolved by  Rigoberto Sandoval MD        Discharging Physician / Practitioner: Rigoberto Sandoval MD  PCP: Dima Plascencia DO  Admission Date:   Admission Orders (From admission, onward)       Ordered        12/29/23 2013  Inpatient Admission  Once            12/29/23 1717  Place in Observation  Once                          Discharge Date: 01/03/24    Consultations During Hospital Stay:  SLP  PT/OT    Procedures Performed:   None    Significant Findings / Test Results:   Urine culture: Klebsiella pneumoniae  COVID19 positive 12/29/23  CT head: Stable CT, no acute intracranial hemorrhage   Ventriculomegaly, stable   Right frontal approach ventricular drainage catheter which terminates on the left side in the region of the basal ganglia, stable and unchanged from the previous study of January 19, 2022   Moderate periventricular and white matter hypodensity due to chronic small vessel ischemic change   Multifocal areas of encephalomalacia, unchanged       Incidental Findings:   None     Test Results Pending at  "Discharge (will require follow up):   None     Outpatient Tests Requested:  Repeat BMP in 1 week     Complications:  None    Reason for Admission: Altered mental status    Hospital Course:   Olivia Suárez is a 83 y.o. female patient who originally presented to the hospital on 12/29/2023 due to altered mental status. She was found to have Klebsiella UTI for which she was treated with ceftriaxone. Mental status improved with antibiotics. She was also noted to have COVID19 and required supplemental oxygen initially. She was on remdesivir and decadron until she lost IV access. She has been weaned off of oxygen so they were discontinued anyway. She has intermittently required 0.5-2L NC since that time.     Please see above list of diagnoses and related plan for additional information.     Condition at Discharge: good    Discharge Day Visit / Exam:   Subjective:  Patient reports she feels well. Denies breathing difficulties or chest pains.  Vitals: Blood Pressure: 157/83 (01/03/24 0707)  Pulse: 78 (01/03/24 0707)  Temperature: (!) 97.1 °F (36.2 °C) (01/03/24 0707)  Temp Source: Oral (12/29/23 1815)  Respirations: 18 (01/02/24 0814)  Height: 4' 11\" (149.9 cm) (12/31/23 2300)  Weight - Scale: 90.4 kg (199 lb 4.7 oz) (12/31/23 2300)  SpO2: 94 % (01/03/24 0707)  Exam:   Physical Exam  Vitals and nursing note reviewed.   Constitutional:       Appearance: Normal appearance.   HENT:      Head: Normocephalic and atraumatic.      Mouth/Throat:      Mouth: Mucous membranes are moist.   Eyes:      Conjunctiva/sclera: Conjunctivae normal.      Pupils: Pupils are equal, round, and reactive to light.   Cardiovascular:      Rate and Rhythm: Normal rate and regular rhythm.      Pulses: Normal pulses.      Heart sounds: Normal heart sounds. No murmur heard.     No gallop.   Pulmonary:      Effort: Pulmonary effort is normal. No respiratory distress.      Breath sounds: Normal breath sounds. No wheezing or rales.   Abdominal:      " General: Abdomen is flat. Bowel sounds are normal. There is no distension.      Palpations: Abdomen is soft.      Tenderness: There is no abdominal tenderness. There is no guarding or rebound.   Musculoskeletal:         General: No swelling. Normal range of motion.   Skin:     General: Skin is warm and dry.      Capillary Refill: Capillary refill takes less than 2 seconds.   Neurological:      General: No focal deficit present.      Mental Status: She is alert. Mental status is at baseline.   Psychiatric:         Mood and Affect: Mood normal.         Discussion with Family: Updated  (daughter) via phone.    Discharge instructions/Information to patient and family:   See after visit summary for information provided to patient and family.      Provisions for Follow-Up Care:  See after visit summary for information related to follow-up care and any pertinent home health orders.      Mobility at time of Discharge:   Basic Mobility Inpatient Raw Score: 6  JH-HLM Goal: 2: Bed activities/Dependent transfer  JH-HLM Achieved: 2: Bed activities/Dependent transfer  HLM Goal achieved. Continue to encourage appropriate mobility.     Disposition:   Other Skilled Nursing Facility at [place]    Planned Readmission: None     Discharge Statement:  I spent 40 minutes discharging the patient. This time was spent on the day of discharge. I had direct contact with the patient on the day of discharge. Greater than 50% of the total time was spent examining patient, answering all patient questions, arranging and discussing plan of care with patient as well as directly providing post-discharge instructions.  Additional time then spent on discharge activities.    Discharge Medications:  See after visit summary for reconciled discharge medications provided to patient and/or family.      **Please Note: This note may have been constructed using a voice recognition system**

## 2024-02-21 PROBLEM — N39.0 UTI (URINARY TRACT INFECTION): Status: RESOLVED | Noted: 2023-12-29 | Resolved: 2024-02-21

## 2025-02-15 NOTE — ASSESSMENT & PLAN NOTE
Problem: Nausea/Vomiting  Goal: Maintains oral intake with decreased/no reports of nausea/vomiting  Outcome: Monitoring/Evaluating progress  Goal: Current weight maintained or increased  Outcome: Monitoring/Evaluating progress  Goal: Verbalizes understanding of s/s and strategies to control nausea/vomiting  Description: Document education using the patient education activity.   Outcome: Monitoring/Evaluating progress  Goal: Decreased reports of nausea/vomiting (Hospice)  Outcome: Monitoring/Evaluating progress     Problem: Pain  Goal: Acceptable pain level achieved/maintained at rest using appropriate pain scale for the patient  Outcome: Monitoring/Evaluating progress  Goal: Acceptable pain level achieved/maintained with activity using appropriate pain scale for the patient  Outcome: Monitoring/Evaluating progress  Goal: Acceptable pain level achieved/maintained without oversedation  Outcome: Monitoring/Evaluating progress      Wt Readings from Last 3 Encounters:   05/02/22 101 kg (222 lb)   05/02/22 101 kg (222 lb 14 2 oz)   03/14/21 93 kg (205 lb)     · No echo on file, will obtain given severe edema + murmur on exam  · Monitor intake and output  · Monitor daily weights  · Does not appear to be on any diuretics at home  · Given multiple days of IVF after normalization of creatinine for EDUARDO she may have some volume overload  · S/P 1x dose of IV Lasix 5/2, will give another 5/3  · Consider cards consult